# Patient Record
Sex: FEMALE | Race: WHITE | NOT HISPANIC OR LATINO | Employment: OTHER | URBAN - NONMETROPOLITAN AREA
[De-identification: names, ages, dates, MRNs, and addresses within clinical notes are randomized per-mention and may not be internally consistent; named-entity substitution may affect disease eponyms.]

---

## 2017-01-19 ENCOUNTER — OFFICE VISIT (OUTPATIENT)
Dept: GASTROENTEROLOGY | Facility: CLINIC | Age: 57
End: 2017-01-19

## 2017-01-19 VITALS
SYSTOLIC BLOOD PRESSURE: 108 MMHG | HEIGHT: 64 IN | HEART RATE: 90 BPM | BODY MASS INDEX: 44.71 KG/M2 | WEIGHT: 261.9 LBS | DIASTOLIC BLOOD PRESSURE: 70 MMHG

## 2017-01-19 DIAGNOSIS — R11.2 NON-INTRACTABLE VOMITING WITH NAUSEA, UNSPECIFIED VOMITING TYPE: ICD-10-CM

## 2017-01-19 DIAGNOSIS — R10.9 CHRONIC ABDOMINAL PAIN: ICD-10-CM

## 2017-01-19 DIAGNOSIS — K51.818 OTHER ULCERATIVE COLITIS WITH OTHER COMPLICATION (HCC): Primary | ICD-10-CM

## 2017-01-19 DIAGNOSIS — G89.29 CHRONIC ABDOMINAL PAIN: ICD-10-CM

## 2017-01-19 DIAGNOSIS — R19.7 DIARRHEA, UNSPECIFIED TYPE: ICD-10-CM

## 2017-01-19 DIAGNOSIS — D50.9 IRON DEFICIENCY ANEMIA, UNSPECIFIED IRON DEFICIENCY ANEMIA TYPE: ICD-10-CM

## 2017-01-19 LAB
ALBUMIN SERPL-MCNC: 3.3 GM/DL (ref 3.4–4.8)
ALP SERPL-CCNC: 63 U/L (ref 38–126)
ALT SERPL-CCNC: 37 U/L (ref 9–52)
ANION GAP SERPL CALCULATED.3IONS-SCNC: 12 MMOL/L (ref 5–15)
AST SERPL-CCNC: 20 U/L (ref 14–36)
BILIRUB SERPL-MCNC: 0.5 MG/DL (ref 0.2–1.3)
BUN SERPL-MCNC: 17 MG/DL (ref 7–21)
CALCIUM SERPL-MCNC: 8.8 MG/DL (ref 8.4–10.2)
CHLORIDE SERPL-SCNC: 98 MMOL/L (ref 95–110)
CO2 SERPL-SCNC: 26 MMOL/L (ref 22–31)
CREAT SERPL-MCNC: 0.8 MG/DL (ref 0.5–1)
CRP SERPL-MCNC: 2.6 MG/DL (ref 0–1)
FERRITIN SERPL IA-MCNC: 63.6 NG/ML (ref 11.1–264)
GLUCOSE SERPL-MCNC: 120 MG/DL (ref 60–100)
IRON SATN MFR SERPL: 12.4 % (ref 15–50)
IRON SERPL-MCNC: 33 UG/DL (ref 37–170)
POTASSIUM SERPL-SCNC: 4.5 MMOL/L (ref 3.5–5.1)
PROT SERPL-MCNC: 6.8 GM/DL (ref 6.3–8.6)
SODIUM SERPL-SCNC: 136 MMOL/L (ref 137–145)
TIBC SERPL-MCNC: 266 UG/DL (ref 265–497)

## 2017-01-19 PROCEDURE — 99214 OFFICE O/P EST MOD 30 MIN: CPT | Performed by: PHYSICIAN ASSISTANT

## 2017-01-19 RX ORDER — METRONIDAZOLE 500 MG/1
500 TABLET ORAL 4 TIMES DAILY
COMMUNITY
End: 2017-01-26

## 2017-01-19 RX ORDER — PREDNISONE 20 MG/1
20 TABLET ORAL
COMMUNITY
End: 2017-03-06

## 2017-01-19 NOTE — MR AVS SNAPSHOT
Tiffani Serra   1/19/2017 2:00 PM   Office Visit    Dept Phone:  [phone]   Encounter #:  [id number]    Provider:  Joao Arnett PA-C   Department:  [facility] MEDICAL [facility] GASTROENTEROLOGY                Your Full Care Plan              Your Updated Medication List          This list is accurate as of: 1/19/17  2:26 PM.  Always use your most recent med list.                CALTRATE GUMMY BITES PO       ferrous sulfate 325 (65 FE) MG tablet       hyoscyamine 0.125 MG SL tablet   Commonly known as:  LEVSIN       mesalamine 0.375 G 24 hr capsule   Commonly known as:  APRISO       metroNIDAZOLE 500 MG tablet   Commonly known as:  FLAGYL       predniSONE 20 MG tablet   Commonly known as:  DELTASONE               We Performed the Following     C-reactive Protein     C.Difficile Cytotoxin Antibody,Neut.     Calprotectin, Fecal     CBC Auto Differential     Comprehensive Metabolic Panel     CT Abdomen Pelvis With Contrast     Ferritin     Iron and TIBC       You Were Diagnosed With        Codes Comments    Other ulcerative colitis with other complication    -  Primary ICD-10-CM: K51.818  ICD-9-CM: 556.8     Chronic abdominal pain     ICD-10-CM: R10.9, G89.29  ICD-9-CM: 789.00, 338.29     Diarrhea, unspecified type     ICD-10-CM: R19.7  ICD-9-CM: 787.91     Iron deficiency anemia, unspecified iron deficiency anemia type     ICD-10-CM: D50.9  ICD-9-CM: 280.9     Non-intractable vomiting with nausea, unspecified vomiting type     ICD-10-CM: R11.2  ICD-9-CM: 787.01       Instructions     None    Patient Instructions History      Upcoming Appointments     Visit Type Date Time Department    OFFICE VISIT 1/19/2017  2:00 PM [facility] [place] [facility]    OFFICE VISIT 2/8/2017  9:15 AM [facility] [place] [facility]      MyChart Signup     [facility] allows you to send messages to your doctor, view your test results, renew your prescriptions, schedule appointments, and more. To sign up, go to  "[email or web address] and click on the Sign Up Now link in the New User? box. Enter your [employer] Activation Code exactly as it appears below along with the last four digits of your Social Security Number and your Date of Birth () to complete the sign-up process. If you do not sign up before the expiration date, you must request a new code.    [employer] Activation Code: [email or web address]  Expires: 2017  2:25 PM    If you have questions, you can email [email or web address] or call [phone] to talk to our [employer] staff. Remember, [employer] is NOT to be used for urgent needs. For medical emergencies, dial 911.               Other Info from Your Visit           Your Appointments     2017  9:15 AM CST   Office Visit with Joao Arnett PA-C   [facility] MEDICAL [facility] GASTROENTEROLOGY (--)    [address] [place] [address] Dr  [facility] [address] [place] [id number] [phone]           Arrive 15 minutes prior to appointment.              Allergies     Tape      Silk tape    Keflex [Cephalexin]  Rash    Penicillins  Rash    nylon      Reason for Visit     Ulcerative Colitis     Diarrhea     Abdominal Pain           Vital Signs     Blood Pressure Pulse Height Weight Body Mass Index Smoking Status    108/70 (BP Location: Left arm) 90 64\" (162.6 cm) 261 lb 14.4 oz (119 kg) 44.96 kg/m2 Never Smoker      Problems and Diagnoses Noted     Other ulcerative colitis with other complication    -  Primary    Chronic abdominal pain        Diarrhea        Iron deficiency anemia        Non-intractable vomiting with nausea, unspecified vomiting type            "

## 2017-01-19 NOTE — PROGRESS NOTES
Chief Complaint   Patient presents with   • Ulcerative Colitis   • Diarrhea   • Abdominal Pain       Subjective    Tiffani Serra is a 56 y.o. female. she is here today for follow-up.    History of Present Illness    Patient seen on a recheck of her ulcerative colitis, diarrhea, abdominal pain.  Last seen 8/15/16.  She has not kept her follow-up until today.  She had called stating she was ill and we try to get her in to the office but she was hospitalized from 1/3-6/17.  Initial labs showed normal amylase lipase, CMP.  CBC showed white count of 14.7.  CRP was 21.3.  She had GI pathogen panel was negative, she had a negative C. difficile.  A CT scan abdomen pelvis with contrast was unremarkable with a long segment in the mid transverse colon to sigmoid colon inflamed.  Laboratories from the day of discharge CMP showed glucose 126, protein 5.4, albumin 2.7, otherwise normal.  CBC showed a hemoglobin of 10.8, hematocrit 32.2.  She was discharged on Levaquin, Flagyl, prednisone, Pentasa.  She will finish the Flagyl today.  She is on 40 mg of prednisone.  She resumed her Apriso.  She completed the Levaquin.    Patient states she started having itching in her groin for several days in August, [place] got more red and inflamed, she had a boil, it was painful, seem to be getting worse was draining blood and purulent material, before [place] she had irritation around her rectum.  She's had fever.  She's had a lot of belching.  Nausea and vomiting.  She complains of 8 out of 10 left lower quadrant pain.  She has variable bowel movements.  Some day she is passing a lot of blood, Sunday she may have a normal formed stool.  She has urgent bowel movements after she eats.  Her weight is down 11 pounds since last visit.  Last colonoscopy on 6/15/16 showed colitis of the rectum, colon polyps.    A/P: Nausea, vomiting, abdominal pain, blood in stool, weight loss, presumed flare of her ulcerative colitis, suspect recurrent C.  difficile.  I recommended stool studies.  We'll also repeat laboratories, I recommended CT scan abdomen pelvis.  May need to consider repeat endoscopy.  We'll see her back in one week, further pending clinical course and the results of the above.     The following portions of the patient's history were reviewed and updated as appropriate:   Past Medical History   Diagnosis Date   • Abdominal pain    • Abscess of labia    • Acute posthemorrhagic anemia 01/08/2015   • Anemia      history of, mild   • Anemia due to blood loss 11/20/2014   • Contact dermatitis 01/30/2013     on labia and surrounding regions   • Cystitis      recurrent   • Diarrhea 04/11/2016   • Foot pain      porokeratoma causin metatarsalgia, right foot   • Generalized abdominal pain 02/23/2015   • Hypercholesterolemia    • Hypertensive disorder    • Infection of skin and subcutaneous tissue 01/30/2013   • Iron deficiency anemia 04/11/2016     improving   • Irritable bowel syndrome    • Left sided ulcerative colitis 10/08/2015   • Malaise and fatigue 11/02/2011   • Obesity    • Pseudomembranous enterocolitis 11/02/2012   • Rectal hemorrhage 07/01/2015   • Scapulalgia 10/24/2014   • Sialoadenitis 07/26/2013   • Ulcerative colitis 04/11/2016     chronic, for 29 years.  flare   • Urinary tract infectious disease 07/14/2012   • Vitamin D deficiency 05/14/2012     Past Surgical History   Procedure Laterality Date   • Lymph node biopsy  05/12/2009     [place] lymph node biopsy, superficial and deep, within the right groin involving superficial femoral nodes and also the external iliac nodes. Melanoma of the right leg   • Bladder surgery  2001     bladder tacking x 2   • Colonoscopy  07/25/2011     Colitis found in rectum & sigmoid colon. Biopsy taken   • Colonoscopy  06/15/2016     Erythematous mucosa in the rectum.Biopsied.One polyp in the transverse colon. Biopsied   • Colonoscopy  08/15/2008     Colitis of the rectum, the sigmoid and the transverse  colon. Multiple biopsies were obtained from the rectum, the sigmoid and the transverse colon. Multiple biopsies were obtained from the cecum, the transverse colon, sigmoid & rectum   • Knee arthroscopy  02/21/2005     Tears of the meidal and lateral meniscus and osteoarthritis of the knee. Arthroscopic medial and lateral meniscectomies of the right knee with chondroplasty of the medial, lateral femoral condyles and patella   • Excision lesion  10/17/1969     removal of sebaceous cyst of neck   • Wrist ganglion excision  10/17/1969     left wrist   • Total abdominal hysterectomy with salpingo oophorectomy  2001   • Total knee arthroplasty  08/08/2007     severe osteoarthritis of the left knee.     • Joint replacement Left      Family History   Problem Relation Age of Onset   • Coronary artery disease Other    • Hypertension Other      OB History     No data available        Allergies   Allergen Reactions   • Tape      Silk tape   • Keflex [Cephalexin] Rash   • Penicillins Rash     nylon     Social History     Social History   • Marital status:      Spouse name: N/A   • Number of children: N/A   • Years of education: N/A     Social History Main Topics   • Smoking status: Never Smoker   • Smokeless tobacco: Never Used   • Alcohol use No   • Drug use: No   • Sexual activity: Defer     Other Topics Concern   • None     Social History Narrative       Current Outpatient Prescriptions:   •  Ca Phosphate-Cholecalciferol (CALTRATE GUMMY BITES PO), Take  by mouth Daily., Disp: , Rfl:   •  ferrous sulfate 325 (65 FE) MG tablet, Take 325 mg by mouth daily with breakfast., Disp: , Rfl:   •  hyoscyamine (LEVSIN) 0.125 MG SL tablet, Take 0.125 mg by mouth every 4 (four) hours as needed for cramping., Disp: , Rfl:   •  mesalamine (APRISO) 0.375 G 24 hr capsule, Take 375 mg by mouth Daily. 4 CAPS DAILY, Disp: , Rfl:   •  metroNIDAZOLE (FLAGYL) 500 MG tablet, Take 500 mg by mouth 4 (Four) Times a Day., Disp: , Rfl:   •   "predniSONE (DELTASONE) 20 MG tablet, Take 20 mg by mouth. 2 daily, Disp: , Rfl:   Review of Systems  Review of Systems       Objective      Visit Vitals   • /70 (BP Location: Left arm)   • Pulse 90   • Ht 64\" (162.6 cm)   • Wt 261 lb 14.4 oz (119 kg)   • BMI 44.96 kg/m2     Physical Exam   Constitutional: She is oriented to person, place, and time. She appears well-developed and well-nourished. She appears distressed.   Ill appearing   HENT:   Head: Normocephalic and atraumatic.   Eyes: EOM are normal. Pupils are equal, round, and reactive to light.   Neck: Normal range of motion.   Cardiovascular: Normal rate, regular rhythm and normal heart sounds.    Pulmonary/Chest: Effort normal and breath sounds normal.   Abdominal: Soft. She exhibits distension. She exhibits no shifting dullness, no abdominal bruit, no ascites and no mass. Bowel sounds are decreased. There is no hepatosplenomegaly. There is tenderness in the periumbilical area. There is no rigidity, no rebound, no guarding and no CVA tenderness. No hernia. Hernia confirmed negative in the ventral area.   Obese, moderate diffuse   Musculoskeletal: Normal range of motion.   Neurological: She is alert and oriented to person, place, and time.   Skin: Skin is warm. She is diaphoretic.   Psychiatric: She has a normal mood and affect. Her behavior is normal. Judgment and thought content normal.   Nursing note and vitals reviewed.    Admission on 01/03/2017, Discharged on 01/06/2017   Component Date Value Ref Range Status   • Color, UA 01/03/2017 ORANGE* YELLOW,Yellow Final   • Appearance 01/03/2017 TURBID* CLEAR,Clear Final   • Specific Gravity, UA 01/03/2017 1.025  1.003 - 1.030 Final   • pH, UA 01/03/2017 6.0* 5.0 - 9.0 pH Units Final    pH Normal: 5.0 - 9.0    • Leukocytes, UA 01/03/2017 NEGATIVE  Negative,NEGATIVE Final   • Nitrite, UA 01/03/2017 NEGATIVE  Negative,NEGATIVE Final   • Protein, UA 01/03/2017 1+* Negative,NEGATIVE Final   • Glucose, Urine " 01/03/2017 NEGATIVE  Negative,NEGATIVE mg/dl Final   • Ketones, UA 01/03/2017 2+* Negative,NEGATIVE Final   • Urobilinogen, UA 01/03/2017 0.2  0.2 EU/dl Final   • Blood, UA 01/03/2017 NEGATIVE  NEGATIVE Final   • WBC 01/03/2017 14.7* 3.2 - 9.8 x1000/uL Final   • RBC 01/03/2017 4.04  3.77 - 5.16 veena/mm3 Final   • Hemoglobin 01/03/2017 12.4  12.0 - 15.5 gm/dl Final   • Hematocrit 01/03/2017 36.6  35.0 - 45.0 % Final   • MCV 01/03/2017 90.6  80.0 - 98.0 fl Final   • MCH 01/03/2017 30.7  26.0 - 34.0 pg Final   • MCHC 01/03/2017 33.9  31.4 - 36.0 gm/dl Final   • RDW 01/03/2017 12.0  11.5 - 14.5 % Final   • Platelets 01/03/2017 275  150 - 450 x1000/mm3 Final   • MPV 01/03/2017 8.5  8.0 - 12.0 fl Final   • Neutrophil Rel % 01/03/2017 76.0  37.0 - 80.0 % Final   • Lymphocyte Rel % 01/03/2017 11.3  10.0 - 50.0 % Final   • Monocyte Rel % 01/03/2017 7.6  0.0 - 12.0 % Final   • Eosinophil Rel % 01/03/2017 3.7  0.0 - 7.0 % Final   • Basophil Rel % 01/03/2017 0.4  0.0 - 2.0 % Final   • Immature Granulocyte Rel % 01/03/2017 1.00* 0.00 - 0.50 % Final   • Neutrophils Absolute 01/03/2017 11.14* 2.00 - 8.60 x1000/uL Final   • Lymphocytes Absolute 01/03/2017 1.65  0.60 - 4.20 x1000/uL Final   • Monocytes Absolute 01/03/2017 1.11* 0.00 - 0.90 x1000/uL Final   • Eosinophils Absolute 01/03/2017 0.54  0.00 - 0.70 x1000/uL Final   • Basophils Absolute 01/03/2017 0.06  0.00 - 0.20 x1000/uL Final   • Immature Granulocytes Absolute 01/03/2017 0.150* 0.005 - 0.022 x1000/uL Final   • Lipase 01/03/2017 46  23 - 300 U/L Final   • Amylase 01/03/2017 43* 50 - 130 U/L Final   • Sodium 01/03/2017 140  137 - 145 mmol/L Final   • Potassium 01/03/2017 3.6  3.5 - 5.1 mmol/L Final   • Chloride 01/03/2017 99  95 - 110 mmol/L Final   • CO2 01/03/2017 29  22 - 31 mmol/L Final   • Anion Gap 01/03/2017 12.0  5.0 - 15.0 mmol/L Final   • Glucose 01/03/2017 94  60 - 100 mg/dl Final   • BUN 01/03/2017 12  7 - 21 mg/dl Final   • Creatinine 01/03/2017 0.7  0.5 - 1.0  mg/dl Final   • GFR MDRD Non  01/03/2017 86  51 - 120 mL/min/1.73 sq.M Final    Comment: Invalid if creatinine is changing or the patient is on dialysis. Use AA  result if patient is -American, non AA result otherwise.     • GFR MDRD  01/03/2017 104  51 - 120 mL/min/1.73 sq.M Final   • Calcium 01/03/2017 8.8  8.4 - 10.2 mg/dl Final   • Total Protein 01/03/2017 7.3  6.3 - 8.6 gm/dl Final   • Albumin 01/03/2017 3.7  3.4 - 4.8 gm/dl Final   • Total Bilirubin 01/03/2017 0.7  0.2 - 1.3 mg/dl Final   • Alkaline Phosphatase 01/03/2017 109  38 - 126 U/L Final   • AST (SGOT) 01/03/2017 24  14 - 36 U/L Final   • ALT (SGPT) 01/03/2017 32  9 - 52 U/L Final   • C-Reactive Protein 01/03/2017 21.3* 0.0 - 1.0 mg/dl Final   • Prealbumin 01/03/2017 11.1* 17.6 - 36.0 mg/dl Final   • Sodium 01/04/2017 138  137 - 145 mmol/L Final   • Potassium 01/04/2017 4.4  3.5 - 5.1 mmol/L Final   • Chloride 01/04/2017 104  95 - 110 mmol/L Final   • CO2 01/04/2017 23  22 - 31 mmol/L Final   • Anion Gap 01/04/2017 11.0  5.0 - 15.0 mmol/L Final   • Glucose 01/04/2017 126* 60 - 100 mg/dl Final   • BUN 01/04/2017 12  7 - 21 mg/dl Final   • Creatinine 01/04/2017 0.6  0.5 - 1.0 mg/dl Final   • GFR MDRD Non  01/04/2017 103  51 - 120 mL/min/1.73 sq.M Final    Comment: Invalid if creatinine is changing or the patient is on dialysis. Use AA  result if patient is -American, non AA result otherwise.     • GFR MDRD  01/04/2017 125* 51 - 120 mL/min/1.73 sq.M Final   • Calcium 01/04/2017 8.3* 8.4 - 10.2 mg/dl Final   • Total Protein 01/04/2017 5.6* 6.3 - 8.6 gm/dl Final   • Albumin 01/04/2017 2.8* 3.4 - 4.8 gm/dl Final   • Total Bilirubin 01/04/2017 0.6  0.2 - 1.3 mg/dl Final   • Alkaline Phosphatase 01/04/2017 95  38 - 126 U/L Final   • AST (SGOT) 01/04/2017 11* 14 - 36 U/L Final   • ALT (SGPT) 01/04/2017 26  9 - 52 U/L Final   • WBC 01/04/2017 13.2* 3.2 - 9.8 x1000/uL Final   • RBC  01/04/2017 3.64* 3.77 - 5.16 veena/mm3 Final   • Hemoglobin 01/04/2017 11.1* 12.0 - 15.5 gm/dl Final   • Hematocrit 01/04/2017 32.7* 35.0 - 45.0 % Final   • MCV 01/04/2017 89.8  80.0 - 98.0 fl Final   • MCH 01/04/2017 30.5  26.0 - 34.0 pg Final   • MCHC 01/04/2017 33.9  31.4 - 36.0 gm/dl Final   • RDW 01/04/2017 11.9  11.5 - 14.5 % Final   • Platelets 01/04/2017 231  150 - 450 x1000/mm3 Final   • MPV 01/04/2017 8.8  8.0 - 12.0 fl Final   • Neutrophil Rel % 01/04/2017 91.8* 37.0 - 80.0 % Final   • Lymphocyte Rel % 01/04/2017 6.0* 10.0 - 50.0 % Final   • Monocyte Rel % 01/04/2017 1.1  0.0 - 12.0 % Final   • Eosinophil Rel % 01/04/2017 0.2  0.0 - 7.0 % Final   • Basophil Rel % 01/04/2017 0.2  0.0 - 2.0 % Final   • Immature Granulocyte Rel % 01/04/2017 0.70* 0.00 - 0.50 % Final   • Neutrophils Absolute 01/04/2017 12.15* 2.00 - 8.60 x1000/uL Final   • Lymphocytes Absolute 01/04/2017 0.80  0.60 - 4.20 x1000/uL Final   • Monocytes Absolute 01/04/2017 0.15  0.00 - 0.90 x1000/uL Final   • Eosinophils Absolute 01/04/2017 0.02  0.00 - 0.70 x1000/uL Final   • Basophils Absolute 01/04/2017 0.02  0.00 - 0.20 x1000/uL Final   • Immature Granulocytes Absolute 01/04/2017 0.090* 0.005 - 0.022 x1000/uL Final   • GI Panel 01/05/2017 Negative  Negative Final    Testing performed by multiplex PCR.   • C difficile Toxins A+B, EIA 01/05/2017 Negative  Negative,N/A - Formed stool Final   • WBC 01/05/2017 9.9* 3.2 - 9.8 x1000/uL Final   • RBC 01/05/2017 3.67* 3.77 - 5.16 veena/mm3 Final   • Hemoglobin 01/05/2017 11.1* 12.0 - 15.5 gm/dl Final   • Hematocrit 01/05/2017 33.1* 35.0 - 45.0 % Final   • MCV 01/05/2017 90.2  80.0 - 98.0 fl Final   • MCH 01/05/2017 30.2  26.0 - 34.0 pg Final   • MCHC 01/05/2017 33.5  31.4 - 36.0 gm/dl Final   • RDW 01/05/2017 12.0  11.5 - 14.5 % Final   • Platelets 01/05/2017 277  150 - 450 x1000/mm3 Final   • MPV 01/05/2017 8.8  8.0 - 12.0 fl Final   • Neutrophil Rel % 01/05/2017 79.8  37.0 - 80.0 % Final   •  Lymphocyte Rel % 01/05/2017 8.6* 10.0 - 50.0 % Final   • Monocyte Rel % 01/05/2017 9.9  0.0 - 12.0 % Final   • Eosinophil Rel % 01/05/2017 0.1  0.0 - 7.0 % Final   • Basophil Rel % 01/05/2017 0.1  0.0 - 2.0 % Final   • Immature Granulocyte Rel % 01/05/2017 1.50* 0.00 - 0.50 % Final   • Neutrophils Absolute 01/05/2017 7.93  2.00 - 8.60 x1000/uL Final   • Lymphocytes Absolute 01/05/2017 0.85  0.60 - 4.20 x1000/uL Final   • Monocytes Absolute 01/05/2017 0.98* 0.00 - 0.90 x1000/uL Final   • Eosinophils Absolute 01/05/2017 0.01  0.00 - 0.70 x1000/uL Final   • Basophils Absolute 01/05/2017 0.01  0.00 - 0.20 x1000/uL Final   • Immature Granulocytes Absolute 01/05/2017 0.150* 0.005 - 0.022 x1000/uL Final   • Sodium 01/05/2017 141  137 - 145 mmol/L Final   • Potassium 01/05/2017 3.9  3.5 - 5.1 mmol/L Final   • Chloride 01/05/2017 106  95 - 110 mmol/L Final   • CO2 01/05/2017 26  22 - 31 mmol/L Final   • Anion Gap 01/05/2017 9.0  5.0 - 15.0 mmol/L Final   • Glucose 01/05/2017 116* 60 - 100 mg/dl Final   • BUN 01/05/2017 14  7 - 21 mg/dl Final   • Creatinine 01/05/2017 0.6  0.5 - 1.0 mg/dl Final   • GFR MDRD Non  01/05/2017 103  51 - 120 mL/min/1.73 sq.M Final    Comment: Invalid if creatinine is changing or the patient is on dialysis. Use AA  result if patient is -American, non AA result otherwise.     • GFR MDRD  01/05/2017 125* 51 - 120 mL/min/1.73 sq.M Final   • Calcium 01/05/2017 8.6  8.4 - 10.2 mg/dl Final   • Total Protein 01/05/2017 5.8* 6.3 - 8.6 gm/dl Final   • Albumin 01/05/2017 2.9* 3.4 - 4.8 gm/dl Final   • Total Bilirubin 01/05/2017 0.3  0.2 - 1.3 mg/dl Final   • Alkaline Phosphatase 01/05/2017 87  38 - 126 U/L Final   • AST (SGOT) 01/05/2017 11* 14 - 36 U/L Final   • ALT (SGPT) 01/05/2017 28  9 - 52 U/L Final   • WBC 01/06/2017 8.6  3.2 - 9.8 x1000/uL Final   • RBC 01/06/2017 3.60* 3.77 - 5.16 veena/mm3 Final   • Hemoglobin 01/06/2017 10.8* 12.0 - 15.5 gm/dl Final   •  Hematocrit 01/06/2017 32.2* 35.0 - 45.0 % Final   • MCV 01/06/2017 89.4  80.0 - 98.0 fl Final   • MCH 01/06/2017 30.0  26.0 - 34.0 pg Final   • MCHC 01/06/2017 33.5  31.4 - 36.0 gm/dl Final   • RDW 01/06/2017 12.6  11.5 - 14.5 % Final   • Platelets 01/06/2017 247  150 - 450 x1000/mm3 Final   • MPV 01/06/2017 8.7  8.0 - 12.0 fl Final   • Neutrophil Rel % 01/06/2017 75.1  37.0 - 80.0 % Final   • Lymphocyte Rel % 01/06/2017 12.4  10.0 - 50.0 % Final   • Monocyte Rel % 01/06/2017 5.8  0.0 - 12.0 % Final   • Eosinophil Rel % 01/06/2017 0.1  0.0 - 7.0 % Final   • Basophil Rel % 01/06/2017 0.6  0.0 - 2.0 % Final   • Immature Granulocyte Rel % 01/06/2017 6.00* 0.00 - 0.50 % Final   • Neutrophils Absolute 01/06/2017 6.44  2.00 - 8.60 x1000/uL Final   • Lymphocytes Absolute 01/06/2017 1.06  0.60 - 4.20 x1000/uL Final   • Monocytes Absolute 01/06/2017 0.50  0.00 - 0.90 x1000/uL Final   • Eosinophils Absolute 01/06/2017 0.01  0.00 - 0.70 x1000/uL Final   • Basophils Absolute 01/06/2017 0.05  0.00 - 0.20 x1000/uL Final   • Immature Granulocytes Absolute 01/06/2017 0.510* 0.005 - 0.022 x1000/uL Final   • nRBC 01/06/2017 0.0  0.0 - 0.2 % Final   • nRBC 01/06/2017 0.000  x1000/uL Final   • Neutrophil Rel % 01/06/2017 61  37 - 80 % Final   • Bands Rel %  01/06/2017 1* 3 - 5 % Final   • Lymphocyte Rel % 01/06/2017 24  10 - 50 % Final   • Monocyte Rel % 01/06/2017 10  0 - 12 % Final   • Eosinophil Rel % 01/06/2017 1  0 - 7 % Final   • Metamyelocyte % 01/06/2017 2* 0 - 0 % Final   • Myelocyte Rel % 01/06/2017 1* 0 - 0 % Final   • RBC Morphology 01/06/2017 Normocytic Normochromic   Final   • Platelet Estimate 01/06/2017 Normal   Final   • Total Counted 01/06/2017 100   Final   • Sodium 01/06/2017 139  137 - 145 mmol/L Final   • Potassium 01/06/2017 4.0  3.5 - 5.1 mmol/L Final   • Chloride 01/06/2017 107  95 - 110 mmol/L Final   • CO2 01/06/2017 23  22 - 31 mmol/L Final   • Anion Gap 01/06/2017 9.0  5.0 - 15.0 mmol/L Final   • Glucose  01/06/2017 126* 60 - 100 mg/dl Final   • BUN 01/06/2017 15  7 - 21 mg/dl Final   • Creatinine 01/06/2017 0.6  0.5 - 1.0 mg/dl Final   • GFR MDRD Non  01/06/2017 103  51 - 120 mL/min/1.73 sq.M Final    Comment: Invalid if creatinine is changing or the patient is on dialysis. Use AA  result if patient is -American, non AA result otherwise.     • GFR MDRD  01/06/2017 125* 51 - 120 mL/min/1.73 sq.M Final   • Calcium 01/06/2017 8.5  8.4 - 10.2 mg/dl Final   • Total Protein 01/06/2017 5.4* 6.3 - 8.6 gm/dl Final   • Albumin 01/06/2017 2.7* 3.4 - 4.8 gm/dl Final   • Total Bilirubin 01/06/2017 0.3  0.2 - 1.3 mg/dl Final   • Alkaline Phosphatase 01/06/2017 70  38 - 126 U/L Final   • AST (SGOT) 01/06/2017 10* 14 - 36 U/L Final   • ALT (SGPT) 01/06/2017 28  9 - 52 U/L Final     Assessment/Plan      1. Other ulcerative colitis with other complication    2. Chronic abdominal pain    3. Diarrhea, unspecified type    4. Iron deficiency anemia, unspecified iron deficiency anemia type    5. Non-intractable vomiting with nausea, unspecified vomiting type    .   Tiffani was seen today for ulcerative colitis, diarrhea and abdominal pain.    Diagnoses and all orders for this visit:    Other ulcerative colitis with other complication  -     Calprotectin, Fecal  -     C.Difficile Cytotoxin Antibody,Neut.  -     Comprehensive Metabolic Panel  -     CBC Auto Differential  -     C-reactive Protein  -     CT Abdomen Pelvis With Contrast    Chronic abdominal pain  -     Comprehensive Metabolic Panel  -     CBC Auto Differential  -     C-reactive Protein  -     CT Abdomen Pelvis With Contrast    Diarrhea, unspecified type  -     Calprotectin, Fecal  -     C.Difficile Cytotoxin Antibody,Neut.  -     Comprehensive Metabolic Panel  -     CBC Auto Differential  -     C-reactive Protein  -     CT Abdomen Pelvis With Contrast    Iron deficiency anemia, unspecified iron deficiency anemia type  -     Comprehensive  Metabolic Panel  -     CBC Auto Differential  -     C-reactive Protein  -     Iron and TIBC  -     Ferritin  -     CT Abdomen Pelvis With Contrast    Non-intractable vomiting with nausea, unspecified vomiting type        Orders placed during this encounter include:  Orders Placed This Encounter   Procedures   • CT Abdomen Pelvis With Contrast     Order Specific Question:   Reason for Exam:     Answer:   N/V/D, BABAR, blood stool     Order Specific Question:   Is the patient pregnant?     Answer:   No   • Calprotectin, Fecal   • C.Difficile Cytotoxin Antibody,Neut.   • Comprehensive Metabolic Panel   • CBC Auto Differential   • C-reactive Protein   • Iron and TIBC   • Ferritin       Medications prescribed:  No orders of the defined types were placed in this encounter.      Requested Prescriptions      No prescriptions requested or ordered in this encounter       Review and/or summary of lab tests, radiology, procedures, medications. Review and summary of old records and obtaining of history. The risks and benefits of my recommendations, as well as other treatment options were discussed with the patient today. Questions were answered.    Follow-up: Return in about 1 week (around 1/26/2017), or if symptoms worsen or fail to improve.           This document has been electronically signed by Joao Arnett PA-C on January 25, 2017 5:43 PM      Results for orders placed or performed during the hospital encounter of 01/19/17   Clostridium Difficile Toxin, PCR   Result Value Ref Range    Toxogenic C. difficile Negative Negative    O27 Negative Negative   CBC & Differential   Result Value Ref Range    WBC 8.8 3.2 - 9.8 x1000/uL    RBC 3.97 3.77 - 5.16 veena/mm3    Hemoglobin 11.9 (L) 12.0 - 15.5 gm/dl    Hematocrit 36.4 35.0 - 45.0 %    MCV 91.7 80.0 - 98.0 fl    MCH 30.0 26.0 - 34.0 pg    MCHC 32.7 31.4 - 36.0 gm/dl    RDW 13.4 11.5 - 14.5 %    Platelets 324 150 - 450 x1000/mm3    MPV 8.0 8.0 - 12.0 fl    Neutrophil Rel %  79.8 37.0 - 80.0 %    Lymphocyte Rel % 10.3 10.0 - 50.0 %    Monocyte Rel % 8.9 0.0 - 12.0 %    Eosinophil Rel % 0.1 0.0 - 7.0 %    Basophil Rel % 0.1 0.0 - 2.0 %    Immature Granulocyte Rel % 0.80 (H) 0.00 - 0.50 %    Neutrophils Absolute 7.04 2.00 - 8.60 x1000/uL    Lymphocytes Absolute 0.91 0.60 - 4.20 x1000/uL    Monocytes Absolute 0.79 0.00 - 0.90 x1000/uL    Eosinophils Absolute 0.01 0.00 - 0.70 x1000/uL    Basophils Absolute 0.01 0.00 - 0.20 x1000/uL    Immature Granulocytes Absolute 0.070 (H) 0.005 - 0.022 x1000/uL   Results for orders placed or performed in visit on 01/19/17   Calprotectin, Fecal   Result Value Ref Range    Calprotectin, Fecal 298 (H) 0 - 120 ug/g   Iron and TIBC   Result Value Ref Range    Iron 33 (L) 37 - 170 ug/dl    TIBC 266 265 - 497 ug/dl    Iron Saturation 12.4 (L) 15.0 - 50.0 %   C-reactive Protein   Result Value Ref Range    C-Reactive Protein 2.6 (H) 0.0 - 1.0 mg/dl   Ferritin   Result Value Ref Range    Ferritin 63.6 11.1 - 264.0 ng/ml   Comprehensive Metabolic Panel   Result Value Ref Range    Sodium 136 (L) 137 - 145 mmol/L    Potassium 4.5 3.5 - 5.1 mmol/L    Chloride 98 95 - 110 mmol/L    CO2 26 22 - 31 mmol/L    Anion Gap 12.0 5.0 - 15.0 mmol/L    Glucose 120 (H) 60 - 100 mg/dl    BUN 17 7 - 21 mg/dl    Creatinine 0.8 0.5 - 1.0 mg/dl    GFR MDRD Non  74 51 - 120 mL/min/1.73 sq.M    GFR MDRD  89 51 - 120 mL/min/1.73 sq.M    Calcium 8.8 8.4 - 10.2 mg/dl    Total Protein 6.8 6.3 - 8.6 gm/dl    Albumin 3.3 (L) 3.4 - 4.8 gm/dl    Total Bilirubin 0.5 0.2 - 1.3 mg/dl    Alkaline Phosphatase 63 38 - 126 U/L    AST (SGOT) 20 14 - 36 U/L    ALT (SGPT) 37 9 - 52 U/L   Results for orders placed or performed during the hospital encounter of 01/03/17   Gastrointestinal Panel PCR   Result Value Ref Range    GI Panel Negative Negative    C difficile Toxins A+B, EIA Negative Negative,N/A - Formed stool   Manual Differential   Result Value Ref Range     Neutrophil Rel % 61 37 - 80 %    Bands Rel %  1 (L) 3 - 5 %    Lymphocyte Rel % 24 10 - 50 %    Monocyte Rel % 10 0 - 12 %    Eosinophil Rel % 1 0 - 7 %    Metamyelocyte % 2 (H) 0 - 0 %    Myelocyte Rel % 1 (H) 0 - 0 %    RBC Morphology Normocytic Normochromic     Platelet Estimate Normal     Total Counted 100    Urinalysis   Result Value Ref Range    Color, UA ORANGE (H) YELLOW,Yellow    Appearance TURBID (H) CLEAR,Clear    Specific [place], UA 1.025 1.003 - 1.030    pH, UA 6.0 (H) 5.0 - 9.0 pH Units    Leukocytes, UA NEGATIVE Negative,NEGATIVE    Nitrite, UA NEGATIVE Negative,NEGATIVE    Protein, UA 1+ (H) Negative,NEGATIVE    Glucose, Urine NEGATIVE Negative,NEGATIVE mg/dl    Ketones, UA 2+ (H) Negative,NEGATIVE    Urobilinogen, UA 0.2 0.2 EU/dl    Blood, UA NEGATIVE NEGATIVE   CBC & Differential   Result Value Ref Range    WBC 8.6 3.2 - 9.8 x1000/uL    RBC 3.60 (L) 3.77 - 5.16 veena/mm3    Hemoglobin 10.8 (L) 12.0 - 15.5 gm/dl    Hematocrit 32.2 (L) 35.0 - 45.0 %    MCV 89.4 80.0 - 98.0 fl    MCH 30.0 26.0 - 34.0 pg    MCHC 33.5 31.4 - 36.0 gm/dl    RDW 12.6 11.5 - 14.5 %    Platelets 247 150 - 450 x1000/mm3    MPV 8.7 8.0 - 12.0 fl    Neutrophil Rel % 75.1 37.0 - 80.0 %    Lymphocyte Rel % 12.4 10.0 - 50.0 %    Monocyte Rel % 5.8 0.0 - 12.0 %    Eosinophil Rel % 0.1 0.0 - 7.0 %    Basophil Rel % 0.6 0.0 - 2.0 %    Immature Granulocyte Rel % 6.00 (H) 0.00 - 0.50 %    Neutrophils Absolute 6.44 2.00 - 8.60 x1000/uL    Lymphocytes Absolute 1.06 0.60 - 4.20 x1000/uL    Monocytes Absolute 0.50 0.00 - 0.90 x1000/uL    Eosinophils Absolute 0.01 0.00 - 0.70 x1000/uL    Basophils Absolute 0.05 0.00 - 0.20 x1000/uL    Immature Granulocytes Absolute 0.510 (H) 0.005 - 0.022 x1000/uL    nRBC 0.0 0.0 - 0.2 %    nRBC 0.000 x1000/uL   CBC & Differential   Result Value Ref Range    WBC 9.9 (H) 3.2 - 9.8 x1000/uL    RBC 3.67 (L) 3.77 - 5.16 veena/mm3    Hemoglobin 11.1 (L) 12.0 - 15.5 gm/dl    Hematocrit 33.1 (L) 35.0 - 45.0 %     MCV 90.2 80.0 - 98.0 fl    MCH 30.2 26.0 - 34.0 pg    MCHC 33.5 31.4 - 36.0 gm/dl    RDW 12.0 11.5 - 14.5 %    Platelets 277 150 - 450 x1000/mm3    MPV 8.8 8.0 - 12.0 fl    Neutrophil Rel % 79.8 37.0 - 80.0 %    Lymphocyte Rel % 8.6 (L) 10.0 - 50.0 %    Monocyte Rel % 9.9 0.0 - 12.0 %    Eosinophil Rel % 0.1 0.0 - 7.0 %    Basophil Rel % 0.1 0.0 - 2.0 %    Immature Granulocyte Rel % 1.50 (H) 0.00 - 0.50 %    Neutrophils Absolute 7.93 2.00 - 8.60 x1000/uL    Lymphocytes Absolute 0.85 0.60 - 4.20 x1000/uL    Monocytes Absolute 0.98 (H) 0.00 - 0.90 x1000/uL    Eosinophils Absolute 0.01 0.00 - 0.70 x1000/uL    Basophils Absolute 0.01 0.00 - 0.20 x1000/uL    Immature Granulocytes Absolute 0.150 (H) 0.005 - 0.022 x1000/uL     *Note: Due to a large number of results and/or encounters for the requested time period, some results have not been displayed. A complete set of results can be found in Results Review.

## 2017-01-24 ENCOUNTER — HOSPITAL ENCOUNTER (OUTPATIENT)
Dept: CT IMAGING | Facility: HOSPITAL | Age: 57
Discharge: HOME | End: 2017-01-24
Admitting: PHYSICIAN ASSISTANT

## 2017-01-24 LAB — CALPROTECTIN STL-MCNT: 298 UG/G (ref 0–120)

## 2017-01-24 PROCEDURE — 0 IOPAMIDOL 61 % SOLUTION: Performed by: NURSE PRACTITIONER

## 2017-01-24 PROCEDURE — 74177 CT ABD & PELVIS W/CONTRAST: CPT

## 2017-01-24 RX ADMIN — IOPAMIDOL 95 ML: 612 INJECTION, SOLUTION INTRAVENOUS at 16:30

## 2017-01-26 ENCOUNTER — OFFICE VISIT (OUTPATIENT)
Dept: GASTROENTEROLOGY | Facility: CLINIC | Age: 57
End: 2017-01-26

## 2017-01-26 VITALS
DIASTOLIC BLOOD PRESSURE: 70 MMHG | SYSTOLIC BLOOD PRESSURE: 132 MMHG | HEART RATE: 85 BPM | HEIGHT: 64 IN | WEIGHT: 260.9 LBS | BODY MASS INDEX: 44.54 KG/M2

## 2017-01-26 DIAGNOSIS — R10.9 CHRONIC ABDOMINAL PAIN: Primary | ICD-10-CM

## 2017-01-26 DIAGNOSIS — R19.7 DIARRHEA, UNSPECIFIED TYPE: ICD-10-CM

## 2017-01-26 DIAGNOSIS — G89.29 CHRONIC ABDOMINAL PAIN: Primary | ICD-10-CM

## 2017-01-26 DIAGNOSIS — K51.818 OTHER ULCERATIVE COLITIS WITH OTHER COMPLICATION (HCC): ICD-10-CM

## 2017-01-26 DIAGNOSIS — D50.9 IRON DEFICIENCY ANEMIA, UNSPECIFIED IRON DEFICIENCY ANEMIA TYPE: ICD-10-CM

## 2017-01-26 DIAGNOSIS — R11.0 NAUSEA: ICD-10-CM

## 2017-01-26 PROCEDURE — 99214 OFFICE O/P EST MOD 30 MIN: CPT | Performed by: PHYSICIAN ASSISTANT

## 2017-01-26 RX ORDER — SUCRALFATE 1 G/1
1 TABLET ORAL 3 TIMES DAILY
Qty: 90 TABLET | Refills: 1 | Status: SHIPPED | OUTPATIENT
Start: 2017-01-26 | End: 2017-06-07

## 2017-02-02 ENCOUNTER — OFFICE VISIT (OUTPATIENT)
Dept: GASTROENTEROLOGY | Facility: CLINIC | Age: 57
End: 2017-02-02

## 2017-02-02 VITALS
BODY MASS INDEX: 46.33 KG/M2 | WEIGHT: 271.4 LBS | SYSTOLIC BLOOD PRESSURE: 131 MMHG | DIASTOLIC BLOOD PRESSURE: 79 MMHG | HEIGHT: 64 IN | HEART RATE: 87 BPM

## 2017-02-02 DIAGNOSIS — K51.818 OTHER ULCERATIVE COLITIS WITH OTHER COMPLICATION (HCC): Primary | ICD-10-CM

## 2017-02-02 DIAGNOSIS — G89.29 CHRONIC ABDOMINAL PAIN: ICD-10-CM

## 2017-02-02 DIAGNOSIS — R10.9 CHRONIC ABDOMINAL PAIN: ICD-10-CM

## 2017-02-02 DIAGNOSIS — R19.7 DIARRHEA, UNSPECIFIED TYPE: ICD-10-CM

## 2017-02-02 DIAGNOSIS — R11.0 NAUSEA: ICD-10-CM

## 2017-02-02 PROCEDURE — 99213 OFFICE O/P EST LOW 20 MIN: CPT | Performed by: PHYSICIAN ASSISTANT

## 2017-02-02 RX ORDER — DEXLANSOPRAZOLE 60 MG/1
60 CAPSULE, DELAYED RELEASE ORAL DAILY
COMMUNITY
End: 2017-06-07

## 2017-02-02 RX ORDER — PREDNISONE 10 MG/1
TABLET ORAL
Qty: 100 TABLET | Refills: 0 | Status: SHIPPED | OUTPATIENT
Start: 2017-02-02 | End: 2017-06-07

## 2017-02-02 NOTE — PROGRESS NOTES
Chief Complaint   Patient presents with   • Ulcerative Colitis       ENDO PROCEDURE ORDERED:    Subjective    Tiffani Serra is a 56 y.o. female. she is here today for follow-up.    History of Present Illness    Patient seen on a recheck of her abdominal pain, ulcerative colitis, chronic GERD, diarrhea.  Last seen 1/26/17.  Patient was started on prednisone 40 mg 1/6/17.  She's been having injections in her knees by Dr. Marion.  She's been doing better since starting the prednisone.  She's had less gas.  She's had less belching.  She still seeing a trace of blood on the tissue paper.  She's had much less discomfort, diarrhea.  No nausea, vomiting, fevers.  Weight is up 10 pounds since last visit.  She is on Dexilant for her chronic GERD.  She is taking iron.  She is on Apriso, Carafate.    A/P: Patient appears improved on current regimen.  We'll attempt to decrease her prednisone to 30 mg for now.  She is asked to hold that for one week at 30 mg, as long as she is improved she may begin to taper down by 5 mg per week.  I've asked her to follow-up in 4 weeks, further pending clinical course and the results of the above.     The following portions of the patient's history were reviewed and updated as appropriate:   Past Medical History   Diagnosis Date   • Abdominal pain    • Abscess of labia    • Acute posthemorrhagic anemia 01/08/2015   • Anemia      history of, mild   • Anemia due to blood loss 11/20/2014   • Contact dermatitis 01/30/2013     on labia and surrounding regions   • Cystitis      recurrent   • Diarrhea 04/11/2016   • Foot pain      porokeratoma causin metatarsalgia, right foot   • Generalized abdominal pain 02/23/2015   • Hypercholesterolemia    • Hypertensive disorder    • Infection of skin and subcutaneous tissue 01/30/2013   • Iron deficiency anemia 04/11/2016     improving   • Irritable bowel syndrome    • Left sided ulcerative colitis 10/08/2015   • Malaise and fatigue 11/02/2011   • Obesity     • Pseudomembranous enterocolitis 11/02/2012   • Rectal hemorrhage 07/01/2015   • Scapulalgia 10/24/2014   • Sialoadenitis 07/26/2013   • Ulcerative colitis 04/11/2016     chronic, for 29 years.  flare   • Urinary tract infectious disease 07/14/2012   • Vitamin D deficiency 05/14/2012     Past Surgical History   Procedure Laterality Date   • Lymph node biopsy  05/12/2009     [place] lymph node biopsy, superficial and deep, within the right groin involving superficial femoral nodes and also the external iliac nodes. Melanoma of the right leg   • Bladder surgery  2001     bladder tacking x 2   • Colonoscopy  07/25/2011     Colitis found in rectum & sigmoid colon. Biopsy taken   • Colonoscopy  06/15/2016     Erythematous mucosa in the rectum.Biopsied.One polyp in the transverse colon. Biopsied   • Colonoscopy  08/15/2008     Colitis of the rectum, the sigmoid and the transverse colon. Multiple biopsies were obtained from the rectum, the sigmoid and the transverse colon. Multiple biopsies were obtained from the cecum, the transverse colon, sigmoid & rectum   • Knee arthroscopy  02/21/2005     Tears of the meidal and lateral meniscus and osteoarthritis of the knee. Arthroscopic medial and lateral meniscectomies of the right knee with chondroplasty of the medial, lateral femoral condyles and patella   • Excision lesion  10/17/1969     removal of sebaceous cyst of neck   • Wrist ganglion excision  10/17/1969     left wrist   • Total abdominal hysterectomy with salpingo oophorectomy  2001   • Total knee arthroplasty  08/08/2007     severe osteoarthritis of the left knee.     • Joint replacement Left      Family History   Problem Relation Age of Onset   • Coronary artery disease Other    • Hypertension Other      OB History     No data available        Allergies   Allergen Reactions   • Tape      Silk tape   • Keflex [Cephalexin] Rash   • Penicillins Rash     nylon     Social History     Social History   • Marital status:  "     Spouse name: N/A   • Number of children: N/A   • Years of education: N/A     Social History Main Topics   • Smoking status: Never Smoker   • Smokeless tobacco: Never Used   • Alcohol use No   • Drug use: No   • Sexual activity: Defer     Other Topics Concern   • None     Social History Narrative       Current Outpatient Prescriptions:   •  Ca Phosphate-Cholecalciferol (CALTRATE GUMMY BITES PO), Take  by mouth Daily., Disp: , Rfl:   •  dexlansoprazole (DEXILANT) 60 MG capsule, Take 60 mg by mouth Daily., Disp: , Rfl:   •  ferrous sulfate 325 (65 FE) MG tablet, Take 325 mg by mouth daily with breakfast., Disp: , Rfl:   •  hyoscyamine (LEVSIN) 0.125 MG SL tablet, Take 0.125 mg by mouth every 4 (four) hours as needed for cramping., Disp: , Rfl:   •  mesalamine (APRISO) 0.375 G 24 hr capsule, Take 375 mg by mouth Daily. 4 CAPS DAILY, Disp: , Rfl:   •  predniSONE (DELTASONE) 20 MG tablet, Take 20 mg by mouth. 2 daily, Disp: , Rfl:   •  sucralfate (CARAFATE) 1 G tablet, Take 1 tablet by mouth 3 (Three) Times a Day., Disp: 90 tablet, Rfl: 1  •  predniSONE (DELTASONE) 10 MG tablet, As directed, Disp: 100 tablet, Rfl: 0    Current Facility-Administered Medications:   •  sodium hyaluronate (SUPARTZ) injection 25 mg, 25 mg, Intra-articular, Weekly, Yury Marion MD, 25 mg at 02/09/17 1645  •  sodium hyaluronate (SUPARTZ) injection 25 mg, 25 mg, Intra-articular, Weekly, Yury Marion MD, 25 mg at 02/16/17 1702  Review of Systems  Review of Systems       Objective      Visit Vitals   • /79 (BP Location: Left arm)   • Pulse 87   • Ht 64\" (162.6 cm)   • Wt 271 lb 6.4 oz (123 kg)   • BMI 46.59 kg/m2     Physical Exam   Constitutional: She is oriented to person, place, and time. She appears well-developed and well-nourished. No distress.   Ill appearing   HENT:   Head: Normocephalic and atraumatic.   Eyes: EOM are normal. Pupils are equal, round, and reactive to light.   Neck: Normal range of motion. "   Cardiovascular: Normal rate, regular rhythm and normal heart sounds.    Pulmonary/Chest: Effort normal and breath sounds normal.   Abdominal: Soft. She exhibits distension. She exhibits no shifting dullness, no abdominal bruit, no ascites and no mass. Bowel sounds are decreased. There is no hepatosplenomegaly. There is tenderness in the periumbilical area. There is no rigidity, no rebound, no guarding and no CVA tenderness. No hernia. Hernia confirmed negative in the ventral area.   Obese, moderate diffuse   Musculoskeletal: Normal range of motion.   Neurological: She is alert and oriented to person, place, and time.   Skin: Skin is warm. She is not diaphoretic.   Psychiatric: She has a normal mood and affect. Her behavior is normal. Judgment and thought content normal.   Nursing note and vitals reviewed.    Hospital Outpatient Visit on 01/19/2017   Component Date Value Ref Range Status   • WBC 01/19/2017 8.8  3.2 - 9.8 x1000/uL Final   • RBC 01/19/2017 3.97  3.77 - 5.16 veena/mm3 Final   • Hemoglobin 01/19/2017 11.9* 12.0 - 15.5 gm/dl Final   • Hematocrit 01/19/2017 36.4  35.0 - 45.0 % Final   • MCV 01/19/2017 91.7  80.0 - 98.0 fl Final   • MCH 01/19/2017 30.0  26.0 - 34.0 pg Final   • MCHC 01/19/2017 32.7  31.4 - 36.0 gm/dl Final   • RDW 01/19/2017 13.4  11.5 - 14.5 % Final   • Platelets 01/19/2017 324  150 - 450 x1000/mm3 Final   • MPV 01/19/2017 8.0  8.0 - 12.0 fl Final   • Neutrophil Rel % 01/19/2017 79.8  37.0 - 80.0 % Final   • Lymphocyte Rel % 01/19/2017 10.3  10.0 - 50.0 % Final   • Monocyte Rel % 01/19/2017 8.9  0.0 - 12.0 % Final   • Eosinophil Rel % 01/19/2017 0.1  0.0 - 7.0 % Final   • Basophil Rel % 01/19/2017 0.1  0.0 - 2.0 % Final   • Immature Granulocyte Rel % 01/19/2017 0.80* 0.00 - 0.50 % Final   • Neutrophils Absolute 01/19/2017 7.04  2.00 - 8.60 x1000/uL Final   • Lymphocytes Absolute 01/19/2017 0.91  0.60 - 4.20 x1000/uL Final   • Monocytes Absolute 01/19/2017 0.79  0.00 - 0.90 x1000/uL Final    • Eosinophils Absolute 01/19/2017 0.01  0.00 - 0.70 x1000/uL Final   • Basophils Absolute 01/19/2017 0.01  0.00 - 0.20 x1000/uL Final   • Immature Granulocytes Absolute 01/19/2017 0.070* 0.005 - 0.022 x1000/uL Final   • Toxogenic C. difficile 01/19/2017 Negative  Negative Final   • O27 01/19/2017 Negative  Negative Final     Assessment/Plan      1. Other ulcerative colitis with other complication    2. Chronic abdominal pain    3. Diarrhea, unspecified type    4. Nausea    .   Tiffani was seen today for ulcerative colitis.    Diagnoses and all orders for this visit:    Other ulcerative colitis with other complication  -     predniSONE (DELTASONE) 10 MG tablet; As directed    Chronic abdominal pain  -     predniSONE (DELTASONE) 10 MG tablet; As directed    Diarrhea, unspecified type  -     predniSONE (DELTASONE) 10 MG tablet; As directed    Nausea        Orders placed during this encounter include:  No orders of the defined types were placed in this encounter.      Medications prescribed:  New Medications Ordered This Visit   Medications   • predniSONE (DELTASONE) 10 MG tablet     Sig: As directed     Dispense:  100 tablet     Refill:  0       Requested Prescriptions     Signed Prescriptions Disp Refills   • predniSONE (DELTASONE) 10 MG tablet 100 tablet 0     Sig: As directed       Review and/or summary of lab tests, radiology, procedures, medications. Review and summary of old records and obtaining of history. The risks and benefits of my recommendations, as well as other treatment options were discussed with the patient today. Questions were answered.    Follow-up: Return in about 4 weeks (around 3/2/2017), or if symptoms worsen or fail to improve.           This document has been electronically signed by Joao Arnett PA-C on February 23, 2017 5:36 PM      Results for orders placed or performed during the hospital encounter of 01/19/17   Clostridium Difficile Toxin, PCR   Result Value Ref Range    Toxogenic  C. difficile Negative Negative    O27 Negative Negative   CBC & Differential   Result Value Ref Range    WBC 8.8 3.2 - 9.8 x1000/uL    RBC 3.97 3.77 - 5.16 veena/mm3    Hemoglobin 11.9 (L) 12.0 - 15.5 gm/dl    Hematocrit 36.4 35.0 - 45.0 %    MCV 91.7 80.0 - 98.0 fl    MCH 30.0 26.0 - 34.0 pg    MCHC 32.7 31.4 - 36.0 gm/dl    RDW 13.4 11.5 - 14.5 %    Platelets 324 150 - 450 x1000/mm3    MPV 8.0 8.0 - 12.0 fl    Neutrophil Rel % 79.8 37.0 - 80.0 %    Lymphocyte Rel % 10.3 10.0 - 50.0 %    Monocyte Rel % 8.9 0.0 - 12.0 %    Eosinophil Rel % 0.1 0.0 - 7.0 %    Basophil Rel % 0.1 0.0 - 2.0 %    Immature Granulocyte Rel % 0.80 (H) 0.00 - 0.50 %    Neutrophils Absolute 7.04 2.00 - 8.60 x1000/uL    Lymphocytes Absolute 0.91 0.60 - 4.20 x1000/uL    Monocytes Absolute 0.79 0.00 - 0.90 x1000/uL    Eosinophils Absolute 0.01 0.00 - 0.70 x1000/uL    Basophils Absolute 0.01 0.00 - 0.20 x1000/uL    Immature Granulocytes Absolute 0.070 (H) 0.005 - 0.022 x1000/uL   Results for orders placed or performed in visit on 01/19/17   Calprotectin, Fecal   Result Value Ref Range    Calprotectin, Fecal 298 (H) 0 - 120 ug/g   Iron and TIBC   Result Value Ref Range    Iron 33 (L) 37 - 170 ug/dl    TIBC 266 265 - 497 ug/dl    Iron Saturation 12.4 (L) 15.0 - 50.0 %   C-reactive Protein   Result Value Ref Range    C-Reactive Protein 2.6 (H) 0.0 - 1.0 mg/dl   Ferritin   Result Value Ref Range    Ferritin 63.6 11.1 - 264.0 ng/ml   Comprehensive Metabolic Panel   Result Value Ref Range    Sodium 136 (L) 137 - 145 mmol/L    Potassium 4.5 3.5 - 5.1 mmol/L    Chloride 98 95 - 110 mmol/L    CO2 26 22 - 31 mmol/L    Anion Gap 12.0 5.0 - 15.0 mmol/L    Glucose 120 (H) 60 - 100 mg/dl    BUN 17 7 - 21 mg/dl    Creatinine 0.8 0.5 - 1.0 mg/dl    GFR MDRD Non  74 51 - 120 mL/min/1.73 sq.M    GFR MDRD  89 51 - 120 mL/min/1.73 sq.M    Calcium 8.8 8.4 - 10.2 mg/dl    Total Protein 6.8 6.3 - 8.6 gm/dl    Albumin 3.3 (L) 3.4 - 4.8  gm/dl    Total Bilirubin 0.5 0.2 - 1.3 mg/dl    Alkaline Phosphatase 63 38 - 126 U/L    AST (SGOT) 20 14 - 36 U/L    ALT (SGPT) 37 9 - 52 U/L   Results for orders placed or performed during the hospital encounter of 01/03/17   Gastrointestinal Panel PCR   Result Value Ref Range    GI Panel Negative Negative    C difficile Toxins A+B, EIA Negative Negative,N/A - Formed stool   Manual Differential   Result Value Ref Range    Neutrophil Rel % 61 37 - 80 %    Bands Rel %  1 (L) 3 - 5 %    Lymphocyte Rel % 24 10 - 50 %    Monocyte Rel % 10 0 - 12 %    Eosinophil Rel % 1 0 - 7 %    Metamyelocyte % 2 (H) 0 - 0 %    Myelocyte Rel % 1 (H) 0 - 0 %    RBC Morphology Normocytic Normochromic     Platelet Estimate Normal     Total Counted 100    Urinalysis   Result Value Ref Range    Color, UA ORANGE (H) YELLOW,Yellow    Appearance TURBID (H) CLEAR,Clear    Specific [place], UA 1.025 1.003 - 1.030    pH, UA 6.0 (H) 5.0 - 9.0 pH Units    Leukocytes, UA NEGATIVE Negative,NEGATIVE    Nitrite, UA NEGATIVE Negative,NEGATIVE    Protein, UA 1+ (H) Negative,NEGATIVE    Glucose, Urine NEGATIVE Negative,NEGATIVE mg/dl    Ketones, UA 2+ (H) Negative,NEGATIVE    Urobilinogen, UA 0.2 0.2 EU/dl    Blood, UA NEGATIVE NEGATIVE   CBC & Differential   Result Value Ref Range    WBC 8.6 3.2 - 9.8 x1000/uL    RBC 3.60 (L) 3.77 - 5.16 veena/mm3    Hemoglobin 10.8 (L) 12.0 - 15.5 gm/dl    Hematocrit 32.2 (L) 35.0 - 45.0 %    MCV 89.4 80.0 - 98.0 fl    MCH 30.0 26.0 - 34.0 pg    MCHC 33.5 31.4 - 36.0 gm/dl    RDW 12.6 11.5 - 14.5 %    Platelets 247 150 - 450 x1000/mm3    MPV 8.7 8.0 - 12.0 fl    Neutrophil Rel % 75.1 37.0 - 80.0 %    Lymphocyte Rel % 12.4 10.0 - 50.0 %    Monocyte Rel % 5.8 0.0 - 12.0 %    Eosinophil Rel % 0.1 0.0 - 7.0 %    Basophil Rel % 0.6 0.0 - 2.0 %    Immature Granulocyte Rel % 6.00 (H) 0.00 - 0.50 %    Neutrophils Absolute 6.44 2.00 - 8.60 x1000/uL    Lymphocytes Absolute 1.06 0.60 - 4.20 x1000/uL    Monocytes Absolute 0.50 0.00  - 0.90 x1000/uL    Eosinophils Absolute 0.01 0.00 - 0.70 x1000/uL    Basophils Absolute 0.05 0.00 - 0.20 x1000/uL    Immature Granulocytes Absolute 0.510 (H) 0.005 - 0.022 x1000/uL    nRBC 0.0 0.0 - 0.2 %    nRBC 0.000 x1000/uL   CBC & Differential   Result Value Ref Range    WBC 9.9 (H) 3.2 - 9.8 x1000/uL    RBC 3.67 (L) 3.77 - 5.16 veena/mm3    Hemoglobin 11.1 (L) 12.0 - 15.5 gm/dl    Hematocrit 33.1 (L) 35.0 - 45.0 %    MCV 90.2 80.0 - 98.0 fl    MCH 30.2 26.0 - 34.0 pg    MCHC 33.5 31.4 - 36.0 gm/dl    RDW 12.0 11.5 - 14.5 %    Platelets 277 150 - 450 x1000/mm3    MPV 8.8 8.0 - 12.0 fl    Neutrophil Rel % 79.8 37.0 - 80.0 %    Lymphocyte Rel % 8.6 (L) 10.0 - 50.0 %    Monocyte Rel % 9.9 0.0 - 12.0 %    Eosinophil Rel % 0.1 0.0 - 7.0 %    Basophil Rel % 0.1 0.0 - 2.0 %    Immature Granulocyte Rel % 1.50 (H) 0.00 - 0.50 %    Neutrophils Absolute 7.93 2.00 - 8.60 x1000/uL    Lymphocytes Absolute 0.85 0.60 - 4.20 x1000/uL    Monocytes Absolute 0.98 (H) 0.00 - 0.90 x1000/uL    Eosinophils Absolute 0.01 0.00 - 0.70 x1000/uL    Basophils Absolute 0.01 0.00 - 0.20 x1000/uL    Immature Granulocytes Absolute 0.150 (H) 0.005 - 0.022 x1000/uL     *Note: Due to a large number of results and/or encounters for the requested time period, some results have not been displayed. A complete set of results can be found in Results Review.       Some portions of this note have been dictated using voice recognition software and may contain errors or omissions.

## 2017-02-03 ENCOUNTER — CLINICAL SUPPORT (OUTPATIENT)
Dept: ORTHOPEDIC SURGERY | Facility: CLINIC | Age: 57
End: 2017-02-03

## 2017-02-03 VITALS — WEIGHT: 269 LBS | HEIGHT: 64 IN | BODY MASS INDEX: 45.93 KG/M2

## 2017-02-03 DIAGNOSIS — M17.11 PRIMARY OSTEOARTHRITIS OF RIGHT KNEE: Primary | ICD-10-CM

## 2017-02-03 PROCEDURE — 20610 DRAIN/INJ JOINT/BURSA W/O US: CPT | Performed by: ORTHOPAEDIC SURGERY

## 2017-02-03 NOTE — PROGRESS NOTES
Subjective   Tiffani Serra is a 56 y.o. female. 1960    Patient is here today for a recheck of Right knee osteoarthritis and a Supartz injections (1 of 5)    History of Present Illness Patient presents with right knee osteoarthritis. She states she is not currently in pain and is doing well. She is here for Sodium hyaluronate injections series 1 of 5.    The following portions of the patient's history were reviewed and updated as appropriate:   She  has a past medical history of Abdominal pain; Abscess of labia; Acute posthemorrhagic anemia (01/08/2015); Anemia; Anemia due to blood loss (11/20/2014); Contact dermatitis (01/30/2013); Cystitis; Diarrhea (04/11/2016); Foot pain; Generalized abdominal pain (02/23/2015); Hypercholesterolemia; Hypertensive disorder; Infection of skin and subcutaneous tissue (01/30/2013); Iron deficiency anemia (04/11/2016); Irritable bowel syndrome; Left sided ulcerative colitis (10/08/2015); Malaise and fatigue (11/02/2011); Obesity; Pseudomembranous enterocolitis (11/02/2012); Rectal hemorrhage (07/01/2015); Scapulalgia (10/24/2014); Sialoadenitis (07/26/2013); Ulcerative colitis (04/11/2016); Urinary tract infectious disease (07/14/2012); and Vitamin D deficiency (05/14/2012).  She  does not have any pertinent problems on file.  She  has a past surgical history that includes Lymph node biopsy (05/12/2009); Bladder surgery (2001); Colonoscopy (07/25/2011); Colonoscopy (06/15/2016); Colonoscopy (08/15/2008); Knee arthroscopy (02/21/2005); Excision Lesion (10/17/1969); Wrist ganglion excision (10/17/1969); Total abdominal hysterectomy w/ bilateral salpingoophorectomy (2001); Total knee arthroplasty (08/08/2007); and Joint replacement (Left).  Her family history includes Coronary artery disease in her other; Hypertension in her other.  She  reports that she has never smoked. She has never used smokeless tobacco. She reports that she does not drink alcohol or use illicit  "drugs.  Current Outpatient Prescriptions   Medication Sig Dispense Refill   • Ca Phosphate-Cholecalciferol (CALTRATE GUMMY BITES PO) Take  by mouth Daily.     • dexlansoprazole (DEXILANT) 60 MG capsule Take 60 mg by mouth Daily.     • ferrous sulfate 325 (65 FE) MG tablet Take 325 mg by mouth daily with breakfast.     • hyoscyamine (LEVSIN) 0.125 MG SL tablet Take 0.125 mg by mouth every 4 (four) hours as needed for cramping.     • mesalamine (APRISO) 0.375 G 24 hr capsule Take 375 mg by mouth Daily. 4 CAPS DAILY     • predniSONE (DELTASONE) 10 MG tablet As directed 100 tablet 0   • predniSONE (DELTASONE) 20 MG tablet Take 20 mg by mouth. 2 daily     • sucralfate (CARAFATE) 1 G tablet Take 1 tablet by mouth 3 (Three) Times a Day. 90 tablet 1     No current facility-administered medications for this visit.      Current Outpatient Prescriptions on File Prior to Visit   Medication Sig   • Ca Phosphate-Cholecalciferol (CALTRATE GUMMY BITES PO) Take  by mouth Daily.   • dexlansoprazole (DEXILANT) 60 MG capsule Take 60 mg by mouth Daily.   • ferrous sulfate 325 (65 FE) MG tablet Take 325 mg by mouth daily with breakfast.   • hyoscyamine (LEVSIN) 0.125 MG SL tablet Take 0.125 mg by mouth every 4 (four) hours as needed for cramping.   • mesalamine (APRISO) 0.375 G 24 hr capsule Take 375 mg by mouth Daily. 4 CAPS DAILY   • predniSONE (DELTASONE) 10 MG tablet As directed   • predniSONE (DELTASONE) 20 MG tablet Take 20 mg by mouth. 2 daily   • sucralfate (CARAFATE) 1 G tablet Take 1 tablet by mouth 3 (Three) Times a Day.     No current facility-administered medications on file prior to visit.      She is allergic to tape; keflex [cephalexin]; and penicillins..    Review of Systems  Visit Vitals   • Ht 64\" (162.6 cm)   • Wt 269 lb (122 kg)   • BMI 46.17 kg/m2       Social History     Social History   • Marital status:      Spouse name: N/A   • Number of children: N/A   • Years of education: N/A     Occupational History "   • Not on file.     Social History Main Topics   • Smoking status: Never Smoker   • Smokeless tobacco: Never Used   • Alcohol use No   • Drug use: No   • Sexual activity: Defer     Other Topics Concern   • Not on file     Social History Narrative       HEENT: Normocephalic.  PERRLA.  TM's clear bilaterally.  Oropharynx: Clear.  Neck: Supple, with no adenopathy.  Chest: Equal bilateral expansion.  Clear to auscultation and percussion.  Heart: Regular sinus rhythm, S1 and S2 normal.  No murmurs or extra heart sounds heard.  Abdomen: Soft, nontender, and no organomegaly.  Neurological: cranial nerves II-XII normal Vascular: pulses are present  Dermatological: no rashes  or blemishes, or any abnormality of the skin.      REVIEW OF SYSTEMS:  Negative, other than presenting complaint.  HEENT: No headaches, diplopia, blurred vision, tinnitus, vertigo, epistaxis, hoarseness or sore throat.  Pulmonary: No cough, sputum, hemoptysis, dyspnea, wheezing, or chest pain.  Cardiac: No chest pain, palpitations, orthopnea, paroxysmal nocturnal dyspnea, shortness of breath, or pedal edema.  Gastrointestinal: No diarrhea, melena, or constipation.  Genitourinary: No dysuria, hematuria, nocturia, frequency, bladder or bowel incontinence.  Hematology: No history of any anemia, fatigue, fever, or chills or night sweats.  Dermatology: No rashes, pruritus, or increased pigmentation changes of the skin.   Objective   Physical Examthe knee is prepped  And one unit of Suppartz is injected into  The knee joint. Ice for next 48 hrs, and see next week for next injection.      Assessment/Plan  osteoarthritis of the  Rt knee Plan: as outlined above in the objective section.    Tiffani was seen today for follow-up, pain, edema and injections.    Diagnoses and all orders for this visit:    Primary osteoarthritis of right knee  -     sodium hyaluronate (SUPARTZ) injection 25 mg; Inject 2.5 mL into the joint 1 (One) Time.

## 2017-02-09 ENCOUNTER — CLINICAL SUPPORT (OUTPATIENT)
Dept: ORTHOPEDIC SURGERY | Facility: CLINIC | Age: 57
End: 2017-02-09

## 2017-02-09 VITALS
BODY MASS INDEX: 45.07 KG/M2 | HEIGHT: 64 IN | WEIGHT: 264 LBS | SYSTOLIC BLOOD PRESSURE: 138 MMHG | DIASTOLIC BLOOD PRESSURE: 80 MMHG

## 2017-02-09 DIAGNOSIS — M17.11 PRIMARY OSTEOARTHRITIS OF RIGHT KNEE: Primary | ICD-10-CM

## 2017-02-09 PROCEDURE — 20610 DRAIN/INJ JOINT/BURSA W/O US: CPT | Performed by: ORTHOPAEDIC SURGERY

## 2017-02-09 NOTE — PROGRESS NOTES
Subjective   Tiffani Serra is a 56 y.o. female. Here today for 2nd supartz injection to the right knee.     History of Present Illness Patient is here today for her 2nd supartz injection to her right knee. Patient states that her pain is 5 out of 10 today and she is limping on the right leg.     The following portions of the patient's history were reviewed and updated as appropriate:   She  has a past medical history of Abdominal pain; Abscess of labia; Acute posthemorrhagic anemia (01/08/2015); Anemia; Anemia due to blood loss (11/20/2014); Contact dermatitis (01/30/2013); Cystitis; Diarrhea (04/11/2016); Foot pain; Generalized abdominal pain (02/23/2015); Hypercholesterolemia; Hypertensive disorder; Infection of skin and subcutaneous tissue (01/30/2013); Iron deficiency anemia (04/11/2016); Irritable bowel syndrome; Left sided ulcerative colitis (10/08/2015); Malaise and fatigue (11/02/2011); Obesity; Pseudomembranous enterocolitis (11/02/2012); Rectal hemorrhage (07/01/2015); Scapulalgia (10/24/2014); Sialoadenitis (07/26/2013); Ulcerative colitis (04/11/2016); Urinary tract infectious disease (07/14/2012); and Vitamin D deficiency (05/14/2012).  She  does not have any pertinent problems on file.  She  has a past surgical history that includes Lymph node biopsy (05/12/2009); Bladder surgery (2001); Colonoscopy (07/25/2011); Colonoscopy (06/15/2016); Colonoscopy (08/15/2008); Knee arthroscopy (02/21/2005); Excision Lesion (10/17/1969); Wrist ganglion excision (10/17/1969); Total abdominal hysterectomy w/ bilateral salpingoophorectomy (2001); Total knee arthroplasty (08/08/2007); and Joint replacement (Left).  Her family history includes Coronary artery disease in her other; Hypertension in her other.  She  reports that she has never smoked. She has never used smokeless tobacco. She reports that she does not drink alcohol or use illicit drugs.  Current Outpatient Prescriptions   Medication Sig Dispense Refill  "  • Ca Phosphate-Cholecalciferol (CALTRATE GUMMY BITES PO) Take  by mouth Daily.     • dexlansoprazole (DEXILANT) 60 MG capsule Take 60 mg by mouth Daily.     • ferrous sulfate 325 (65 FE) MG tablet Take 325 mg by mouth daily with breakfast.     • hyoscyamine (LEVSIN) 0.125 MG SL tablet Take 0.125 mg by mouth every 4 (four) hours as needed for cramping.     • mesalamine (APRISO) 0.375 G 24 hr capsule Take 375 mg by mouth Daily. 4 CAPS DAILY     • predniSONE (DELTASONE) 10 MG tablet As directed 100 tablet 0   • predniSONE (DELTASONE) 20 MG tablet Take 20 mg by mouth. 2 daily     • sucralfate (CARAFATE) 1 G tablet Take 1 tablet by mouth 3 (Three) Times a Day. 90 tablet 1     Current Facility-Administered Medications   Medication Dose Route Frequency Provider Last Rate Last Dose   • sodium hyaluronate (SUPARTZ) injection 25 mg  25 mg Intra-articular Weekly Yury Marion MD   25 mg at 02/09/17 1645     Current Outpatient Prescriptions on File Prior to Visit   Medication Sig   • Ca Phosphate-Cholecalciferol (CALTRATE GUMMY BITES PO) Take  by mouth Daily.   • dexlansoprazole (DEXILANT) 60 MG capsule Take 60 mg by mouth Daily.   • ferrous sulfate 325 (65 FE) MG tablet Take 325 mg by mouth daily with breakfast.   • hyoscyamine (LEVSIN) 0.125 MG SL tablet Take 0.125 mg by mouth every 4 (four) hours as needed for cramping.   • mesalamine (APRISO) 0.375 G 24 hr capsule Take 375 mg by mouth Daily. 4 CAPS DAILY   • predniSONE (DELTASONE) 10 MG tablet As directed   • predniSONE (DELTASONE) 20 MG tablet Take 20 mg by mouth. 2 daily   • sucralfate (CARAFATE) 1 G tablet Take 1 tablet by mouth 3 (Three) Times a Day.     No current facility-administered medications on file prior to visit.      She is allergic to tape; keflex [cephalexin]; and penicillins..    Review of Systems  Visit Vitals   • /80   • Ht 64\" (162.6 cm)   • Wt 264 lb (120 kg)   • BMI 45.32 kg/m2       Social History     Social History   • Marital status: "      Spouse name: N/A   • Number of children: N/A   • Years of education: N/A     Occupational History   • Not on file.     Social History Main Topics   • Smoking status: Never Smoker   • Smokeless tobacco: Never Used   • Alcohol use No   • Drug use: No   • Sexual activity: Defer     Other Topics Concern   • Not on file     Social History Narrative       HEENT: Normocephalic.  PERRLA.  TM's clear bilaterally.  Oropharynx: Clear.  Neck: Supple, with no adenopathy.  Chest: Equal bilateral expansion.  Clear to auscultation and percussion.  Heart: Regular sinus rhythm, S1 and S2 normal.  No murmurs or extra heart sounds heard.  Abdomen: Soft, nontender, and no organomegaly.  Neurological: cranial nerves II-XII normal Vascular: pulses are present  Dermatological: no rashes  or blemishes, or any abnormality of the skin.    REVIEW OF SYSTEMS:  Negative, other than presenting complaint.  HEENT: No headaches, diplopia, blurred vision, tinnitus, vertigo, epistaxis, hoarseness or sore throat.  Pulmonary: No cough, sputum, hemoptysis, dyspnea, wheezing, or chest pain.  Cardiac: No chest pain, palpitations, orthopnea, paroxysmal nocturnal dyspnea, shortness of breath, or pedal edema.  Gastrointestinal: No diarrhea, melena, or constipation.  Genitourinary: No dysuria, hematuria, nocturia, frequency, bladder or bowel incontinence.  Hematology: No history of any anemia, fatigue, fever, or chills or night sweats.  Dermatology: No rashes, pruritus, or increased pigmentation changes of the skin.   Objective   Physical Exam the skin of the left knee was prepped and one unit of Suppartz is given in the knee  Recommend ice for next 48 hrs.    Assessment/Plan   Tiffani was seen today for follow-up.    Diagnoses and all orders for this visit:    Primary osteoarthritis of right knee  -     sodium hyaluronate (SUPARTZ) injection 25 mg; Inject 2.5 mL into the joint 1 (One) Time Per Week.

## 2017-02-16 ENCOUNTER — OFFICE VISIT (OUTPATIENT)
Dept: ORTHOPEDIC SURGERY | Facility: CLINIC | Age: 57
End: 2017-02-16

## 2017-02-16 VITALS
DIASTOLIC BLOOD PRESSURE: 98 MMHG | BODY MASS INDEX: 45.07 KG/M2 | SYSTOLIC BLOOD PRESSURE: 150 MMHG | WEIGHT: 264 LBS | HEIGHT: 64 IN

## 2017-02-16 DIAGNOSIS — M17.11 PRIMARY OSTEOARTHRITIS OF RIGHT KNEE: Primary | ICD-10-CM

## 2017-02-16 PROCEDURE — 99024 POSTOP FOLLOW-UP VISIT: CPT | Performed by: ORTHOPAEDIC SURGERY

## 2017-02-16 PROCEDURE — 20610 DRAIN/INJ JOINT/BURSA W/O US: CPT | Performed by: ORTHOPAEDIC SURGERY

## 2017-02-16 NOTE — PROGRESS NOTES
Subjective   Tiffani Serra is a 56 y.o. female. Here today for 3rd Supartz injection to Right knee.    History of Present Illness Patient is here today for her 3rd Supartz injection. Patient states that she feels like the injections are helping her knee.     The following portions of the patient's history were reviewed and updated as appropriate:   She  has a past medical history of Abdominal pain; Abscess of labia; Acute posthemorrhagic anemia (01/08/2015); Anemia; Anemia due to blood loss (11/20/2014); Contact dermatitis (01/30/2013); Cystitis; Diarrhea (04/11/2016); Foot pain; Generalized abdominal pain (02/23/2015); Hypercholesterolemia; Hypertensive disorder; Infection of skin and subcutaneous tissue (01/30/2013); Iron deficiency anemia (04/11/2016); Irritable bowel syndrome; Left sided ulcerative colitis (10/08/2015); Malaise and fatigue (11/02/2011); Obesity; Pseudomembranous enterocolitis (11/02/2012); Rectal hemorrhage (07/01/2015); Scapulalgia (10/24/2014); Sialoadenitis (07/26/2013); Ulcerative colitis (04/11/2016); Urinary tract infectious disease (07/14/2012); and Vitamin D deficiency (05/14/2012).  She  does not have any pertinent problems on file.  She  has a past surgical history that includes Lymph node biopsy (05/12/2009); Bladder surgery (2001); Colonoscopy (07/25/2011); Colonoscopy (06/15/2016); Colonoscopy (08/15/2008); Knee arthroscopy (02/21/2005); Excision Lesion (10/17/1969); Wrist ganglion excision (10/17/1969); Total abdominal hysterectomy w/ bilateral salpingoophorectomy (2001); Total knee arthroplasty (08/08/2007); and Joint replacement (Left).  Her family history includes Coronary artery disease in her other; Hypertension in her other.  She  reports that she has never smoked. She has never used smokeless tobacco. She reports that she does not drink alcohol or use illicit drugs.  Current Outpatient Prescriptions   Medication Sig Dispense Refill   • Ca Phosphate-Cholecalciferol  (CALTRATE GUMMY BITES PO) Take  by mouth Daily.     • dexlansoprazole (DEXILANT) 60 MG capsule Take 60 mg by mouth Daily.     • ferrous sulfate 325 (65 FE) MG tablet Take 325 mg by mouth daily with breakfast.     • hyoscyamine (LEVSIN) 0.125 MG SL tablet Take 0.125 mg by mouth every 4 (four) hours as needed for cramping.     • mesalamine (APRISO) 0.375 G 24 hr capsule Take 375 mg by mouth Daily. 4 CAPS DAILY     • predniSONE (DELTASONE) 10 MG tablet As directed 100 tablet 0   • predniSONE (DELTASONE) 20 MG tablet Take 20 mg by mouth. 2 daily     • sucralfate (CARAFATE) 1 G tablet Take 1 tablet by mouth 3 (Three) Times a Day. 90 tablet 1     Current Facility-Administered Medications   Medication Dose Route Frequency Provider Last Rate Last Dose   • sodium hyaluronate (SUPARTZ) injection 25 mg  25 mg Intra-articular Weekly Yury Marion MD   25 mg at 02/09/17 1645   • sodium hyaluronate (SUPARTZ) injection 25 mg  25 mg Intra-articular Weekly Yury Marion MD   25 mg at 02/16/17 1702     Current Outpatient Prescriptions on File Prior to Visit   Medication Sig   • Ca Phosphate-Cholecalciferol (CALTRATE GUMMY BITES PO) Take  by mouth Daily.   • dexlansoprazole (DEXILANT) 60 MG capsule Take 60 mg by mouth Daily.   • ferrous sulfate 325 (65 FE) MG tablet Take 325 mg by mouth daily with breakfast.   • hyoscyamine (LEVSIN) 0.125 MG SL tablet Take 0.125 mg by mouth every 4 (four) hours as needed for cramping.   • mesalamine (APRISO) 0.375 G 24 hr capsule Take 375 mg by mouth Daily. 4 CAPS DAILY   • predniSONE (DELTASONE) 10 MG tablet As directed   • predniSONE (DELTASONE) 20 MG tablet Take 20 mg by mouth. 2 daily   • sucralfate (CARAFATE) 1 G tablet Take 1 tablet by mouth 3 (Three) Times a Day.     Current Facility-Administered Medications on File Prior to Visit   Medication   • sodium hyaluronate (SUPARTZ) injection 25 mg     She is allergic to tape; keflex [cephalexin]; and penicillins..    Review of Systems  Visit Vitals  "  • /98   • Ht 64\" (162.6 cm)   • Wt 264 lb (120 kg)   • BMI 45.32 kg/m2       Social History     Social History   • Marital status:      Spouse name: N/A   • Number of children: N/A   • Years of education: N/A     Occupational History   • Not on file.     Social History Main Topics   • Smoking status: Never Smoker   • Smokeless tobacco: Never Used   • Alcohol use No   • Drug use: No   • Sexual activity: Defer     Other Topics Concern   • Not on file     Social History Narrative       HEENT: Normocephalic.  PERRLA.  TM's clear bilaterally.  Oropharynx: Clear.  Neck: Supple, with no adenopathy.  Chest: Equal bilateral expansion.  Clear to auscultation and percussion.  Heart: Regular sinus rhythm, S1 and S2 normal.  No murmurs or extra heart sounds heard.  Abdomen: Soft, nontender, and no organomegaly.  Neurological: cranial nerves II-XII normal Vascular: pulses are present  Dermatological: no rashes  or blemishes, or any abnormality of the skin.    REVIEW OF SYSTEMS:  Negative, other than presenting complaint.  HEENT: No headaches, diplopia, blurred vision, tinnitus, vertigo, epistaxis, hoarseness or sore throat.  Pulmonary: No cough, sputum, hemoptysis, dyspnea, wheezing, or chest pain.  Cardiac: No chest pain, palpitations, orthopnea, paroxysmal nocturnal dyspnea, shortness of breath, or pedal edema.  Gastrointestinal: No diarrhea, melena, or constipation.  Genitourinary: No dysuria, hematuria, nocturia, frequency, bladder or bowel incontinence.  Hematology: No history of any anemia, fatigue, fever, or chills or night sweats.  Dermatology: No rashes, pruritus, or increased pigmentation changes of the skin.   Objective   Physical Exam after prepping of the rt knee, one unit of Suppartz is given in the knee. Ice for next 48 hrs.  Return next week  For last injection.        Assessment/Plan   Tiffani was seen today for injections.    Diagnoses and all orders for this visit:    Primary osteoarthritis of " right knee  -     sodium hyaluronate (SUPARTZ) injection 25 mg; Inject 2.5 mL into the joint 1 (One) Time Per Week.

## 2017-03-06 ENCOUNTER — OFFICE VISIT (OUTPATIENT)
Dept: GASTROENTEROLOGY | Facility: CLINIC | Age: 57
End: 2017-03-06

## 2017-03-06 VITALS
SYSTOLIC BLOOD PRESSURE: 162 MMHG | WEIGHT: 271.7 LBS | BODY MASS INDEX: 46.38 KG/M2 | DIASTOLIC BLOOD PRESSURE: 97 MMHG | HEIGHT: 64 IN | HEART RATE: 114 BPM

## 2017-03-06 DIAGNOSIS — K51.818 OTHER ULCERATIVE COLITIS WITH OTHER COMPLICATION (HCC): Primary | ICD-10-CM

## 2017-03-06 DIAGNOSIS — G89.29 CHRONIC ABDOMINAL PAIN: ICD-10-CM

## 2017-03-06 DIAGNOSIS — D50.9 IRON DEFICIENCY ANEMIA, UNSPECIFIED IRON DEFICIENCY ANEMIA TYPE: ICD-10-CM

## 2017-03-06 DIAGNOSIS — R10.9 CHRONIC ABDOMINAL PAIN: ICD-10-CM

## 2017-03-06 DIAGNOSIS — R19.7 DIARRHEA, UNSPECIFIED TYPE: ICD-10-CM

## 2017-03-06 PROCEDURE — 99213 OFFICE O/P EST LOW 20 MIN: CPT | Performed by: PHYSICIAN ASSISTANT

## 2017-03-06 RX ORDER — MESALAMINE 0.38 G/1
375 CAPSULE, EXTENDED RELEASE ORAL DAILY
Qty: 160 CAPSULE | Refills: 5 | Status: ON HOLD | OUTPATIENT
Start: 2017-03-06 | End: 2017-10-16

## 2017-03-06 NOTE — PROGRESS NOTES
Chief Complaint   Patient presents with   • Abdominal Pain   • Heartburn   • Ulcerative Colitis       ENDO PROCEDURE ORDERED:    Subjective    Tiffani Serra is a 56 y.o. female. she is here today for follow-up.    History of Present Illness    Patient seen on a recheck of her GERD, abdominal pain, ulcerative colitis.  Last seen 2/2/17.  At that time patient was told to begin tapering down on her prednisone.  She is at 15 mg daily now.  She's having hot flashes as well as swelling.  She feels she is doing much better.  She's had no severe abdominal pain.  She's had no diarrhea or gas.  She thinks Lialda does better for her ulcerative colitis but insurance was not covering well and she is on Apriso.  She is still on iron.  She seen no blood in her stool.  Her weight is stable.  Last colonoscopy 6/15/16.  Her GERD is doing well on Dexilant and Carafate.    A/P: GERD, abdominal pain, diarrhea, ulcerative colitis.  She appears improved on current regimen.  We'll continue to taper off of the prednisone by 5 mg per week.  We'll plan colonoscopy this year.  She has to defer at present.  Refilled her Apriso, she did do better on Lialda but states she cannot afford the co-pay.  We'll plan follow-up in 3 months with laboratory prior.  Sooner if needed.     The following portions of the patient's history were reviewed and updated as appropriate:   Past Medical History   Diagnosis Date   • Abdominal pain    • Abscess of labia    • Acute posthemorrhagic anemia 01/08/2015   • Anemia      history of, mild   • Anemia due to blood loss 11/20/2014   • Contact dermatitis 01/30/2013     on labia and surrounding regions   • Cystitis      recurrent   • Diarrhea 04/11/2016   • Foot pain      porokeratoma causin metatarsalgia, right foot   • Generalized abdominal pain 02/23/2015   • Hypercholesterolemia    • Hypertensive disorder    • Infection of skin and subcutaneous tissue 01/30/2013   • Iron deficiency anemia 04/11/2016      improving   • Irritable bowel syndrome    • Left sided ulcerative colitis 10/08/2015   • Malaise and fatigue 11/02/2011   • Obesity    • Pseudomembranous enterocolitis 11/02/2012   • Rectal hemorrhage 07/01/2015   • Scapulalgia 10/24/2014   • Sialoadenitis 07/26/2013   • Ulcerative colitis 04/11/2016     chronic, for 29 years.  flare   • Urinary tract infectious disease 07/14/2012   • Vitamin D deficiency 05/14/2012     Past Surgical History   Procedure Laterality Date   • Lymph node biopsy  05/12/2009     [place] lymph node biopsy, superficial and deep, within the right groin involving superficial femoral nodes and also the external iliac nodes. Melanoma of the right leg   • Bladder surgery  2001     bladder tacking x 2   • Colonoscopy  07/25/2011     Colitis found in rectum & sigmoid colon. Biopsy taken   • Colonoscopy  06/15/2016     Erythematous mucosa in the rectum.Biopsied.One polyp in the transverse colon. Biopsied   • Colonoscopy  08/15/2008     Colitis of the rectum, the sigmoid and the transverse colon. Multiple biopsies were obtained from the rectum, the sigmoid and the transverse colon. Multiple biopsies were obtained from the cecum, the transverse colon, sigmoid & rectum   • Knee arthroscopy  02/21/2005     Tears of the meidal and lateral meniscus and osteoarthritis of the knee. Arthroscopic medial and lateral meniscectomies of the right knee with chondroplasty of the medial, lateral femoral condyles and patella   • Excision lesion  10/17/1969     removal of sebaceous cyst of neck   • Wrist ganglion excision  10/17/1969     left wrist   • Total abdominal hysterectomy with salpingo oophorectomy  2001   • Total knee arthroplasty  08/08/2007     severe osteoarthritis of the left knee.     • Joint replacement Left      Family History   Problem Relation Age of Onset   • Coronary artery disease Other    • Hypertension Other      OB History     No data available        Allergies   Allergen Reactions   • Tape   "    Silk tape   • Keflex [Cephalexin] Rash   • Penicillins Rash     nylon     Social History     Social History   • Marital status:      Spouse name: N/A   • Number of children: N/A   • Years of education: N/A     Social History Main Topics   • Smoking status: Never Smoker   • Smokeless tobacco: Never Used   • Alcohol use No   • Drug use: No   • Sexual activity: Defer     Other Topics Concern   • None     Social History Narrative       Current Outpatient Prescriptions:   •  Ca Phosphate-Cholecalciferol (CALTRATE GUMMY BITES PO), Take  by mouth Daily., Disp: , Rfl:   •  dexlansoprazole (DEXILANT) 60 MG capsule, Take 60 mg by mouth Daily., Disp: , Rfl:   •  ferrous sulfate 325 (65 FE) MG tablet, Take 325 mg by mouth daily with breakfast., Disp: , Rfl:   •  hyoscyamine (LEVSIN) 0.125 MG SL tablet, Take 0.125 mg by mouth every 4 (four) hours as needed for cramping., Disp: , Rfl:   •  mesalamine (APRISO) 0.375 G 24 hr capsule, Take 1 capsule by mouth Daily. 4 CAPS DAILY, Disp: 160 capsule, Rfl: 5  •  predniSONE (DELTASONE) 10 MG tablet, As directed, Disp: 100 tablet, Rfl: 0  •  sucralfate (CARAFATE) 1 G tablet, Take 1 tablet by mouth 3 (Three) Times a Day. (Patient taking differently: Take 1 g by mouth As Needed.), Disp: 90 tablet, Rfl: 1    Current Facility-Administered Medications:   •  sodium hyaluronate (SUPARTZ) injection 25 mg, 25 mg, Intra-articular, Weekly, Yury Marion MD, 25 mg at 02/09/17 1645  •  sodium hyaluronate (SUPARTZ) injection 25 mg, 25 mg, Intra-articular, Weekly, Yury Marion MD, 25 mg at 02/16/17 1702  Review of Systems  Review of Systems       Objective      Visit Vitals   • /97 (BP Location: Left arm)   • Pulse 114   • Ht 64\" (162.6 cm)   • Wt 271 lb 11.2 oz (123 kg)   • BMI 46.64 kg/m2     Physical Exam   Constitutional: She is oriented to person, place, and time. She appears well-developed and well-nourished. No distress.   Ill appearing   HENT:   Head: Normocephalic and " atraumatic.   Eyes: EOM are normal. Pupils are equal, round, and reactive to light.   Neck: Normal range of motion.   Cardiovascular: Normal rate, regular rhythm and normal heart sounds.    Pulmonary/Chest: Effort normal and breath sounds normal.   Abdominal: Soft. She exhibits distension. She exhibits no shifting dullness, no abdominal bruit, no ascites and no mass. Bowel sounds are decreased. There is no hepatosplenomegaly. There is tenderness in the periumbilical area. There is no rigidity, no rebound, no guarding and no CVA tenderness. No hernia. Hernia confirmed negative in the ventral area.   Obese, mild diffuse   Musculoskeletal: Normal range of motion.   Neurological: She is alert and oriented to person, place, and time.   Skin: Skin is warm. She is not diaphoretic.   Psychiatric: She has a normal mood and affect. Her behavior is normal. Judgment and thought content normal.   Nursing note and vitals reviewed.    Hospital Outpatient Visit on 01/19/2017   Component Date Value Ref Range Status   • WBC 01/19/2017 8.8  3.2 - 9.8 x1000/uL Final   • RBC 01/19/2017 3.97  3.77 - 5.16 veena/mm3 Final   • Hemoglobin 01/19/2017 11.9* 12.0 - 15.5 gm/dl Final   • Hematocrit 01/19/2017 36.4  35.0 - 45.0 % Final   • MCV 01/19/2017 91.7  80.0 - 98.0 fl Final   • MCH 01/19/2017 30.0  26.0 - 34.0 pg Final   • MCHC 01/19/2017 32.7  31.4 - 36.0 gm/dl Final   • RDW 01/19/2017 13.4  11.5 - 14.5 % Final   • Platelets 01/19/2017 324  150 - 450 x1000/mm3 Final   • MPV 01/19/2017 8.0  8.0 - 12.0 fl Final   • Neutrophil Rel % 01/19/2017 79.8  37.0 - 80.0 % Final   • Lymphocyte Rel % 01/19/2017 10.3  10.0 - 50.0 % Final   • Monocyte Rel % 01/19/2017 8.9  0.0 - 12.0 % Final   • Eosinophil Rel % 01/19/2017 0.1  0.0 - 7.0 % Final   • Basophil Rel % 01/19/2017 0.1  0.0 - 2.0 % Final   • Immature Granulocyte Rel % 01/19/2017 0.80* 0.00 - 0.50 % Final   • Neutrophils Absolute 01/19/2017 7.04  2.00 - 8.60 x1000/uL Final   • Lymphocytes Absolute  01/19/2017 0.91  0.60 - 4.20 x1000/uL Final   • Monocytes Absolute 01/19/2017 0.79  0.00 - 0.90 x1000/uL Final   • Eosinophils Absolute 01/19/2017 0.01  0.00 - 0.70 x1000/uL Final   • Basophils Absolute 01/19/2017 0.01  0.00 - 0.20 x1000/uL Final   • Immature Granulocytes Absolute 01/19/2017 0.070* 0.005 - 0.022 x1000/uL Final   • Toxogenic C. difficile 01/19/2017 Negative  Negative Final   • O27 01/19/2017 Negative  Negative Final     Assessment/Plan      1. Other ulcerative colitis with other complication    2. Chronic abdominal pain    3. Diarrhea, unspecified type    4. Iron deficiency anemia, unspecified iron deficiency anemia type    .   Tiffani was seen today for abdominal pain, heartburn and ulcerative colitis.    Diagnoses and all orders for this visit:    Other ulcerative colitis with other complication  -     Comprehensive Metabolic Panel; Future  -     CBC Auto Differential; Future  -     mesalamine (APRISO) 0.375 G 24 hr capsule; Take 1 capsule by mouth Daily. 4 CAPS DAILY    Chronic abdominal pain  -     Comprehensive Metabolic Panel; Future  -     CBC Auto Differential; Future  -     mesalamine (APRISO) 0.375 G 24 hr capsule; Take 1 capsule by mouth Daily. 4 CAPS DAILY    Diarrhea, unspecified type  -     Comprehensive Metabolic Panel; Future  -     CBC Auto Differential; Future  -     mesalamine (APRISO) 0.375 G 24 hr capsule; Take 1 capsule by mouth Daily. 4 CAPS DAILY    Iron deficiency anemia, unspecified iron deficiency anemia type  -     Comprehensive Metabolic Panel; Future  -     CBC Auto Differential; Future        Orders placed during this encounter include:  Orders Placed This Encounter   Procedures   • Comprehensive Metabolic Panel     Due before follow up     Standing Status:   Future     Standing Expiration Date:   6/16/2017   • CBC Auto Differential     Due before follow up     Standing Status:   Future     Standing Expiration Date:   6/16/2017       Medications prescribed:  New  Medications Ordered This Visit   Medications   • mesalamine (APRISO) 0.375 G 24 hr capsule     Sig: Take 1 capsule by mouth Daily. 4 CAPS DAILY     Dispense:  160 capsule     Refill:  5     Discontinued Medications       Reason for Discontinue    predniSONE (DELTASONE) 20 MG tablet Therapy completed        Requested Prescriptions     Signed Prescriptions Disp Refills   • mesalamine (APRISO) 0.375 G 24 hr capsule 160 capsule 5     Sig: Take 1 capsule by mouth Daily. 4 CAPS DAILY       Review and/or summary of lab tests, radiology, procedures, medications. Review and summary of old records and obtaining of history. The risks and benefits of my recommendations, as well as other treatment options were discussed with the patient today. Questions were answered.    Follow-up: Return in about 3 months (around 6/6/2017), or if symptoms worsen or fail to improve, for lab prior.     * Surgery not found *      This document has been electronically signed by Joao Arnett PA-C on March 13, 2017 7:11 PM      Results for orders placed or performed during the hospital encounter of 01/19/17   Clostridium Difficile Toxin, PCR   Result Value Ref Range    Toxogenic C. difficile Negative Negative    O27 Negative Negative   CBC & Differential   Result Value Ref Range    WBC 8.8 3.2 - 9.8 x1000/uL    RBC 3.97 3.77 - 5.16 veena/mm3    Hemoglobin 11.9 (L) 12.0 - 15.5 gm/dl    Hematocrit 36.4 35.0 - 45.0 %    MCV 91.7 80.0 - 98.0 fl    MCH 30.0 26.0 - 34.0 pg    MCHC 32.7 31.4 - 36.0 gm/dl    RDW 13.4 11.5 - 14.5 %    Platelets 324 150 - 450 x1000/mm3    MPV 8.0 8.0 - 12.0 fl    Neutrophil Rel % 79.8 37.0 - 80.0 %    Lymphocyte Rel % 10.3 10.0 - 50.0 %    Monocyte Rel % 8.9 0.0 - 12.0 %    Eosinophil Rel % 0.1 0.0 - 7.0 %    Basophil Rel % 0.1 0.0 - 2.0 %    Immature Granulocyte Rel % 0.80 (H) 0.00 - 0.50 %    Neutrophils Absolute 7.04 2.00 - 8.60 x1000/uL    Lymphocytes Absolute 0.91 0.60 - 4.20 x1000/uL    Monocytes Absolute 0.79 0.00 -  0.90 x1000/uL    Eosinophils Absolute 0.01 0.00 - 0.70 x1000/uL    Basophils Absolute 0.01 0.00 - 0.20 x1000/uL    Immature Granulocytes Absolute 0.070 (H) 0.005 - 0.022 x1000/uL   Results for orders placed or performed in visit on 01/19/17   Calprotectin, Fecal   Result Value Ref Range    Calprotectin, Fecal 298 (H) 0 - 120 ug/g   Iron and TIBC   Result Value Ref Range    Iron 33 (L) 37 - 170 ug/dl    TIBC 266 265 - 497 ug/dl    Iron Saturation 12.4 (L) 15.0 - 50.0 %   C-reactive Protein   Result Value Ref Range    C-Reactive Protein 2.6 (H) 0.0 - 1.0 mg/dl   Ferritin   Result Value Ref Range    Ferritin 63.6 11.1 - 264.0 ng/ml   Comprehensive Metabolic Panel   Result Value Ref Range    Sodium 136 (L) 137 - 145 mmol/L    Potassium 4.5 3.5 - 5.1 mmol/L    Chloride 98 95 - 110 mmol/L    CO2 26 22 - 31 mmol/L    Anion Gap 12.0 5.0 - 15.0 mmol/L    Glucose 120 (H) 60 - 100 mg/dl    BUN 17 7 - 21 mg/dl    Creatinine 0.8 0.5 - 1.0 mg/dl    GFR MDRD Non  74 51 - 120 mL/min/1.73 sq.M    GFR MDRD  89 51 - 120 mL/min/1.73 sq.M    Calcium 8.8 8.4 - 10.2 mg/dl    Total Protein 6.8 6.3 - 8.6 gm/dl    Albumin 3.3 (L) 3.4 - 4.8 gm/dl    Total Bilirubin 0.5 0.2 - 1.3 mg/dl    Alkaline Phosphatase 63 38 - 126 U/L    AST (SGOT) 20 14 - 36 U/L    ALT (SGPT) 37 9 - 52 U/L   Results for orders placed or performed during the hospital encounter of 01/03/17   Gastrointestinal Panel PCR   Result Value Ref Range    GI Panel Negative Negative    C difficile Toxins A+B, EIA Negative Negative,N/A - Formed stool   Manual Differential   Result Value Ref Range    Neutrophil Rel % 61 37 - 80 %    Bands Rel %  1 (L) 3 - 5 %    Lymphocyte Rel % 24 10 - 50 %    Monocyte Rel % 10 0 - 12 %    Eosinophil Rel % 1 0 - 7 %    Metamyelocyte % 2 (H) 0 - 0 %    Myelocyte Rel % 1 (H) 0 - 0 %    RBC Morphology Normocytic Normochromic     Platelet Estimate Normal     Total Counted 100    Urinalysis   Result Value Ref Range     Color, UA ORANGE (H) YELLOW,Yellow    Appearance TURBID (H) CLEAR,Clear    Specific [place], UA 1.025 1.003 - 1.030    pH, UA 6.0 (H) 5.0 - 9.0 pH Units    Leukocytes, UA NEGATIVE Negative,NEGATIVE    Nitrite, UA NEGATIVE Negative,NEGATIVE    Protein, UA 1+ (H) Negative,NEGATIVE    Glucose, Urine NEGATIVE Negative,NEGATIVE mg/dl    Ketones, UA 2+ (H) Negative,NEGATIVE    Urobilinogen, UA 0.2 0.2 EU/dl    Blood, UA NEGATIVE NEGATIVE   CBC & Differential   Result Value Ref Range    WBC 8.6 3.2 - 9.8 x1000/uL    RBC 3.60 (L) 3.77 - 5.16 veena/mm3    Hemoglobin 10.8 (L) 12.0 - 15.5 gm/dl    Hematocrit 32.2 (L) 35.0 - 45.0 %    MCV 89.4 80.0 - 98.0 fl    MCH 30.0 26.0 - 34.0 pg    MCHC 33.5 31.4 - 36.0 gm/dl    RDW 12.6 11.5 - 14.5 %    Platelets 247 150 - 450 x1000/mm3    MPV 8.7 8.0 - 12.0 fl    Neutrophil Rel % 75.1 37.0 - 80.0 %    Lymphocyte Rel % 12.4 10.0 - 50.0 %    Monocyte Rel % 5.8 0.0 - 12.0 %    Eosinophil Rel % 0.1 0.0 - 7.0 %    Basophil Rel % 0.6 0.0 - 2.0 %    Immature Granulocyte Rel % 6.00 (H) 0.00 - 0.50 %    Neutrophils Absolute 6.44 2.00 - 8.60 x1000/uL    Lymphocytes Absolute 1.06 0.60 - 4.20 x1000/uL    Monocytes Absolute 0.50 0.00 - 0.90 x1000/uL    Eosinophils Absolute 0.01 0.00 - 0.70 x1000/uL    Basophils Absolute 0.05 0.00 - 0.20 x1000/uL    Immature Granulocytes Absolute 0.510 (H) 0.005 - 0.022 x1000/uL    nRBC 0.0 0.0 - 0.2 %    nRBC 0.000 x1000/uL   CBC & Differential   Result Value Ref Range    WBC 9.9 (H) 3.2 - 9.8 x1000/uL    RBC 3.67 (L) 3.77 - 5.16 veena/mm3    Hemoglobin 11.1 (L) 12.0 - 15.5 gm/dl    Hematocrit 33.1 (L) 35.0 - 45.0 %    MCV 90.2 80.0 - 98.0 fl    MCH 30.2 26.0 - 34.0 pg    MCHC 33.5 31.4 - 36.0 gm/dl    RDW 12.0 11.5 - 14.5 %    Platelets 277 150 - 450 x1000/mm3    MPV 8.8 8.0 - 12.0 fl    Neutrophil Rel % 79.8 37.0 - 80.0 %    Lymphocyte Rel % 8.6 (L) 10.0 - 50.0 %    Monocyte Rel % 9.9 0.0 - 12.0 %    Eosinophil Rel % 0.1 0.0 - 7.0 %    Basophil Rel % 0.1 0.0 - 2.0 %     Immature Granulocyte Rel % 1.50 (H) 0.00 - 0.50 %    Neutrophils Absolute 7.93 2.00 - 8.60 x1000/uL    Lymphocytes Absolute 0.85 0.60 - 4.20 x1000/uL    Monocytes Absolute 0.98 (H) 0.00 - 0.90 x1000/uL    Eosinophils Absolute 0.01 0.00 - 0.70 x1000/uL    Basophils Absolute 0.01 0.00 - 0.20 x1000/uL    Immature Granulocytes Absolute 0.150 (H) 0.005 - 0.022 x1000/uL     *Note: Due to a large number of results and/or encounters for the requested time period, some results have not been displayed. A complete set of results can be found in Results Review.       Some portions of this note have been dictated using voice recognition software and may contain errors and/or omissions.

## 2017-05-24 ENCOUNTER — OFFICE VISIT (OUTPATIENT)
Dept: PODIATRY | Facility: CLINIC | Age: 57
End: 2017-05-24

## 2017-05-24 VITALS — BODY MASS INDEX: 46.1 KG/M2 | WEIGHT: 270 LBS | HEIGHT: 64 IN

## 2017-05-24 DIAGNOSIS — M21.42 PES PLANUS OF LEFT FOOT: ICD-10-CM

## 2017-05-24 DIAGNOSIS — G57.52 TARSAL TUNNEL SYNDROME OF LEFT SIDE: ICD-10-CM

## 2017-05-24 DIAGNOSIS — M79.672 LEFT FOOT PAIN: Primary | ICD-10-CM

## 2017-05-24 PROCEDURE — 99203 OFFICE O/P NEW LOW 30 MIN: CPT | Performed by: PODIATRIST

## 2017-05-24 RX ORDER — GABAPENTIN 300 MG/1
300 CAPSULE ORAL NIGHTLY
Qty: 30 CAPSULE | Refills: 0 | Status: SHIPPED | OUTPATIENT
Start: 2017-05-24 | End: 2017-10-04

## 2017-06-07 ENCOUNTER — OFFICE VISIT (OUTPATIENT)
Dept: GASTROENTEROLOGY | Facility: CLINIC | Age: 57
End: 2017-06-07

## 2017-06-07 VITALS
BODY MASS INDEX: 47.27 KG/M2 | SYSTOLIC BLOOD PRESSURE: 167 MMHG | HEIGHT: 64 IN | WEIGHT: 276.9 LBS | HEART RATE: 73 BPM | DIASTOLIC BLOOD PRESSURE: 85 MMHG

## 2017-06-07 DIAGNOSIS — R19.7 DIARRHEA, UNSPECIFIED TYPE: ICD-10-CM

## 2017-06-07 DIAGNOSIS — G89.29 CHRONIC ABDOMINAL PAIN: Primary | ICD-10-CM

## 2017-06-07 DIAGNOSIS — K51.818 OTHER ULCERATIVE COLITIS WITH OTHER COMPLICATION (HCC): ICD-10-CM

## 2017-06-07 DIAGNOSIS — R10.9 CHRONIC ABDOMINAL PAIN: Primary | ICD-10-CM

## 2017-06-07 DIAGNOSIS — D50.9 IRON DEFICIENCY ANEMIA, UNSPECIFIED IRON DEFICIENCY ANEMIA TYPE: ICD-10-CM

## 2017-06-07 PROCEDURE — 99213 OFFICE O/P EST LOW 20 MIN: CPT | Performed by: PHYSICIAN ASSISTANT

## 2017-06-07 NOTE — PROGRESS NOTES
Chief Complaint   Patient presents with   • Ulcerative Colitis   • Abdominal Pain   • Diarrhea   • Iron Deficiency Anemia       ENDO PROCEDURE ORDERED:    Subjective    Tiffani Serra is a 57 y.o. female. she is here today for follow-up.    History of Present Illness    Patient seen on a recheck of her ulcerative colitis, abdominal pain, diarrhea, iron deficiency anemia.  Last seen 3/6/17.  Patient did not get her laboratories drawn as requested.  She currently denies significant abdominal pain, nausea, vomiting, diarrhea.  Weight is up 5 pounds since last visit.  Last colonoscopy showed colitis in the rectum with a colon polyp on 6/15/16.  She has reduced herself to 2 Apriso daily.  She still claims to be taking her iron.    A/P: Patient appears to be stable on current regimen.  I recommended she resume dose back up to 4 Apriso daily.  We'll check CBC, CMP, serum iron.  As long as she is doing well we'll see her back in 4 months.  We'll need to consider repeat colonoscopy within the next year.  She declined at present.     The following portions of the patient's history were reviewed and updated as appropriate:   Past Medical History:   Diagnosis Date   • Abdominal pain    • Abscess of labia    • Acute posthemorrhagic anemia 01/08/2015   • Anemia     history of, mild   • Anemia due to blood loss 11/20/2014   • Contact dermatitis 01/30/2013    on labia and surrounding regions   • Cystitis     recurrent   • Diarrhea 04/11/2016   • Foot pain     porokeratoma causin metatarsalgia, right foot   • Generalized abdominal pain 02/23/2015   • Hypercholesterolemia    • Hypertensive disorder    • Infection of skin and subcutaneous tissue 01/30/2013   • Iron deficiency anemia 04/11/2016    improving   • Irritable bowel syndrome    • Left sided ulcerative colitis 10/08/2015   • Malaise and fatigue 11/02/2011   • Obesity    • Pseudomembranous enterocolitis 11/02/2012   • Rectal hemorrhage 07/01/2015   • Scapulalgia  10/24/2014   • Sialoadenitis 2013   • Ulcerative colitis 2016    chronic, for 29 years.  flare   • Urinary tract infectious disease 2012   • Vitamin D deficiency 2012     Past Surgical History:   Procedure Laterality Date   • BLADDER SURGERY      bladder tacking x 2   • COLONOSCOPY  2011    Colitis found in rectum & sigmoid colon. Biopsy taken   • COLONOSCOPY  06/15/2016    Erythematous mucosa in the rectum.Biopsied.One polyp in the transverse colon. Biopsied   • COLONOSCOPY  08/15/2008    Colitis of the rectum, the sigmoid and the transverse colon. Multiple biopsies were obtained from the rectum, the sigmoid and the transverse colon. Multiple biopsies were obtained from the cecum, the transverse colon, sigmoid & rectum   • EXCISION LESION  10/17/1969    removal of sebaceous cyst of neck   • JOINT REPLACEMENT Left    • KNEE ARTHROSCOPY  2005    Tears of the meidal and lateral meniscus and osteoarthritis of the knee. Arthroscopic medial and lateral meniscectomies of the right knee with chondroplasty of the medial, lateral femoral condyles and patella   • LYMPH NODE BIOPSY  2009    [place] lymph node biopsy, superficial and deep, within the right groin involving superficial femoral nodes and also the external iliac nodes. Melanoma of the right leg   • TOTAL ABDOMINAL HYSTERECTOMY WITH SALPINGO OOPHORECTOMY     • TOTAL KNEE ARTHROPLASTY  2007    severe osteoarthritis of the left knee.     • WRIST GANGLION EXCISION  10/17/1969    left wrist     Family History   Problem Relation Age of Onset   • Coronary artery disease Other    • Hypertension Other      OB History      Para Term  AB TAB SAB Ectopic Multiple Living    1 1 1               Allergies   Allergen Reactions   • Tape      Silk tape   • Keflex [Cephalexin] Rash   • Penicillins Rash     nylon     Social History     Social History   • Marital status:      Spouse name: N/A   • Number of  "children: N/A   • Years of education: N/A     Social History Main Topics   • Smoking status: Never Smoker   • Smokeless tobacco: Never Used   • Alcohol use No   • Drug use: No   • Sexual activity: Defer     Other Topics Concern   • None     Social History Narrative       Current Outpatient Prescriptions:   •  Ca Phosphate-Cholecalciferol (CALTRATE GUMMY BITES PO), Take  by mouth Daily., Disp: , Rfl:   •  ferrous sulfate 325 (65 FE) MG tablet, Take 325 mg by mouth daily with breakfast., Disp: , Rfl:   •  gabapentin (NEURONTIN) 300 MG capsule, Take 1 capsule by mouth Every Night. Take at bedtime., Disp: 30 capsule, Rfl: 0  •  mesalamine (APRISO) 0.375 G 24 hr capsule, Take 1 capsule by mouth Daily. 4 CAPS DAILY (Patient taking differently: Take 375 mg by mouth Daily. 2 CAPS DAILY), Disp: 160 capsule, Rfl: 5    Current Facility-Administered Medications:   •  sodium hyaluronate (SUPARTZ) injection 25 mg, 25 mg, Intra-articular, Weekly, Yury Marion MD, 25 mg at 02/09/17 1645  •  sodium hyaluronate (SUPARTZ) injection 25 mg, 25 mg, Intra-articular, Weekly, Yury Marion MD, 25 mg at 02/16/17 1702  Review of Systems  Review of Systems       Objective    /85 (BP Location: Left arm)  Pulse 73  Ht 64\" (162.6 cm)  Wt 276 lb 14.4 oz (126 kg)  LMP  (LMP Unknown)  BMI 47.53 kg/m2  Physical Exam   Constitutional: She is oriented to person, place, and time. She appears well-developed and well-nourished. No distress.   Ill appearing   HENT:   Head: Normocephalic and atraumatic.   Eyes: EOM are normal. Pupils are equal, round, and reactive to light.   Neck: Normal range of motion.   Cardiovascular: Normal rate, regular rhythm and normal heart sounds.    Pulmonary/Chest: Effort normal and breath sounds normal.   Abdominal: Soft. She exhibits no shifting dullness, no distension, no abdominal bruit, no ascites and no mass. Bowel sounds are decreased. There is no hepatosplenomegaly. There is tenderness in the periumbilical " area. There is no rigidity, no rebound, no guarding and no CVA tenderness. No hernia. Hernia confirmed negative in the ventral area.   Obese, mild diffuse   Musculoskeletal: Normal range of motion.   Neurological: She is alert and oriented to person, place, and time.   Skin: Skin is warm. She is not diaphoretic.   Psychiatric: She has a normal mood and affect. Her behavior is normal. Judgment and thought content normal.   Nursing note and vitals reviewed.    Assessment/Plan      1. Chronic abdominal pain    2. Other ulcerative colitis with other complication    3. Iron deficiency anemia, unspecified iron deficiency anemia type    4. Diarrhea, unspecified type    .   Tiffani was seen today for ulcerative colitis, abdominal pain, diarrhea and iron deficiency anemia.    Diagnoses and all orders for this visit:    Chronic abdominal pain  -     CBC Auto Differential  -     Comprehensive Metabolic Panel  -     Iron    Other ulcerative colitis with other complication  -     CBC Auto Differential  -     Comprehensive Metabolic Panel  -     Iron    Iron deficiency anemia, unspecified iron deficiency anemia type  -     CBC Auto Differential  -     Comprehensive Metabolic Panel  -     Iron    Diarrhea, unspecified type  -     CBC Auto Differential  -     Comprehensive Metabolic Panel  -     Iron        Orders placed during this encounter include:  Orders Placed This Encounter   Procedures   • CBC Auto Differential   • Comprehensive Metabolic Panel   • Iron       Medications prescribed:  No orders of the defined types were placed in this encounter.    Discontinued Medications       Reason for Discontinue    dexlansoprazole (DEXILANT) 60 MG capsule Therapy completed    predniSONE (DELTASONE) 10 MG tablet Therapy completed    hyoscyamine (LEVSIN) 0.125 MG SL tablet Non-compliance    sucralfate (CARAFATE) 1 G tablet Therapy completed        Requested Prescriptions      No prescriptions requested or ordered in this encounter        Review and/or summary of lab tests, radiology, procedures, medications. Review and summary of old records and obtaining of history. The risks and benefits of my recommendations, as well as other treatment options were discussed with the patient today. Questions were answered.    Follow-up: Return in about 4 months (around 10/7/2017).     * Surgery not found *      This document has been electronically signed by Joao Arnett PA-C on June 30, 2017 2:04 PM      Results for orders placed or performed during the hospital encounter of 01/19/17   Clostridium Difficile Toxin, PCR   Result Value Ref Range    Toxogenic C. difficile Negative Negative    O27 Negative Negative   CBC & Differential   Result Value Ref Range    WBC 8.8 3.2 - 9.8 x1000/uL    RBC 3.97 3.77 - 5.16 veena/mm3    Hemoglobin 11.9 (L) 12.0 - 15.5 gm/dl    Hematocrit 36.4 35.0 - 45.0 %    MCV 91.7 80.0 - 98.0 fl    MCH 30.0 26.0 - 34.0 pg    MCHC 32.7 31.4 - 36.0 gm/dl    RDW 13.4 11.5 - 14.5 %    Platelets 324 150 - 450 x1000/mm3    MPV 8.0 8.0 - 12.0 fl    Neutrophil Rel % 79.8 37.0 - 80.0 %    Lymphocyte Rel % 10.3 10.0 - 50.0 %    Monocyte Rel % 8.9 0.0 - 12.0 %    Eosinophil Rel % 0.1 0.0 - 7.0 %    Basophil Rel % 0.1 0.0 - 2.0 %    Immature Granulocyte Rel % 0.80 (H) 0.00 - 0.50 %    Neutrophils Absolute 7.04 2.00 - 8.60 x1000/uL    Lymphocytes Absolute 0.91 0.60 - 4.20 x1000/uL    Monocytes Absolute 0.79 0.00 - 0.90 x1000/uL    Eosinophils Absolute 0.01 0.00 - 0.70 x1000/uL    Basophils Absolute 0.01 0.00 - 0.20 x1000/uL    Immature Granulocytes Absolute 0.070 (H) 0.005 - 0.022 x1000/uL   Results for orders placed or performed in visit on 01/19/17   Calprotectin, Fecal   Result Value Ref Range    Calprotectin, Fecal 298 (H) 0 - 120 ug/g   Iron and TIBC   Result Value Ref Range    Iron 33 (L) 37 - 170 ug/dl    TIBC 266 265 - 497 ug/dl    Iron Saturation 12.4 (L) 15.0 - 50.0 %   C-reactive Protein   Result Value Ref Range    C-Reactive  Protein 2.6 (H) 0.0 - 1.0 mg/dl   Ferritin   Result Value Ref Range    Ferritin 63.6 11.1 - 264.0 ng/ml   Comprehensive Metabolic Panel   Result Value Ref Range    Sodium 136 (L) 137 - 145 mmol/L    Potassium 4.5 3.5 - 5.1 mmol/L    Chloride 98 95 - 110 mmol/L    CO2 26 22 - 31 mmol/L    Anion Gap 12.0 5.0 - 15.0 mmol/L    Glucose 120 (H) 60 - 100 mg/dl    BUN 17 7 - 21 mg/dl    Creatinine 0.8 0.5 - 1.0 mg/dl    GFR MDRD Non  74 51 - 120 mL/min/1.73 sq.M    GFR MDRD  89 51 - 120 mL/min/1.73 sq.M    Calcium 8.8 8.4 - 10.2 mg/dl    Total Protein 6.8 6.3 - 8.6 gm/dl    Albumin 3.3 (L) 3.4 - 4.8 gm/dl    Total Bilirubin 0.5 0.2 - 1.3 mg/dl    Alkaline Phosphatase 63 38 - 126 U/L    AST (SGOT) 20 14 - 36 U/L    ALT (SGPT) 37 9 - 52 U/L   Results for orders placed or performed during the hospital encounter of 01/03/17   Gastrointestinal Panel PCR   Result Value Ref Range    GI Panel Negative Negative    C difficile Toxins A+B, EIA Negative Negative,N/A - Formed stool   Manual Differential   Result Value Ref Range    Neutrophil Rel % 61 37 - 80 %    Bands Rel %  1 (L) 3 - 5 %    Lymphocyte Rel % 24 10 - 50 %    Monocyte Rel % 10 0 - 12 %    Eosinophil Rel % 1 0 - 7 %    Metamyelocyte % 2 (H) 0 - 0 %    Myelocyte Rel % 1 (H) 0 - 0 %    RBC Morphology Normocytic Normochromic     Platelet Estimate Normal     Total Counted 100    Urinalysis   Result Value Ref Range    Color, UA ORANGE (H) YELLOW,Yellow    Appearance TURBID (H) CLEAR,Clear    Specific [place], UA 1.025 1.003 - 1.030    pH, UA 6.0 (H) 5.0 - 9.0 pH Units    Leukocytes, UA NEGATIVE Negative,NEGATIVE    Nitrite, UA NEGATIVE Negative,NEGATIVE    Protein, UA 1+ (H) Negative,NEGATIVE    Glucose, Urine NEGATIVE Negative,NEGATIVE mg/dl    Ketones, UA 2+ (H) Negative,NEGATIVE    Urobilinogen, UA 0.2 0.2 EU/dl    Blood, UA NEGATIVE NEGATIVE   CBC & Differential   Result Value Ref Range    WBC 8.6 3.2 - 9.8 x1000/uL    RBC 3.60 (L) 3.77 -  5.16 veena/mm3    Hemoglobin 10.8 (L) 12.0 - 15.5 gm/dl    Hematocrit 32.2 (L) 35.0 - 45.0 %    MCV 89.4 80.0 - 98.0 fl    MCH 30.0 26.0 - 34.0 pg    MCHC 33.5 31.4 - 36.0 gm/dl    RDW 12.6 11.5 - 14.5 %    Platelets 247 150 - 450 x1000/mm3    MPV 8.7 8.0 - 12.0 fl    Neutrophil Rel % 75.1 37.0 - 80.0 %    Lymphocyte Rel % 12.4 10.0 - 50.0 %    Monocyte Rel % 5.8 0.0 - 12.0 %    Eosinophil Rel % 0.1 0.0 - 7.0 %    Basophil Rel % 0.6 0.0 - 2.0 %    Immature Granulocyte Rel % 6.00 (H) 0.00 - 0.50 %    Neutrophils Absolute 6.44 2.00 - 8.60 x1000/uL    Lymphocytes Absolute 1.06 0.60 - 4.20 x1000/uL    Monocytes Absolute 0.50 0.00 - 0.90 x1000/uL    Eosinophils Absolute 0.01 0.00 - 0.70 x1000/uL    Basophils Absolute 0.05 0.00 - 0.20 x1000/uL    Immature Granulocytes Absolute 0.510 (H) 0.005 - 0.022 x1000/uL    nRBC 0.0 0.0 - 0.2 %    nRBC 0.000 x1000/uL   CBC & Differential   Result Value Ref Range    WBC 9.9 (H) 3.2 - 9.8 x1000/uL    RBC 3.67 (L) 3.77 - 5.16 veena/mm3    Hemoglobin 11.1 (L) 12.0 - 15.5 gm/dl    Hematocrit 33.1 (L) 35.0 - 45.0 %    MCV 90.2 80.0 - 98.0 fl    MCH 30.2 26.0 - 34.0 pg    MCHC 33.5 31.4 - 36.0 gm/dl    RDW 12.0 11.5 - 14.5 %    Platelets 277 150 - 450 x1000/mm3    MPV 8.8 8.0 - 12.0 fl    Neutrophil Rel % 79.8 37.0 - 80.0 %    Lymphocyte Rel % 8.6 (L) 10.0 - 50.0 %    Monocyte Rel % 9.9 0.0 - 12.0 %    Eosinophil Rel % 0.1 0.0 - 7.0 %    Basophil Rel % 0.1 0.0 - 2.0 %    Immature Granulocyte Rel % 1.50 (H) 0.00 - 0.50 %    Neutrophils Absolute 7.93 2.00 - 8.60 x1000/uL    Lymphocytes Absolute 0.85 0.60 - 4.20 x1000/uL    Monocytes Absolute 0.98 (H) 0.00 - 0.90 x1000/uL    Eosinophils Absolute 0.01 0.00 - 0.70 x1000/uL    Basophils Absolute 0.01 0.00 - 0.20 x1000/uL    Immature Granulocytes Absolute 0.150 (H) 0.005 - 0.022 x1000/uL     *Note: Due to a large number of results and/or encounters for the requested time period, some results have not been displayed. A complete set of results can be  found in Results Review.       Some portions of this note have been dictated using voice recognition software and may contain errors and/or omissions.

## 2017-09-19 ENCOUNTER — OFFICE VISIT (OUTPATIENT)
Dept: ORTHOPEDIC SURGERY | Facility: CLINIC | Age: 57
End: 2017-09-19

## 2017-09-19 VITALS
HEIGHT: 64 IN | DIASTOLIC BLOOD PRESSURE: 80 MMHG | BODY MASS INDEX: 45.07 KG/M2 | WEIGHT: 264 LBS | SYSTOLIC BLOOD PRESSURE: 148 MMHG

## 2017-09-19 DIAGNOSIS — M25.561 ACUTE PAIN OF RIGHT KNEE: ICD-10-CM

## 2017-09-19 DIAGNOSIS — M17.11 PRIMARY OSTEOARTHRITIS OF RIGHT KNEE: Primary | ICD-10-CM

## 2017-09-19 PROCEDURE — 99214 OFFICE O/P EST MOD 30 MIN: CPT | Performed by: ORTHOPAEDIC SURGERY

## 2017-09-19 RX ORDER — PREGABALIN 75 MG/1
75 CAPSULE ORAL ONCE
Status: CANCELLED | OUTPATIENT
Start: 2017-10-16 | End: 2017-09-19

## 2017-09-19 RX ORDER — ACETAMINOPHEN 325 MG/1
975 TABLET ORAL ONCE
Status: CANCELLED | OUTPATIENT
Start: 2017-10-16 | End: 2017-09-19

## 2017-09-19 RX ORDER — CYCLOBENZAPRINE HCL 10 MG
10 TABLET ORAL 3 TIMES DAILY PRN
Status: ON HOLD | COMMUNITY
Start: 2017-08-30 | End: 2017-10-16

## 2017-09-19 NOTE — PROGRESS NOTES
"Subjective   Tiffani Serra is a 57 y.o. female. 1960- Recheck of right knee- Patient wants to schedule right total knee arthroplasty      History of Present Illness   Patient is here for recheck of right knee. Patient would like to decline the remaining Supartz injection series and go ahead and schedule right total knee arthroplasty. Patient last xray's were September 2016. Patient states that she has severe pain daily, in her right knee. This pain keeps her from daily living activities. Patient states that she is \"so very sick\" of feeling poorly and in constant pain. Patient states that her primary care physician prescribed Flexeril which seems to help the patient get some rest.  Xray of the right knee will be obtained before she leaves today's office visit.     The following portions of the patient's history were reviewed and updated as appropriate:   She  has a past medical history of Abdominal pain; Abscess of labia; Acute posthemorrhagic anemia (01/08/2015); Anemia; Anemia due to blood loss (11/20/2014); Contact dermatitis (01/30/2013); Cystitis; Diarrhea (04/11/2016); Foot pain; Generalized abdominal pain (02/23/2015); Hypercholesterolemia; Hypertensive disorder; Infection of skin and subcutaneous tissue (01/30/2013); Iron deficiency anemia (04/11/2016); Irritable bowel syndrome; Left sided ulcerative colitis (10/08/2015); Malaise and fatigue (11/02/2011); Obesity; Pseudomembranous enterocolitis (11/02/2012); Rectal hemorrhage (07/01/2015); Scapulalgia (10/24/2014); Sialoadenitis (07/26/2013); Ulcerative colitis (04/11/2016); Urinary tract infectious disease (07/14/2012); and Vitamin D deficiency (05/14/2012).  She  does not have any pertinent problems on file.  She  has a past surgical history that includes Lymph node biopsy (05/12/2009); Bladder surgery (2001); Colonoscopy (07/25/2011); Colonoscopy (06/15/2016); Colonoscopy (08/15/2008); Knee arthroscopy (02/21/2005); Excision Lesion (10/17/1969); " Wrist ganglion excision (10/17/1969); Total abdominal hysterectomy w/ bilateral salpingoophorectomy (2001); Total knee arthroplasty (08/08/2007); and Joint replacement (Left).  Her family history includes Coronary artery disease in her other; Hypertension in her other.  She  reports that she has never smoked. She has never used smokeless tobacco. She reports that she does not drink alcohol or use illicit drugs.  Current Outpatient Prescriptions   Medication Sig Dispense Refill   • Ca Phosphate-Cholecalciferol (CALTRATE GUMMY BITES PO) Take  by mouth Daily.     • cyclobenzaprine (FLEXERIL) 10 MG tablet      • ferrous sulfate 325 (65 FE) MG tablet Take 325 mg by mouth daily with breakfast.     • gabapentin (NEURONTIN) 300 MG capsule Take 1 capsule by mouth Every Night. Take at bedtime. 30 capsule 0   • mesalamine (APRISO) 0.375 G 24 hr capsule Take 1 capsule by mouth Daily. 4 CAPS DAILY (Patient taking differently: Take 375 mg by mouth Daily. 2 CAPS DAILY) 160 capsule 5     Current Facility-Administered Medications   Medication Dose Route Frequency Provider Last Rate Last Dose   • sodium hyaluronate (SUPARTZ) injection 25 mg  25 mg Intra-articular Weekly Yury Marion MD   25 mg at 02/09/17 1645   • sodium hyaluronate (SUPARTZ) injection 25 mg  25 mg Intra-articular Weekly Yury Marion MD   25 mg at 02/16/17 1702     Current Outpatient Prescriptions on File Prior to Visit   Medication Sig   • Ca Phosphate-Cholecalciferol (CALTRATE GUMMY BITES PO) Take  by mouth Daily.   • ferrous sulfate 325 (65 FE) MG tablet Take 325 mg by mouth daily with breakfast.   • gabapentin (NEURONTIN) 300 MG capsule Take 1 capsule by mouth Every Night. Take at bedtime.   • mesalamine (APRISO) 0.375 G 24 hr capsule Take 1 capsule by mouth Daily. 4 CAPS DAILY (Patient taking differently: Take 375 mg by mouth Daily. 2 CAPS DAILY)     Current Facility-Administered Medications on File Prior to Visit   Medication   • sodium hyaluronate  "(SUPARTZ) injection 25 mg   • sodium hyaluronate (SUPARTZ) injection 25 mg     She is allergic to tape; keflex [cephalexin]; and penicillins..    Review of Systems  REVIEW OF SYSTEMS:  Negative, other than presenting complaint.  HEENT: No headaches, diplopia, blurred vision, tinnitus, vertigo, epistaxis, hoarseness or sore throat.  Pulmonary: No cough, sputum, hemoptysis, dyspnea, wheezing, or chest pain.  Cardiac: No chest pain, palpitations, orthopnea, paroxysmal nocturnal dyspnea, shortness of breath, or pedal edema.  Gastrointestinal: No diarrhea, melena, or constipation.  Genitourinary: No dysuria, hematuria, nocturia, frequency, bladder or bowel incontinence.  Hematology: No history of any anemia, fatigue, fever, or chills or night sweats.  Dermatology: No rashes, pruritus, or increased pigmentation changes of the skin.     Objective   Physical Exam  /80 (BP Location: Right arm, Patient Position: Sitting)  Ht 64\" (162.6 cm)  Wt 264 lb (120 kg)  LMP  (LMP Unknown)  BMI 45.32 kg/m2    Social History     Social History   • Marital status:      Spouse name: N/A   • Number of children: N/A   • Years of education: N/A     Occupational History   • Not on file.     Social History Main Topics   • Smoking status: Never Smoker   • Smokeless tobacco: Never Used   • Alcohol use No   • Drug use: No   • Sexual activity: Defer     Other Topics Concern   • Not on file     Social History Narrative       HEENT: Normocephalic.  PERRLA.  TM's clear bilaterally.  Oropharynx: Clear.  Neck: Supple, with no adenopathy.  Chest: Equal bilateral expansion.  Clear to auscultation and percussion.  Heart: Regular sinus rhythm, S1 and S2 normal.  No murmurs or extra heart sounds heard.  Abdomen: Soft, nontender, and no organomegaly.  Neurological: cranial nerves II-XII normal Vascular: pulses are present  Dermatological: no rashes  or blemishes, or any abnormality of the skin.    Examination of right knee shows severe " crepitus. There is tenderness present upon palpitation to the medial joint line. Severe varus deformity present.  Range is from -10 degrees of extension to 95 degrees of flexion, tender to palpation of the medial joint line.      Xray of the right knee reveals severe osteoarthritis of the right knee. End stage.    SURGERY RISKS    Procedure has been explained to the patient.  Risks and benefits have been explained, including the risk of bleeding, stiffness, recurrent problems, blood vessel or nerve injury, blood clots, infection, and lack of healing.  Patient understands and agrees to the procedure, and we will proceed ahead with this, as an outpatient under general anesthesia, on  10/16/17          Assessment/Plan   Problems Addressed this Visit        Musculoskeletal and Integument    Osteoarthritis of right knee - Primary    Relevant Orders    XR Knee 4+ View Right    Knee pain, acute    Relevant Orders    XR Knee 4+ View Right

## 2017-10-04 ENCOUNTER — APPOINTMENT (OUTPATIENT)
Dept: PREADMISSION TESTING | Facility: HOSPITAL | Age: 57
End: 2017-10-04

## 2017-10-04 VITALS
RESPIRATION RATE: 12 BRPM | WEIGHT: 265 LBS | OXYGEN SATURATION: 100 % | HEART RATE: 95 BPM | HEIGHT: 64 IN | BODY MASS INDEX: 45.24 KG/M2 | SYSTOLIC BLOOD PRESSURE: 148 MMHG | DIASTOLIC BLOOD PRESSURE: 92 MMHG

## 2017-10-04 LAB
ABO GROUP BLD: NORMAL
BLD GP AB SCN SERPL QL: NEGATIVE
DEPRECATED RDW RBC AUTO: 40.9 FL (ref 36.4–46.3)
ERYTHROCYTE [DISTWIDTH] IN BLOOD BY AUTOMATED COUNT: 12.6 % (ref 11.5–14.5)
HCT VFR BLD AUTO: 39.9 % (ref 35–45)
HGB BLD-MCNC: 13.5 G/DL (ref 12–15.5)
Lab: NORMAL
MCH RBC QN AUTO: 30.3 PG (ref 26.5–34)
MCHC RBC AUTO-ENTMCNC: 33.8 G/DL (ref 31.4–36)
MCV RBC AUTO: 89.7 FL (ref 80–98)
MRSA DNA SPEC QL NAA+PROBE: NEGATIVE
PLATELET # BLD AUTO: 225 10*3/MM3 (ref 150–450)
PMV BLD AUTO: 9.2 FL (ref 8–12)
RBC # BLD AUTO: 4.45 10*6/MM3 (ref 3.77–5.16)
RH BLD: NEGATIVE
WBC NRBC COR # BLD: 8.1 10*3/MM3 (ref 3.2–9.8)

## 2017-10-04 PROCEDURE — 93005 ELECTROCARDIOGRAM TRACING: CPT

## 2017-10-04 PROCEDURE — 86901 BLOOD TYPING SEROLOGIC RH(D): CPT | Performed by: ANESTHESIOLOGY

## 2017-10-04 PROCEDURE — 86900 BLOOD TYPING SEROLOGIC ABO: CPT | Performed by: ANESTHESIOLOGY

## 2017-10-04 PROCEDURE — 86850 RBC ANTIBODY SCREEN: CPT | Performed by: ANESTHESIOLOGY

## 2017-10-04 PROCEDURE — 85027 COMPLETE CBC AUTOMATED: CPT | Performed by: ANESTHESIOLOGY

## 2017-10-04 PROCEDURE — 36415 COLL VENOUS BLD VENIPUNCTURE: CPT

## 2017-10-04 PROCEDURE — 87641 MR-STAPH DNA AMP PROBE: CPT | Performed by: ORTHOPAEDIC SURGERY

## 2017-10-04 PROCEDURE — 93010 ELECTROCARDIOGRAM REPORT: CPT | Performed by: INTERNAL MEDICINE

## 2017-10-04 RX ORDER — SODIUM CHLORIDE, SODIUM GLUCONATE, SODIUM ACETATE, POTASSIUM CHLORIDE, AND MAGNESIUM CHLORIDE 526; 502; 368; 37; 30 MG/100ML; MG/100ML; MG/100ML; MG/100ML; MG/100ML
1000 INJECTION, SOLUTION INTRAVENOUS CONTINUOUS PRN
Status: CANCELLED | OUTPATIENT
Start: 2017-10-16

## 2017-10-04 ASSESSMENT — KOOS JR: KOOS JR SCORE: 22

## 2017-10-04 NOTE — DISCHARGE INSTRUCTIONS
[facility]  Pre-op Information and Guidelines    You will be called after 2 p.m. the day before your surgery (Friday for Monday surgery) and notified of your time for arrival and approximate surgery time.  If you have not received a call by 4P.M., please contact Same Day Surgery at [phone] of if outside [facility] call [phone].    Please Follow these Important Safety Guidelines:    • The morning of your procedure, take only the medications listed below with   A sip of water:_____________________________________________       ______________________________________________    • DO NOT eat or drink anything after 12:00 midnight the night before surgery  Specific instructions concerning drinking clear liquids will be discussed during  the pre-surgery instruction call the day before your surgery.    • If you take a blood thinner (ex. Plavix, Coumadin, aspirin), ask your doctor when to stop it before surgery  STOP DATE: _________________    • Only 2 visitors are allowed in patient rooms at a time  Your visitors will be asked to wait in the lobby until the admission process is complete with the exception of a parent with a child and patients in need of special assistance.    • YOU CANNOT DRIVE YOURSELF HOME  You must be accompanied by someone who will be responsible for driving you home after surgery and for your care at home.    • DO NOT chew gum, use breath mints, hard candy, or smoke the day of surgery  • DO NOT drink alcohol for at least 24 hours before your surgery  • DO NOT wear any jewelry and remove all body piercing before coming to the hospital  • DO NOT wear make-up to the hospital  • If you are having surgery on an extremity (arm/leg/foot) remove nail polish/artificial nails on the surgical side  • Clothing, glasses, contacts, dentures, and hairpieces must be removed before surgery  • Bathe the night before or the morning of your surgery and do not use powders/lotions on  skin.

## 2017-10-04 NOTE — PAT
Spoke with Dr. Rodriguez over the phone, EKG results read at this time, /92 today had seen cardiology 10 yrs ago in relation to bp elevation prior to surgery at that time, placed on bp meds which she relates she was only on for a short period of time, and was told ok to stop them, currently denies any cardiac issues, she does reports that she has white coat syndrome.  Dr. Rodriguez related may proceed with surgery no further orders or instructions at this time.

## 2017-10-16 ENCOUNTER — HOSPITAL ENCOUNTER (INPATIENT)
Facility: HOSPITAL | Age: 57
LOS: 3 days | Discharge: HOME HEALTH | End: 2017-10-19
Attending: ORTHOPAEDIC SURGERY | Admitting: ORTHOPAEDIC SURGERY

## 2017-10-16 ENCOUNTER — ANESTHESIA (OUTPATIENT)
Dept: PERIOP | Facility: HOSPITAL | Age: 57
End: 2017-10-16

## 2017-10-16 ENCOUNTER — ANESTHESIA EVENT (OUTPATIENT)
Dept: PERIOP | Facility: HOSPITAL | Age: 57
End: 2017-10-16

## 2017-10-16 ENCOUNTER — APPOINTMENT (OUTPATIENT)
Dept: GENERAL RADIOLOGY | Facility: HOSPITAL | Age: 57
End: 2017-10-16

## 2017-10-16 DIAGNOSIS — M17.11 PRIMARY OSTEOARTHRITIS OF RIGHT KNEE: ICD-10-CM

## 2017-10-16 DIAGNOSIS — Z74.09 IMPAIRED MOBILITY AND ACTIVITIES OF DAILY LIVING: ICD-10-CM

## 2017-10-16 DIAGNOSIS — Z74.09 IMPAIRED PHYSICAL MOBILITY: ICD-10-CM

## 2017-10-16 DIAGNOSIS — Z78.9 IMPAIRED MOBILITY AND ACTIVITIES OF DAILY LIVING: ICD-10-CM

## 2017-10-16 LAB
ABO GROUP BLD: NORMAL
BLD GP AB SCN SERPL QL: NEGATIVE
INR PPP: 1.1 (ref 0.8–1.2)
Lab: NORMAL
PROTHROMBIN TIME: 14.1 SECONDS (ref 11.1–15.3)
RH BLD: NEGATIVE

## 2017-10-16 PROCEDURE — 97530 THERAPEUTIC ACTIVITIES: CPT

## 2017-10-16 PROCEDURE — 85610 PROTHROMBIN TIME: CPT | Performed by: ORTHOPAEDIC SURGERY

## 2017-10-16 PROCEDURE — 25010000002 MIDAZOLAM PER 1 MG: Performed by: NURSE ANESTHETIST, CERTIFIED REGISTERED

## 2017-10-16 PROCEDURE — C1776 JOINT DEVICE (IMPLANTABLE): HCPCS | Performed by: ORTHOPAEDIC SURGERY

## 2017-10-16 PROCEDURE — 0SRC0J9 REPLACEMENT OF RIGHT KNEE JOINT WITH SYNTHETIC SUBSTITUTE, CEMENTED, OPEN APPROACH: ICD-10-PCS | Performed by: ORTHOPAEDIC SURGERY

## 2017-10-16 PROCEDURE — 27447 TOTAL KNEE ARTHROPLASTY: CPT | Performed by: ORTHOPAEDIC SURGERY

## 2017-10-16 PROCEDURE — G8979 MOBILITY GOAL STATUS: HCPCS

## 2017-10-16 PROCEDURE — 25010000002 DEXAMETHASONE PER 1 MG: Performed by: NURSE ANESTHETIST, CERTIFIED REGISTERED

## 2017-10-16 PROCEDURE — 88305 TISSUE EXAM BY PATHOLOGIST: CPT | Performed by: ORTHOPAEDIC SURGERY

## 2017-10-16 PROCEDURE — 86923 COMPATIBILITY TEST ELECTRIC: CPT

## 2017-10-16 PROCEDURE — 94799 UNLISTED PULMONARY SVC/PX: CPT

## 2017-10-16 PROCEDURE — 25010000002 PROPOFOL 1000 MG/ML EMULSION: Performed by: NURSE ANESTHETIST, CERTIFIED REGISTERED

## 2017-10-16 PROCEDURE — 25010000002 MORPHINE PER 10 MG: Performed by: ORTHOPAEDIC SURGERY

## 2017-10-16 PROCEDURE — 86850 RBC ANTIBODY SCREEN: CPT | Performed by: ORTHOPAEDIC SURGERY

## 2017-10-16 PROCEDURE — 94760 N-INVAS EAR/PLS OXIMETRY 1: CPT

## 2017-10-16 PROCEDURE — 73560 X-RAY EXAM OF KNEE 1 OR 2: CPT

## 2017-10-16 PROCEDURE — 87070 CULTURE OTHR SPECIMN AEROBIC: CPT | Performed by: ORTHOPAEDIC SURGERY

## 2017-10-16 PROCEDURE — 86901 BLOOD TYPING SEROLOGIC RH(D): CPT | Performed by: ORTHOPAEDIC SURGERY

## 2017-10-16 PROCEDURE — G8978 MOBILITY CURRENT STATUS: HCPCS

## 2017-10-16 PROCEDURE — C1713 ANCHOR/SCREW BN/BN,TIS/BN: HCPCS | Performed by: ORTHOPAEDIC SURGERY

## 2017-10-16 PROCEDURE — 25010000002 HYDROMORPHONE PER 4 MG: Performed by: NURSE ANESTHETIST, CERTIFIED REGISTERED

## 2017-10-16 PROCEDURE — 97162 PT EVAL MOD COMPLEX 30 MIN: CPT

## 2017-10-16 PROCEDURE — 87205 SMEAR GRAM STAIN: CPT | Performed by: ORTHOPAEDIC SURGERY

## 2017-10-16 PROCEDURE — 86900 BLOOD TYPING SEROLOGIC ABO: CPT | Performed by: ORTHOPAEDIC SURGERY

## 2017-10-16 PROCEDURE — 25010000002 ROPIVACAINE PER 1 MG: Performed by: NURSE ANESTHETIST, CERTIFIED REGISTERED

## 2017-10-16 PROCEDURE — 25010000002 ONDANSETRON PER 1 MG: Performed by: ORTHOPAEDIC SURGERY

## 2017-10-16 PROCEDURE — 88305 TISSUE EXAM BY PATHOLOGIST: CPT | Performed by: PATHOLOGY

## 2017-10-16 PROCEDURE — 25010000002 ONDANSETRON PER 1 MG: Performed by: NURSE ANESTHETIST, CERTIFIED REGISTERED

## 2017-10-16 PROCEDURE — 25010000002 VANCOMYCIN PER 500 MG: Performed by: ORTHOPAEDIC SURGERY

## 2017-10-16 DEVICE — ATTUNE KNEE SYSTEM TIBIAL BASE ROTATING PLATFORM SIZE 4 CEMENTED
Type: IMPLANTABLE DEVICE | Site: KNEE | Status: FUNCTIONAL
Brand: ATTUNE

## 2017-10-16 DEVICE — ATTUNE KNEE SYSTEM TIBIAL INSERT ROTATING PLATFORM POSTERIOR STABILIZED 5 8MM AOX
Type: IMPLANTABLE DEVICE | Site: KNEE | Status: FUNCTIONAL
Brand: ATTUNE

## 2017-10-16 DEVICE — TOTL KN ATTUNE DEPUY 9527038: Type: IMPLANTABLE DEVICE | Site: KNEE | Status: FUNCTIONAL

## 2017-10-16 DEVICE — SMARTSET GHV GENTAMICIN HIGH VISCOSITY BONE CEMENT 40G
Type: IMPLANTABLE DEVICE | Site: KNEE | Status: FUNCTIONAL
Brand: SMARTSET

## 2017-10-16 DEVICE — ATTUNE PATELLA MEDIALIZED DOME 32MM CEMENTED AOX
Type: IMPLANTABLE DEVICE | Site: KNEE | Status: FUNCTIONAL
Brand: ATTUNE

## 2017-10-16 DEVICE — ATTUNE KNEE SYSTEM FEMORAL POSTERIOR STABILIZED NARROW SIZE 5N RIGHT CEMENTED
Type: IMPLANTABLE DEVICE | Site: KNEE | Status: FUNCTIONAL
Brand: ATTUNE

## 2017-10-16 RX ORDER — FERROUS SULFATE 325(65) MG
325 TABLET ORAL
COMMUNITY

## 2017-10-16 RX ORDER — BISACODYL 10 MG
10 SUPPOSITORY, RECTAL RECTAL DAILY PRN
Status: DISCONTINUED | OUTPATIENT
Start: 2017-10-16 | End: 2017-10-19 | Stop reason: HOSPADM

## 2017-10-16 RX ORDER — BACTERIOSTATIC SODIUM CHLORIDE 0.9 %
VIAL (ML) INJECTION AS NEEDED
Status: DISCONTINUED | OUTPATIENT
Start: 2017-10-16 | End: 2017-10-19 | Stop reason: HOSPADM

## 2017-10-16 RX ORDER — ONDANSETRON 2 MG/ML
INJECTION INTRAMUSCULAR; INTRAVENOUS AS NEEDED
Status: DISCONTINUED | OUTPATIENT
Start: 2017-10-16 | End: 2017-10-16 | Stop reason: SURG

## 2017-10-16 RX ORDER — NALOXONE HCL 0.4 MG/ML
0.4 VIAL (ML) INJECTION
Status: DISCONTINUED | OUTPATIENT
Start: 2017-10-16 | End: 2017-10-19 | Stop reason: HOSPADM

## 2017-10-16 RX ORDER — BUPIVACAINE HYDROCHLORIDE AND EPINEPHRINE 2.5; 5 MG/ML; UG/ML
INJECTION, SOLUTION EPIDURAL; INFILTRATION; INTRACAUDAL; PERINEURAL AS NEEDED
Status: DISCONTINUED | OUTPATIENT
Start: 2017-10-16 | End: 2017-10-19 | Stop reason: HOSPADM

## 2017-10-16 RX ORDER — ACETAMINOPHEN 325 MG/1
975 TABLET ORAL ONCE
Status: COMPLETED | OUTPATIENT
Start: 2017-10-16 | End: 2017-10-16

## 2017-10-16 RX ORDER — DEXAMETHASONE SODIUM PHOSPHATE 4 MG/ML
INJECTION, SOLUTION INTRA-ARTICULAR; INTRALESIONAL; INTRAMUSCULAR; INTRAVENOUS; SOFT TISSUE AS NEEDED
Status: DISCONTINUED | OUTPATIENT
Start: 2017-10-16 | End: 2017-10-16 | Stop reason: SURG

## 2017-10-16 RX ORDER — BUPIVACAINE HYDROCHLORIDE 7.5 MG/ML
INJECTION, SOLUTION EPIDURAL; RETROBULBAR AS NEEDED
Status: DISCONTINUED | OUTPATIENT
Start: 2017-10-16 | End: 2017-10-16 | Stop reason: SURG

## 2017-10-16 RX ORDER — MORPHINE SULFATE 10 MG/ML
INJECTION, SOLUTION INTRAMUSCULAR; INTRAVENOUS AS NEEDED
Status: DISCONTINUED | OUTPATIENT
Start: 2017-10-16 | End: 2017-10-19 | Stop reason: HOSPADM

## 2017-10-16 RX ORDER — ONDANSETRON 2 MG/ML
4 INJECTION INTRAMUSCULAR; INTRAVENOUS EVERY 6 HOURS PRN
Status: DISCONTINUED | OUTPATIENT
Start: 2017-10-16 | End: 2017-10-19 | Stop reason: HOSPADM

## 2017-10-16 RX ORDER — ONDANSETRON 2 MG/ML
4 INJECTION INTRAMUSCULAR; INTRAVENOUS ONCE AS NEEDED
Status: DISCONTINUED | OUTPATIENT
Start: 2017-10-16 | End: 2017-10-16 | Stop reason: HOSPADM

## 2017-10-16 RX ORDER — HYDROMORPHONE HCL 110MG/55ML
PATIENT CONTROLLED ANALGESIA SYRINGE INTRAVENOUS AS NEEDED
Status: DISCONTINUED | OUTPATIENT
Start: 2017-10-16 | End: 2017-10-16 | Stop reason: SURG

## 2017-10-16 RX ORDER — MORPHINE SULFATE 10 MG/ML
4 INJECTION INTRAMUSCULAR; INTRAVENOUS; SUBCUTANEOUS
Status: DISCONTINUED | OUTPATIENT
Start: 2017-10-16 | End: 2017-10-19 | Stop reason: HOSPADM

## 2017-10-16 RX ORDER — PREGABALIN 75 MG/1
75 CAPSULE ORAL ONCE
Status: COMPLETED | OUTPATIENT
Start: 2017-10-16 | End: 2017-10-16

## 2017-10-16 RX ORDER — OXYCODONE AND ACETAMINOPHEN 7.5; 325 MG/1; MG/1
2 TABLET ORAL EVERY 4 HOURS PRN
Status: DISCONTINUED | OUTPATIENT
Start: 2017-10-16 | End: 2017-10-19 | Stop reason: HOSPADM

## 2017-10-16 RX ORDER — ALBUTEROL SULFATE 2.5 MG/3ML
2.5 SOLUTION RESPIRATORY (INHALATION) ONCE AS NEEDED
Status: DISCONTINUED | OUTPATIENT
Start: 2017-10-16 | End: 2017-10-16 | Stop reason: HOSPADM

## 2017-10-16 RX ORDER — WARFARIN SODIUM 5 MG/1
5 TABLET ORAL
Status: DISCONTINUED | OUTPATIENT
Start: 2017-10-16 | End: 2017-10-19

## 2017-10-16 RX ORDER — ONDANSETRON 4 MG/1
4 TABLET, FILM COATED ORAL EVERY 6 HOURS PRN
Status: DISCONTINUED | OUTPATIENT
Start: 2017-10-16 | End: 2017-10-19 | Stop reason: HOSPADM

## 2017-10-16 RX ORDER — MESALAMINE 0.38 G/1
375 CAPSULE, EXTENDED RELEASE ORAL DAILY
COMMUNITY
End: 2018-07-25 | Stop reason: SDUPTHER

## 2017-10-16 RX ORDER — SODIUM CHLORIDE, SODIUM GLUCONATE, SODIUM ACETATE, POTASSIUM CHLORIDE, AND MAGNESIUM CHLORIDE 526; 502; 368; 37; 30 MG/100ML; MG/100ML; MG/100ML; MG/100ML; MG/100ML
1000 INJECTION, SOLUTION INTRAVENOUS CONTINUOUS PRN
Status: DISCONTINUED | OUTPATIENT
Start: 2017-10-16 | End: 2017-10-16 | Stop reason: HOSPADM

## 2017-10-16 RX ORDER — BACITRACIN 50000 [IU]/1
INJECTION, POWDER, FOR SOLUTION INTRAMUSCULAR AS NEEDED
Status: DISCONTINUED | OUTPATIENT
Start: 2017-10-16 | End: 2017-10-19 | Stop reason: HOSPADM

## 2017-10-16 RX ORDER — ONDANSETRON 4 MG/1
4 TABLET, ORALLY DISINTEGRATING ORAL EVERY 6 HOURS PRN
Status: DISCONTINUED | OUTPATIENT
Start: 2017-10-16 | End: 2017-10-19 | Stop reason: HOSPADM

## 2017-10-16 RX ORDER — ROPIVACAINE HYDROCHLORIDE 5 MG/ML
INJECTION, SOLUTION EPIDURAL; INFILTRATION; PERINEURAL AS NEEDED
Status: DISCONTINUED | OUTPATIENT
Start: 2017-10-16 | End: 2017-10-16 | Stop reason: SURG

## 2017-10-16 RX ORDER — MIDAZOLAM HYDROCHLORIDE 1 MG/ML
INJECTION INTRAMUSCULAR; INTRAVENOUS AS NEEDED
Status: DISCONTINUED | OUTPATIENT
Start: 2017-10-16 | End: 2017-10-16 | Stop reason: SURG

## 2017-10-16 RX ORDER — PROPOFOL 10 MG/ML
1000 INJECTION, EMULSION INTRAVENOUS ONCE
Status: DISCONTINUED | OUTPATIENT
Start: 2017-10-16 | End: 2017-10-19 | Stop reason: HOSPADM

## 2017-10-16 RX ADMIN — BUPIVACAINE HYDROCHLORIDE 1.5 ML: 7.5 INJECTION, SOLUTION EPIDURAL; RETROBULBAR at 07:30

## 2017-10-16 RX ADMIN — HYDROMORPHONE HYDROCHLORIDE 1 MG: 2 INJECTION INTRAMUSCULAR; INTRAVENOUS; SUBCUTANEOUS at 10:30

## 2017-10-16 RX ADMIN — VANCOMYCIN HYDROCHLORIDE 1 G: 1 INJECTION, POWDER, LYOPHILIZED, FOR SOLUTION INTRAVENOUS at 07:39

## 2017-10-16 RX ADMIN — VANCOMYCIN HYDROCHLORIDE 1750 MG: 5 INJECTION, POWDER, LYOPHILIZED, FOR SOLUTION INTRAVENOUS at 13:58

## 2017-10-16 RX ADMIN — ROPIVACAINE HYDROCHLORIDE 30 ML: 5 INJECTION, SOLUTION EPIDURAL; INFILTRATION; PERINEURAL at 07:38

## 2017-10-16 RX ADMIN — SODIUM CHLORIDE, SODIUM GLUCONATE, SODIUM ACETATE, POTASSIUM CHLORIDE, AND MAGNESIUM CHLORIDE: 526; 502; 368; 37; 30 INJECTION, SOLUTION INTRAVENOUS at 07:10

## 2017-10-16 RX ADMIN — SODIUM CHLORIDE, SODIUM GLUCONATE, SODIUM ACETATE, POTASSIUM CHLORIDE, AND MAGNESIUM CHLORIDE: 526; 502; 368; 37; 30 INJECTION, SOLUTION INTRAVENOUS at 08:40

## 2017-10-16 RX ADMIN — TRANEXAMIC ACID 1000 MG: 100 INJECTION, SOLUTION INTRAVENOUS at 07:10

## 2017-10-16 RX ADMIN — MIDAZOLAM 2 MG: 1 INJECTION INTRAMUSCULAR; INTRAVENOUS at 07:50

## 2017-10-16 RX ADMIN — SODIUM CHLORIDE, SODIUM GLUCONATE, SODIUM ACETATE, POTASSIUM CHLORIDE, AND MAGNESIUM CHLORIDE 1000 ML: 526; 502; 368; 37; 30 INJECTION, SOLUTION INTRAVENOUS at 06:26

## 2017-10-16 RX ADMIN — ONDANSETRON 4 MG: 2 INJECTION INTRAMUSCULAR; INTRAVENOUS at 13:58

## 2017-10-16 RX ADMIN — HYDROMORPHONE HYDROCHLORIDE 1 MG: 2 INJECTION INTRAMUSCULAR; INTRAVENOUS; SUBCUTANEOUS at 10:37

## 2017-10-16 RX ADMIN — ONDANSETRON 4 MG: 2 INJECTION INTRAMUSCULAR; INTRAVENOUS at 09:39

## 2017-10-16 RX ADMIN — MIDAZOLAM 2 MG: 1 INJECTION INTRAMUSCULAR; INTRAVENOUS at 07:30

## 2017-10-16 RX ADMIN — TRANEXAMIC ACID 1000 MG: 100 INJECTION, SOLUTION INTRAVENOUS at 10:08

## 2017-10-16 RX ADMIN — PROPOFOL 50 MCG/KG/MIN: 10 INJECTION, EMULSION INTRAVENOUS at 07:45

## 2017-10-16 RX ADMIN — MIDAZOLAM 2 MG: 1 INJECTION INTRAMUSCULAR; INTRAVENOUS at 07:07

## 2017-10-16 RX ADMIN — ACETAMINOPHEN 975 MG: 325 TABLET ORAL at 06:26

## 2017-10-16 RX ADMIN — OXYCODONE HYDROCHLORIDE AND ACETAMINOPHEN 1 TABLET: 7.5; 325 TABLET ORAL at 18:04

## 2017-10-16 RX ADMIN — WARFARIN SODIUM 5 MG: 5 TABLET ORAL at 17:59

## 2017-10-16 RX ADMIN — PREGABALIN 75 MG: 75 CAPSULE ORAL at 06:26

## 2017-10-16 RX ADMIN — DEXAMETHASONE SODIUM PHOSPHATE 4 MG: 4 INJECTION, SOLUTION INTRAMUSCULAR; INTRAVENOUS at 09:39

## 2017-10-16 RX ADMIN — OXYCODONE HYDROCHLORIDE AND ACETAMINOPHEN 1 TABLET: 7.5; 325 TABLET ORAL at 22:20

## 2017-10-16 RX ADMIN — TRANEXAMIC ACID 1000 MG: 100 INJECTION, SOLUTION INTRAVENOUS at 06:26

## 2017-10-16 NOTE — OP NOTE
TOTAL KNEE ARTHROPLASTY  Procedure Note    Name:    Tiffani Serra  YOB: 1960  Date of surgery:   10/16/2017    Pre-op Diagnosis:   Primary osteoarthritis of right knee [M17.11]    Post-op Diagnosis:    Post-Op Diagnosis Codes:     * Primary osteoarthritis of right knee [M17.11]    Procedure:  Procedure(s):  TOTAL KNEE ARTHROPLASTY  RIGHT    Surgeon(s):  Yury Marion MD    SURGEON: Yury Marion MD    [facility]  [facility]    Anesthesia: Spinal    Staff:   Circulator: Alisha Asif RN  Scrub Person: Marlene Garner; Waleska Metzger  Assistant: William Brito    Estimated Blood Loss: 100 mL    Specimens:                  ID Type Source Tests Collected by Time Destination   1 : C & S GRAM STAIN RIGHT KNEE JOINT Wound Knee, Right WOUND CULTURE Yury Marion MD 10/16/2017 0816    A : bone and soft tissue right knee  Bone Knee, Right TISSUE EXAM Yury Marion MD 10/16/2017 0833          Drains: one hemodrain    Y Collapsible Closed Device 1 and 2 10/16/17 1004 Right leg (Active)       Urethral Catheter 10/16/17 1006 latex 16 (Active)           Findings:  End-stage osteoarthritis Right knee    Complications: None    IMPLANTS:     Implant Name Type Inv. Item Serial No.  Lot No. LRB No. Used   CMT BONE SMARTSET GHV GENTAMYCIN 40GM - - - [id number] Implant CMT BONE SMARTSET GHV GENTAMYCIN 40GM [id number] DEPUY [id number] Right 2   BASE TIB ATTUNE CMT RP SZ4 - [id number] - [id number] Implant BASE TIB ATTUNE CMT RP SZ4 [id number] DEPUY [id number] Right 1   COMP FEM ATTUNE CMT PS NRW SZ5 RT - [id number] - [id number] Implant COMP FEM ATTUNE CMT PS NRW SZ5 RT [id number] DEPUY [id number] Right 1   COMP PAT ATTUNE CMT MEDL/DOME 32MM - [id number] - [id number] Implant COMP PAT ATTUNE CMT MEDL/DOME 32MM [id number] DEPUY [id number] Right 1   INSRT TIB ATTUNE PS RP SZ5 8MM - [id number] - [id number] Implant INSRT TIB ATTUNE PS RP SZ5 8MM [id number] DEPUY [id number] Right 1         PROCEDURE:    The  patient was taken to the operating room and placed in the supine position. Preoperative antibiotics were administered. Surgical time out was performed. After adequate induction of anesthesia, the leg was prepped and draped in the usual sterile fashion, exsanguinated with an Ace bandage and the tourniquet inflated to 300 mmHg. A midline incision was performed followed by a medial parapatellar arthrotomy.    Attention was then placed to the patella. The patella was noted to track centrally through range of motion. The patella was then sized with the trials. The thickness of the patella was then measured and the cut was made in a free-hand technique. The patella protector was then placed and the surrounding osteophytes were removed.   The patella was subluxed laterally in a non-everted position.  A portion of the fat pad, ACL, and anterior horns of the meniscus were excised.     The drill hole was placed in the distal femur and the canal was the irrigated and suctioned. The IM modesto was placed and a 5 degree distal valgus cut was performed on the femur. The femur was then sized with a sizing guide. The femoral cutting block was placed and all femoral cuts were performed. The proximal tibia was exposed. We used the extramedullary tibial cutting guide set for removal of an appropriate amount of proximal tibia. The tibial cut was performed. The posterior horns of the menisci were excised. The posterior osteophytes were removed. Flexion extension blocks were then used to balance the knee. The tibial cut surface was then sized with the sizing templates and the tibial and femoral trial were then placed. The knee was placed in full extension and then the patella was re-addressed. The patella was resurfaced and the preoperative thickness was reproduced. The patella tracked centrally through range of motion.     At this point all trial components were removed, the knee was copiously irrigated with pulsed lavage.. The cut surfaces  were then dried with clean lap sponges, and the components were cemented tibia, followed by femur, then patella. The knee was held in full extension and all excess cement was removed. The knee was held still until the cement had completely hardened. We then placed the trial polyethylene spacer which resulted in full extension and excellent flexion-extension balance. We placed the final polyethylene spacer.    The knee was then copiously irrigated. The tourniquet was then released. There was excellent hemostasis. We placed a 10 [place] Hemovac drain. We closed the knee in multiple layers in standard fashion. Sterile dressing were applied. At the end of the case, the sponge and needle counts were reported as being correct. There were no known complications. The patient was then transported to the recovery room in satisfactory condition..      Yury Marion MD     Date: 10/16/2017  Time: 10:53 AM

## 2017-10-16 NOTE — ANESTHESIA PROCEDURE NOTES
Peripheral Block    Patient location during procedure: OR  Start time: 10/16/2017 7:38 AM  Reason for block: at surgeon's request and post-op pain management  Performed by  CRNA: ANDRE STEVENSON  Preanesthetic Checklist  Completed: patient identified, site marked, surgical consent, pre-op evaluation, timeout performed, IV checked, risks and benefits discussed and monitors and equipment checked  Prep:  Pt Position: supine  Sterile barriers:cap, gloves and mask  Prep: ChloraPrep  Patient monitoring: blood pressure monitoring, continuous pulse oximetry and EKG  Procedure  Sedation:yes  Performed under: MAC  Guidance:nerve stimulator and landmark technique    Laterality:right  Block Type:femoral  Injection Technique:single-shotNeedle Gauge:22 G    Medications  Comment:10 mg psf decadron    Local Injected:ropivacaine 0.5% Local Amount Injected:30mL  Post Assessment  Injection Assessment: negative aspiration for heme, no paresthesia on injection and incremental injection  Patient Tolerance:comfortable throughout block  Complications:no

## 2017-10-16 NOTE — PROGRESS NOTES
"Anticoagulation by Pharmacy - Warfarin Day 1 of 28    Tiffani Serra is a 57 y.o.female  [Ht: 64\" (162.6 cm); Wt: 278 lb (126 kg)] on Warfarin 5 mg PO  for indication of VTE prophylaxis s/p total right knee arthroplasty.    Goal INR: 1.7-2.5  Today's INR:   Lab Results   Component Value Date    INR 1.10 10/16/2017         Lab Results   Component Value Date    INR 1.10 10/16/2017    PROTIME 14.1 10/16/2017     Lab Results   Component Value Date    HGB 13.5 10/04/2017    HGB 11.9 (L) 01/19/2017    HGB 10.8 (L) 01/06/2017     Lab Results   Component Value Date    HCT 39.9 10/04/2017    HCT 36.4 01/19/2017    HCT 32.2 (L) 01/06/2017     Assessment/Plan:  Initial consult. Reviewed above labs. INR is 1.1.  INR is subtherapeutic. Concurrent medications include enoxaparin. Will initiate dose of  5 mg. Rx will continue to follow and adjust dose accordingly.  Today is day 1 of therapy.      Ling Perez RPH  10/16/17 4:00 PM       "

## 2017-10-16 NOTE — THERAPY EVALUATION
Acute Care - Physical Therapy Initial Evaluation  [facility]     Patient Name: Tiffani Serra  : 1960  MRN: [id number]  Today's Date: 10/16/2017   Onset of Illness/Injury or Date of Surgery Date: 10/16/17  Date of Referral to PT: 10/16/17  Referring Physician: JOEY Marion MD.      Admit Date: 10/16/2017     Visit Dx:    ICD-10-CM ICD-9-CM   1. Primary osteoarthritis of right knee M17.11 715.16   2. Impaired physical mobility Z74.09 781.99     Patient Active Problem List   Diagnosis   • Osteoarthritis of right knee   • Acute foot pain   • Knee pain, acute   • Eversion deformity of left foot   • Plantar fasciitis of left foot   • Pes planus of left foot   • Primary osteoarthritis of right knee     Past Medical History:   Diagnosis Date   • Abdominal pain    • Abscess of labia    • Acute posthemorrhagic anemia 2015   • Anemia     history of, mild   • Anemia due to blood loss 2014   • Cancer     cosmo/right leg mole   • Contact dermatitis 2013    on labia and surrounding regions   • Cystitis     recurrent   • Diarrhea 2016   • Foot pain     porokeratoma causin metatarsalgia, right foot   • Generalized abdominal pain 2015   • Hypercholesterolemia    • Hypertensive disorder    • Infection of skin and subcutaneous tissue 2013   • Iron deficiency anemia 2016    improving   • Irritable bowel syndrome    • Knee pain, right    • Left sided ulcerative colitis 10/08/2015   • Malaise and fatigue 2011   • Obesity    • Pseudomembranous enterocolitis 2012   • Rectal hemorrhage 2015   • Scapulalgia 10/24/2014   • Sialoadenitis 2013   • Ulcerative colitis 2016    chronic, for 29 years.  flare   • Urinary tract infectious disease 2012   • Vitamin D deficiency 2012     Past Surgical History:   Procedure Laterality Date   • BLADDER SURGERY      bladder tacking x 3   • COLONOSCOPY  2011    Colitis found in rectum & sigmoid colon.  Biopsy taken   • COLONOSCOPY  06/15/2016    Erythematous mucosa in the rectum.Biopsied.One polyp in the transverse colon. Biopsied   • COLONOSCOPY  08/15/2008    Colitis of the rectum, the sigmoid and the transverse colon. Multiple biopsies were obtained from the rectum, the sigmoid and the transverse colon. Multiple biopsies were obtained from the cecum, the transverse colon, sigmoid & rectum   • EXCISION LESION  10/17/1969    removal of sebaceous cyst of neck   • JOINT REPLACEMENT Left    • KNEE ARTHROSCOPY  02/21/2005    Tears of the meidal and lateral meniscus and osteoarthritis of the knee. Arthroscopic medial and lateral meniscectomies of the right knee with chondroplasty of the medial, lateral femoral condyles and patella   • LYMPH NODE BIOPSY  05/12/2009    [place] lymph node biopsy, superficial and deep, within the right groin involving superficial femoral nodes and also the external iliac nodes. Melanoma of the right leg   • TOTAL ABDOMINAL HYSTERECTOMY WITH SALPINGO OOPHORECTOMY  2001   • TOTAL KNEE ARTHROPLASTY  08/08/2007    severe osteoarthritis of the left knee.     • WRIST GANGLION EXCISION  10/17/1969    left wrist          PT ASSESSMENT (last 72 hours)      PT Evaluation       10/16/17 1303 10/16/17 1100    Rehab Evaluation    Document Type evaluation  -CZ     Subjective Information agree to therapy;complains of;pain  -CZ     Patient Effort, Rehab Treatment good  -CZ     Symptoms Noted During/After Treatment fatigue;other (see comments)   nausea: nurse notified.   -CZ     General Information    Patient Profile Review yes  -CZ     Onset of Illness/Injury or Date of Surgery Date 10/16/17  -CZ     Referring Physician JOEY Marion MD.  -CZ     General Observations Supine in bed, on supplemental O2 via nasal canula, SCDs, lethargic, nauseous, but cooperative; spouse present  -CZ     Pertinent History Of Current Problem Admitted for R TKA surgery.   -CZ     Precautions/Limitations fall precautions  -CZ   "   Prior Level of Function independent:;all household mobility;community mobility  -CZ     Equipment Currently Used at Home none   Has FWW from previous TKA (L, 10 years ago).  -CZ none  -LW    Plans/Goals Discussed With patient;spouse/S.O.  -CZ     Benefits Reviewed patient:;spouse/S.O.:;improve function;increase independence;increase strength;increase balance  -CZ     Living Environment    Lives With child(lila), adult;spouse  -CZ spouse  -LW    Living Arrangements house  -CZ house  -LW    Home Accessibility stairs to enter home  -CZ no concerns  -LW    Number of Stairs to Enter Home 4  -CZ     Stair Railings at Home outside, present on right side  -CZ outside, present on left side;outside, present on right side  -LW    Type of Financial/Environmental Concern  none  -LW    Transportation Available  car  -LW    Living Environment Comment has 4 \"landings\", 1 step up to each.   -CZ     Clinical Impression    Date of Referral to PT 10/16/17  -CZ     PT Diagnosis impaired physical mobility  -CZ     Prognosis good  -CZ     Functional Level At Time Of Evaluation min Ax1 bed mobility, max Ax1 for transfers and standing.   -CZ     Criteria for Skilled Therapeutic Interventions Met yes;treatment indicated  -CZ     Pathology/Pathophysiology Noted (Describe Specifically for Each System) musculoskeletal  -CZ     Impairments Found (describe specific impairments) aerobic capacity/endurance;gait, locomotion, and balance;muscle performance;ROM  -CZ     Rehab Potential good, to achieve stated therapy goals  -CZ     Predicted Duration of Therapy Intervention (days/wks) 2-4 days  -CZ     Vital Signs    Pre Systolic BP Rehab 161  -CZ     Pre Treatment Diastolic BP 70  -CZ     Post Systolic BP Rehab 128  -CZ     Post Treatment Diastolic BP 82  -CZ     Pretreatment Heart Rate (beats/min) 71  -CZ     Posttreatment Heart Rate (beats/min) 76  -CZ     Pre SpO2 (%) 97  -CZ     O2 Delivery Pre Treatment supplemental O2   2 LPM  -CZ     Post " SpO2 (%) 96  -CZ     O2 Delivery Post Treatment supplemental O2  -CZ     Pain Assessment    Pain Assessment 0-10  -CZ     Pain Score 8  -CZ     Post Pain Score 7  -CZ     Pain Type Chronic pain;Surgical pain  -CZ     Pain Location Knee  -CZ     Pain Orientation Right  -CZ     Pain Intervention(s) Medication (See MAR);Ambulation/increased activity;Distraction;Repositioned  -CZ     Vision Assessment/Intervention    Visual Impairment WFL with corrective lenses  -CZ     Cognitive Assessment/Intervention    Current Cognitive/Communication Assessment functional  -CZ     Orientation Status oriented x 4  -CZ     Follows Commands/Answers Questions 75% of the time   Very lethargic.  -CZ     Personal Safety mild impairment  -CZ     Personal Safety Interventions gait belt;fall prevention program maintained;nonskid shoes/slippers when out of bed  -CZ     ROM (Range of Motion)    General ROM lower extremity range of motion deficits identified  -CZ     General ROM Detail LLE: WFL, RLE: 12-85 decrease PROM.  -CZ     MMT (Manual Muscle Testing)    General MMT Assessment lower extremity strength deficits identified  -CZ     General MMT Assessment Detail RLE: 2+/5, LLE: 4/5, grossly  -CZ     Bed Mobility, Assessment/Treatment    Bed Mobility, Assistive Device bed rails;head of bed elevated  -CZ     Bed Mobility, Scoot/Bridge, [place] minimum assist (75% patient effort);2 person assist required  -CZ     Bed Mob, Supine to Sit, [place] minimum assist (75% patient effort)  -CZ     Bed Mob, Sit to Supine, [place] minimum assist (75% patient effort)  -CZ     Transfer Assessment/Treatment    Transfers, Sit-Stand [place] maximum assist (25% patient effort)  -CZ     Transfers, Stand-Sit [place] maximum assist (25% patient effort)  -CZ     Transfer, Comment Cues for upright posture, increased ventilation.   -CZ     Gait Assessment/Treatment    Gait, [place] Level maximum assist (25% patient effort)  -CZ     Gait,  Distance (Feet) 0  -CZ     Gait, Comment Able to stand, unable to advance either LE.   -CZ     Sensory Assessment/Intervention    Light Touch LLE;RLE  -CZ     LLE Light Touch WNL  -CZ     RLE Light Touch mild impairment  -CZ     Positioning and Restraints    Pre-Treatment Position in bed  -CZ     Post Treatment Position bed  -CZ     In Bed supine;call light within reach;encouraged to call for assist;exit alarm on;with family/caregiver;SCD pump applied  -CZ       User Key  (r) = Recorded By, (t) = Taken By, (c) = Cosigned By    Initials Name Provider Type    LW Chelsy Varela, RN Registered Nurse     Lukas Blake, PT Physical Therapist          Physical Therapy Education     Title: PT OT SLP Therapies (Active)     Topic: Physical Therapy (Active)     Point: Mobility training (Active)    Learning Progress Summary    Learner Readiness Method Response Comment Documented by Status   Patient Acceptance E NR Educated patient in proper technique for bed mobility,transfers and standing.  10/16/17 1516 Active                      User Key     Initials Effective Dates Name Provider Type Discipline     02/17/17 -  Lukas Blake, PT Physical Therapist PT                PT Recommendation and Plan  Anticipated Discharge Disposition: home with outpatient services  Planned Therapy Interventions: balance training, bed mobility training, gait training, home exercise program, patient/family education, ROM (Range of Motion), stair training, strengthening, stretching, transfer training  PT Frequency: other (see comments) (5-14x/week)  Plan of Care Review  Plan Of Care Reviewed With: patient, spouse  Outcome Summary/Follow up Plan: Initial PT evaluation complete.  Patient is cooperative, but very lethargic, difficulty remaining awake and alert.  Patient requires min Ax1 with bed mobility, max Ax1 for sit<-->stand, max Ax1 to stand at EOB.  R knee PROM: 12 - 85 degrees. Patient's spouse present to encourage patient.  Continue  skilled PT.           IP PT Goals       10/16/17 1303          Bed Mobility PT STG    Bed Mobility PT STG, Date Established 10/16/17  -CZ      Bed Mobility PT STG, Time to Achieve 2 days  -CZ      Bed Mobility PT STG, Activity Type all bed mobility  -CZ      Bed Mobility PT STG, [place] Level conditional independence  -CZ      Bed Mobility PT STG, Additional Goal HOB flat, no bed rails.   -CZ      Bed Mobility PT STG, Date Goal Reviewed 10/16/17  -CZ      Bed Mobility PT STG, Outcome goal ongoing  -CZ      Transfer Training PT STG    Transfer Training PT STG, Date Established 10/16/17  -CZ      Transfer Training PT STG, Time to Achieve 2 days  -CZ      Transfer Training PT STG, Activity Type bed to chair /chair to bed;sit to stand/stand to sit  -CZ      Transfer Training PT STG, Assist Device walker, rolling  -CZ      Transfer Training PT STG, Date Goal Reviewed 10/16/17  -CZ      Transfer Training PT STG, Outcome goal ongoing  -CZ      Gait Training PT LTG    Gait Training Goal PT LTG, Date Established 10/16/17  -CZ      Gait Training Goal PT LTG, Time to Achieve by discharge  -CZ      Gait Training Goal PT LTG, [place] Level conditional independence  -CZ      Gait Training Goal PT LTG, Assist Device walker, rolling  -CZ      Gait Training Goal PT LTG, Distance to Achieve 150'x1.  -CZ      Gait Training Goal PT LTG, Date Goal Reviewed 10/16/17  -CZ      Gait Training Goal PT LTG, Outcome goal ongoing  -CZ      Stair Training PT LTG    Stair Training Goal PT LTG, Date Established 10/16/17  -CZ      Stair Training Goal PT LTG, Time to Achieve by discharge  -CZ      Stair Training Goal PT LTG, Number of Steps 4  -CZ      Stair Training Goal PT LTG, [place] Level conditional independence  -CZ      Stair Training Goal PT LTG, Assist Device 1 handrail  -CZ      Stair Training Goal PT LTG, Date Goal Reviewed 10/16/17  -CZ      Stair Training Goal PT LTG, Outcome goal ongoing  -CZ        User Key  (r) =  Recorded By, (t) = Taken By, (c) = Cosigned By    Initials Name Provider Type    CZ Lukas Blake, PT Physical Therapist                Outcome Measures       10/16/17 1303          How much help from another person do you currently need...    Turning from your back to your side while in flat bed without using bedrails? 3  -CZ      Moving from lying on back to sitting on the side of a flat bed without bedrails? 3  -CZ      Moving to and from a bed to a chair (including a wheelchair)? 2  -CZ      Standing up from a chair using your arms (e.g., wheelchair, bedside chair)? 2  -CZ      Climbing 3-5 steps with a railing? 2  -CZ      To walk in hospital room? 2  -CZ      AM-PAC 6 Clicks Score 14  -CZ      Functional Assessment    Outcome Measure Options AM-PAC 6 Clicks Basic Mobility (PT)  -CZ        User Key  (r) = Recorded By, (t) = Taken By, (c) = Cosigned By    Initials Name Provider Type    CZ Lukas Blake PT Physical Therapist           Time Calculation:         PT Charges       10/16/17 1526          Time Calculation    Start Time 1303  -CZ      Stop Time 1357  -CZ      Time Calculation (min) 54 min  -CZ      PT Received On 10/16/17  -CZ      PT Goal Re-Cert Due Date 10/29/17  -CZ      Time Calculation- PT    Total Timed Code Minutes- PT 39 minute(s)  -CZ        User Key  (r) = Recorded By, (t) = Taken By, (c) = Cosigned By    Initials Name Provider Type    CZ Lukas Blake, PT Physical Therapist          Therapy Charges for Today     Code Description Service Date Service Provider Modifiers Qty    30566720104 HC PT MOBILITY CURRENT 10/16/2017 Lukas Blake, PT GP, CK 1    23539397155 HC PT MOBILITY PROJECTED 10/16/2017 Lukas Blake, PT GP, CJ 1    34785312893 HC PT THERAPEUTIC ACT EA 15 MIN 10/16/2017 Lukas Blake, PT GP 3    77962877236 HC PT EVAL MOD COMPLEXITY 1 10/16/2017 Lukas Blake, PT GP 1          PT G-Codes  PT Professional Judgement Used?: Yes  Outcome Measure Options: AM-PAC 6 Clicks  Basic Mobility (PT)  Score: 14  Functional Limitation: Mobility: Walking and moving around  Mobility: Walking and Moving Around Current Status (): At least 40 percent but less than 60 percent impaired, limited or restricted  Mobility: Walking and Moving Around Goal Status (): At least 20 percent but less than 40 percent impaired, limited or restricted      Lukas Blake, PT  10/16/2017

## 2017-10-16 NOTE — PLAN OF CARE
Problem: Patient Care Overview (Adult)  Goal: Plan of Care Review  Outcome: Ongoing (interventions implemented as appropriate)    10/16/17 1303   Coping/Psychosocial Response Interventions   Plan Of Care Reviewed With patient;spouse   Outcome Evaluation   Outcome Summary/Follow up Plan Initial PT evaluation complete. Patient is cooperative, but very lethargic, difficulty remaining awake and alert. Patient requires min Ax1 with bed mobility, max Ax1 for sit<-->stand, max Ax1 to stand at EOB. R knee PROM: 12 - 85 degrees. Patient's spouse present to encourage patient. Continue skilled PT.          Problem: Inpatient Physical Therapy  Goal: Bed Mobility Goal STG- PT  Outcome: Ongoing (interventions implemented as appropriate)    10/16/17 1303   Bed Mobility PT STG   Bed Mobility PT STG, Date Established 10/16/17   Bed Mobility PT STG, Time to Achieve 2 days   Bed Mobility PT STG, Activity Type all bed mobility   Bed Mobility PT STG, [place] Level conditional independence   Bed Mobility PT STG, Additional Goal HOB flat, no bed rails.    Bed Mobility PT STG, Date Goal Reviewed 10/16/17   Bed Mobility PT STG, Outcome goal ongoing       Goal: Transfer Training Goal 1 STG- PT  Outcome: Ongoing (interventions implemented as appropriate)    10/16/17 1303   Transfer Training PT STG   Transfer Training PT STG, Date Established 10/16/17   Transfer Training PT STG, Time to Achieve 2 days   Transfer Training PT STG, Activity Type bed to chair /chair to bed;sit to stand/stand to sit   Transfer Training PT STG, Assist Device walker, rolling   Transfer Training PT STG, Date Goal Reviewed 10/16/17   Transfer Training PT STG, Outcome goal ongoing       Goal: Gait Training Goal LTG- PT  Outcome: Ongoing (interventions implemented as appropriate)    10/16/17 1303   Gait Training PT LTG   Gait Training Goal PT LTG, Date Established 10/16/17   Gait Training Goal PT LTG, Time to Achieve by discharge   Gait Training Goal PT LTG,  [place] Level conditional independence   Gait Training Goal PT LTG, Assist Device walker, rolling   Gait Training Goal PT LTG, Distance to Achieve 150'x1.   Gait Training Goal PT LTG, Date Goal Reviewed 10/16/17   Gait Training Goal PT LTG, Outcome goal ongoing       Goal: Stair Training Goal LTG- PT  Outcome: Ongoing (interventions implemented as appropriate)    10/16/17 1303   Stair Training PT LTG   Stair Training Goal PT LTG, Date Established 10/16/17   Stair Training Goal PT LTG, Time to Achieve by discharge   Stair Training Goal PT LTG, Number of Steps 4   Stair Training Goal PT LTG, [place] Level conditional independence   Stair Training Goal PT LTG, Assist Device 1 handrail   Stair Training Goal PT LTG, Date Goal Reviewed 10/16/17   Stair Training Goal PT LTG, Outcome goal ongoing

## 2017-10-16 NOTE — PLAN OF CARE
Problem: Patient Care Overview (Adult)  Goal: Plan of Care Review  Outcome: Ongoing (interventions implemented as appropriate)    10/16/17 1506   Coping/Psychosocial Response Interventions   Plan Of Care Reviewed With patient   Patient Care Overview   Progress no change   Outcome Evaluation   Outcome Summary/Follow up Plan pt still drowsy from surgery; doing well, able to stand on side of bed. tolerating pain well       Goal: Adult Individualization and Mutuality  Outcome: Ongoing (interventions implemented as appropriate)  Goal: Discharge Needs Assessment  Outcome: Ongoing (interventions implemented as appropriate)    Problem: Perioperative Period (Adult)  Goal: Signs and Symptoms of Listed Potential Problems Will be Absent or Manageable (Perioperative Period)  Outcome: Ongoing (interventions implemented as appropriate)

## 2017-10-16 NOTE — ANESTHESIA PROCEDURE NOTES
Spinal Block    Patient location during procedure: OR  Start Time: 10/16/2017 7:30 AM  Indication:at surgeon's request  Preanesthetic Checklist  Completed: patient identified, site marked, surgical consent, pre-op evaluation, timeout performed, IV checked, risks and benefits discussed and monitors and equipment checked  Spinal Block Prep:  Patient Position:sitting  Sterile Tech:cap, gloves, gown, mask and sterile barriers  Prep:Betadine  Patient Monitoring:blood pressure monitoring, continuous pulse oximetry and EKG  Spinal Block Procedure  Approach:midline  Guidance:landmark technique  Location:L3-L4  Needle Type:Quincke  Needle Gauge:22 G  Placement of Spinal needle event:cerebrospinal fluid aspirated and paresthesia  Paresthesia: right  Fluid Appearance:clear  Post Assessment  Patient Tolerance:patient tolerated the procedure well with no apparent complications  Complications no  Additional Notes  1.5 cc spinal marcaine

## 2017-10-16 NOTE — ANESTHESIA PREPROCEDURE EVALUATION
Anesthesia Evaluation     Patient summary reviewed and Nursing notes reviewed   NPO Solid Status: > 8 hours       Airway   Mallampati: II  TM distance: >3 FB  Neck ROM: full  possible difficult intubation  Dental    (+) poor dentation    Comment: Right upper incisor crown.    Pulmonary - negative pulmonary ROS and normal exam    breath sounds clear to auscultation  Cardiovascular - normal exam    ECG reviewed  Rhythm: regular  Rate: normal    (+) hypertension, hyperlipidemia    ROS comment: EKG:NSR possible old IMI.    Neuro/Psych- negative ROS    ROS Comment: By history right Meralgia Paraesthetica.  GI/Hepatic/Renal/Endo    (+) obesity,      Musculoskeletal     Abdominal   (+) obese,    Substance History - negative use     OB/GYN negative ob/gyn ROS         Other   (+) arthritis                                     Anesthesia Plan    ASA 3     spinal   (Discussed peripheral nerve block for post op pain relief and patient understands possible complications,risks and agrees.)  intravenous induction   Anesthetic plan and risks discussed with patient and spouse/significant other.

## 2017-10-16 NOTE — PLAN OF CARE
Problem: Patient Care Overview (Adult)  Goal: Plan of Care Review  Outcome: Ongoing (interventions implemented as appropriate)    10/16/17 1124   Coping/Psychosocial Response Interventions   Plan Of Care Reviewed With patient   Patient Care Overview   Progress improving   Outcome Evaluation   Outcome Summary/Follow up Plan Verbalizing minimal pain. Resting comfortably. VSS. Meets pacu d/c criteria.          Problem: Perioperative Period (Adult)  Goal: Signs and Symptoms of Listed Potential Problems Will be Absent or Manageable (Perioperative Period)  Outcome: Ongoing (interventions implemented as appropriate)    10/16/17 1124   Perioperative Period   Problems Assessed (Perioperative Period) all   Problems Present (Perioperative Period) none

## 2017-10-16 NOTE — ANESTHESIA POSTPROCEDURE EVALUATION
Patient: Tiffani Serra    Procedure Summary     Date Anesthesia Start Anesthesia Stop Room / Location    10/16/17 0710   [facility] / [facility]       Procedure Diagnosis Surgeon Provider    TOTAL KNEE ARTHROPLASTY (Right Knee) Primary osteoarthritis of right knee  (Primary osteoarthritis of right knee [M17.11]) MD Russ Aguero MD          Anesthesia Type: spinal  Last vitals  BP       Temp        Pulse       Resp        SpO2          Post Anesthesia Care and Evaluation    Patient location during evaluation: PACU  Patient participation: complete - patient participated  Level of consciousness: awake and alert  Pain score: 3  Pain management: adequate  Airway patency: patent  Anesthetic complications: No anesthetic complications  PONV Status: none  Cardiovascular status: acceptable  Respiratory status: acceptable  Hydration status: acceptable

## 2017-10-17 LAB
HCT VFR BLD AUTO: 30 % (ref 35–45)
HGB BLD-MCNC: 10.6 G/DL (ref 12–15.5)
HOLD SPECIMEN: NORMAL
INR PPP: 1.1 (ref 0.8–1.2)
LAB AP CASE REPORT: NORMAL
Lab: NORMAL
PATH REPORT.FINAL DX SPEC: NORMAL
PATH REPORT.GROSS SPEC: NORMAL
PROTHROMBIN TIME: 14.1 SECONDS (ref 11.1–15.3)

## 2017-10-17 PROCEDURE — G8987 SELF CARE CURRENT STATUS: HCPCS

## 2017-10-17 PROCEDURE — 97110 THERAPEUTIC EXERCISES: CPT

## 2017-10-17 PROCEDURE — 85014 HEMATOCRIT: CPT | Performed by: ORTHOPAEDIC SURGERY

## 2017-10-17 PROCEDURE — 85610 PROTHROMBIN TIME: CPT | Performed by: ORTHOPAEDIC SURGERY

## 2017-10-17 PROCEDURE — 85018 HEMOGLOBIN: CPT | Performed by: ORTHOPAEDIC SURGERY

## 2017-10-17 PROCEDURE — 25010000002 ENOXAPARIN PER 10 MG: Performed by: ORTHOPAEDIC SURGERY

## 2017-10-17 PROCEDURE — 97535 SELF CARE MNGMENT TRAINING: CPT

## 2017-10-17 PROCEDURE — 97166 OT EVAL MOD COMPLEX 45 MIN: CPT

## 2017-10-17 PROCEDURE — G8988 SELF CARE GOAL STATUS: HCPCS

## 2017-10-17 RX ADMIN — OXYCODONE HYDROCHLORIDE AND ACETAMINOPHEN 1 TABLET: 7.5; 325 TABLET ORAL at 04:17

## 2017-10-17 RX ADMIN — OXYCODONE HYDROCHLORIDE AND ACETAMINOPHEN 2 TABLET: 7.5; 325 TABLET ORAL at 20:49

## 2017-10-17 RX ADMIN — OXYCODONE HYDROCHLORIDE AND ACETAMINOPHEN 1 TABLET: 7.5; 325 TABLET ORAL at 17:14

## 2017-10-17 RX ADMIN — ENOXAPARIN SODIUM 40 MG: 40 INJECTION SUBCUTANEOUS at 00:22

## 2017-10-17 RX ADMIN — WARFARIN SODIUM 5 MG: 5 TABLET ORAL at 17:14

## 2017-10-17 RX ADMIN — OXYCODONE HYDROCHLORIDE AND ACETAMINOPHEN 1 TABLET: 7.5; 325 TABLET ORAL at 08:47

## 2017-10-17 NOTE — PLAN OF CARE
Problem: Patient Care Overview (Adult)  Goal: Plan of Care Review  Outcome: Ongoing (interventions implemented as appropriate)    10/17/17 1522   Coping/Psychosocial Response Interventions   Plan Of Care Reviewed With patient   Patient Care Overview   Progress progress towards functional goals is fair   Outcome Evaluation   Outcome Summary/Follow up Plan pt evin A-AAROM of B LE well, pt w/ c/o's pain posterior knee and calf of R LE         Problem: Inpatient Physical Therapy  Goal: Bed Mobility Goal STG- PT  Outcome: Ongoing (interventions implemented as appropriate)    10/16/17 1303 10/17/17 1522   Bed Mobility PT STG   Bed Mobility PT STG, Date Established 10/16/17 --    Bed Mobility PT STG, Time to Achieve 2 days --    Bed Mobility PT STG, Activity Type all bed mobility --    Bed Mobility PT STG, [place] Level conditional independence --    Bed Mobility PT STG, Additional Goal HOB flat, no bed rails.  --    Bed Mobility PT STG, Date Goal Reviewed --  10/17/17   Bed Mobility PT STG, Outcome --  goal ongoing       Goal: Transfer Training Goal 1 STG- PT  Outcome: Ongoing (interventions implemented as appropriate)    10/16/17 1303 10/17/17 1522   Transfer Training PT STG   Transfer Training PT STG, Date Established 10/16/17 --    Transfer Training PT STG, Time to Achieve 2 days --    Transfer Training PT STG, Activity Type bed to chair /chair to bed;sit to stand/stand to sit --    Transfer Training PT STG, Assist Device walker, rolling --    Transfer Training PT STG, Date Goal Reviewed --  10/17/17   Transfer Training PT STG, Outcome --  goal ongoing       Goal: Gait Training Goal LTG- PT  Outcome: Ongoing (interventions implemented as appropriate)    10/16/17 1303 10/17/17 1522   Gait Training PT LTG   Gait Training Goal PT LTG, Date Established 10/16/17 --    Gait Training Goal PT LTG, Time to Achieve by discharge --    Gait Training Goal PT LTG, [place] Level conditional independence --    Gait Training  Goal PT LTG, Assist Device walker, rolling --    Gait Training Goal PT LTG, Distance to Achieve 150'x1. --    Gait Training Goal PT LTG, Date Goal Reviewed --  10/17/17   Gait Training Goal PT LTG, Outcome --  goal ongoing       Goal: Stair Training Goal LTG- PT  Outcome: Ongoing (interventions implemented as appropriate)    10/16/17 1303 10/17/17 1522   Stair Training PT LTG   Stair Training Goal PT LTG, Date Established 10/16/17 --    Stair Training Goal PT LTG, Time to Achieve by discharge --    Stair Training Goal PT LTG, Number of Steps 4 --    Stair Training Goal PT LTG, [place] Level conditional independence --    Stair Training Goal PT LTG, Assist Device 1 handrail --    Stair Training Goal PT LTG, Date Goal Reviewed --  10/17/17   Stair Training Goal PT LTG, Outcome --  goal ongoing

## 2017-10-17 NOTE — THERAPY EVALUATION
Acute Care - Occupational Therapy Initial Evaluation  [facility]     Patient Name: Tiffani Serra  : 1960  MRN: [id number]  Today's Date: 10/17/2017  Onset of Illness/Injury or Date of Surgery Date: 10/16/17  Date of Referral to OT: 10/16/17  Referring Physician: Sanam    Admit Date: 10/16/2017       ICD-10-CM ICD-9-CM   1. Primary osteoarthritis of right knee M17.11 715.16   2. Impaired physical mobility Z74.09 781.99   3. Impaired mobility and activities of daily living Z74.09 799.89     Patient Active Problem List   Diagnosis   • Osteoarthritis of right knee   • Acute foot pain   • Knee pain, acute   • Eversion deformity of left foot   • Plantar fasciitis of left foot   • Pes planus of left foot   • Primary osteoarthritis of right knee     Past Medical History:   Diagnosis Date   • Abdominal pain    • Abscess of labia    • Acute posthemorrhagic anemia 2015   • Anemia     history of, mild   • Anemia due to blood loss 2014   • Cancer     cosmo/right leg mole   • Contact dermatitis 2013    on labia and surrounding regions   • Cystitis     recurrent   • Diarrhea 2016   • Foot pain     porokeratoma causin metatarsalgia, right foot   • Generalized abdominal pain 2015   • Hypercholesterolemia    • Hypertensive disorder    • Infection of skin and subcutaneous tissue 2013   • Iron deficiency anemia 2016    improving   • Irritable bowel syndrome    • Knee pain, right    • Left sided ulcerative colitis 10/08/2015   • Malaise and fatigue 2011   • Obesity    • Pseudomembranous enterocolitis 2012   • Rectal hemorrhage 2015   • Scapulalgia 10/24/2014   • Sialoadenitis 2013   • Ulcerative colitis 2016    chronic, for 29 years.  flare   • Urinary tract infectious disease 2012   • Vitamin D deficiency 2012     Past Surgical History:   Procedure Laterality Date   • BLADDER SURGERY      bladder tacking x 3   • COLONOSCOPY   07/25/2011    Colitis found in rectum & sigmoid colon. Biopsy taken   • COLONOSCOPY  06/15/2016    Erythematous mucosa in the rectum.Biopsied.One polyp in the transverse colon. Biopsied   • COLONOSCOPY  08/15/2008    Colitis of the rectum, the sigmoid and the transverse colon. Multiple biopsies were obtained from the rectum, the sigmoid and the transverse colon. Multiple biopsies were obtained from the cecum, the transverse colon, sigmoid & rectum   • EXCISION LESION  10/17/1969    removal of sebaceous cyst of neck   • JOINT REPLACEMENT Left    • KNEE ARTHROSCOPY  02/21/2005    Tears of the meidal and lateral meniscus and osteoarthritis of the knee. Arthroscopic medial and lateral meniscectomies of the right knee with chondroplasty of the medial, lateral femoral condyles and patella   • LYMPH NODE BIOPSY  05/12/2009    [place] lymph node biopsy, superficial and deep, within the right groin involving superficial femoral nodes and also the external iliac nodes. Melanoma of the right leg   • TOTAL ABDOMINAL HYSTERECTOMY WITH SALPINGO OOPHORECTOMY  2001   • TOTAL KNEE ARTHROPLASTY  08/08/2007    severe osteoarthritis of the left knee.     • WRIST GANGLION EXCISION  10/17/1969    left wrist          OT ASSESSMENT FLOWSHEET (last 72 hours)      OT Evaluation       10/17/17 1050 10/17/17 0930 10/17/17 0925 10/16/17 1303 10/16/17 1100    Rehab Evaluation    Document Type therapy note (daily note)  -AM  evaluation  -RB evaluation  -CZ     Subjective Information agree to therapy;complains of;pain  -AM  agree to therapy;complains of;pain;numbness  -RB agree to therapy;complains of;pain  -CZ     Patient Effort, Rehab Treatment good  -AM  good  -RB good  -CZ     Symptoms Noted During/After Treatment none  -AM  other (see comments)   decreased feeling in R LE.  -RB fatigue;other (see comments)   nausea: nurse notified.   -CZ     General Information    Patient Profile Review   yes  -RB yes  -CZ     Onset of Illness/Injury or Date  "of Surgery Date   10/16/17  -RB 10/16/17  -CZ     Referring Physician   Sanam  -RB JOEY Marion MD.  -CZ     General Observations   Supine in bed with IV  -RB Supine in bed, on supplemental O2 via nasal canula, SCDs, lethargic, nauseous, but cooperative; spouse present  -CZ     Pertinent History Of Current Problem   S/P R TKA.  -RB Admitted for R TKA surgery.   -CZ     Precautions/Limitations fall precautions;other (see comments)   WBAT R LE  -AM  fall precautions  -RB fall precautions  -CZ     Prior Level of Function   independent:;gait;transfer;ADL's  -RB independent:;all household mobility;community mobility  -CZ     Equipment Currently Used at Home  walker, rolling;raised toilet;shower chair  -EW none   Has R/W.  -RB none   Has FWW from previous TKA (L, 10 years ago).  -CZ none  -LW    Plans/Goals Discussed With   patient  -RB patient;spouse/S.O.  -CZ     Risks Reviewed   patient:  -RB      Benefits Reviewed   patient:  -RB patient:;spouse/S.O.:;improve function;increase independence;increase strength;increase balance  -CZ     Barriers to Rehab   none identified  -RB      Living Environment    Lives With  child(lila), adult;spouse  -EW child(lila), adult;spouse  -RB child(lila), adult;spouse  -CZ spouse  -LW    Living Arrangements   house  -RB house  -CZ house  -LW    Home Accessibility   stairs to enter home  -RB stairs to enter home  -CZ no concerns  -LW    Number of Stairs to Enter Home   4  -RB 4  -CZ     Stair Railings at Home   outside, present on right side  -RB outside, present on right side  -CZ outside, present on left side;outside, present on right side  -LW    Type of Financial/Environmental Concern     none  -LW    Transportation Available     car  -LW    Living Environment Comment   4 landings with 1 step for each.  -RB has 4 \"landings\", 1 step up to each.   -CZ     Clinical Impression    Date of Referral to OT   10/16/17  -RB      OT Diagnosis   impaired mobility and ADLs.  -RB      Functional Level At " Time Of Evaluation   Impaired mobility and ADLs.  -RB      Impairments Found (describe specific impairments)   gait, locomotion, and balance  -RB      Criteria for Skilled Therapeutic Interventions Met   yes;treatment indicated  -RB      Rehab Potential   good, to achieve stated therapy goals  -RB      Therapy Frequency   --   3-14x/wk  -RB      Predicted Duration of Therapy Intervention (days/wks)   Until D/C or goals met.  -RB      Anticipated Discharge Disposition   home with home health;home with outpatient services  -RB      Functional Level Prior    Ambulation  0-->independent  -EW 0-->independent  -RB  0-->independent  -LW    Transferring  0-->independent  -EW 0-->independent  -RB  0-->independent  -LW    Toileting  0-->independent  -EW 0-->independent  -RB  0-->independent  -LW    Bathing  0-->independent  -EW 0-->independent  -RB  0-->independent  -LW    Dressing  0-->independent  -EW 0-->independent  -RB  0-->independent  -LW    Eating  0-->independent  -EW 0-->independent  -RB  0-->independent  -LW    Communication  0-->understands/communicates without difficulty  -EW 0-->understands/communicates without difficulty  -RB  0-->understands/communicates without difficulty  -LW    Swallowing   0-->swallows foods/liquids without difficulty  -RB  0-->swallows foods/liquids without difficulty  -LW    Prior Functional Level Comment     independent  -LW    Vital Signs    Pre Systolic BP Rehab 119  -AM  141  -  -CZ     Pre Treatment Diastolic BP 61  -AM  67  -RB 70  -CZ     Post Systolic BP Rehab   135  -  -CZ     Post Treatment Diastolic BP   70  -RB 82  -CZ     Pretreatment Heart Rate (beats/min) 79  -AM  88  -RB 71  -CZ     Posttreatment Heart Rate (beats/min)   90  -RB 76  -CZ     Pre SpO2 (%) 94  -AM  95  -RB 97  -CZ     O2 Delivery Pre Treatment room air  -AM  room air  -RB supplemental O2   2 LPM  -CZ     Post SpO2 (%)   97  -RB 96  -CZ     O2 Delivery Post Treatment   room air  -RB supplemental  O2  -CZ     Pre Patient Position Supine  -AM  Supine  -RB      Intra Patient Position Standing  -AM  Standing  -RB      Post Patient Position Sitting  -AM  Supine  -RB      Pain Assessment    Pain Assessment 0-10  -AM  0-10  -RB 0-10  -CZ     Pain Score 5  -AM  3  -RB 8  -CZ     Post Pain Score   7  -RB 7  -CZ     Pain Type Acute pain;Surgical pain  -AM  Surgical pain  -RB Chronic pain;Surgical pain  -CZ     Pain Location Knee  -AM  Knee  -RB Knee  -CZ     Pain Orientation Right  -AM  Right  -RB Right  -CZ     Pain Descriptors Sore  -AM        Pain Frequency Constant/continuous  -AM        Date Pain First Started 10/16/17  -AM        Clinical Progression Gradually improving  -AM        Patient's Stated Pain Goal No pain  -AM        Pain Intervention(s) Medication (See MAR);Cold applied;Ambulation/increased activity  -AM  Medication (See MAR)  -RB Medication (See MAR);Ambulation/increased activity;Distraction;Repositioned  -CZ     Result of Injury No  -AM        Work-Related Injury No  -AM        Multiple Pain Sites No  -AM        Vision Assessment/Intervention    Visual Impairment --  -AM  WFL with corrective lenses  -RB WFL with corrective lenses  -CZ     Cognitive Assessment/Intervention    Current Cognitive/Communication Assessment functional  -AM  functional  -RB functional  -CZ     Orientation Status oriented x 4  -AM  oriented x 4  -RB oriented x 4  -CZ     Follows Commands/Answers Questions 100% of the time  -AM  100% of the time  -RB 75% of the time   Very lethargic.  -CZ     Personal Safety   WNL/WFL  -RB mild impairment  -CZ     Personal Safety Interventions gait belt;nonskid shoes/slippers when out of bed;supervised activity  -AM  gait belt;nonskid shoes/slippers when out of bed;supervised activity  -RB gait belt;fall prevention program maintained;nonskid shoes/slippers when out of bed  -CZ     ROM (Range of Motion)    General ROM lower extremity range of motion deficits identified  -AM  no range of  motion deficits identified  -RB lower extremity range of motion deficits identified  -     General ROM Detail   B UEs  -RB LLE: WFL, RLE: 12-85 decrease PROM.  -     MMT (Manual Muscle Testing)    General MMT Assessment   no strength deficits identified  -RB lower extremity strength deficits identified  -     General MMT Assessment Detail   4+ to 5/5 for B UE strength.  -RB RLE: 2+/5, LLE: 4/5, grossly  -CZ     Mobility Assessment/Training    Extremity Weight-Bearing Status right lower extremity  -AM  right lower extremity  -RB      Right Lower Extremity Weight-Bearing weight-bearing as tolerated  -AM  weight-bearing as tolerated  -RB      Bed Mobility, Assessment/Treatment    Bed Mobility, Assistive Device --  -AM  bed rails;head of bed elevated  - bed rails;head of bed elevated  -     Bed Mobility, Scoot/Bridge, [place] --  -AM   minimum assist (75% patient effort);2 person assist required  -     Bed Mob, Supine to Sit, [place] --  -AM  contact guard assist  -RB minimum assist (75% patient effort)  -     Bed Mob, Sit to Supine, [place] --  -AM  contact guard assist  -RB minimum assist (75% patient effort)  -     Bed Mobility, Comment   Pt used rolled towel to move R LE on and off bed.  -RB      Transfer Assessment/Treatment    Transfers, Sit-Stand [place] --  -AM  minimum assist (75% patient effort)  -RB maximum assist (25% patient effort)  -CZ     Transfers, Stand-Sit [place] --  -AM  minimum assist (75% patient effort)  -RB maximum assist (25% patient effort)  -CZ     Transfers, Sit-Stand-Sit, Assist Device   rolling walker  -RB      Transfer, Comment    Cues for upright posture, increased ventilation.   -CZ     Functional Mobility    Functional Mobility- Ind. Level   minimum assist (75% patient effort)  -RB      Functional Mobility- Device   rolling walker  -RB      Functional Mobility-Distance (Feet)   5  -RB      Functional Mobility- Safety Issues   step length  decreased;sequencing ability decreased;steps too close front assistive device  -RB      Sensory Assessment/Intervention    Light Touch --  -AM  LUE;RUE  -RB LLE;RLE  -CZ     LUE Light Touch   WNL  -RB      RUE Light Touch   WNL  -RB      LLE Light Touch --  -AM   WNL  -CZ     RLE Light Touch --  -AM   mild impairment  -CZ     General Therapy Interventions    Planned Therapy Interventions   activity intolerance;ADL retraining;balance training;bed mobility training;strengthening;transfer training  -RB      Activity Intolerance   fair  -RB      Positioning and Restraints    Pre-Treatment Position in bed  -AM  in bed  -RB in bed  -CZ     Post Treatment Position chair  -AM  bed  -RB bed  -CZ     In Bed   supine;call light within reach  -RB supine;call light within reach;encouraged to call for assist;exit alarm on;with family/caregiver;SCD pump applied  -CZ     In Chair reclined;call light within reach;encouraged to call for assist;exit alarm on;legs elevated  -AM        General LE Assessment    ROM knee, right: LE ROM deficit  -AM        Right Knee    Extension/Flexion AROM within functional limits  -AM          User Key  (r) = Recorded By, (t) = Taken By, (c) = Cosigned By    Initials Name Effective Dates    RB Flynn Kaplan, OT 06/15/16 -     AM Wayne Olmos, KANDY 10/17/16 -     EW ESTEPHANIE Arias 10/17/16 -     LW Chelsy Varela, RN 10/17/16 -     CZ Lukas Blake, PT 02/17/17 -            Occupational Therapy Education     Title: PT OT SLP Therapies (Active)     Topic: Occupational Therapy (Active)     Point: Precautions (Done)    Description: Instruct learner(s) on prescribed precautions during self-care and functional transfers.    Learning Progress Summary    Learner Readiness Method Response Comment Documented by Status   Patient Acceptance E VU Pt edu on use of gait belt and non skid socks when OOB. RB 10/17/17 1128 Done                      User Key     Initials Effective Dates Name Provider Type  Discipline    RB 06/15/16 -  Flynn Kaplan OT Occupational Therapist OT                  OT Recommendation and Plan  Anticipated Discharge Disposition: home with home health, home with outpatient services  Planned Therapy Interventions: activity intolerance, ADL retraining, balance training, bed mobility training, strengthening, transfer training  Therapy Frequency:  (3-14x/wk)  Plan of Care Review  Plan Of Care Reviewed With: patient  Outcome Summary/Follow up Plan: OT pastor complete on this date.  Pt required SBA/CGA for sup to sit to sup,  min A for sit to stand to sit and min A with  5' of ambulation.  Pt could benefit form OT services to increase independence with ADLs and functional mobility/transfers.          OT Goals       10/17/17 1130          Transfer Training OT LTG    Transfer Training OT LTG, Date Established 10/17/17  -RB      Transfer Training OT LTG, Time to Achieve by discharge  -RB      Transfer Training OT LTG, Activity Type all transfers  -RB      Transfer Training OT LTG, [place] Level supervision required;conditional independence  -RB      Static Standing Balance OT LTG    Static Standing Balance OT LTG, Date Established 10/17/17  -RB      Static Standing Balance OT LTG, Time to Achieve by discharge  -RB      Static Standing Balance OT LTG, [place] Level conditional independence;supervision required   5 minutes with functional activity.  -RB      Static Standing Balance OT LTG, Assist Device assistive device   R/W.  -RB      Patient Education OT STG    Patient Education OT STG, Date Established 10/17/17  -RB      Patient Education OT STG, Time to Achieve 4 days  -RB      Patient Education OT STG, Education Type energy conservation;home safety;adaptive equipment mgmt;joint protection;positioning;posture/body mechanics  -RB      Patient Education OT STG, Education Understanding demonstrates adequately;verbalizes understanding  -RB      ADL OT LTG    ADL OT LTG, Date Established  10/17/17  -RB      ADL OT LTG, Time to Achieve by discharge  -RB      ADL OT LTG, Activity Type ADL skills   Sponge bath and dress or walk-in shower.  -RB      ADL OT LTG, [place] Level modified independent;standby assist;assistive device   R/W.  -RB        User Key  (r) = Recorded By, (t) = Taken By, (c) = Cosigned By    Initials Name Provider Type    KENZIE Kaplan OT Occupational Therapist                Outcome Measures       10/17/17 0925 10/16/17 1303       How much help from another person do you currently need...    Turning from your back to your side while in flat bed without using bedrails?  3  -CZ     Moving from lying on back to sitting on the side of a flat bed without bedrails?  3  -CZ     Moving to and from a bed to a chair (including a wheelchair)?  2  -CZ     Standing up from a chair using your arms (e.g., wheelchair, bedside chair)?  2  -CZ     Climbing 3-5 steps with a railing?  2  -CZ     To walk in hospital room?  2  -CZ     AM-PAC 6 Clicks Score  14  -CZ     How much help from another is currently needed...    Putting on and taking off regular lower body clothing? 2  -RB      Bathing (including washing, rinsing, and drying) 2  -RB      Toileting (which includes using toilet bed pan or urinal) 2  -RB      Putting on and taking off regular upper body clothing 3  -RB      Taking care of personal grooming (such as brushing teeth) 4  -RB      Eating meals 4  -RB      Score 17  -RB      Functional Assessment    Outcome Measure Options AM-PAC 6 Clicks Daily Activity (OT)  -RB AM-PAC 6 Clicks Basic Mobility (PT)  -CZ       User Key  (r) = Recorded By, (t) = Taken By, (c) = Cosigned By    Initials Name Provider Type    KENZIE Kaplan OT Occupational Therapist    HERBERT Blake, PT Physical Therapist          Time Calculation:   OT Start Time: 0925  OT Stop Time: 0959  OT Time Calculation (min): 34 min    Therapy Charges for Today     Code Description Service Date Service Provider Modifiers  Qty    15216561171 HC OT SELFCARE CURRENT 10/17/2017 WAYNE Mckinley, CK 1    61099446485 HC OT SELFCARE PROJECTED 10/17/2017 WAYNE Mckinley CJ 1    [id number]  OT EVAL MOD COMPLEXITY 2 10/17/2017 Flynn Kaplan OT GO 1          OT G-codes  OT Professional Judgement Used?: Yes  OT Functional Scales Options: AM-PAC 6 Clicks Daily Activity (OT)  Score: 17  Functional Limitation: Self care  Self Care Current Status (): At least 40 percent but less than 60 percent impaired, limited or restricted  Self Care Goal Status (): At least 20 percent but less than 40 percent impaired, limited or restricted    Flynn Kaplan OT  10/17/2017

## 2017-10-17 NOTE — PLAN OF CARE
Problem: Patient Care Overview (Adult)  Goal: Plan of Care Review  Outcome: Ongoing (interventions implemented as appropriate)    10/17/17 0312   Coping/Psychosocial Response Interventions   Plan Of Care Reviewed With patient   Patient Care Overview   Progress progress toward functional goals as expected   Outcome Evaluation   Outcome Summary/Follow up Plan VSS, pain controlled       Goal: Adult Individualization and Mutuality  Outcome: Ongoing (interventions implemented as appropriate)    Problem: Perioperative Period (Adult)  Goal: Signs and Symptoms of Listed Potential Problems Will be Absent or Manageable (Perioperative Period)  Outcome: Outcome(s) achieved Date Met:  10/17/17    Problem: Knee Replacement, Total (Adult)  Goal: Signs and Symptoms of Listed Potential Problems Will be Absent or Manageable (Knee Replacement, Total)  Outcome: Ongoing (interventions implemented as appropriate)

## 2017-10-17 NOTE — CONSULTS
Adult Nutrition  Assessment    Patient Name:  Tiffani Serra  YOB: 1960  MRN: [id number]  Admit Date:  10/16/2017    Assessment Date:  10/17/2017    Comments:  Pt presents with a BMI of 47.72 which is compatible with morbid obesity.  She is 232% of her IBW.  Provided education on Making Lifestyle changes for a Healthy Weight.  Written diet copy provided with contact number.  Rd will monitor prn.          Reason for Assessment       10/17/17 1307    Reason for Assessment    Reason For Assessment/Visit identified at risk by screening criteria    Identified At Risk By Screening Criteria BMI                Nutrition/Diet History       10/17/17 1307    Nutrition/Diet History    Typical Food/Fluid Intake Pt reports that meals are going well.                Labs/Tests/Procedures/Meds       10/17/17 1307    Labs/Tests/Procedures/Meds    Labs/Tests Review Reviewed    Medication Review Reviewed, pertinent            Physical Findings       10/17/17 1308    Physical Findings/Assessment    Additional Documentation Physical Appearance (Group)    Physical Appearance    Overall Physical Appearance overweight;on oxygen therapy                  Electronically signed by:  Yesenia Holley RD  10/17/17 1:12 PM

## 2017-10-17 NOTE — THERAPY TREATMENT NOTE
Acute Care - Physical Therapy Treatment Note  [facility]     Patient Name: Tiffani Serra  : 1960  MRN: [id number]  Today's Date: 10/17/2017  Onset of Illness/Injury or Date of Surgery Date: 10/16/17  Date of Referral to PT: 10/16/17  Referring Physician: Sanam    Admit Date: 10/16/2017    Visit Dx:    ICD-10-CM ICD-9-CM   1. Primary osteoarthritis of right knee M17.11 715.16   2. Impaired physical mobility Z74.09 781.99   3. Impaired mobility and activities of daily living Z74.09 799.89     Patient Active Problem List   Diagnosis   • Osteoarthritis of right knee   • Acute foot pain   • Knee pain, acute   • Eversion deformity of left foot   • Plantar fasciitis of left foot   • Pes planus of left foot   • Primary osteoarthritis of right knee               Adult Rehabilitation Note       10/17/17 1522 10/17/17 1050       Rehab Assessment/Intervention    Discipline physical therapy assistant  -JW physical therapy assistant  -AM     Document Type therapy note (daily note)  -JW therapy note (daily note)  -AM     Subjective Information agree to therapy  -JW agree to therapy;complains of;pain  -AM     Patient Effort, Rehab Treatment good  -JW good  -AM     Symptoms Noted During/After Treatment  none  -AM     Precautions/Limitations fall precautions  -JW fall precautions;other (see comments)   WBAT R LE  -AM     Equipment Issued to Patient  gait belt  -AM     Recorded by [JW] Kadi Paredes, PTA [AM] Wayne Olmos, KANDY     Vital Signs    Pre Systolic BP Rehab  119  -AM     Pre Treatment Diastolic BP  61  -AM     Post Systolic BP Rehab  121  -AM     Post Treatment Diastolic BP  73  -AM     Pretreatment Heart Rate (beats/min)  79  -AM     Posttreatment Heart Rate (beats/min)  74  -AM     Pre SpO2 (%)  94  -AM     O2 Delivery Pre Treatment  room air  -AM     Post SpO2 (%)  96  -AM     O2 Delivery Post Treatment  room air  -AM     Pre Patient Position Sitting  -JW Supine  -AM     Intra Patient Position   Standing  -AM     Post Patient Position Sitting  -JW Sitting  -AM     Recorded by [JW] Kadi Paredes PTA [AM] Wayne Olmos PTA     Pain Assessment    Pain Assessment 0-10  -JW 0-10  -AM     Pain Score 5  -JW 5  -AM     Post Pain Score 5  -JW 5  -AM     Pain Type Surgical pain;Acute pain  -JW Acute pain;Surgical pain  -AM     Pain Location Knee  -JW Knee  -AM     Pain Orientation Right  -JW Right  -AM     Pain Descriptors  Sore  -AM     Pain Frequency  Constant/continuous  -AM     Date Pain First Started  10/16/17  -AM     Clinical Progression  Gradually improving  -AM     Patient's Stated Pain Goal  No pain  -AM     Pain Intervention(s) Medication (See MAR)  - Medication (See MAR);Cold applied;Ambulation/increased activity  -AM     Result of Injury  No  -AM     Work-Related Injury  No  -AM     Multiple Pain Sites  No  -AM     Recorded by [JW] Kadi Paredes PTA [AM] Wayne Olmos PTA     Vision Assessment/Intervention    Visual Impairment  --  -AM     Recorded by  [AM] Wayne Omlos PTA     Cognitive Assessment/Intervention    Current Cognitive/Communication Assessment functional  -JW functional  -AM     Orientation Status oriented x 4  -JW oriented x 4  -AM     Follows Commands/Answers Questions 100% of the time  -% of the time  -AM     Personal Safety Interventions gait belt;nonskid shoes/slippers when out of bed  - gait belt;nonskid shoes/slippers when out of bed;supervised activity  -AM     Recorded by [JW] Kadi Paredes PTA [AM] Wayne Olmos PTA     ROM (Range of Motion)    General ROM  lower extremity range of motion deficits identified  -AM     Recorded by  [AM] Wayne Olmos PTA     General LE Assessment    ROM  knee, right: LE ROM deficit  -AM     Recorded by  [AM] Wayne Olmos PTA     Right Knee    Extension/Flexion AROM  within functional limits   12-96  -AM     Recorded by  [AM] Wayne Olmos PTA     Mobility Assessment/Training    Extremity  Weight-Bearing Status  right lower extremity  -AM     Right Lower Extremity Weight-Bearing  weight-bearing as tolerated  -AM     Recorded by  [AM] Wayne Olmos PTA     Bed Mobility, Assessment/Treatment    Bed Mobility, Assistive Device  bed rails;head of bed elevated  -AM     Bed Mobility, Roll Left, [place]  not tested  -AM     Bed Mobility, Roll Right, [place]  not tested  -AM     Bed Mobility, Scoot/Bridge, [place]  not tested  -AM     Bed Mob, Supine to Sit, [place]  conditional independence  -AM     Bed Mob, Sit to Supine, [place]  not tested  -AM     Bed Mob, Sidelying to Sit, [place]  not tested  -AM     Bed Mob, Sit to Sidelying, [place]  not tested  -AM     Bed Mobility, Safety Issues  decreased use of arms for pushing/pulling;decreased use of legs for bridging/pushing  -AM     Bed Mobility, Impairments  ROM decreased;strength decreased;pain  -AM     Recorded by  [AM] Wayne Olmos PTA     Transfer Assessment/Treatment    Transfers, Bed-Chair [place]  maximum assist (25% patient effort);2 person assist required  -AM     Transfers, Chair-Bed [place]  maximum assist (25% patient effort);2 person assist required  -AM     Transfers, Bed-Chair-Bed, Assist Device  rolling walker  -AM     Transfers, Sit-Stand [place]  maximum assist (25% patient effort);2 person assist required  -AM     Transfers, Stand-Sit [place]  maximum assist (25% patient effort);2 person assist required  -AM     Transfers, Sit-Stand-Sit, Assist Device  rolling walker  -AM     Toilet Transfer, [place]  not tested  -AM     Walk-In Shower Transfer, [place]  not tested  -AM     Bathtub Transfer, [place]  not tested  -AM     Transfer, Maintain Weight Bearing Status  able to maintain weight bearing status  -AM     Transfer, Safety Issues  step length decreased  -AM     Transfer, Impairments  ROM decreased;strength decreased;pain  -AM     Transfer, Comment pt defers as  she wanted to stay up in recliner longer  -JW      Recorded by [JW] Kadi Paredes PTA [AM] Wayne Olmos PTA     Gait Assessment/Treatment    Gait, [place] Level  not tested  -AM     Recorded by  [AM] Wayne Olmos PTA     Stairs Assessment/Treatment    Stairs, [place] Level  not tested  -AM     Recorded by  [AM] Wayne Olmos PTA     Therapy Exercises    Right Lower Extremity AAROM:;10 reps;heel slides;hip abduction/adduction   2 sets  -JW      Left Lower Extremity AROM:;10 reps;hip abduction/adduction;heel slides   2 sets  -JW      Bilateral Lower Extremities AROM:;20 reps;ankle pumps/circles;glut sets;quad sets  -JW AROM:;20 reps;supine;sitting;ankle pumps/circles;glut sets;heel slides;quad sets;SLR  -AM     Recorded by [JW] Kadi Paredes PTA [AM] Wayne Olmos PTA     Sensory Assessment/Intervention    Light Touch  --  -AM     LLE Light Touch  --  -AM     RLE Light Touch  --  -AM     Recorded by  [AM] Wayne Olmos PTA     Modality Treatment    Modality Treatment Location 1 right knee  -JW      Modality Performed cryotherapy  -JW      Recorded by [JW] Kadi Paredes PTA      Positioning and Restraints    Pre-Treatment Position sitting in chair/recliner  -JW in bed  -AM     Post Treatment Position chair  -JW chair  -AM     In Chair reclined;call light within reach;encouraged to call for assist  -JW reclined;call light within reach;encouraged to call for assist;exit alarm on;legs elevated  -AM     Recorded by [JW] Kadi Paredes PTA [AM] Wayne Olmos PTA       User Key  (r) = Recorded By, (t) = Taken By, (c) = Cosigned By    Initials Name Effective Dates     Kadi Paredes PTA 08/11/15 -     AM Wayne Olmos PTA 10/17/16 -                 IP PT Goals       10/17/17 1522 10/16/17 1303       Bed Mobility PT STG    Bed Mobility PT STG, Date Established  10/16/17  -CZ     Bed Mobility PT STG, Time to Achieve  2 days  -CZ     Bed Mobility PT STG,  Activity Type  all bed mobility  -CZ     Bed Mobility PT STG, [place] Level  conditional independence  -CZ     Bed Mobility PT STG, Additional Goal  HOB flat, no bed rails.   -CZ     Bed Mobility PT STG, Date Goal Reviewed 10/17/17  -JW 10/16/17  -CZ     Bed Mobility PT STG, Outcome goal ongoing  - goal ongoing  -CZ     Transfer Training PT STG    Transfer Training PT STG, Date Established  10/16/17  -CZ     Transfer Training PT STG, Time to Achieve  2 days  -CZ     Transfer Training PT STG, Activity Type  bed to chair /chair to bed;sit to stand/stand to sit  -CZ     Transfer Training PT STG, Assist Device  walker, rolling  -CZ     Transfer Training PT STG, Date Goal Reviewed 10/17/17  -JW 10/16/17  -CZ     Transfer Training PT STG, Outcome goal ongoing - goal ongoing  -CZ     Gait Training PT LTG    Gait Training Goal PT LTG, Date Established  10/16/17  -CZ     Gait Training Goal PT LTG, Time to Achieve  by discharge  -CZ     Gait Training Goal PT LTG, [place] Level  conditional independence  -CZ     Gait Training Goal PT LTG, Assist Device  walker, rolling  -CZ     Gait Training Goal PT LTG, Distance to Achieve  150'x1.  -CZ     Gait Training Goal PT LTG, Date Goal Reviewed 10/17/17  -JW 10/16/17  -CZ     Gait Training Goal PT LTG, Outcome goal ongoing - goal ongoing  -CZ     Stair Training PT LTG    Stair Training Goal PT LTG, Date Established  10/16/17  -CZ     Stair Training Goal PT LTG, Time to Achieve  by discharge  -CZ     Stair Training Goal PT LTG, Number of Steps  4  -CZ     Stair Training Goal PT LTG, [place] Level  conditional independence  -CZ     Stair Training Goal PT LTG, Assist Device  1 handrail  -CZ     Stair Training Goal PT LTG, Date Goal Reviewed 10/17/17  -JW 10/16/17  -CZ     Stair Training Goal PT LTG, Outcome goal ongoing  - goal ongoing  -CZ       User Key  (r) = Recorded By, (t) = Taken By, (c) = Cosigned By    Initials Name Provider Type    SWEETIE PETERSON  KANDY Paredes Physical Therapy Assistant     Lukas Blake, PT Physical Therapist          Physical Therapy Education     Title: PT OT SLP Therapies (Active)     Topic: Physical Therapy (Active)     Point: Mobility training (Active)    Learning Progress Summary    Learner Readiness Method Response Comment Documented by Status   Patient Acceptance E NR Educated patient in proper technique for bed mobility,transfers and standing.  10/16/17 1516 Active                      User Key     Initials Effective Dates Name Provider Type Discipline     02/17/17 -  Lukas Blake, PT Physical Therapist PT                    PT Recommendation and Plan  Anticipated Discharge Disposition: home with outpatient services  Planned Therapy Interventions: balance training, bed mobility training, gait training, home exercise program, patient/family education, ROM (Range of Motion), stair training, strengthening, stretching, transfer training  PT Frequency: other (see comments) (5-14x/week)  Plan of Care Review  Plan Of Care Reviewed With: patient  Progress: progress towards functional goals is fair  Outcome Summary/Follow up Plan: pt evin A-AAROM of B LE well, pt w/ c/o's pain posterior knee and calf of R LE          Outcome Measures       10/17/17 1050 10/17/17 0925 10/16/17 1303    How much help from another person do you currently need...    Turning from your back to your side while in flat bed without using bedrails? 4  -AM  3  -CZ    Moving from lying on back to sitting on the side of a flat bed without bedrails? 4  -AM  3  -CZ    Moving to and from a bed to a chair (including a wheelchair)? 2  -AM  2  -CZ    Standing up from a chair using your arms (e.g., wheelchair, bedside chair)? 2  -AM  2  -CZ    Climbing 3-5 steps with a railing? 1  -AM  2  -CZ    To walk in hospital room? 1  -AM  2  -CZ    AM-PAC 6 Clicks Score 14  -AM  14  -CZ    How much help from another is currently needed...    Putting on and taking off regular  lower body clothing?  2  -RB     Bathing (including washing, rinsing, and drying)  2  -RB     Toileting (which includes using toilet bed pan or urinal)  2  -RB     Putting on and taking off regular upper body clothing  3  -RB     Taking care of personal grooming (such as brushing teeth)  4  -RB     Eating meals  4  -RB     Score  17  -RB     Functional Assessment    Outcome Measure Options AM-PAC 6 Clicks Basic Mobility (PT)  -AM AM-PAC 6 Clicks Daily Activity (OT)  -RB AM-PAC 6 Clicks Basic Mobility (PT)  -CZ      User Key  (r) = Recorded By, (t) = Taken By, (c) = Cosigned By    Initials Name Provider Type    RB Flynn Kaplan, OT Occupational Therapist    AM Wayne Olmos, PTA Physical Therapy Assistant    HERBERT Blake, PT Physical Therapist           Time Calculation:         PT Charges       10/17/17 1522 10/17/17 1050       Time Calculation    Start Time 1522  -JW 1050  -AM     Stop Time 1548  -JW 1145  -AM     Time Calculation (min) 26 min  -JW 55 min  -AM     PT Received On 10/17/17  -JW 10/17/17  -AM     PT - Next Appointment  10/17/17  -AM     Time Calculation- PT    Total Timed Code Minutes- PT 26 minute(s)  -JW 55 minute(s)  -AM       User Key  (r) = Recorded By, (t) = Taken By, (c) = Cosigned By    Initials Name Provider Type     Kadi Paredes PTA Physical Therapy Assistant    GUILLERMO Olmos PTA Physical Therapy Assistant          Therapy Charges for Today     Code Description Service Date Service Provider Modifiers Qty    [id number] HC PT THER PROC EA 15 MIN 10/17/2017 Kadi Paredes PTA GP 1          PT G-Codes  PT Professional Judgement Used?: Yes  Outcome Measure Options: AM-PAC 6 Clicks Basic Mobility (PT)  Score: 14  Functional Limitation: Mobility: Walking and moving around  Mobility: Walking and Moving Around Current Status (): At least 40 percent but less than 60 percent impaired, limited or restricted  Mobility: Walking and Moving Around Goal Status (): At  least 20 percent but less than 40 percent impaired, limited or restricted    Kadi Paredes, PTA  10/17/2017

## 2017-10-17 NOTE — PROGRESS NOTES
LOS: 1 day   Patient Care Team:  Maria Antonia Hood DO as PCP - General    Chief Complaint:  S/p rt tka.      Subjective  complains of weakness rt leg.    Objective wound is dry and clean, patient states that when she walks her rt leg will not support her , this is due to a good nerve block from anesthesia. Will us knee immobilizer when she is up.      Vital Signs  Temp:  [96.3 °F (35.7 °C)-97 °F (36.1 °C)] 97 °F (36.1 °C)  Heart Rate:  [77-96] 77  Resp:  [16-18] 18  BP: (122-145)/(61-73) 127/61      Assessment/Plan s/p rt tka, motor block still present from  preop block.    Principal Problem:    Primary osteoarthritis of right knee        Yury Marion MD  10/17/17  1:00 PM

## 2017-10-17 NOTE — PLAN OF CARE
Problem: Patient Care Overview (Adult)  Goal: Plan of Care Review  Outcome: Ongoing (interventions implemented as appropriate)    10/17/17 1130   Coping/Psychosocial Response Interventions   Plan Of Care Reviewed With patient   Outcome Evaluation   Outcome Summary/Follow up Plan OT pastor complete on this date. Pt required SBA/CGA for sup to sit to sup, min A for sit to stand to sit and min A with 5' of ambulation. Pt could benefit form OT services to increase independence with ADLs and functional mobility/transfers.       Goal: Discharge Needs Assessment  Outcome: Ongoing (interventions implemented as appropriate)    10/17/17 1130   Discharge Needs Assessment   Equipment Needed After Discharge shower chair         Problem: Inpatient Occupational Therapy  Goal: Transfer Training Goal 1 LTG- OT  Outcome: Ongoing (interventions implemented as appropriate)    10/17/17 1130   Transfer Training OT LTG   Transfer Training OT LTG, Date Established 10/17/17   Transfer Training OT LTG, Time to Achieve by discharge   Transfer Training OT LTG, Activity Type all transfers   Transfer Training OT LTG, [place] Level supervision required;conditional independence       Goal: Static Standing Balance Goal LTG- OT  Outcome: Ongoing (interventions implemented as appropriate)    10/17/17 1130   Static Standing Balance OT LTG   Static Standing Balance OT LTG, Date Established 10/17/17   Static Standing Balance OT LTG, Time to Achieve by discharge   Static Standing Balance OT LTG, [place] Level conditional independence;supervision required  (5 minutes with functional activity.)   Static Standing Balance OT LTG, Assist Device assistive device  (R/W.)       Goal: Patient Education Goal STG- OT  Outcome: Ongoing (interventions implemented as appropriate)    10/17/17 1130   Patient Education OT STG   Patient Education OT STG, Date Established 10/17/17   Patient Education OT STG, Time to Achieve 4 days   Patient Education OT STG,  Education Type energy conservation;home safety;adaptive equipment mgmt;joint protection;positioning;posture/body mechanics   Patient Education OT STG, Education Understanding demonstrates adequately;verbalizes understanding       Goal: ADL Goal LTG- OT  Outcome: Ongoing (interventions implemented as appropriate)    10/17/17 1130   ADL OT LTG   ADL OT LTG, Date Established 10/17/17   ADL OT LTG, Time to Achieve by discharge   ADL OT LTG, Activity Type ADL skills  (Sponge bath and dress or walk-in shower.)   ADL OT LTG, [place] Level modified independent;standby assist;assistive device  (R/W.)

## 2017-10-17 NOTE — PROGRESS NOTES
"Anticoagulation by Pharmacy - Warfarin Day 2 of 28    Tiffani Serra is a 57 y.o.female  [Ht: 64\" (162.6 cm); Wt: 278 lb (126 kg)] on Warfarin 5 mg PO  for indication of VTE prophylaxis s/p ortho procedure.    Goal INR: 1.7-2.5  Today's INR:   Lab Results   Component Value Date    INR 1.10 10/17/2017         Lab Results   Component Value Date    INR 1.10 10/17/2017    INR 1.10 10/16/2017    PROTIME 14.1 10/17/2017    PROTIME 14.1 10/16/2017     Lab Results   Component Value Date    HGB 10.6 (L) 10/17/2017    HGB 13.5 10/04/2017    HGB 11.9 (L) 01/19/2017     Lab Results   Component Value Date    HCT 30.0 (L) 10/17/2017    HCT 39.9 10/04/2017    HCT 36.4 01/19/2017     Assessment/Plan:  INR 1.10, will continue 5mg daily as INR trend develops. Today is Day 2 of 28. Last day of therapy will be 11/13.    Darci Person [facility]  10/17/17 1:16 PM     "

## 2017-10-18 LAB
HOLD SPECIMEN: NORMAL
INR PPP: 1.19 (ref 0.8–1.2)
PROTHROMBIN TIME: 15.1 SECONDS (ref 11.1–15.3)
WHOLE BLOOD HOLD SPECIMEN: NORMAL

## 2017-10-18 PROCEDURE — 25010000002 ENOXAPARIN PER 10 MG: Performed by: ORTHOPAEDIC SURGERY

## 2017-10-18 PROCEDURE — 85610 PROTHROMBIN TIME: CPT | Performed by: ORTHOPAEDIC SURGERY

## 2017-10-18 PROCEDURE — 97116 GAIT TRAINING THERAPY: CPT

## 2017-10-18 PROCEDURE — 97755 ASSISTIVE TECHNOLOGY ASSESS: CPT

## 2017-10-18 PROCEDURE — 97110 THERAPEUTIC EXERCISES: CPT

## 2017-10-18 PROCEDURE — 97535 SELF CARE MNGMENT TRAINING: CPT

## 2017-10-18 RX ADMIN — OXYCODONE HYDROCHLORIDE AND ACETAMINOPHEN 1 TABLET: 7.5; 325 TABLET ORAL at 23:22

## 2017-10-18 RX ADMIN — OXYCODONE HYDROCHLORIDE AND ACETAMINOPHEN 1 TABLET: 7.5; 325 TABLET ORAL at 12:09

## 2017-10-18 RX ADMIN — OXYCODONE HYDROCHLORIDE AND ACETAMINOPHEN 2 TABLET: 7.5; 325 TABLET ORAL at 01:50

## 2017-10-18 RX ADMIN — ENOXAPARIN SODIUM 40 MG: 40 INJECTION SUBCUTANEOUS at 00:32

## 2017-10-18 RX ADMIN — OXYCODONE HYDROCHLORIDE AND ACETAMINOPHEN 1 TABLET: 7.5; 325 TABLET ORAL at 17:04

## 2017-10-18 RX ADMIN — WARFARIN SODIUM 5 MG: 5 TABLET ORAL at 18:19

## 2017-10-18 RX ADMIN — ENOXAPARIN SODIUM 40 MG: 40 INJECTION SUBCUTANEOUS at 23:22

## 2017-10-18 RX ADMIN — OXYCODONE HYDROCHLORIDE AND ACETAMINOPHEN 1 TABLET: 7.5; 325 TABLET ORAL at 08:29

## 2017-10-18 NOTE — PROGRESS NOTES
"Anticoagulation by Pharmacy - Warfarin Day 3 of 28    Tiffani Serra is a 57 y.o.female  [Ht: 64\" (162.6 cm); Wt: 278 lb (126 kg)] on Warfarin 5 mg PO  for indication of VTE prophylaxis s/p ortho procedure.    Goal INR: 1.7-2.5  Today's INR:   Lab Results   Component Value Date    INR 1.19 10/18/2017         Lab Results   Component Value Date    INR 1.19 10/18/2017    INR 1.10 10/17/2017    INR 1.10 10/16/2017    PROTIME 15.1 10/18/2017    PROTIME 14.1 10/17/2017    PROTIME 14.1 10/16/2017     Lab Results   Component Value Date    HGB 10.6 (L) 10/17/2017    HGB 13.5 10/04/2017    HGB 11.9 (L) 01/19/2017     Lab Results   Component Value Date    HCT 30.0 (L) 10/17/2017    HCT 39.9 10/04/2017    HCT 36.4 01/19/2017     Lab Results   Component Value Date     10/04/2017     01/19/2017     01/06/2017     Assessment/Plan:  Reviewed above labs. INR is 1.19.  INR is subtherapeutic, but trending to goal. No changes in medication list. Concurrent medications include enoxaparin. Pt did receive dose of warfarin last night.  Will continue current dose of  5 mg to see trend. Rx will continue to follow and adjust dose accordingly.  Today is day 3 of therapy.      Ling Perez RPH  10/18/17 11:18 AM     "

## 2017-10-18 NOTE — PLAN OF CARE
Problem: Inpatient Occupational Therapy  Goal: Transfer Training Goal 1 LTG- OT  Outcome: Ongoing (interventions implemented as appropriate)    10/17/17 1130 10/18/17 0945   Transfer Training OT LTG   Transfer Training OT LTG, Date Established 10/17/17 --    Transfer Training OT LTG, Time to Achieve by discharge --    Transfer Training OT LTG, Activity Type all transfers --    Transfer Training OT LTG, [place] Level supervision required;conditional independence --    Transfer Training OT LTG, Date Goal Reviewed --  10/18/17   Transfer Training OT LTG, Outcome --  goal ongoing       Goal: Static Standing Balance Goal LTG- OT  Outcome: Ongoing (interventions implemented as appropriate)    10/17/17 1130 10/18/17 0945   Static Standing Balance OT LTG   Static Standing Balance OT LTG, Date Established 10/17/17 --    Static Standing Balance OT LTG, Time to Achieve by discharge --    Static Standing Balance OT LTG, [place] Level conditional independence;supervision required  (5 minutes with functional activity.) --    Static Standing Balance OT LTG, Assist Device assistive device  (R/W.) --    Static Standing Balance OT LTG, Date Goal Reviewed --  10/18/17   Static Standing Balance OT LTG, Outcome --  goal ongoing       Goal: Patient Education Goal STG- OT  Outcome: Ongoing (interventions implemented as appropriate)    10/17/17 1130 10/18/17 0945   Patient Education OT STG   Patient Education OT STG, Date Established 10/17/17 --    Patient Education OT STG, Time to Achieve 4 days --    Patient Education OT STG, Education Type energy conservation;home safety;adaptive equipment mgmt;joint protection;positioning;posture/body mechanics --    Patient Education OT STG, Education Understanding demonstrates adequately;verbalizes understanding --    Patient Education OT STG, Date Goal Reviewed --  10/18/17   Patient Education OT STG Outcome --  goal ongoing       Goal: ADL Goal LTG- OT  Outcome: Ongoing (interventions  implemented as appropriate)    10/17/17 1130 10/18/17 0945   ADL OT LTG   ADL OT LTG, Date Established 10/17/17 --    ADL OT LTG, Time to Achieve by discharge --    ADL OT LTG, Activity Type ADL skills  (Sponge bath and dress or walk-in shower.) --    ADL OT LTG, [place] Level modified independent;standby assist;assistive device  (R/W.) --    ADL OT LTG, Date Goal Reviewed --  10/18/17   ADL OT LTG, Outcome --  goal ongoing

## 2017-10-18 NOTE — PROGRESS NOTES
LOS: 2 days   Patient Care Team:  Maria Antonia Hood DO as PCP - General    Chief Complaint:  S/p rt tka.      Subjective  no complaints.      Objective wound clean , dry h/h stable. ambulating well.      Vital Signs  Temp:  [97.6 °F (36.4 °C)-98.3 °F (36.8 °C)] 98.1 °F (36.7 °C)  Heart Rate:  [72-93] 93  Resp:  [18] 18  BP: (130-144)/(64-75) 135/75      Assessment/Plan s/p rt tka. Continue with pathway.      Principal Problem:    Primary osteoarthritis of right knee        Yury Marion MD  10/18/17  12:17 PM

## 2017-10-18 NOTE — THERAPY TREATMENT NOTE
Acute Care - Occupational Therapy Treatment Note  [facility]     Patient Name: Tiffani Serra  : 1960  MRN: [id number]  Today's Date: 10/18/2017  Onset of Illness/Injury or Date of Surgery Date: 10/16/17  Date of Referral to OT: 10/16/17  Referring Physician: Sanam      Admit Date: 10/16/2017    Visit Dx:     ICD-10-CM ICD-9-CM   1. Primary osteoarthritis of right knee M17.11 715.16   2. Impaired physical mobility Z74.09 781.99   3. Impaired mobility and activities of daily living Z74.09 799.89     Patient Active Problem List   Diagnosis   • Osteoarthritis of right knee   • Acute foot pain   • Knee pain, acute   • Eversion deformity of left foot   • Plantar fasciitis of left foot   • Pes planus of left foot   • Primary osteoarthritis of right knee             Adult Rehabilitation Note       10/18/17 1045 10/18/17 0945 10/17/17 1522    Rehab Assessment/Intervention    Discipline physical therapy assistant  -AM occupational therapy assistant  - physical therapy assistant  -    Document Type therapy note (daily note)  -AM therapy note (daily note)  - therapy note (daily note)  -    Subjective Information agree to therapy;complains of;pain  -AM agree to therapy  - agree to therapy  -    Patient Effort, Rehab Treatment good  -AM good  - good  -    Symptoms Noted During/After Treatment increased pain  -AM      Precautions/Limitations fall precautions;other (see comments)   WBAT R LE  -AM fall precautions  - fall precautions  -    Equipment Issued to Patient gait belt  -AM      Recorded by [AM] Wayne Olmos PTA [] TATIANA Ayers [] Kadi Paredes PTA    Vital Signs    Pre Systolic BP Rehab 135  -AM      Pre Treatment Diastolic BP 75  -AM      Pretreatment Heart Rate (beats/min) 93  -AM      Pre SpO2 (%) 96  -AM      O2 Delivery Pre Treatment room air  -AM      Pre Patient Position Sitting  -AM  Sitting  -JW    Intra Patient Position Standing  -AM      Post Patient  Position Sitting  -AM  Sitting  -JW    Recorded by [AM] Wayne Olmos PTA  [JW] Kadi Paredes PTA    Pain Assessment    Pain Assessment 0-10  -AM No/denies pain  - 0-10  -JW    Pain Score 5  -AM  5  -JW    Post Pain Score 9  -AM  5  -JW    Pain Type Acute pain;Surgical pain  -AM  Surgical pain;Acute pain  -JW    Pain Location Knee  -AM  Knee  -JW    Pain Orientation Right  -AM  Right  -JW    Pain Descriptors Sore  -AM      Pain Frequency Constant/continuous  -AM      Date Pain First Started 10/16/17  -AM      Clinical Progression Gradually improving  -AM      Patient's Stated Pain Goal No pain  -AM      Pain Intervention(s) Medication (See MAR);Cold applied;Ambulation/increased activity  -AM  Medication (See MAR)  -JW    Result of Injury No  -AM      Work-Related Injury No  -AM      Multiple Pain Sites No  -AM      Recorded by [AM] Wayne Olmos PTA [] DENEEN Ayers/ZOEY [JW] Kadi Paredes PTA    Cognitive Assessment/Intervention    Current Cognitive/Communication Assessment functional  -AM functional  -JH functional  -JW    Orientation Status oriented x 4  -AM oriented x 4  -JH oriented x 4  -JW    Follows Commands/Answers Questions 100% of the time  -% of the time  -% of the time  -JW    Personal Safety Interventions gait belt;nonskid shoes/slippers when out of bed;supervised activity  -AM  gait belt;nonskid shoes/slippers when out of bed  -JW    Recorded by [AM] Wayne Olmos PTA [JH] DENEEN Ayers/L [] Kadi Paredes PTA    ROM (Range of Motion)    General ROM lower extremity range of motion deficits identified  -AM      Recorded by [AM] Wayne Olmos PTA      General LE Assessment    ROM knee, right: LE ROM deficit  -AM      Recorded by [AM] Wayne Olmos PTA      Right Knee    Extension/Flexion AROM within functional limits   6-80  -AM      Recorded by [AM] Wayne Olmos PTA      Mobility Assessment/Training    Extremity Weight-Bearing  Status right lower extremity  -AM      Right Lower Extremity Weight-Bearing weight-bearing as tolerated  -AM      Recorded by [AM] Wayne Olmos PTA      Bed Mobility, Assessment/Treatment    Bed Mobility, Roll Left, [place] not tested  -AM      Bed Mobility, Roll Right, [place] not tested  -AM      Bed Mobility, Scoot/Bridge, [place] not tested  -AM      Bed Mob, Supine to Sit, [place] not tested  -AM      Bed Mob, Sit to Supine, [place] not tested  -AM      Bed Mob, Sidelying to Sit, [place] not tested  -AM      Bed Mob, Sit to Sidelying, [place] not tested  -AM      Recorded by [AM] Wayne Olmos PTA      Transfer Assessment/Treatment    Transfers, Bed-Chair [place] contact guard assist  -AM      Transfers, Chair-Bed [place] contact guard assist  -AM      Transfers, Bed-Chair-Bed, Assist Device rolling walker  -AM      Transfers, Sit-Stand [place] contact guard assist  -AM      Transfers, Stand-Sit [place] contact guard assist  -AM      Transfers, Sit-Stand-Sit, Assist Device rolling walker  -AM      Toilet Transfer, [place] contact guard assist  -AM      Toilet Transfer, Assistive Device bedside commode without drop arms;rolling walker  -AM      Walk-In Shower Transfer, [place] not tested  -AM      Bathtub Transfer, [place] not tested  -AM      Transfer, Maintain Weight Bearing Status able to maintain weight bearing status  -AM      Transfer, Safety Issues step length decreased  -AM      Transfer, Impairments ROM decreased;strength decreased;pain  -AM      Transfer, Comment   pt defers as she wanted to stay up in recliner longer  -JW    Recorded by [AM] Wayne Olmos PTA  [JW] Kadi Paredes PTA    Gait Assessment/Treatment    Gait, [place] Level contact guard assist  -AM      Gait, Assistive Device rolling walker  -AM      Gait, Distance (Feet) 30   15+15  -AM      Gait, Gait Pattern Analysis 3-point gait  -AM      Gait,  Gait Deviations bilateral:;nabeel decreased  -AM      Gait, Maintain Weight Bearing Status able to maintain weight bearing status  -AM      Gait, Safety Issues step length decreased  -AM      Gait, Impairments ROM decreased;strength decreased;pain  -AM      Recorded by [AM] Wayne Olmos PTA      Stairs Assessment/Treatment    Stairs, [place] Level not tested  -AM      Recorded by [AM] Wayne Olmos PTA      Lower Body Dressing Assessment/Training    LB Dressing Assess/Train, Clothing Type  socks  -     LB Dressing Assess/Train, Assist Device  reacher;sock-aid  -     LB Dressing Assess/Train, Position  sitting  -     Recorded by  [] DENEEN Ayers/L     Grooming Assessment/Training    Grooming Assess/Train, Position  sitting  -     Grooming Assess/Train, Indepen Level  set up required;supervision required  -     Recorded by  [] TATIANA Ayers     Therapy Exercises    Right Lower Extremity   AAROM:;10 reps;heel slides;hip abduction/adduction   2 sets  -    Left Lower Extremity   AROM:;10 reps;hip abduction/adduction;heel slides   2 sets  -    Bilateral Lower Extremities AROM:;20 reps;supine;sitting;ankle pumps/circles;glut sets;heel slides;quad sets  -AM  AROM:;20 reps;ankle pumps/circles;glut sets;quad sets  -    Bilateral Upper Extremity  AROM:;15 reps;supine;sitting;elbow flexion/extension;hand pumps;pronation/supination  -     BUE Resistance  manual resistance- minimal  -     Recorded by [AM] Wayne Olmos PTA [] DENEEN Ayers/ZOEY [] Kadi Paredes PTA    Modality Treatment    Modality Treatment Location 1   right knee  -    Modality Performed   cryotherapy  -    Recorded by   [JW] Kadi Paredes PTA    Positioning and Restraints    Pre-Treatment Position sitting in chair/recliner  -AM in bed  - sitting in chair/recliner  -JW    Post Treatment Position chair  -AM bed  - chair  -JW    In Bed  sitting;call light within  reach;encouraged to call for assist  -     In Chair reclined;call light within reach;encouraged to call for assist;legs elevated  -AM  reclined;call light within reach;encouraged to call for assist  -SWEETIE    Recorded by [AM] Wayne Olmos PTA [JH] DENEEN Ayers/ZOEY [JW] Kadi Paredes PTA      10/17/17 1050          Rehab Assessment/Intervention    Discipline physical therapy assistant  -AM      Document Type therapy note (daily note)  -AM      Subjective Information agree to therapy;complains of;pain  -AM      Patient Effort, Rehab Treatment good  -AM      Symptoms Noted During/After Treatment none  -AM      Precautions/Limitations fall precautions;other (see comments)   WBAT R LE  -AM      Equipment Issued to Patient gait belt  -AM      Recorded by [AM] Wayne Olmos PTA      Vital Signs    Pre Systolic BP Rehab 119  -AM      Pre Treatment Diastolic BP 61  -AM      Post Systolic BP Rehab 121  -AM      Post Treatment Diastolic BP 73  -AM      Pretreatment Heart Rate (beats/min) 79  -AM      Posttreatment Heart Rate (beats/min) 74  -AM      Pre SpO2 (%) 94  -AM      O2 Delivery Pre Treatment room air  -AM      Post SpO2 (%) 96  -AM      O2 Delivery Post Treatment room air  -AM      Pre Patient Position Supine  -AM      Intra Patient Position Standing  -AM      Post Patient Position Sitting  -AM      Recorded by [AM] Wayne Olmos PTA      Pain Assessment    Pain Assessment 0-10  -AM      Pain Score 5  -AM      Post Pain Score 5  -AM      Pain Type Acute pain;Surgical pain  -AM      Pain Location Knee  -AM      Pain Orientation Right  -AM      Pain Descriptors Sore  -AM      Pain Frequency Constant/continuous  -AM      Date Pain First Started 10/16/17  -AM      Clinical Progression Gradually improving  -AM      Patient's Stated Pain Goal No pain  -AM      Pain Intervention(s) Medication (See MAR);Cold applied;Ambulation/increased activity  -AM      Result of Injury No  -AM      Work-Related  Injury No  -AM      Multiple Pain Sites No  -AM      Recorded by [AM] Wayne Olmos PTA      Vision Assessment/Intervention    Visual Impairment --  -AM      Recorded by [AM] Wayne Olmos PTA      Cognitive Assessment/Intervention    Current Cognitive/Communication Assessment functional  -AM      Orientation Status oriented x 4  -AM      Follows Commands/Answers Questions 100% of the time  -AM      Personal Safety Interventions gait belt;nonskid shoes/slippers when out of bed;supervised activity  -AM      Recorded by [AM] Wayne Olmos PTA      ROM (Range of Motion)    General ROM lower extremity range of motion deficits identified  -AM      Recorded by [AM] Wayne Olmos PTA      General LE Assessment    ROM knee, right: LE ROM deficit  -AM      Recorded by [AM] Wayne Olmos PTA      Right Knee    Extension/Flexion AROM within functional limits   12-96  -AM      Recorded by [AM] Wayne Olmos PTA      Mobility Assessment/Training    Extremity Weight-Bearing Status right lower extremity  -AM      Right Lower Extremity Weight-Bearing weight-bearing as tolerated  -AM      Recorded by [AM] Wayne Olmos PTA      Bed Mobility, Assessment/Treatment    Bed Mobility, Assistive Device bed rails;head of bed elevated  -AM      Bed Mobility, Roll Left, [place] not tested  -AM      Bed Mobility, Roll Right, [place] not tested  -AM      Bed Mobility, Scoot/Bridge, [place] not tested  -AM      Bed Mob, Supine to Sit, [place] conditional independence  -AM      Bed Mob, Sit to Supine, [place] not tested  -AM      Bed Mob, Sidelying to Sit, [place] not tested  -AM      Bed Mob, Sit to Sidelying, [place] not tested  -AM      Bed Mobility, Safety Issues decreased use of arms for pushing/pulling;decreased use of legs for bridging/pushing  -AM      Bed Mobility, Impairments ROM decreased;strength decreased;pain  -AM      Recorded by [AM] Wayne Olmos PTA      Transfer  Assessment/Treatment    Transfers, Bed-Chair [place] maximum assist (25% patient effort);2 person assist required  -AM      Transfers, Chair-Bed [place] maximum assist (25% patient effort);2 person assist required  -AM      Transfers, Bed-Chair-Bed, Assist Device rolling walker  -AM      Transfers, Sit-Stand [place] maximum assist (25% patient effort);2 person assist required  -AM      Transfers, Stand-Sit [place] maximum assist (25% patient effort);2 person assist required  -AM      Transfers, Sit-Stand-Sit, Assist Device rolling walker  -AM      Toilet Transfer, [place] not tested  -AM      Walk-In Shower Transfer, [place] not tested  -AM      Bathtub Transfer, [place] not tested  -AM      Transfer, Maintain Weight Bearing Status able to maintain weight bearing status  -AM      Transfer, Safety Issues step length decreased  -AM      Transfer, Impairments ROM decreased;strength decreased;pain  -AM      Recorded by [AM] Wayne Olmos PTA      Gait Assessment/Treatment    Gait, [place] Level not tested  -AM      Recorded by [AM] Wayne Olmos PTA      Stairs Assessment/Treatment    Stairs, [place] Level not tested  -AM      Recorded by [AM] Wayne Olmos PTA      Therapy Exercises    Bilateral Lower Extremities AROM:;20 reps;supine;sitting;ankle pumps/circles;glut sets;heel slides;quad sets;SLR  -AM      Recorded by [AM] Wayne Olmos PTA      Sensory Assessment/Intervention    Light Touch --  -AM      LLE Light Touch --  -AM      RLE Light Touch --  -AM      Recorded by [AM] Wayne Olmos PTA      Positioning and Restraints    Pre-Treatment Position in bed  -AM      Post Treatment Position chair  -AM      In Chair reclined;call light within reach;encouraged to call for assist;exit alarm on;legs elevated  -AM      Recorded by [AM] Wayne Olmos PTA        User Key  (r) = Recorded By, (t) = Taken By, (c) = Cosigned By    Initials Name Effective Dates    JW  Kadi Paredes, PTA 08/11/15 -     AM Wayne Olmos, PTA 10/17/16 -      Marlys Delcid, BROTHERS/L 10/17/16 -                 OT Goals       10/18/17 0945 10/17/17 1130       Transfer Training OT LTG    Transfer Training OT LTG, Date Established  10/17/17  -     Transfer Training OT LTG, Time to Achieve  by discharge  -RB     Transfer Training OT LTG, Activity Type  all transfers  -RB     Transfer Training OT LTG, [place] Level  supervision required;conditional independence  -RB     Transfer Training OT LTG, Date Goal Reviewed 10/18/17  -      Transfer Training OT LTG, Outcome goal ongoing  -      Static Standing Balance OT LTG    Static Standing Balance OT LTG, Date Established  10/17/17  -     Static Standing Balance OT LTG, Time to Achieve  by discharge  -RB     Static Standing Balance OT LTG, [place] Level  conditional independence;supervision required   5 minutes with functional activity.  -RB     Static Standing Balance OT LTG, Assist Device  assistive device   R/W.  -RB     Static Standing Balance OT LTG, Date Goal Reviewed 10/18/17  -      Static Standing Balance OT LTG, Outcome goal ongoing  -      Patient Education OT STG    Patient Education OT STG, Date Established  10/17/17  -     Patient Education OT STG, Time to Achieve  4 days  -     Patient Education OT STG, Education Type  energy conservation;home safety;adaptive equipment mgmt;joint protection;positioning;posture/body mechanics  -     Patient Education OT STG, Education Understanding  demonstrates adequately;verbalizes understanding  -     Patient Education OT STG, Date Goal Reviewed 10/18/17  -      Patient Education OT STG Outcome goal ongoing  -      ADL OT LTG    ADL OT LTG, Date Established  10/17/17  -     ADL OT LTG, Time to Achieve  by discharge  -RB     ADL OT LTG, Activity Type  ADL skills   Sponge bath and dress or walk-in shower.  -RB     ADL OT LTG, [place] Level  modified  independent;standby assist;assistive device   R/W.  -RB     ADL OT LTG, Date Goal Reviewed 10/18/17  -      ADL OT LTG, Outcome goal ongoing  -        User Key  (r) = Recorded By, (t) = Taken By, (c) = Cosigned By    Initials Name Provider Type    RB Flynn Kaplan OT Occupational Therapist     DENEEN Ayers/L Occupational Therapy Assistant          Occupational Therapy Education     Title: PT OT SLP Therapies (Active)     Topic: Occupational Therapy (Active)     Point: Precautions (Done)    Description: Instruct learner(s) on prescribed precautions during self-care and functional transfers.    Learning Progress Summary    Learner Readiness Method Response Comment Documented by Status   Patient Acceptance E VU Pt edu on use of gait belt and non skid socks when OOB.  10/17/17 1128 Done                      User Key     Initials Effective Dates Name Provider Type Discipline     06/15/16 -  Flynn Kaplan OT Occupational Therapist OT                  OT Recommendation and Plan  Anticipated Discharge Disposition: home with home health, home with outpatient services  Planned Therapy Interventions: activity intolerance, ADL retraining, balance training, bed mobility training, strengthening, transfer training  Therapy Frequency:  (3-14x/wk)           Outcome Measures       10/18/17 1045 10/17/17 1050 10/17/17 0925    How much help from another person do you currently need...    Turning from your back to your side while in flat bed without using bedrails? 4  -AM 4  -AM     Moving from lying on back to sitting on the side of a flat bed without bedrails? 4  -AM 4  -AM     Moving to and from a bed to a chair (including a wheelchair)? 3  -AM 2  -AM     Standing up from a chair using your arms (e.g., wheelchair, bedside chair)? 3  -AM 2  -AM     Climbing 3-5 steps with a railing? 1  -AM 1  -AM     To walk in hospital room? 3  -AM 1  -AM     AM-PAC 6 Clicks Score 18  -AM 14  -AM     How much help from another is  currently needed...    Putting on and taking off regular lower body clothing?   2  -RB    Bathing (including washing, rinsing, and drying)   2  -RB    Toileting (which includes using toilet bed pan or urinal)   2  -RB    Putting on and taking off regular upper body clothing   3  -RB    Taking care of personal grooming (such as brushing teeth)   4  -RB    Eating meals   4  -RB    Score   17  -RB    Functional Assessment    Outcome Measure Options AM-PAC 6 Clicks Basic Mobility (PT)  -AM AM-PAC 6 Clicks Basic Mobility (PT)  -AM AM-PAC 6 Clicks Daily Activity (OT)  -RB      10/16/17 1303          How much help from another person do you currently need...    Turning from your back to your side while in flat bed without using bedrails? 3  -CZ      Moving from lying on back to sitting on the side of a flat bed without bedrails? 3  -CZ      Moving to and from a bed to a chair (including a wheelchair)? 2  -CZ      Standing up from a chair using your arms (e.g., wheelchair, bedside chair)? 2  -CZ      Climbing 3-5 steps with a railing? 2  -CZ      To walk in hospital room? 2  -CZ      AM-PAC 6 Clicks Score 14  -CZ      Functional Assessment    Outcome Measure Options AM-PAC 6 Clicks Basic Mobility (PT)  -CZ        User Key  (r) = Recorded By, (t) = Taken By, (c) = Cosigned By    Initials Name Provider Type     Flynn Kaplan, OT Occupational Therapist    AM Wayne Olmos, PTA Physical Therapy Assistant    HERBERT Blake, PT Physical Therapist           Time Calculation:         Time Calculation- OT       10/18/17 1217          Time Calculation- OT    OT Start Time 0945  -      OT Stop Time 1030  -      OT Time Calculation (min) 45 min  -      OT Received On 10/18/17  -        User Key  (r) = Recorded By, (t) = Taken By, (c) = Cosigned By    Initials Name Provider Type     DENEEN Ayers/L Occupational Therapy Assistant           Therapy Charges for Today     Code Description Service Date Service  Provider Modifiers Qty    18150960554 HC OT SELF CARE/MGMT/TRAIN EA 15 MIN 10/18/2017 DENEEN Ayers/L GO 1    83676931281 HC OT ASSTV TECHNICAL ASSMT-15 MIN 10/18/2017 DENEEN Ayers/L  1    16092225070 HC OT THER PROC EA 15 MIN 10/18/2017 DENEEN Ayers/L GO 1    73914369115 HC OT THER SUPP EA 15 MIN 10/18/2017 DENEEN Ayers/L GO 1          OT G-codes  OT Professional Judgement Used?: Yes  OT Functional Scales Options: AM-PAC 6 Clicks Daily Activity (OT)  Score: 17  Functional Limitation: Self care  Self Care Current Status (): At least 40 percent but less than 60 percent impaired, limited or restricted  Self Care Goal Status (): At least 20 percent but less than 40 percent impaired, limited or restricted    DENEEN Ayers/ZOEY  10/18/2017

## 2017-10-18 NOTE — THERAPY TREATMENT NOTE
Acute Care - Physical Therapy Treatment Note  [facility]     Patient Name: Tiffani Serra  : 1960  MRN: [id number]  Today's Date: 10/18/2017  Onset of Illness/Injury or Date of Surgery Date: 10/16/17  Date of Referral to PT: 10/16/17  Referring Physician: Sanam    Admit Date: 10/16/2017    Visit Dx:    ICD-10-CM ICD-9-CM   1. Primary osteoarthritis of right knee M17.11 715.16   2. Impaired physical mobility Z74.09 781.99   3. Impaired mobility and activities of daily living Z74.09 799.89     Patient Active Problem List   Diagnosis   • Osteoarthritis of right knee   • Acute foot pain   • Knee pain, acute   • Eversion deformity of left foot   • Plantar fasciitis of left foot   • Pes planus of left foot   • Primary osteoarthritis of right knee               Adult Rehabilitation Note       10/18/17 1400 10/18/17 1045 10/18/17 0945    Rehab Assessment/Intervention    Discipline physical therapy assistant  -AM physical therapy assistant  -AM occupational therapy assistant  -    Document Type therapy note (daily note)  -AM therapy note (daily note)  -AM therapy note (daily note)  -    Subjective Information agree to therapy;complains of;pain  -AM agree to therapy;complains of;pain  -AM agree to therapy  -JH    Patient Effort, Rehab Treatment good  -AM good  -AM good  -JH    Symptoms Noted During/After Treatment increased pain  -AM increased pain  -AM     Precautions/Limitations fall precautions;other (see comments)   WBAT RLE  -AM fall precautions;other (see comments)   WBAT R LE  -AM fall precautions  -    Equipment Issued to Patient gait belt  -AM gait belt  -AM     Recorded by [AM] Wayne Olmos PTA [AM] Wayne Olmos PTA [JH] DENEEN Ayers/L    Vital Signs    Pre Systolic BP Rehab 127  -  -AM     Pre Treatment Diastolic BP 62  -AM 75  -AM     Post Systolic BP Rehab 145  -  -AM     Post Treatment Diastolic BP 67  -AM 75  -AM     Pretreatment Heart Rate (beats/min) 96  -AM  93  -AM     Posttreatment Heart Rate (beats/min) 91  -  -AM     Pre SpO2 (%) 97  -AM 96  -AM     O2 Delivery Pre Treatment room air  -AM room air  -AM     Post SpO2 (%) 96  -AM 96  -AM     O2 Delivery Post Treatment room air  -AM room air  -AM     Pre Patient Position Sitting  -AM Sitting  -AM     Intra Patient Position Standing  -AM Standing  -AM     Post Patient Position Supine  -AM Sitting  -AM     Recorded by [AM] Wayne Olmos PTA [AM] Wayne Olmos PTA     Pain Assessment    Pain Assessment 0-10  -AM 0-10  -AM No/denies pain  -JH    Pain Score 4  -AM 5  -AM     Post Pain Score 7  -AM 9  -AM     Pain Type Acute pain;Surgical pain  -AM Acute pain;Surgical pain  -AM     Pain Location Knee  -AM Knee  -AM     Pain Orientation Right  -AM Right  -AM     Pain Descriptors Sore  -AM Sore  -AM     Pain Frequency Constant/continuous  -AM Constant/continuous  -AM     Date Pain First Started 10/16/17  -AM 10/16/17  -AM     Clinical Progression Gradually improving  -AM Gradually improving  -AM     Patient's Stated Pain Goal No pain  -AM No pain  -AM     Pain Intervention(s) Medication (See MAR);Cold applied;Ambulation/increased activity  -AM Medication (See MAR);Cold applied;Ambulation/increased activity  -AM     Result of Injury No  -AM No  -AM     Work-Related Injury No  -AM No  -AM     Multiple Pain Sites No  -AM No  -AM     Recorded by [AM] Wayne Olmos PTA [AM] Wayne Olmos PTA [JH] DENEEN Ayers/L    Cognitive Assessment/Intervention    Current Cognitive/Communication Assessment functional  -AM functional  -AM functional  -JH    Orientation Status oriented x 4  -AM oriented x 4  -AM oriented x 4  -JH    Follows Commands/Answers Questions 100% of the time  -% of the time  -% of the time  -JH    Personal Safety Interventions gait belt;nonskid shoes/slippers when out of bed;supervised activity  -AM gait belt;nonskid shoes/slippers when out of bed;supervised activity  -AM     Recorded  by [AM] Wayne Olmos PTA [AM] Wayne Olmos PTA [JH] Marlys Delcid, BROTHERS/L    ROM (Range of Motion)    General ROM lower extremity range of motion deficits identified  -AM lower extremity range of motion deficits identified  -AM     Recorded by [AM] Wayne Olmos PTA [AM] Wayne Olmos PTA     General LE Assessment    ROM knee, right: LE ROM deficit  -AM knee, right: LE ROM deficit  -AM     Recorded by [AM] Wayne Olmos PTA [AM] Wayne Olmos PTA     Right Knee    Extension/Flexion AROM within functional limits   6-80  -AM within functional limits   6-80  -AM     Recorded by [AM] Wayne Olmos PTA [AM] Wayne Olmos PTA     Mobility Assessment/Training    Extremity Weight-Bearing Status right lower extremity  -AM right lower extremity  -AM     Right Lower Extremity Weight-Bearing weight-bearing as tolerated  -AM weight-bearing as tolerated  -AM     Recorded by [AM] Wayne Olmos PTA [AM] Wayne Olmos PTA     Bed Mobility, Assessment/Treatment    Bed Mobility, Roll Left, [place] not tested  -AM not tested  -AM     Bed Mobility, Roll Right, [place] not tested  -AM not tested  -AM     Bed Mobility, Scoot/Bridge, [place] not tested  -AM not tested  -AM     Bed Mob, Supine to Sit, [place] not tested  -AM not tested  -AM     Bed Mob, Sit to Supine, [place] contact guard assist  -AM not tested  -AM     Bed Mob, Sidelying to Sit, [place] not tested  -AM not tested  -AM     Bed Mob, Sit to Sidelying, [place] not tested  -AM not tested  -AM     Bed Mobility, Safety Issues decreased use of arms for pushing/pulling;decreased use of legs for bridging/pushing  -AM      Bed Mobility, Impairments ROM decreased;strength decreased;pain  -AM      Recorded by [AM] Wayne Olmos PTA [AM] Wayne Olmos PTA     Transfer Assessment/Treatment    Transfers, Bed-Chair [place] stand by assist  -AM contact guard assist  -AM     Transfers, Chair-Bed [place]  stand by assist  -AM contact guard assist  -AM     Transfers, Bed-Chair-Bed, Assist Device rolling walker  -AM rolling walker  -AM     Transfers, Sit-Stand [place] stand by assist  -AM contact guard assist  -AM     Transfers, Stand-Sit [place] stand by assist  -AM contact guard assist  -AM     Transfers, Sit-Stand-Sit, Assist Device rolling walker  -AM rolling walker  -AM     Toilet Transfer, [place] supervision required  -AM contact guard assist  -AM     Toilet Transfer, Assistive Device bedside commode without drop arms;rolling walker  -AM bedside commode without drop arms;rolling walker  -AM     Walk-In Shower Transfer, [place] not tested  -AM not tested  -AM     Bathtub Transfer, [place] not tested  -AM not tested  -AM     Transfer, Maintain Weight Bearing Status able to maintain weight bearing status  -AM able to maintain weight bearing status  -AM     Transfer, Safety Issues step length decreased  -AM step length decreased  -AM     Transfer, Impairments ROM decreased;strength decreased;pain  -AM ROM decreased;strength decreased;pain  -AM     Recorded by [AM] Wayne Olmos PTA [AM] Wayne Olmos PTA     Gait Assessment/Treatment    Gait, [place] Level stand by assist  -AM contact guard assist  -AM     Gait, Assistive Device rolling walker  -AM rolling walker  -AM     Gait, Distance (Feet) 150   75+75  -AM 30   15+15  -AM     Gait, Gait Pattern Analysis 3-point gait  -AM 3-point gait  -AM     Gait, Gait Deviations bilateral:;n[place] decreased  -AM bilateral:;nabeel decreased  -AM     Gait, Maintain Weight Bearing Status able to maintain weight bearing status  -AM able to maintain weight bearing status  -AM     Gait, Safety Issues step length decreased  -AM step length decreased  -AM     Gait, Impairments ROM decreased;strength decreased;pain  -AM ROM decreased;strength decreased;pain  -AM     Recorded by [AM] Wayne Olmos PTA [AM] Wayne Olmos PTA     Stairs  Assessment/Treatment    Number of Stairs 1 step  -AM      Stairs, Handrail Location none  -AM      Stairs, [place] Level contact guard assist  -AM not tested  -AM     Stairs, Assistive Device walker  -AM      Stairs, Technique Used step to step (ascending);step to step (descending)  -AM      Stairs, Maintain Weight Bearing Status able to maintain weight bearing status  -AM      Stairs, Impairments ROM decreased;strength decreased;pain  -AM      Recorded by [AM] Wayne Olmos PTA [AM] Wayne Olmos PTA     Lower Body Dressing Assessment/Training    LB Dressing Assess/Train, Clothing Type   socks  -    LB Dressing Assess/Train, Assist Device   reacher;sock-aid  -    LB Dressing Assess/Train, Position   sitting  -    Recorded by   [] TATIANA Ayers    Grooming Assessment/Training    Grooming Assess/Train, Position   sitting  -    Grooming Assess/Train, Indepen Level   set up required;supervision required  -    Recorded by   [] TATIANA Ayers    Therapy Exercises    Bilateral Lower Extremities AROM:;20 reps;supine;sitting;ankle pumps/circles;glut sets;heel slides;quad sets  -AM AROM:;20 reps;supine;sitting;ankle pumps/circles;glut sets;heel slides;quad sets  -AM     Bilateral Upper Extremity   AROM:;15 reps;supine;sitting;elbow flexion/extension;hand pumps;pronation/supination  -    BUE Resistance   manual resistance- minimal  -    Recorded by [AM] Wayne Olmos PTA [AM] Wayne Olmos PTA [] DENEEN Ayers/L    Positioning and Restraints    Pre-Treatment Position sitting in chair/recliner  -AM sitting in chair/recliner  -AM in bed  -    Post Treatment Position bed  -AM chair  -AM bed  -    In Bed supine;call light within reach;encouraged to call for assist;exit alarm on  -AM  sitting;call light within reach;encouraged to call for assist  -    In Chair  reclined;call light within reach;encouraged to call for assist;legs elevated  -AM     Recorded by [AM]  Wayne Olmos PTA [AM] Wayne Olmos PTA [JH] Marlys Delcid, BROTHERS/ZOEY      10/17/17 1522 10/17/17 1050       Rehab Assessment/Intervention    Discipline physical therapy assistant  - physical therapy assistant  -AM     Document Type therapy note (daily note)  - therapy note (daily note)  -AM     Subjective Information agree to therapy  -JW agree to therapy;complains of;pain  -AM     Patient Effort, Rehab Treatment good  -JW good  -AM     Symptoms Noted During/After Treatment  none  -AM     Precautions/Limitations fall precautions  -JW fall precautions;other (see comments)   WBAT R LE  -AM     Equipment Issued to Patient  gait belt  -AM     Recorded by [JW] Kadi Paredes PTA [AM] Wayne Olmos PTA     Vital Signs    Pre Systolic BP Rehab  119  -AM     Pre Treatment Diastolic BP  61  -AM     Post Systolic BP Rehab  121  -AM     Post Treatment Diastolic BP  73  -AM     Pretreatment Heart Rate (beats/min)  79  -AM     Posttreatment Heart Rate (beats/min)  74  -AM     Pre SpO2 (%)  94  -AM     O2 Delivery Pre Treatment  room air  -AM     Post SpO2 (%)  96  -AM     O2 Delivery Post Treatment  room air  -AM     Pre Patient Position Sitting  -JW Supine  -AM     Intra Patient Position  Standing  -AM     Post Patient Position Sitting  -JW Sitting  -AM     Recorded by [JW] Kadi Paredes PTA [AM] Wayne Olmos PTA     Pain Assessment    Pain Assessment 0-10  -JW 0-10  -AM     Pain Score 5  -JW 5  -AM     Post Pain Score 5  -JW 5  -AM     Pain Type Surgical pain;Acute pain  -JW Acute pain;Surgical pain  -AM     Pain Location Knee  -JW Knee  -AM     Pain Orientation Right  -JW Right  -AM     Pain Descriptors  Sore  -AM     Pain Frequency  Constant/continuous  -AM     Date Pain First Started  10/16/17  -AM     Clinical Progression  Gradually improving  -AM     Patient's Stated Pain Goal  No pain  -AM     Pain Intervention(s) Medication (See MAR)  - Medication (See MAR);Cold  applied;Ambulation/increased activity  -AM     Result of Injury  No  -AM     Work-Related Injury  No  -AM     Multiple Pain Sites  No  -AM     Recorded by [JW] Kadi Paredes PTA [AM] Wayne Olmos PTA     Vision Assessment/Intervention    Visual Impairment  --  -AM     Recorded by  [AM] Wayne Olmos PTA     Cognitive Assessment/Intervention    Current Cognitive/Communication Assessment functional  -JW functional  -AM     Orientation Status oriented x 4  -JW oriented x 4  -AM     Follows Commands/Answers Questions 100% of the time  -% of the time  -AM     Personal Safety Interventions gait belt;nonskid shoes/slippers when out of bed  -JW gait belt;nonskid shoes/slippers when out of bed;supervised activity  -AM     Recorded by [JW] Kadi Paredes PTA [AM] Wayne Olmos PTA     ROM (Range of Motion)    General ROM  lower extremity range of motion deficits identified  -AM     Recorded by  [AM] Wayne Olmos PTA     General LE Assessment    ROM  knee, right: LE ROM deficit  -AM     Recorded by  [AM] Wayne Olmos PTA     Right Knee    Extension/Flexion AROM  within functional limits   12-96  -AM     Recorded by  [AM] Wayne Olmos PTA     Mobility Assessment/Training    Extremity Weight-Bearing Status  right lower extremity  -AM     Right Lower Extremity Weight-Bearing  weight-bearing as tolerated  -AM     Recorded by  [AM] Wayne Olmos PTA     Bed Mobility, Assessment/Treatment    Bed Mobility, Assistive Device  bed rails;head of bed elevated  -AM     Bed Mobility, Roll Left, [place]  not tested  -AM     Bed Mobility, Roll Right, [place]  not tested  -AM     Bed Mobility, Scoot/Bridge, [place]  not tested  -AM     Bed Mob, Supine to Sit, [place]  conditional independence  -AM     Bed Mob, Sit to Supine, [place]  not tested  -AM     Bed Mob, Sidelying to Sit, [place]  not tested  -AM     Bed Mob, Sit to Sidelying, [place]  not tested  -AM      Bed Mobility, Safety Issues  decreased use of arms for pushing/pulling;decreased use of legs for bridging/pushing  -AM     Bed Mobility, Impairments  ROM decreased;strength decreased;pain  -AM     Recorded by  [AM] Wayne Olmos PTA     Transfer Assessment/Treatment    Transfers, Bed-Chair [place]  maximum assist (25% patient effort);2 person assist required  -AM     Transfers, Chair-Bed [place]  maximum assist (25% patient effort);2 person assist required  -AM     Transfers, Bed-Chair-Bed, Assist Device  rolling walker  -AM     Transfers, Sit-Stand [place]  maximum assist (25% patient effort);2 person assist required  -AM     Transfers, Stand-Sit [place]  maximum assist (25% patient effort);2 person assist required  -AM     Transfers, Sit-Stand-Sit, Assist Device  rolling walker  -AM     Toilet Transfer, [place]  not tested  -AM     Walk-In Shower Transfer, [place]  not tested  -AM     Bathtub Transfer, [place]  not tested  -AM     Transfer, Maintain Weight Bearing Status  able to maintain weight bearing status  -AM     Transfer, Safety Issues  step length decreased  -AM     Transfer, Impairments  ROM decreased;strength decreased;pain  -AM     Transfer, Comment pt defers as she wanted to stay up in recliner longer  -JW      Recorded by [JW] Kadi Paredes PTA [AM] Wayne Olmos PTA     Gait Assessment/Treatment    Gait, [place] Level  not tested  -AM     Recorded by  [AM] Wayne Olmos PTA     Stairs Assessment/Treatment    Stairs, [place] Level  not tested  -AM     Recorded by  [AM] Wayne Olmos PTA     Therapy Exercises    Right Lower Extremity AAROM:;10 reps;heel slides;hip abduction/adduction   2 sets  -JW      Left Lower Extremity AROM:;10 reps;hip abduction/adduction;heel slides   2 sets  -JW      Bilateral Lower Extremities AROM:;20 reps;ankle pumps/circles;glut sets;quad sets  -JW AROM:;20 reps;supine;sitting;ankle pumps/circles;glut sets;heel  slides;quad sets;SLR  -AM     Recorded by [JW] Kadi Paredes, KANDY [AM] Wayne Olmos PTA     Sensory Assessment/Intervention    Light Touch  --  -AM     LLE Light Touch  --  -AM     RLE Light Touch  --  -AM     Recorded by  [AM] Wayne Olmos PTA     Modality Treatment    Modality Treatment Location 1 right knee  -JW      Modality Performed cryotherapy  -JW      Recorded by [JW] Kadi Paredes PTA      Positioning and Restraints    Pre-Treatment Position sitting in chair/recliner  -JW in bed  -AM     Post Treatment Position chair  -JW chair  -AM     In Chair reclined;call light within reach;encouraged to call for assist  -JW reclined;call light within reach;encouraged to call for assist;exit alarm on;legs elevated  -AM     Recorded by [JW] Kadi Paredes PTA [AM] Wayne Olmos PTA       User Key  (r) = Recorded By, (t) = Taken By, (c) = Cosigned By    Initials Name Effective Dates     Kadi Paredes, KANDY 08/11/15 -     AM Wayne Olmos PTA 10/17/16 -     JH DENEEN Ayers/ZOEY 10/17/16 -                 IP PT Goals       10/17/17 1522 10/16/17 1303       Bed Mobility PT STG    Bed Mobility PT STG, Date Established  10/16/17  -CZ     Bed Mobility PT STG, Time to Achieve  2 days  -CZ     Bed Mobility PT STG, Activity Type  all bed mobility  -CZ     Bed Mobility PT STG, [place] Level  conditional independence  -CZ     Bed Mobility PT STG, Additional Goal  HOB flat, no bed rails.   -CZ     Bed Mobility PT STG, Date Goal Reviewed 10/17/17  -JW 10/16/17  -CZ     Bed Mobility PT STG, Outcome goal ongoing  -JW goal ongoing  -CZ     Transfer Training PT STG    Transfer Training PT STG, Date Established  10/16/17  -CZ     Transfer Training PT STG, Time to Achieve  2 days  -CZ     Transfer Training PT STG, Activity Type  bed to chair /chair to bed;sit to stand/stand to sit  -CZ     Transfer Training PT STG, Assist Device  walker, rolling  -CZ     Transfer Training PT STG, Date  Goal Reviewed 10/17/17  -JW 10/16/17  -CZ     Transfer Training PT STG, Outcome goal ongoing  - goal ongoing  -CZ     Gait Training PT LTG    Gait Training Goal PT LTG, Date Established  10/16/17  -CZ     Gait Training Goal PT LTG, Time to Achieve  by discharge  -CZ     Gait Training Goal PT LTG, [place] Level  conditional independence  -CZ     Gait Training Goal PT LTG, Assist Device  walker, rolling  -CZ     Gait Training Goal PT LTG, Distance to Achieve  150'x1.  -CZ     Gait Training Goal PT LTG, Date Goal Reviewed 10/17/17  -JW 10/16/17  -CZ     Gait Training Goal PT LTG, Outcome goal ongoing  - goal ongoing  -CZ     Stair Training PT LTG    Stair Training Goal PT LTG, Date Established  10/16/17  -CZ     Stair Training Goal PT LTG, Time to Achieve  by discharge  -CZ     Stair Training Goal PT LTG, Number of Steps  4  -CZ     Stair Training Goal PT LTG, [place] Level  conditional independence  -CZ     Stair Training Goal PT LTG, Assist Device  1 handrail  -CZ     Stair Training Goal PT LTG, Date Goal Reviewed 10/17/17  -JW 10/16/17  -CZ     Stair Training Goal PT LTG, Outcome goal ongoing  - goal ongoing  -       User Key  (r) = Recorded By, (t) = Taken By, (c) = Cosigned By    Initials Name Provider Type     Kadi Paredes, PTA Physical Therapy Assistant     Lukas Blake, PT Physical Therapist          Physical Therapy Education     Title: PT OT SLP Therapies (Active)     Topic: Physical Therapy (Active)     Point: Mobility training (Active)    Learning Progress Summary    Learner Readiness Method Response Comment Documented by Status   Patient Acceptance E NR Educated patient in proper technique for bed mobility,transfers and standing.  10/16/17 1516 Active                      User Key     Initials Effective Dates Name Provider Type Discipline     02/17/17 -  Lukas Blake, JOIN Physical Therapist PT                    PT Recommendation and Plan  Anticipated Discharge  Disposition: home with outpatient services  Planned Therapy Interventions: balance training, bed mobility training, gait training, home exercise program, patient/family education, ROM (Range of Motion), stair training, strengthening, stretching, transfer training  PT Frequency: other (see comments) (5-14x/week)             Outcome Measures       10/18/17 1400 10/18/17 1045 10/17/17 1050    How much help from another person do you currently need...    Turning from your back to your side while in flat bed without using bedrails? 4  -AM 4  -AM 4  -AM    Moving from lying on back to sitting on the side of a flat bed without bedrails? 4  -AM 4  -AM 4  -AM    Moving to and from a bed to a chair (including a wheelchair)? 3  -AM 3  -AM 2  -AM    Standing up from a chair using your arms (e.g., wheelchair, bedside chair)? 3  -AM 3  -AM 2  -AM    Climbing 3-5 steps with a railing? 3  -AM 1  -AM 1  -AM    To walk in hospital room? 3  -AM 3  -AM 1  -AM    AM-PAC 6 Clicks Score 20  -AM 18  -AM 14  -AM    Functional Assessment    Outcome Measure Options AM-PAC 6 Clicks Basic Mobility (PT)  -AM AM-PAC 6 Clicks Basic Mobility (PT)  -AM AM-PAC 6 Clicks Basic Mobility (PT)  -AM      10/17/17 0925 10/16/17 1303       How much help from another person do you currently need...    Turning from your back to your side while in flat bed without using bedrails?  3  -CZ     Moving from lying on back to sitting on the side of a flat bed without bedrails?  3  -CZ     Moving to and from a bed to a chair (including a wheelchair)?  2  -CZ     Standing up from a chair using your arms (e.g., wheelchair, bedside chair)?  2  -CZ     Climbing 3-5 steps with a railing?  2  -CZ     To walk in hospital room?  2  -CZ     AM-PAC 6 Clicks Score  14  -CZ     How much help from another is currently needed...    Putting on and taking off regular lower body clothing? 2  -RB      Bathing (including washing, rinsing, and drying) 2  -RB      Toileting (which  includes using toilet bed pan or urinal) 2  -RB      Putting on and taking off regular upper body clothing 3  -RB      Taking care of personal grooming (such as brushing teeth) 4  -RB      Eating meals 4  -RB      Score 17  -RB      Functional Assessment    Outcome Measure Options AM-PAC 6 Clicks Daily Activity (OT)  -RB AM-PAC 6 Clicks Basic Mobility (PT)  -CZ       User Key  (r) = Recorded By, (t) = Taken By, (c) = Cosigned By    Initials Name Provider Type    RB Flynn Kaplan, OT Occupational Therapist    AM Wayne Olmos PTA Physical Therapy Assistant    HERBERT Blake, PT Physical Therapist           Time Calculation:         PT Charges       10/18/17 1400 10/18/17 1045       Time Calculation    Start Time 1400  -AM 1045  -AM     Stop Time 1440  -AM 1125  -AM     Time Calculation (min) 40 min  -AM 40 min  -AM     PT Received On 10/18/17  -AM 10/18/17  -AM     PT - Next Appointment 10/19/17  -AM 10/18/17  -AM     Time Calculation- PT    Total Timed Code Minutes- PT 40 minute(s)  -AM 40 minute(s)  -AM       User Key  (r) = Recorded By, (t) = Taken By, (c) = Cosigned By    Initials Name Provider Type    AM Wayne Olmos PTA Physical Therapy Assistant          Therapy Charges for Today     Code Description Service Date Service Provider Modifiers Qty    07399187654 HC PT THER PROC EA 15 MIN 10/17/2017 Wayne Olmos PTA GP 2     Duration:  25m (10:50 AM - 11:15 AM)      76018055874 HC PT SELF CARE/MGMT/TRAIN EA 15 MIN 10/17/2017 Wayne Olmos PTA GP 2     Duration:  30m (11:15 AM - 11:45 AM)      90766386628 HC GAIT TRAINING EA 15 MIN 10/18/2017 Wayne Olmos PTA GP 1     Duration:  15m (10:45 AM - 11:00 AM)      80374532883 HC PT THER PROC EA 15 MIN 10/18/2017 Wayne Olmos PTA GP 2     Duration:  25m (11:00 AM - 11:25 AM)      [id number] HC GAIT TRAINING EA 15 MIN 10/18/2017 Wayne Olmos PTA GP 1     Duration:  15m ( 2:25 PM -  2:40 PM)      [id number] HC PT THER PROC EA 15 MIN 10/18/2017  Wayne Olmos, PTA GP 2     Duration:  25m ( 2:00 PM -  2:25 PM)            PT G-Codes  PT Professional Judgement Used?: Yes  Outcome Measure Options: AM-PAC 6 Clicks Basic Mobility (PT)  Score: 14  Functional Limitation: Mobility: Walking and moving around  Mobility: Walking and Moving Around Current Status (): At least 40 percent but less than 60 percent impaired, limited or restricted  Mobility: Walking and Moving Around Goal Status (): At least 20 percent but less than 40 percent impaired, limited or restricted    Wayne Olmos, PTA  10/18/2017

## 2017-10-18 NOTE — PLAN OF CARE
Problem: Patient Care Overview (Adult)  Goal: Plan of Care Review  Outcome: Ongoing (interventions implemented as appropriate)    10/18/17 1400   Coping/Psychosocial Response Interventions   Plan Of Care Reviewed With patient   Patient Care Overview   Progress progress toward functional goals as expected   Outcome Evaluation   Outcome Summary/Follow up Plan Pt met 1 PT goal this date. Pt able to t/f in/out bed CGA. ROM 6-80. Pt able to amb 75+75+15+15 w/RW SBA. Pt able to go up/down steps CGA. Pt would benefit from HH vs OP PT once discharged from this faciltiy.          Problem: Inpatient Physical Therapy  Goal: Transfer Training Goal 1 STG- PT  Outcome: Outcome(s) achieved Date Met:  10/18/17    10/16/17 1303 10/18/17 1400   Transfer Training PT STG   Transfer Training PT STG, Date Established 10/16/17 --    Transfer Training PT STG, Time to Achieve 2 days --    Transfer Training PT STG, Activity Type bed to chair /chair to bed;sit to stand/stand to sit --    Transfer Training PT STG, Assist Device walker, rolling --    Transfer Training PT STG, Date Goal Reviewed --  10/18/17   Transfer Training PT STG, Outcome --  goal met

## 2017-10-18 NOTE — PAYOR COMM NOTE
"Tiffani Serra (57 y.o. Female)     Date of Birth Social Security Number Address Home Phone MRN    1960  [address] [place] [id number] [phone] [id number]    Protestant Marital Status          None        Admission Date Admission Type Admitting Provider Attending Provider Department, Room/Bed    10/16/17 Elective Yury Marion MD [place], Joseph E, MD [address], 379/1    Discharge Date Discharge Disposition Discharge Destination                      Attending Provider: Yury Marion MD     Allergies:  Other, Keflex [Cephalexin], Penicillins, Tape    Isolation:  None   Infection:  None   Code Status:  FULL    Ht:  64\" (162.6 cm)   Wt:  278 lb (126 kg)    Admission Cmt:  None   Principal Problem:  Primary osteoarthritis of right knee [M17.11] More...                 Active Insurance as of 10/16/2017     Primary Coverage     Payor Plan Insurance Group Employer/Plan Group    ANTHEM BLUE CROSS AN[employer]M BLUE CROSS BLUE SHIELD PPO [id number]     Payor Plan Address Payor Plan Phone Number Effective From Effective To    [address] [phone] 6/1/2015     [place] [address]       Subscriber Name Subscriber Birth Date Member ID       FELICE SERRA 7/20/1957 [id number]                 Emergency Contacts      (Rel.) Home Phone Work Phone Mobile Phone    Felice Serra (Spouse) [phone] -- --        REFERENCE # [id number]  CALL BACK [phone]    Insurance Information                [employer]HEM BLUE CROSS/[employer]HEM BLUE CROSS BLUE SHIELD PPO Phone: [phone]    Subscriber: Felice Serra Subscriber#: [id number]    Group#: [id number] Precert#:           Problem List           Codes Noted - Resolved       Hospital    * (Principal)Primary osteoarthritis of right knee ICD-10-CM: M17.11  ICD-9-CM: 715.16 9/19/2017 - Present       Non-Hospital    Osteoarthritis of right knee ICD-10-CM: M17.11  ICD-9-CM: 715.96 9/15/2016 - " "Present    Acute foot pain ICD-10-CM: M79.673  ICD-9-CM: 729.5 9/15/2016 - Present    Knee pain, acute ICD-10-CM: M25.569  ICD-9-CM: 719.46 9/15/2016 - Present    Eversion deformity of left foot ICD-10-CM: M21.072  ICD-9-CM: 736.79 9/15/2016 - Present    Plantar fasciitis of left foot ICD-10-CM: M72.2  ICD-9-CM: 728.71 9/15/2016 - Present    Pes planus of left foot ICD-10-CM: M21.42  ICD-9-CM: 734 9/15/2016 - Present             History & Physical      Yury Marion MD at 9/19/2017 10:25 AM          Subjective   Tiffani Serra is a 57 y.o. female. 1960- Recheck of right knee- Patient wants to schedule right total knee arthroplasty      History of Present Illness   Patient is here for recheck of right knee. Patient would like to decline the remaining Supartz injection series and go ahead and schedule right total knee arthroplasty. Patient last xray's were September 2016. Patient states that she has severe pain daily, in her right knee. This pain keeps her from daily living activities. Patient states that she is \"so very sick\" of feeling poorly and in constant pain. Patient states that her primary care physician prescribed Flexeril which seems to help the patient get some rest.  Xray of the right knee will be obtained before she leaves today's office visit.     The following portions of the patient's history were reviewed and updated as appropriate:   She  has a past medical history of Abdominal pain; Abscess of labia; Acute posthemorrhagic anemia (01/08/2015); Anemia; Anemia due to blood loss (11/20/2014); Contact dermatitis (01/30/2013); Cystitis; Diarrhea (04/11/2016); Foot pain; Generalized abdominal pain (02/23/2015); Hypercholesterolemia; Hypertensive disorder; Infection of skin and subcutaneous tissue (01/30/2013); Iron deficiency anemia (04/11/2016); Irritable bowel syndrome; Left sided ulcerative colitis (10/08/2015); Malaise and fatigue (11/02/2011); Obesity; Pseudomembranous enterocolitis " (11/02/2012); Rectal hemorrhage (07/01/2015); Scapulalgia (10/24/2014); Sialoadenitis (07/26/2013); Ulcerative colitis (04/11/2016); Urinary tract infectious disease (07/14/2012); and Vitamin D deficiency (05/14/2012).  She  does not have any pertinent problems on file.  She  has a past surgical history that includes Lymph node biopsy (05/12/2009); Bladder surgery (2001); Colonoscopy (07/25/2011); Colonoscopy (06/15/2016); Colonoscopy (08/15/2008); Knee arthroscopy (02/21/2005); Excision Lesion (10/17/1969); Wrist ganglion excision (10/17/1969); Total abdominal hysterectomy w/ bilateral salpingoophorectomy (2001); Total knee arthroplasty (08/08/2007); and Joint replacement (Left).  Her family history includes Coronary artery disease in her other; Hypertension in her other.  She  reports that she has never smoked. She has never used smokeless tobacco. She reports that she does not drink alcohol or use illicit drugs.  Current Outpatient Prescriptions   Medication Sig Dispense Refill   • Ca Phosphate-Cholecalciferol (CALTRATE GUMMY BITES PO) Take  by mouth Daily.     • cyclobenzaprine (FLEXERIL) 10 MG tablet      • ferrous sulfate 325 (65 FE) MG tablet Take 325 mg by mouth daily with breakfast.     • gabapentin (NEURONTIN) 300 MG capsule Take 1 capsule by mouth Every Night. Take at bedtime. 30 capsule 0   • mesalamine (APRISO) 0.375 G 24 hr capsule Take 1 capsule by mouth Daily. 4 CAPS DAILY (Patient taking differently: Take 375 mg by mouth Daily. 2 CAPS DAILY) 160 capsule 5     Current Facility-Administered Medications   Medication Dose Route Frequency Provider Last Rate Last Dose   • sodium hyaluronate (SUPARTZ) injection 25 mg  25 mg Intra-articular Weekly Yury Marion MD   25 mg at 02/09/17 1645   • sodium hyaluronate (SUPARTZ) injection 25 mg  25 mg Intra-articular Weekly Yury Marion MD   25 mg at 02/16/17 1702     Current Outpatient Prescriptions on File Prior to Visit   Medication Sig   • Ca  "Phosphate-Cholecalciferol (CALTRATE GUMMY BITES PO) Take  by mouth Daily.   • ferrous sulfate 325 (65 FE) MG tablet Take 325 mg by mouth daily with breakfast.   • gabapentin (NEURONTIN) 300 MG capsule Take 1 capsule by mouth Every Night. Take at bedtime.   • mesalamine (APRISO) 0.375 G 24 hr capsule Take 1 capsule by mouth Daily. 4 CAPS DAILY (Patient taking differently: Take 375 mg by mouth Daily. 2 CAPS DAILY)     Current Facility-Administered Medications on File Prior to Visit   Medication   • sodium hyaluronate (SUPARTZ) injection 25 mg   • sodium hyaluronate (SUPARTZ) injection 25 mg     She is allergic to tape; keflex [cephalexin]; and penicillins..    Review of Systems  REVIEW OF SYSTEMS:  Negative, other than presenting complaint.  HEENT: No headaches, diplopia, blurred vision, tinnitus, vertigo, epistaxis, hoarseness or sore throat.  Pulmonary: No cough, sputum, hemoptysis, dyspnea, wheezing, or chest pain.  Cardiac: No chest pain, palpitations, orthopnea, paroxysmal nocturnal dyspnea, shortness of breath, or pedal edema.  Gastrointestinal: No diarrhea, melena, or constipation.  Genitourinary: No dysuria, hematuria, nocturia, frequency, bladder or bowel incontinence.  Hematology: No history of any anemia, fatigue, fever, or chills or night sweats.  Dermatology: No rashes, pruritus, or increased pigmentation changes of the skin.     Objective   Physical Exam  /80 (BP Location: Right arm, Patient Position: Sitting)  Ht 64\" (162.6 cm)  Wt 264 lb (120 kg)  LMP  (LMP Unknown)  BMI 45.32 kg/m2    Social History     Social History   • Marital status:      Spouse name: N/A   • Number of children: N/A   • Years of education: N/A     Occupational History   • Not on file.     Social History Main Topics   • Smoking status: Never Smoker   • Smokeless tobacco: Never Used   • Alcohol use No   • Drug use: No   • Sexual activity: Defer     Other Topics Concern   • Not on file     Social History Narrative "       HEENT: Normocephalic.  PERRLA.  TM's clear bilaterally.  Oropharynx: Clear.  Neck: Supple, with no adenopathy.  Chest: Equal bilateral expansion.  Clear to auscultation and percussion.  Heart: Regular sinus rhythm, S1 and S2 normal.  No murmurs or extra heart sounds heard.  Abdomen: Soft, nontender, and no organomegaly.  Neurological: cranial nerves II-XII normal Vascular: pulses are present  Dermatological: no rashes  or blemishes, or any abnormality of the skin.    Examination of right knee shows severe crepitus. There is tenderness present upon palpitation to the medial joint line. Severe varus deformity present.  Range is from -10 degrees of extension to 95 degrees of flexion, tender to palpation of the medial joint line.      Xray of the right knee reveals severe osteoarthritis of the right knee. End stage.    SURGERY RISKS    Procedure has been explained to the patient.  Risks and benefits have been explained, including the risk of bleeding, stiffness, recurrent problems, blood vessel or nerve injury, blood clots, infection, and lack of healing.  Patient understands and agrees to the procedure, and we will proceed ahead with this, as an outpatient under general anesthesia, on  10/16/17          Assessment/Plan   Problems Addressed this Visit        Musculoskeletal and Integument    Osteoarthritis of right knee - Primary    Relevant Orders    XR Knee 4+ View Right    Knee pain, acute    Relevant Orders    XR Knee 4+ View Right                      Electronically signed by Yury Marion MD at 9/19/2017 10:36 AM      Yury Marion MD at 10/16/2017  7:11 AM          H&P reviewed. The patient was examined and there are no changes to the H&P.     Electronically signed by Yury Marion MD at 10/16/2017  7:11 AM    Source Note             Subjective   Tiffani Serra is a 57 y.o. female. 1960- Recheck of right knee- Patient wants to schedule right total knee arthroplasty      History of Present  "Illness   Patient is here for recheck of right knee. Patient would like to decline the remaining Supartz injection series and go ahead and schedule right total knee arthroplasty. Patient last xray's were September 2016. Patient states that she has severe pain daily, in her right knee. This pain keeps her from daily living activities. Patient states that she is \"so very sick\" of feeling poorly and in constant pain. Patient states that her primary care physician prescribed Flexeril which seems to help the patient get some rest.  Xray of the right knee will be obtained before she leaves today's office visit.     The following portions of the patient's history were reviewed and updated as appropriate:   She  has a past medical history of Abdominal pain; Abscess of labia; Acute posthemorrhagic anemia (01/08/2015); Anemia; Anemia due to blood loss (11/20/2014); Contact dermatitis (01/30/2013); Cystitis; Diarrhea (04/11/2016); Foot pain; Generalized abdominal pain (02/23/2015); Hypercholesterolemia; Hypertensive disorder; Infection of skin and subcutaneous tissue (01/30/2013); Iron deficiency anemia (04/11/2016); Irritable bowel syndrome; Left sided ulcerative colitis (10/08/2015); Malaise and fatigue (11/02/2011); Obesity; Pseudomembranous enterocolitis (11/02/2012); Rectal hemorrhage (07/01/2015); Scapulalgia (10/24/2014); Sialoadenitis (07/26/2013); Ulcerative colitis (04/11/2016); Urinary tract infectious disease (07/14/2012); and Vitamin D deficiency (05/14/2012).  She  does not have any pertinent problems on file.  She  has a past surgical history that includes Lymph node biopsy (05/12/2009); Bladder surgery (2001); Colonoscopy (07/25/2011); Colonoscopy (06/15/2016); Colonoscopy (08/15/2008); Knee arthroscopy (02/21/2005); Excision Lesion (10/17/1969); Wrist ganglion excision (10/17/1969); Total abdominal hysterectomy w/ bilateral salpingoophorectomy (2001); Total knee arthroplasty (08/08/2007); and Joint replacement " (Left).  Her family history includes Coronary artery disease in her other; Hypertension in her other.  She  reports that she has never smoked. She has never used smokeless tobacco. She reports that she does not drink alcohol or use illicit drugs.  Current Outpatient Prescriptions   Medication Sig Dispense Refill   • Ca Phosphate-Cholecalciferol (CALTRATE GUMMY BITES PO) Take  by mouth Daily.     • cyclobenzaprine (FLEXERIL) 10 MG tablet      • ferrous sulfate 325 (65 FE) MG tablet Take 325 mg by mouth daily with breakfast.     • gabapentin (NEURONTIN) 300 MG capsule Take 1 capsule by mouth Every Night. Take at bedtime. 30 capsule 0   • mesalamine (APRISO) 0.375 G 24 hr capsule Take 1 capsule by mouth Daily. 4 CAPS DAILY (Patient taking differently: Take 375 mg by mouth Daily. 2 CAPS DAILY) 160 capsule 5     Current Facility-Administered Medications   Medication Dose Route Frequency Provider Last Rate Last Dose   • sodium hyaluronate (SUPARTZ) injection 25 mg  25 mg Intra-articular Weekly Yury Marion MD   25 mg at 02/09/17 1645   • sodium hyaluronate (SUPARTZ) injection 25 mg  25 mg Intra-articular Weekly Yury Marion MD   25 mg at 02/16/17 1702     Current Outpatient Prescriptions on File Prior to Visit   Medication Sig   • Ca Phosphate-Cholecalciferol (CALTRATE GUMMY BITES PO) Take  by mouth Daily.   • ferrous sulfate 325 (65 FE) MG tablet Take 325 mg by mouth daily with breakfast.   • gabapentin (NEURONTIN) 300 MG capsule Take 1 capsule by mouth Every Night. Take at bedtime.   • mesalamine (APRISO) 0.375 G 24 hr capsule Take 1 capsule by mouth Daily. 4 CAPS DAILY (Patient taking differently: Take 375 mg by mouth Daily. 2 CAPS DAILY)     Current Facility-Administered Medications on File Prior to Visit   Medication   • sodium hyaluronate (SUPARTZ) injection 25 mg   • sodium hyaluronate (SUPARTZ) injection 25 mg     She is allergic to tape; keflex [cephalexin]; and penicillins..    Review of Systems  REVIEW OF  "SYSTEMS:  Negative, other than presenting complaint.  HEENT: No headaches, diplopia, blurred vision, tinnitus, vertigo, epistaxis, hoarseness or sore throat.  Pulmonary: No cough, sputum, hemoptysis, dyspnea, wheezing, or chest pain.  Cardiac: No chest pain, palpitations, orthopnea, paroxysmal nocturnal dyspnea, shortness of breath, or pedal edema.  Gastrointestinal: No diarrhea, melena, or constipation.  Genitourinary: No dysuria, hematuria, nocturia, frequency, bladder or bowel incontinence.  Hematology: No history of any anemia, fatigue, fever, or chills or night sweats.  Dermatology: No rashes, pruritus, or increased pigmentation changes of the skin.     Objective   Physical Exam  /80 (BP Location: Right arm, Patient Position: Sitting)  Ht 64\" (162.6 cm)  Wt 264 lb (120 kg)  LMP  (LMP Unknown)  BMI 45.32 kg/m2    Social History     Social History   • Marital status:      Spouse name: N/A   • Number of children: N/A   • Years of education: N/A     Occupational History   • Not on file.     Social History Main Topics   • Smoking status: Never Smoker   • Smokeless tobacco: Never Used   • Alcohol use No   • Drug use: No   • Sexual activity: Defer     Other Topics Concern   • Not on file     Social History Narrative       HEENT: Normocephalic.  PERRLA.  TM's clear bilaterally.  Oropharynx: Clear.  Neck: Supple, with no adenopathy.  Chest: Equal bilateral expansion.  Clear to auscultation and percussion.  Heart: Regular sinus rhythm, S1 and S2 normal.  No murmurs or extra heart sounds heard.  Abdomen: Soft, nontender, and no organomegaly.  Neurological: cranial nerves II-XII normal Vascular: pulses are present  Dermatological: no rashes  or blemishes, or any abnormality of the skin.    Examination of right knee shows severe crepitus. There is tenderness present upon palpitation to the medial joint line. Severe varus deformity present.  Range is from -10 degrees of extension to 95 degrees of flexion, " tender to palpation of the medial joint line.      Xray of the right knee reveals severe osteoarthritis of the right knee. End stage.    SURGERY RISKS    Procedure has been explained to the patient.  Risks and benefits have been explained, including the risk of bleeding, stiffness, recurrent problems, blood vessel or nerve injury, blood clots, infection, and lack of healing.  Patient understands and agrees to the procedure, and we will proceed ahead with this, as an outpatient under general anesthesia, on  10/16/17          Assessment/Plan   Problems Addressed this Visit        Musculoskeletal and Integument    Osteoarthritis of right knee - Primary    Relevant Orders    XR Knee 4+ View Right    Knee pain, acute    Relevant Orders    XR Knee 4+ View Right                       Electronically signed by Yury Marion MD at 9/19/2017 10:36 AM              Vital Signs (last 72 hrs)       10/15 0700  -  10/16 0659 10/16 0700  -  10/17 0659 10/17 0700  -  10/18 0659 10/18 0700  -  10/18 1441   Most Recent    Temp (°F)   97.3    96.2 -  97.2    97 -  98.3       98.1 (36.7)    Heart Rate   92    63 -  96    72 -  92      93     93    Resp   20    16 -  22      18       18    BP   (!) 183/79    (!)83/53 -  165/77    127/61 -  145/73      135/75     135/75    SpO2 (%)   95    93 -  100    92 -  96      95     95          Intake & Output (last 3 days)       10/15 0701 - 10/16 0700 10/16 0701 - 10/17 0700 10/17 0701 - 10/18 0700 10/18 0701 - 10/19 0700    P.O.  720 1080 120    I.V. (mL/kg)  250 (2)      IV Piggyback  1750      Total Intake(mL/kg)  2720 (21.6) 1080 (8.6) 120 (1)    Urine (mL/kg/hr)  2825 (0.9) 1900 (0.6) 0 (0)    Emesis/NG output  0 (0) 0 (0) 0 (0)    Other  185 (0.1) 95 (0)     Stool  0 (0) 0 (0) 0 (0)    Blood  100 (0)      Total Output   3110 1995 0    Net   -390 -915 +120            Unmeasured Urine Occurrence    2 x    Unmeasured Stool Occurrence  0 x          Lines, Drains & Airways    Active LDAs      "Name:   Placement date:   Placement time:   Site:   Days:    Peripheral IV Line - Single Lumen 10/16/17 0625 cephalic vein (lateral side of arm), right 20 gauge  10/16/17    0625      2    Y Collapsible Closed Device 1 and 2 10/16/17 1004 Right leg  10/16/17    1004      2                Hospital Medications (active)       Dose Frequency Start End    bacitracin injection  As Needed 10/16/2017     Sig: As Needed.    bisacodyl (DULCOLAX) suppository 10 mg 10 mg Daily PRN 10/16/2017     Sig - Route: Insert 1 suppository into the rectum Daily As Needed for Constipation. - Rectal    bupivacaine-EPINEPHrine PF (MARCAINE w/EPI) 0.25% -1:252251 injection  As Needed 10/16/2017     Sig: As Needed.    enoxaparin (LOVENOX) syringe 40 mg 40 mg Every 24 Hours 10/17/2017     Sig - Route: Inject 0.4 mL under the skin Daily. - Subcutaneous    influenza vac split quad (FLUZONE QUADRIVALENT) IM suspension 0.5 mL 0.5 mL During Hospitalization 10/16/2017     Sig - Route: Inject 0.5 mL into the shoulder, thigh, or buttocks During Hospitalization for Immunization. - Intramuscular    Morphine injection 4 mg 4 mg Every 2 Hours PRN 10/16/2017 10/26/2017    Sig - Route: Infuse 0.4 mL into a venous catheter Every 2 (Two) Hours As Needed for Severe Pain . - Intravenous    Linked Group 1:  \"And\" Linked Group Details        Morphine injection  As Needed 10/16/2017     Sig: As Needed.    naloxone (NARCAN) injection 0.4 mg 0.4 mg Every 5 Minutes PRN 10/16/2017     Sig - Route: Infuse 1 mL into a venous catheter Every 5 (Five) Minutes As Needed for Respiratory Depression. - Intravenous    Linked Group 1:  \"And\" Linked Group Details        ondansetron (ZOFRAN) injection 4 mg 4 mg Every 6 Hours PRN 10/16/2017     Sig - Route: Infuse 2 mL into a venous catheter Every 6 (Six) Hours As Needed for Nausea or Vomiting. - Intravenous    Linked Group 2:  \"Or\" Linked Group Details        ondansetron (ZOFRAN) tablet 4 mg 4 mg Every 6 Hours PRN 10/16/2017     " "Sig - Route: Take 1 tablet by mouth Every 6 (Six) Hours As Needed for Nausea or Vomiting. - Oral    Linked Group 2:  \"Or\" Linked Group Details        ondansetron ODT (ZOFRAN-ODT) disintegrating tablet 4 mg 4 mg Every 6 Hours PRN 10/16/2017     Sig - Route: Take 1 tablet by mouth Every 6 (Six) Hours As Needed for Nausea or Vomiting. - Oral    Linked Group 2:  \"Or\" Linked Group Details        oxyCODONE-acetaminophen (PERCOCET) 7.5-325 MG per tablet 2 tablet 2 tablet Every 4 Hours PRN 10/16/2017 10/26/2017    Sig - Route: Take 2 tablets by mouth Every 4 (Four) Hours As Needed for Severe Pain . - Oral    Pharmacy to dose warfarin  Continuous PRN 10/17/2017     Sig - Route: Continuous As Needed for Consult. - Does not apply    Propofol (DIPRIVAN) injection 1,000 mg 1,000 mg Once 10/16/2017     Sig - Route: Infuse 100 mL into a venous catheter 1 (One) Time. - Intravenous    sodium chloride (BACTERIOSTATIC) injection  As Needed 10/16/2017     Sig: As Needed.    warfarin (COUMADIN) tablet 5 mg 5 mg Daily Warfarin 10/16/2017     Sig - Route: Take 1 tablet by mouth Daily. - Oral          Lab Results (last 72 hours)     Procedure Component Value Units Date/Time    Protime-INR [[id number]]  (Normal) Collected:  10/16/17 1203    Specimen:  Blood Updated:  10/16/17 1231     Protime 14.1 Seconds      INR 1.10    Narrative:       Therapeutic range for most indications is 2.0-3.0 INR,  or 2.5-3.5 for mechanical heart valves.    Hemoglobin & Hematocrit, Blood [[id number]]  (Abnormal) Collected:  10/17/17 0613    Specimen:  Blood Updated:  10/17/17 0714     Hemoglobin 10.6 (L) g/dL       SPECIMEN REANALYZED TO CONFIRM  RESULTS        Hematocrit 30.0 (L) %     Protime-INR [[id number]]  (Normal) Collected:  10/17/17 0612    Specimen:  Blood Updated:  10/17/17 0716     Protime 14.1 Seconds      INR 1.10    Narrative:       Therapeutic range for most indications is 2.0-3.0 INR,  or 2.5-3.5 for mechanical heart valves.    Extra Tubes " [[id number]] Collected:  10/17/17 0612    Specimen:  Blood from Blood, Venous Line Updated:  10/17/17 0716    Narrative:       The following orders were created for panel order Extra Tubes.  Procedure                               Abnormality         Status                     ---------                               -----------         ------                     Gold Top - SST[[id number]]                                   Final result                 Please view results for these tests on the individual orders.    Gold Top - SST [[id number]] Collected:  10/17/17 0612    Specimen:  Blood Updated:  10/17/17 0716     Extra Tube Hold for add-ons.      Auto resulted.       Tissue Pathology Exam - Bone, Knee, Right [[id number]] Collected:  10/16/17 0833    Specimen:  Bone from Knee, Right Updated:  10/17/17 1334     Case Report --     Surgical Pathology Report                         Case: [id number]                                  Authorizing Provider:  Yury Marion MD          Collected:           10/16/2017 08:33 AM          Ordering Location:     [facility]             Received:            10/16/2017 11:26 AM                                 [place]                                                              Pathologist:           Larry Stafford MD                                                         Specimen:    Knee, Right, bone and soft tissue right knee                                                Final Diagnosis --     BONE AND SOFT TISSUE FRAGMENTS WITH OSTEOARTHRITIC CHANGES, RIGHT KNEE.       Gross Description --     The specimen consists of fragments of degenerated bone and soft tissues from the right knee measuring from 2.0-7.8 cm in greatest dimension.  Representative sections are embedded.       Embedded Images --    Wound Culture - Wound, Knee, Right [[id number]]  (Normal) Collected:  10/16/17 0816    Specimen:  Wound from Knee, Right Updated:  10/18/17 0650     Wound Culture No growth  at 2 days     Gram Stain Result No WBCs or organisms seen    Protime-INR [[id number]]  (Normal) Collected:  10/18/17 0618    Specimen:  Blood Updated:  10/18/17 0710     Protime 15.1 Seconds      INR 1.19    Narrative:       Therapeutic range for most indications is 2.0-3.0 INR,  or 2.5-3.5 for mechanical heart valves.    Extra Tubes [[id number]] Collected:  10/18/17 0618    Specimen:  Blood from Blood, Venous Line Updated:  10/18/17 0731    Narrative:       The following orders were created for panel order Extra Tubes.  Procedure                               Abnormality         Status                     ---------                               -----------         ------                     Lavender Top[[id number]]                                     Final result               Green Top (Gel)[[id number]]                                  Final result                 Please view results for these tests on the individual orders.    Lavender Top [[id number]] Collected:  10/18/17 0618    Specimen:  Blood Updated:  10/18/17 0731     Extra Tube hold for add-on      Auto resulted       Green Top (Gel) [[id number]] Collected:  10/18/17 0618    Specimen:  Blood Updated:  10/18/17 0731     Extra Tube Hold for add-ons.      Auto resulted.             Imaging Results (last 72 hours)     Procedure Component Value Units Date/Time    XR Knee 1 or 2 View Right [[id number]] Collected:  10/16/17 1118     Updated:  10/16/17 1136    Narrative:         EXAM:  Radiograph(s), Osseous         REGION:   Knee    SIDE:     Right     VIEWS:   2             INDICATION:    Postoperative follow-up      COMPARISON:    Preoperative examination 9/19/17            FINDINGS:       Status post total knee arthroplasty, with orthopedic appliance in  standard position. Percutaneous drain grossly in standard  position. Overlying skin staples.                            .        Impression:       CONCLUSION:           1. Stable postoperative examination.                          Electronically signed by:  HI Trinh MD  10/16/2017 11:35  AM CDT Workstation: [id number]             Operative/Procedure Notes (last 72 hours) (Notes from 10/15/2017  2:42 PM through 10/18/2017  2:42 PM)      Yury Marion MD at 10/16/2017 10:53 AM  Version 1 of 1         TOTAL KNEE ARTHROPLASTY  Procedure Note    Name:    Tiffani Serra  YOB: 1960  Date of surgery:   10/16/2017    Pre-op Diagnosis:   Primary osteoarthritis of right knee [M17.11]    Post-op Diagnosis:    Post-Op Diagnosis Codes:     * Primary osteoarthritis of right knee [M17.11]    Procedure:  Procedure(s):  TOTAL KNEE ARTHROPLASTY  RIGHT    Surgeon(s):  Yury Marion MD    SURGEON: Yury Marion MD    ASSISTANT:  FARHAD Owusu    Anesthesia: Spinal    Staff:   Circulator: Alisha Asif RN  Scrub Person: Marlene Garner; Waleska Metzger  Assistant: William Schulz    Estimated Blood Loss: 100 mL    Specimens:                  ID Type Source Tests Collected by Time Destination   1 : C & S GRAM STAIN RIGHT KNEE JOINT Wound Knee, Right WOUND CULTURE Yury Marion MD 10/16/2017 0816    A : bone and soft tissue right knee  Bone Knee, Right TISSUE EXAM Yury Marion MD 10/16/2017 0833          Drains: one hemodrain    Y Collapsible Closed Device 1 and 2 10/16/17 1004 Right leg (Active)       Urethral Catheter 10/16/17 1006 latex 16 (Active)           Findings:  End-stage osteoarthritis Right knee    Complications: None    IMPLANTS:     Implant Name Type Inv. Item Serial No.  Lot No. LRB No. Used   [facility] BONE SMARTSET GHV GENTAMYCIN 40GM - - - [id number] Implant CMT BONE SMARTSET GHV GENTAMYCIN 40GM [id number] [id number] Right 2   BASE TIB ATTUNE CMT RP SZ4 - [id number] - [id number] Implant BASE TIB ATTUNE CMT RP SZ4 [id number] [id number] Right 1   COMP FEM ATTUNE CMT PS NRW SZ5 RT - [id number] - [id number] Implant COMP FEM ATTUNE CMT PS NRW SZ5 RT [id number] [id number] Right  1   COMP PAT ATTUNE CMT MEDL/DOME 32MM - [id number] - [id number] Implant COMP PAT ATTUNE CMT MEDL/DOME 32MM [id number] DEPUY [id number] Right 1   INSRT TIB ATTUNE PS RP SZ5 8MM - [id number] - [id number] Implant INSRT TIB ATTUNE PS RP SZ5 8MM [id number] DEPUY [id number] Right 1         PROCEDURE:    The patient was taken to the operating room and placed in the supine position. Preoperative antibiotics were administered. Surgical time out was performed. After adequate induction of anesthesia, the leg was prepped and draped in the usual sterile fashion, exsanguinated with an Ace bandage and the tourniquet inflated to 300 mmHg. A midline incision was performed followed by a medial parapatellar arthrotomy.    Attention was then placed to the patella. The patella was noted to track centrally through range of motion. The patella was then sized with the trials. The thickness of the patella was then measured and the cut was made in a free-hand technique. The patella protector was then placed and the surrounding osteophytes were removed.   The patella was subluxed laterally in a non-everted position.  A portion of the fat pad, ACL, and anterior horns of the meniscus were excised.     The drill hole was placed in the distal femur and the canal was the irrigated and suctioned. The IM modesto was placed and a 5 degree distal valgus cut was performed on the femur. The femur was then sized with a sizing guide. The femoral cutting block was placed and all femoral cuts were performed. The proximal tibia was exposed. We used the extramedullary tibial cutting guide set for removal of an appropriate amount of proximal tibia. The tibial cut was performed. The posterior horns of the menisci were excised. The posterior osteophytes were removed. Flexion extension blocks were then used to balance the knee. The tibial cut surface was then sized with the sizing templates and the tibial and femoral trial were then placed. The knee was placed in full  extension and then the patella was re-addressed. The patella was resurfaced and the preoperative thickness was reproduced. The patella tracked centrally through range of motion.     At this point all trial components were removed, the knee was copiously irrigated with pulsed lavage.. The cut surfaces were then dried with clean lap sponges, and the components were cemented tibia, followed by femur, then patella. The knee was held in full extension and all excess cement was removed. The knee was held still until the cement had completely hardened. We then placed the trial polyethylene spacer which resulted in full extension and excellent flexion-extension balance. We placed the final polyethylene spacer.    The knee was then copiously irrigated. The tourniquet was then released. There was excellent hemostasis. We placed a 10 Polish Hemovac drain. We closed the knee in multiple layers in standard fashion. Sterile dressing were applied. At the end of the case, the sponge and needle counts were reported as being correct. There were no known complications. The patient was then transported to the recovery room in satisfactory condition..      Yury Marion MD     Date: 10/16/2017  Time: 10:53 AM     Electronically signed by Yury Marion MD at 10/16/2017 10:54 AM           Physician Progress Notes (last 24 hours) (Notes from 10/17/2017  2:42 PM through 10/18/2017  2:42 PM)      Yury Marion MD at 10/18/2017 12:17 PM  Version 1 of 1              LOS: 2 days   Patient Care Team:  Maria Antonia Hood DO as PCP - General    Chief Complaint:  S/p rt tka.      Subjective  no complaints.      Objective wound clean , dry h/h stable. ambulating well.      Vital Signs  Temp:  [97.6 °F (36.4 °C)-98.3 °F (36.8 °C)] 98.1 °F (36.7 °C)  Heart Rate:  [72-93] 93  Resp:  [18] 18  BP: (130-144)/(64-75) 135/75      Assessment/Plan s/p rt tka. Continue with pathway.      Principal Problem:    Primary osteoarthritis of right knee        Yury  CAMERON Marion MD  10/18/17  12:17 PM           Electronically signed by Yury Marion MD at 10/18/2017 12:19 PM        Consult Notes (last 24 hours) (Notes from 10/17/2017  2:42 PM through 10/18/2017  2:42 PM)     No notes of this type exist for this encounter.

## 2017-10-19 ENCOUNTER — ANTICOAGULATION VISIT (OUTPATIENT)
Dept: PHARMACY | Facility: HOSPITAL | Age: 57
End: 2017-10-19

## 2017-10-19 VITALS
HEART RATE: 90 BPM | TEMPERATURE: 96 F | OXYGEN SATURATION: 94 % | DIASTOLIC BLOOD PRESSURE: 70 MMHG | HEIGHT: 64 IN | RESPIRATION RATE: 18 BRPM | WEIGHT: 278 LBS | BODY MASS INDEX: 47.46 KG/M2 | SYSTOLIC BLOOD PRESSURE: 146 MMHG

## 2017-10-19 DIAGNOSIS — Z79.01 LONG-TERM (CURRENT) USE OF ANTICOAGULANTS: ICD-10-CM

## 2017-10-19 LAB
HOLD SPECIMEN: NORMAL
INR PPP: 1.23 (ref 0.8–1.2)
PROTHROMBIN TIME: 15.5 SECONDS (ref 11.1–15.3)
WHOLE BLOOD HOLD SPECIMEN: NORMAL

## 2017-10-19 PROCEDURE — 90682 RIV4 VACC RECOMBINANT DNA IM: CPT | Performed by: ORTHOPAEDIC SURGERY

## 2017-10-19 PROCEDURE — 97116 GAIT TRAINING THERAPY: CPT

## 2017-10-19 PROCEDURE — G0008 ADMIN INFLUENZA VIRUS VAC: HCPCS | Performed by: ORTHOPAEDIC SURGERY

## 2017-10-19 PROCEDURE — 97530 THERAPEUTIC ACTIVITIES: CPT

## 2017-10-19 PROCEDURE — 97535 SELF CARE MNGMENT TRAINING: CPT

## 2017-10-19 PROCEDURE — 97110 THERAPEUTIC EXERCISES: CPT

## 2017-10-19 PROCEDURE — 85610 PROTHROMBIN TIME: CPT | Performed by: ORTHOPAEDIC SURGERY

## 2017-10-19 PROCEDURE — 94799 UNLISTED PULMONARY SVC/PX: CPT

## 2017-10-19 PROCEDURE — 25010000002 INFLUENZA VAC SPLIT QUAD SUSPENSION: Performed by: ORTHOPAEDIC SURGERY

## 2017-10-19 PROCEDURE — 97755 ASSISTIVE TECHNOLOGY ASSESS: CPT

## 2017-10-19 RX ORDER — WARFARIN SODIUM 6 MG/1
6 TABLET ORAL
Qty: 25 TABLET | Refills: 0 | Status: SHIPPED | OUTPATIENT
Start: 2017-10-19 | End: 2017-11-13

## 2017-10-19 RX ORDER — OXYCODONE AND ACETAMINOPHEN 7.5; 325 MG/1; MG/1
1 TABLET ORAL EVERY 6 HOURS PRN
Qty: 45 TABLET | Refills: 0 | Status: SHIPPED | OUTPATIENT
Start: 2017-10-19 | End: 2017-10-26

## 2017-10-19 RX ORDER — WARFARIN SODIUM 3 MG/1
6 TABLET ORAL
Status: DISCONTINUED | OUTPATIENT
Start: 2017-10-19 | End: 2017-10-19 | Stop reason: HOSPADM

## 2017-10-19 RX ADMIN — OXYCODONE HYDROCHLORIDE AND ACETAMINOPHEN 2 TABLET: 7.5; 325 TABLET ORAL at 09:09

## 2017-10-19 RX ADMIN — INFLUENZA A VIRUS A/MICHIGAN/45/2015 X-275 (H1N1) ANTIGEN (FORMALDEHYDE INACTIVATED), INFLUENZA A VIRUS A/HONG KONG/4801/2014 X-263B (H3N2) ANTIGEN (FORMALDEHYDE INACTIVATED), INFLUENZA B VIRUS B/PHUKET/3073/2013 ANTIGEN (FORMALDEHYDE INACTIVATED), AND INFLUENZA B VIRUS B/BRISBANE/60/2008 ANTIGEN (FORMALDEHYDE INACTIVATED) 0.5 ML: 15; 15; 15; 15 INJECTION, SUSPENSION INTRAMUSCULAR at 14:35

## 2017-10-19 NOTE — PLAN OF CARE
Problem: Patient Care Overview (Adult)  Goal: Plan of Care Review  Outcome: Ongoing (interventions implemented as appropriate)    10/19/17 0430   Coping/Psychosocial Response Interventions   Plan Of Care Reviewed With patient       Goal: Adult Individualization and Mutuality  Outcome: Ongoing (interventions implemented as appropriate)  Goal: Discharge Needs Assessment  Outcome: Ongoing (interventions implemented as appropriate)    10/19/17 0430   Discharge Needs Assessment   Concerns To Be Addressed no discharge needs identified   Readmission Within The Last 30 Days no previous admission in last 30 days         Problem: Knee Replacement, Total (Adult)  Goal: Signs and Symptoms of Listed Potential Problems Will be Absent or Manageable (Knee Replacement, Total)  Outcome: Ongoing (interventions implemented as appropriate)    10/19/17 0430   Knee Replacement, Total   Problems Assessed (Total Knee Replacement) all   Problems Present (Total Knee Replacement) none

## 2017-10-19 NOTE — PLAN OF CARE
Problem: Inpatient Occupational Therapy  Goal: Transfer Training Goal 1 LTG- OT  Outcome: Outcome(s) achieved Date Met:  10/19/17    10/17/17 1130 10/19/17 0912   Transfer Training OT LTG   Transfer Training OT LTG, Date Established 10/17/17 --    Transfer Training OT LTG, Time to Achieve by discharge --    Transfer Training OT LTG, Activity Type all transfers --    Transfer Training OT LTG, [place] Level supervision required;conditional independence --    Transfer Training OT LTG, Date Goal Reviewed --  10/19/17   Transfer Training OT LTG, Outcome --  goal met       Goal: Static Standing Balance Goal LTG- OT  Outcome: Outcome(s) achieved Date Met:  10/19/17    10/17/17 1130 10/19/17 0912   Static Standing Balance OT LTG   Static Standing Balance OT LTG, Date Established 10/17/17 --    Static Standing Balance OT LTG, Time to Achieve by discharge --    Static Standing Balance OT LTG, [place] Level conditional independence;supervision required  (5 minutes with functional activity.) --    Static Standing Balance OT LTG, Assist Device assistive device  (R/W.) --    Static Standing Balance OT LTG, Date Goal Reviewed --  10/19/17   Static Standing Balance OT LTG, Outcome --  goal met       Goal: Patient Education Goal STG- OT  Outcome: Outcome(s) achieved Date Met:  10/19/17    10/17/17 1130 10/18/17 0945 10/19/17 0912   Patient Education OT STG   Patient Education OT STG, Date Established 10/17/17 --  --    Patient Education OT STG, Time to Achieve 4 days --  --    Patient Education OT STG, Education Type energy conservation;home safety;adaptive equipment mgmt;joint protection;positioning;posture/body mechanics --  --    Patient Education OT STG, Education Understanding demonstrates adequately;verbalizes understanding --  --    Patient Education OT STG, Date Goal Reviewed --  10/18/17 --    Patient Education OT STG Outcome --  --  goal met       Goal: ADL Goal LTG- OT  Outcome: Ongoing (interventions  implemented as appropriate)    10/17/17 1130 10/18/17 0945 10/19/17 0912   ADL OT LTG   ADL OT LTG, Date Established 10/17/17 --  --    ADL OT LTG, Time to Achieve by discharge --  --    ADL OT LTG, Activity Type ADL skills  (Sponge bath and dress or walk-in shower.) --  --    ADL OT LTG, [place] Level modified independent;standby assist;assistive device  (R/W.) --  --    ADL OT LTG, Date Goal Reviewed --  --  10/19/17   ADL OT LTG, Outcome --  goal ongoing --

## 2017-10-19 NOTE — THERAPY TREATMENT NOTE
Acute Care - Occupational Therapy Treatment Note  [facility]     Patient Name: Tiffani Serra  : 1960  MRN: [id number]  Today's Date: 10/19/2017  Onset of Illness/Injury or Date of Surgery Date: 10/16/17  Date of Referral to OT: 10/16/17  Referring Physician: Sanam      Admit Date: 10/16/2017    Visit Dx:     ICD-10-CM ICD-9-CM   1. Primary osteoarthritis of right knee M17.11 715.16   2. Impaired physical mobility Z74.09 781.99   3. Impaired mobility and activities of daily living Z74.09 799.89     Patient Active Problem List   Diagnosis   • Osteoarthritis of right knee   • Acute foot pain   • Knee pain, acute   • Eversion deformity of left foot   • Plantar fasciitis of left foot   • Pes planus of left foot   • Primary osteoarthritis of right knee             Adult Rehabilitation Note       10/19/17 1035 10/19/17 0912 10/18/17 1400    Rehab Assessment/Intervention    Discipline physical therapy assistant  -AM occupational therapy assistant  - physical therapy assistant  -AM    Document Type therapy note (daily note)  -AM therapy note (daily note)  - therapy note (daily note)  -AM    Subjective Information agree to therapy;complains of;pain  -AM agree to therapy  - agree to therapy;complains of;pain  -AM    Patient Effort, Rehab Treatment good  -AM good  -JH good  -AM    Symptoms Noted During/After Treatment none  -AM  increased pain  -AM    Precautions/Limitations fall precautions;other (see comments)   WBAT R LE  -AM  fall precautions;other (see comments)   WBAT RLE  -AM    Equipment Issued to Patient gait belt  -AM  gait belt  -AM    Recorded by [AM] Wayne Olmos PTA [] DENEEN Ayers/ZOEY [AM] Wayne Olmos PTA    Vital Signs    Pre Systolic BP Rehab 128  -AM  127  -AM    Pre Treatment Diastolic BP 68  -AM  62  -AM    Post Systolic BP Rehab 146  -AM  145  -AM    Post Treatment Diastolic BP 70  -AM  67  -AM    Pretreatment Heart Rate (beats/min) 95  -AM  96  -AM    Posttreatment  Heart Rate (beats/min) 90  -AM  91  -AM    Pre SpO2 (%) 97  -AM  97  -AM    O2 Delivery Pre Treatment room air  -AM  room air  -AM    Post SpO2 (%) 94  -AM  96  -AM    O2 Delivery Post Treatment room air  -AM  room air  -AM    Pre Patient Position Sitting  -AM  Sitting  -AM    Intra Patient Position Standing  -AM  Standing  -AM    Post Patient Position Supine  -AM  Supine  -AM    Recorded by [AM] Wayne Olmos PTA  [AM] Wayne Olmos PTA    Pain Assessment    Pain Assessment 0-10  -AM 0-10  -JH 0-10  -AM    Pain Score 2  -AM 6  -JH 4  -AM    Post Pain Score 2  -AM 8  -JH 7  -AM    Pain Type Acute pain;Surgical pain  -AM Acute pain;Surgical pain  -JH Acute pain;Surgical pain  -AM    Pain Location Knee  -AM Knee  -JH Knee  -AM    Pain Orientation Right  -AM Right  -JH Right  -AM    Pain Descriptors Sore  -AM  Sore  -AM    Pain Frequency Constant/continuous  -AM Constant/continuous  -JH Constant/continuous  -AM    Date Pain First Started 10/16/17  -AM  10/16/17  -AM    Clinical Progression Gradually improving  -AM  Gradually improving  -AM    Patient's Stated Pain Goal No pain  -AM  No pain  -AM    Pain Intervention(s) Medication (See MAR);Cold applied;Ambulation/increased activity  -AM Medication (See MAR);Repositioned  - Medication (See MAR);Cold applied;Ambulation/increased activity  -AM    Result of Injury No  -AM  No  -AM    Work-Related Injury No  -AM  No  -AM    Multiple Pain Sites No  -AM  No  -AM    Recorded by [AM] Wayne Olmos PTA [JH] TATIANA Ayres [AM] Wayne Olmos PTA    Cognitive Assessment/Intervention    Current Cognitive/Communication Assessment functional  -AM functional  -JH functional  -AM    Orientation Status oriented x 4  -AM oriented x 4  -JH oriented x 4  -AM    Follows Commands/Answers Questions 100% of the time  -% of the time  -% of the time  -AM    Personal Safety Interventions gait belt;nonskid shoes/slippers when out of bed;supervised activity  -AM   gait belt;nonskid shoes/slippers when out of bed;supervised activity  -AM    Recorded by [AM] Wayne Olmos PTA [JH] DENEEN Ayers/ZOEY [AM] Wayne Olmos PTA    ROM (Range of Motion)    General ROM lower extremity range of motion deficits identified  -AM  lower extremity range of motion deficits identified  -AM    Recorded by [AM] Wayne Olmos PTA  [AM] Wayne Olmos PTA    General LE Assessment    ROM knee, right: LE ROM deficit  -AM  knee, right: LE ROM deficit  -AM    Recorded by [AM] Wayne Olmos PTA  [AM] Wayne Olmos PTA    Right Knee    Extension/Flexion AROM within functional limits   4-80  -AM  within functional limits   6-80  -AM    Recorded by [AM] Wayne Olmos PTA  [AM] Wayne Olmos PTA    Mobility Assessment/Training    Extremity Weight-Bearing Status right lower extremity  -AM  right lower extremity  -AM    Right Lower Extremity Weight-Bearing weight-bearing as tolerated  -AM  weight-bearing as tolerated  -AM    Recorded by [AM] Wayne Olmos PTA  [AM] Wayne Olmos PTA    Bed Mobility, Assessment/Treatment    Bed Mobility, Roll Left, [place] not tested  -AM  not tested  -AM    Bed Mobility, Roll Right, [place] not tested  -AM  not tested  -AM    Bed Mobility, Scoot/Bridge, [place] not tested  -AM  not tested  -AM    Bed Mob, Supine to Sit, [place] independent  -AM  not tested  -AM    Bed Mob, Sit to Supine, [place] independent  -AM  contact guard assist  -AM    Bed Mob, Sidelying to Sit, [place] not tested  -AM  not tested  -AM    Bed Mob, Sit to Sidelying, [place] not tested  -AM  not tested  -AM    Bed Mobility, Safety Issues --  -AM  decreased use of arms for pushing/pulling;decreased use of legs for bridging/pushing  -AM    Bed Mobility, Impairments --  -AM  ROM decreased;strength decreased;pain  -AM    Recorded by [AM] Wayne Olmos PTA  [AM] Wayne Olmos PTA    Transfer Assessment/Treatment    Transfers, Bed-Chair  [place] conditional independence  -AM  stand by assist  -AM    Transfers, Chair-Bed [place] conditional independence  -AM  stand by assist  -AM    Transfers, Bed-Chair-Bed, Assist Device rolling walker  -AM  rolling walker  -AM    Transfers, Sit-Stand [place] conditional independence  -AM supervision required  -JH stand by assist  -AM    Transfers, Stand-Sit [place] conditional independence  -AM supervision required  -JH stand by assist  -AM    Transfers, Sit-Stand-Sit, Assist Device rolling walker  -AM  rolling walker  -AM    Toilet Transfer, [place] conditional independence  -AM supervision required  -JH supervision required  -AM    Toilet Transfer, Assistive Device bedside commode without drop arms;rolling walker  -AM rolling walker  -JH bedside commode without drop arms;rolling walker  -AM    Walk-In Shower Transfer, [place] not tested  -AM  not tested  -AM    Bathtub Transfer, [place] not tested  -AM  not tested  -AM    Transfer, Maintain Weight Bearing Status able to maintain weight bearing status  -AM  able to maintain weight bearing status  -AM    Transfer, Safety Issues step length decreased  -AM  step length decreased  -AM    Transfer, Impairments ROM decreased;strength decreased;pain  -AM  ROM decreased;strength decreased;pain  -AM    Recorded by [AM] Wayne Olmos PTA [JH] DENEEN Ayers/ZOEY [AM] Wayne Olmos PTA    Gait Assessment/Treatment    Gait, [place] Level conditional independence  -AM  stand by assist  -AM    Gait, Assistive Device rolling walker  -AM  rolling walker  -AM    Gait, Distance (Feet) 150  -AM  150   75+75  -AM    Gait, Gait Pattern Analysis 3-point gait  -AM  3-point gait  -AM    Gait, Gait Deviations bilateral:;nabeel decreased  -AM  bilateral:;nabeel decreased  -AM    Gait, Maintain Weight Bearing Status able to maintain weight bearing status  -AM  able to maintain weight bearing status  -AM    Gait, Safety Issues step length  decreased  -AM  step length decreased  -AM    Gait, Impairments ROM decreased;strength decreased;pain  -AM  ROM decreased;strength decreased;pain  -AM    Recorded by [AM] Wayne Olmos PTA  [AM] Wayne Olmos PTA    Stairs Assessment/Treatment    Number of Stairs   1 step  -AM    Stairs, Handrail Location --  -AM  none  -AM    Stairs, [place] Level not tested  -AM  contact guard assist  -AM    Stairs, Assistive Device --  -AM  walker  -AM    Stairs, Technique Used --  -AM  step to step (ascending);step to step (descending)  -AM    Stairs, Maintain Weight Bearing Status   able to maintain weight bearing status  -AM    Stairs, Impairments --  -AM  ROM decreased;strength decreased;pain  -AM    Recorded by [AM] Wayne Olmos PTA  [AM] Wayne Olmos PTA    Upper Body Bathing Assessment/Training    UB Bathing Assess/Train, Comment  Pt reports she completed adl already  -     Recorded by  [JH] STACIE AyersA/L     Toileting Assessment/Training    Toileting Assess/Train, Position  sitting  -     Toileting Assess/Train, Indepen Level  conditional independence  -JH     Recorded by  [JH] STACIE AyersA/L     Grooming Assessment/Training    Grooming Assess/Train, Indepen Level  conditional independence  -JH     Recorded by  [JH] DENEEN Ayers/L     Therapy Exercises    Bilateral Lower Extremities AROM:;20 reps;supine;sitting;ankle pumps/circles;glut sets;heel slides;quad sets  -AM  AROM:;20 reps;supine;sitting;ankle pumps/circles;glut sets;heel slides;quad sets  -AM    Recorded by [AM] Wayne Olmos PTA  [AM] Wayne Olmos PTA    Positioning and Restraints    Pre-Treatment Position sitting in chair/recliner  -AM sitting in chair/recliner  - sitting in chair/recliner  -AM    Post Treatment Position bed  -AM chair  - bed  -AM    In Bed supine;call light within reach;encouraged to call for assist;exit alarm on  -AM  supine;call light within reach;encouraged to call for assist;exit  alarm on  -AM    In Chair  reclined;call light within reach;encouraged to call for assist  -     Recorded by [AM] Wayne Olmos PTA [] TATIANA Ayers [AM] Wayne Olmos PTA      10/18/17 1045 10/18/17 0945 10/17/17 1522    Rehab Assessment/Intervention    Discipline physical therapy assistant  -AM occupational therapy assistant  - physical therapy assistant  -    Document Type therapy note (daily note)  -AM therapy note (daily note)  - therapy note (daily note)  -    Subjective Information agree to therapy;complains of;pain  -AM agree to therapy  - agree to therapy  -    Patient Effort, Rehab Treatment good  -AM good  - good  -    Symptoms Noted During/After Treatment increased pain  -AM      Precautions/Limitations fall precautions;other (see comments)   WBAT R LE  -AM fall precautions  - fall precautions  -    Equipment Issued to Patient gait belt  -AM      Recorded by [AM] Wayne Olmos PTA [] DENEEN Ayers/ZOEY [] Kadi Paredes PTA    Vital Signs    Pre Systolic BP Rehab 135  -AM      Pre Treatment Diastolic BP 75  -AM      Post Systolic BP Rehab 135  -AM      Post Treatment Diastolic BP 75  -AM      Pretreatment Heart Rate (beats/min) 93  -AM      Posttreatment Heart Rate (beats/min) 100  -AM      Pre SpO2 (%) 96  -AM      O2 Delivery Pre Treatment room air  -AM      Post SpO2 (%) 96  -AM      O2 Delivery Post Treatment room air  -AM      Pre Patient Position Sitting  -AM  Sitting  -JW    Intra Patient Position Standing  -AM      Post Patient Position Sitting  -AM  Sitting  -JW    Recorded by [AM] Wayne Olmos PTA  [] Kadi Paredes PTA    Pain Assessment    Pain Assessment 0-10  -AM No/denies pain  - 0-10  -JW    Pain Score 5  -AM  5  -JW    Post Pain Score 9  -AM  5  -JW    Pain Type Acute pain;Surgical pain  -AM  Surgical pain;Acute pain  -    Pain Location Knee  -AM  Knee  -JW    Pain Orientation Right  -AM  Right  -JW    Pain  Descriptors Sore  -AM      Pain Frequency Constant/continuous  -AM      Date Pain First Started 10/16/17  -AM      Clinical Progression Gradually improving  -AM      Patient's Stated Pain Goal No pain  -AM      Pain Intervention(s) Medication (See MAR);Cold applied;Ambulation/increased activity  -AM  Medication (See MAR)  -JW    Result of Injury No  -AM      Work-Related Injury No  -AM      Multiple Pain Sites No  -AM      Recorded by [AM] Wayne Olmos PTA [] DENEEN Ayers/ZOEY [] Kadi Paredes [facility]    Cognitive Assessment/Intervention    Current Cognitive/Communication Assessment functional  -AM functional  -JH functional  -JW    Orientation Status oriented x 4  -AM oriented x 4  -JH oriented x 4  -JW    Follows Commands/Answers Questions 100% of the time  -% of the time  -% of the time  -JW    Personal Safety Interventions gait belt;nonskid shoes/slippers when out of bed;supervised activity  -AM  gait belt;nonskid shoes/slippers when out of bed  -JW    Recorded by [AM] Wayne Olmos PTA [] DENEEN Ayers/ZOEY [] Kadi Paredes, PTA    ROM (Range of Motion)    General ROM lower extremity range of motion deficits identified  -AM      Recorded by [AM] Wayne Olmos PTA      General LE Assessment    ROM knee, right: LE ROM deficit  -AM      Recorded by [AM] Wayne Olmos PTA      Right Knee    Extension/Flexion AROM within functional limits   6-80  -AM      Recorded by [AM] Wayne Olmos PTA      Mobility Assessment/Training    Extremity Weight-Bearing Status right lower extremity  -AM      Right Lower Extremity Weight-Bearing weight-bearing as tolerated  -AM      Recorded by [AM] Wayne Olmos PTA      Bed Mobility, Assessment/Treatment    Bed Mobility, Roll Left, [place] not tested  -AM      Bed Mobility, Roll Right, [place] not tested  -AM      Bed Mobility, Scoot/Bridge, [place] not tested  -AM      Bed Mob, Supine to Sit, [place] not  tested  -AM      Bed Mob, Sit to Supine, [place] not tested  -AM      Bed Mob, Sidelying to Sit, [place] not tested  -AM      Bed Mob, Sit to Sidelying, [place] not tested  -AM      Recorded by [AM] Wayne Olmos PTA      Transfer Assessment/Treatment    Transfers, Bed-Chair [place] contact guard assist  -AM      Transfers, Chair-Bed [place] contact guard assist  -AM      Transfers, Bed-Chair-Bed, Assist Device rolling walker  -AM      Transfers, Sit-Stand [place] contact guard assist  -AM      Transfers, Stand-Sit [place] contact guard assist  -AM      Transfers, Sit-Stand-Sit, Assist Device rolling walker  -AM      Toilet Transfer, [place] contact guard assist  -AM      Toilet Transfer, Assistive Device bedside commode without drop arms;rolling walker  -AM      Walk-In Shower Transfer, [place] not tested  -AM      Bathtub Transfer, [place] not tested  -AM      Transfer, Maintain Weight Bearing Status able to maintain weight bearing status  -AM      Transfer, Safety Issues step length decreased  -AM      Transfer, Impairments ROM decreased;strength decreased;pain  -AM      Transfer, Comment   pt defers as she wanted to stay up in recliner longer  -JW    Recorded by [AM] Wayne Olmos PTA  [JW] Kadi Paredes PTA    Gait Assessment/Treatment    Gait, [place] Level contact guard assist  -AM      Gait, Assistive Device rolling walker  -AM      Gait, Distance (Feet) 30   15+15  -AM      Gait, Gait Pattern Analysis 3-point gait  -AM      Gait, Gait Deviations bilateral:;nabeel decreased  -AM      Gait, Maintain Weight Bearing Status able to maintain weight bearing status  -AM      Gait, Safety Issues step length decreased  -AM      Gait, Impairments ROM decreased;strength decreased;pain  -AM      Recorded by [AM] Wayne Olmos PTA      Stairs Assessment/Treatment    Stairs, [place] Level not tested  -AM      Recorded by [AM] Wayne Olmos,  PTA      Lower Body Dressing Assessment/Training    LB Dressing Assess/Train, Clothing Type  socks  -     LB Dressing Assess/Train, Assist Device  reacher;sock-aid  -     LB Dressing Assess/Train, Position  sitting  -     Recorded by  [] TATIANA Ayers     Grooming Assessment/Training    Grooming Assess/Train, Position  sitting  -     Grooming Assess/Train, [place]ep[place] Level  set up required;supervision required  -     Recorded by  [] TATIANA Ayers     Therapy Exercises    Right Lower Extremity   AAROM:;10 reps;heel slides;hip abduction/adduction   2 sets  -    Left Lower Extremity   AROM:;10 reps;hip abduction/adduction;heel slides   2 sets  -JW    Bilateral Lower Extremities AROM:;20 reps;supine;sitting;ankle pumps/circles;glut sets;heel slides;quad sets  -  AROM:;20 reps;ankle pumps/circles;glut sets;quad sets  -JW    Bilateral Upper Extremity  AROM:;15 reps;supine;sitting;elbow flexion/extension;hand pumps;pronation/supination  -     BUE Resistance  manual resistance- minimal  -     Recorded by [AM] Wayne Olmos PTA [] DENEEN Ayers/ZOEY [JW] Kadi Paredes, KANDY    Modality Treatment    Modality Treatment Location 1   right knee  -    Modality Performed   cryotherapy  -    Recorded by   [JW] Kadi Paredes PTA    Positioning and Restraints    Pre-Treatment Position sitting in chair/recliner  -AM in bed  - sitting in chair/recliner  -JW    Post Treatment Position chair  -AM bed  - chair  -JW    In Bed  sitting;call light within reach;encouraged to call for assist  -     In Chair reclined;call light within reach;encouraged to call for assist;legs elevated  -AM  reclined;call light within reach;encouraged to call for assist  -    Recorded by [AM] Wayne Olmos PTA [] DENEEN Ayers/ZOEY [JW] Kadi Paredes, PTA      10/17/17 1050          Rehab Assessment/Intervention    Discipline physical therapy assistant  -AM      Document Type  therapy note (daily note)  -AM      Subjective Information agree to therapy;complains of;pain  -AM      Patient Effort, Rehab Treatment good  -AM      Symptoms Noted During/After Treatment none  -AM      Precautions/Limitations fall precautions;other (see comments)   WBAT R LE  -AM      Equipment Issued to Patient gait belt  -AM      Recorded by [AM] Wayne Olmos PTA      Vital Signs    Pre Systolic BP Rehab 119  -AM      Pre Treatment Diastolic BP 61  -AM      Post Systolic BP Rehab 121  -AM      Post Treatment Diastolic BP 73  -AM      Pretreatment Heart Rate (beats/min) 79  -AM      Posttreatment Heart Rate (beats/min) 74  -AM      Pre SpO2 (%) 94  -AM      O2 Delivery Pre Treatment room air  -AM      Post SpO2 (%) 96  -AM      O2 Delivery Post Treatment room air  -AM      Pre Patient Position Supine  -AM      Intra Patient Position Standing  -AM      Post Patient Position Sitting  -AM      Recorded by [AM] Wayne Olmos PTA      Pain Assessment    Pain Assessment 0-10  -AM      Pain Score 5  -AM      Post Pain Score 5  -AM      Pain Type Acute pain;Surgical pain  -AM      Pain Location Knee  -AM      Pain Orientation Right  -AM      Pain Descriptors Sore  -AM      Pain Frequency Constant/continuous  -AM      Date Pain First Started 10/16/17  -AM      Clinical Progression Gradually improving  -AM      Patient's Stated Pain Goal No pain  -AM      Pain Intervention(s) Medication (See MAR);Cold applied;Ambulation/increased activity  -AM      Result of Injury No  -AM      Work-Related Injury No  -AM      Multiple Pain Sites No  -AM      Recorded by [AM] Wayne Olmos PTA      Vision Assessment/Intervention    Visual Impairment --  -AM      Recorded by [AM] Wayne Olmos PTA      Cognitive Assessment/Intervention    Current Cognitive/Communication Assessment functional  -AM      Orientation Status oriented x 4  -AM      Follows Commands/Answers Questions 100% of the time  -AM      Personal Safety  Interventions gait belt;nonskid shoes/slippers when out of bed;supervised activity  -AM      Recorded by [AM] Wayne Olmos PTA      ROM (Range of Motion)    General ROM lower extremity range of motion deficits identified  -AM      Recorded by [AM] Wayne Olmos PTA      General LE Assessment    ROM knee, right: LE ROM deficit  -AM      Recorded by [AM] Wayne Olmos PTA      Right Knee    Extension/Flexion AROM within functional limits   12-96  -AM      Recorded by [AM] Wayne Olmos PTA      Mobility Assessment/Training    Extremity Weight-Bearing Status right lower extremity  -AM      Right Lower Extremity Weight-Bearing weight-bearing as tolerated  -AM      Recorded by [AM] Wayne Olmos PTA      Bed Mobility, Assessment/Treatment    Bed Mobility, Assistive Device bed rails;head of bed elevated  -AM      Bed Mobility, Roll Left, [place] not tested  -AM      Bed Mobility, Roll Right, [place] not tested  -AM      Bed Mobility, Scoot/Bridge, [place] not tested  -AM      Bed Mob, Supine to Sit, [place] conditional independence  -AM      Bed Mob, Sit to Supine, [place] not tested  -AM      Bed Mob, Sidelying to Sit, [place] not tested  -AM      Bed Mob, Sit to Sidelying, [place] not tested  -AM      Bed Mobility, Safety Issues decreased use of arms for pushing/pulling;decreased use of legs for bridging/pushing  -AM      Bed Mobility, Impairments ROM decreased;strength decreased;pain  -AM      Recorded by [AM] Wayne Olmos PTA      Transfer Assessment/Treatment    Transfers, Bed-Chair [place] maximum assist (25% patient effort);2 person assist required  -AM      Transfers, Chair-Bed [place] maximum assist (25% patient effort);2 person assist required  -AM      Transfers, Bed-Chair-Bed, Assist Device rolling walker  -AM      Transfers, Sit-Stand [place] maximum assist (25% patient effort);2 person assist required  -AM      Transfers, Stand-Sit  [place] maximum assist (25% patient effort);2 person assist required  -AM      Transfers, Sit-Stand-Sit, Assist Device rolling walker  -AM      Toilet Transfer, [place] not tested  -AM      Walk-In Shower Transfer, [place] not tested  -AM      Bathtub Transfer, [place] not tested  -AM      Transfer, Maintain Weight Bearing Status able to maintain weight bearing status  -AM      Transfer, Safety Issues step length decreased  -AM      Transfer, Impairments ROM decreased;strength decreased;pain  -AM      Recorded by [AM] Wayne Olmos PTA      Gait Assessment/Treatment    Gait, [place] Level not tested  -AM      Recorded by [AM] Wayne Olmos PTA      Stairs Assessment/Treatment    Stairs, [place] Level not tested  -AM      Recorded by [AM] Wayne Olmos PTA      Therapy Exercises    Bilateral Lower Extremities AROM:;20 reps;supine;sitting;ankle pumps/circles;glut sets;heel slides;quad sets;SLR  -AM      Recorded by [AM] Wayne Olmos PTA      Sensory Assessment/Intervention    Light Touch --  -AM      LLE Light Touch --  -AM      RLE Light Touch --  -AM      Recorded by [AM] Wayne Olmos PTA      Positioning and Restraints    Pre-Treatment Position in bed  -AM      Post Treatment Position chair  -AM      In Chair reclined;call light within reach;encouraged to call for assist;exit alarm on;legs elevated  -AM      Recorded by [AM] Wanye Olmos PTA        User Key  (r) = Recorded By, (t) = Taken By, (c) = Cosigned By    Initials Name Effective Dates    SWEETIE Paredes, PTA 08/11/15 -     AM Wayne Olmos, PTA 10/17/16 -     DENEEN Vivas/ZOEY 10/17/16 -                 OT Goals       10/19/17 0912 10/18/17 0945 10/17/17 1130    Transfer Training OT LTG    Transfer Training OT LTG, Date Established   10/17/17  -RB    Transfer Training OT LTG, Time to Achieve   by discharge  -RB    Transfer Training OT LTG, Activity Type   all transfers  -RB    Transfer  Training OT LTG, [place] Level   supervision required;conditional independence  -    Transfer Training OT LTG, Date Goal Reviewed 10/19/17  - 10/18/17  -     Transfer Training OT LTG, Outcome goal met  -[facility] ongoing  -     Static Standing Balance OT LTG    Static Standing Balance OT LTG, Date Established   10/17/17  -    Static Standing Balance OT LTG, Time to Achieve   by discharge  -    Static Standing Balance OT LTG, [place] Level   conditional independence;supervision required   5 minutes with functional activity.  -    Static Standing Balance OT LTG, Assist Device   assistive device   R/W.  -RB    Static Standing Balance OT LTG, Date Goal Reviewed 10/19/17  - 10/18/17  -     Static Standing Balance OT LTG, Outcome goal met  -[facility] ongoing  -     Patient Education OT STG    Patient Education OT STG, Date Established   10/17/17  -    Patient Education OT STG, Time to Achieve   4 days  -    Patient Education OT STG, Education Type   energy conservation;home safety;adaptive equipment mgmt;joint protection;positioning;posture/body mechanics  -    Patient Education OT STG, Education Understanding   demonstrates adequately;verbalizes understanding  -    Patient Education OT STG, Date Goal Reviewed  10/18/17  -     Patient Education OT STG Outcome goal met  -[facility] ongoing  -     ADL OT LTG    ADL OT LTG, Date Established   10/17/17  -    ADL OT LTG, Time to Achieve   by discharge  -    ADL OT LTG, Activity Type   ADL skills   Sponge bath and dress or walk-in shower.  -    ADL OT LTG, [place] Level   modified independent;standby assist;assistive device   R/W.  -    ADL OT LTG, Date Goal Reviewed 10/19/17  - 10/18/17  -     ADL OT LTG, Outcome  goal ongoing  -       User Key  (r) = Recorded By, (t) = Taken By, (c) = Cosigned By    Initials Name Provider Type    RB Flynn Kaplan, OT Occupational Therapist    DENEEN Vivas/L Occupational Therapy  Assistant          Occupational Therapy Education     Title: PT OT SLP Therapies (Active)     Topic: Occupational Therapy (Active)     Point: Precautions (Done)    Description: Instruct learner(s) on prescribed precautions during self-care and functional transfers.    Learning Progress Summary    Learner Readiness Method Response Comment Documented by Status   Patient Acceptance E VU Pt edu on use of gait belt and non skid socks when OOB. RB 10/17/17 1128 Done                      User Key     Initials Effective Dates Name Provider Type Discipline     06/15/16 -  Flynn Kaplan, OT Occupational Therapist OT                  OT Recommendation and Plan  Anticipated Discharge Disposition: home with home health, home with outpatient services  Planned Therapy Interventions: activity intolerance, ADL retraining, balance training, bed mobility training, strengthening, transfer training  Therapy Frequency:  (3-14x/wk)           Outcome Measures       10/19/17 1035 10/18/17 1400 10/18/17 1045    How much help from another person do you currently need...    Turning from your back to your side while in flat bed without using bedrails? 4  -AM 4  -AM 4  -AM    Moving from lying on back to sitting on the side of a flat bed without bedrails? 4  -AM 4  -AM 4  -AM    Moving to and from a bed to a chair (including a wheelchair)? 4  -AM 3  -AM 3  -AM    Standing up from a chair using your arms (e.g., wheelchair, bedside chair)? 4  -AM 3  -AM 3  -AM    Climbing 3-5 steps with a railing? 3  -AM 3  -AM 1  -AM    To walk in hospital room? 4  -AM 3  -AM 3  -AM    AM-PAC 6 Clicks Score 23  -AM 20  -AM 18  -AM    Functional Assessment    Outcome Measure Options AM-PAC 6 Clicks Basic Mobility (PT)  -AM AM-PAC 6 Clicks Basic Mobility (PT)  -AM AM-PAC 6 Clicks Basic Mobility (PT)  -AM      10/17/17 1050 10/17/17 0925 10/16/17 1303    How much help from another person do you currently need...    Turning from your back to your side while in flat  bed without using bedrails? 4  -AM  3  -CZ    Moving from lying on back to sitting on the side of a flat bed without bedrails? 4  -AM  3  -CZ    Moving to and from a bed to a chair (including a wheelchair)? 2  -AM  2  -CZ    Standing up from a chair using your arms (e.g., wheelchair, bedside chair)? 2  -AM  2  -CZ    Climbing 3-5 steps with a railing? 1  -AM  2  -CZ    To walk in hospital room? 1  -AM  2  -CZ    AM-PAC 6 Clicks Score 14  -AM  14  -CZ    How much help from another is currently needed...    Putting on and taking off regular lower body clothing?  2  -RB     Bathing (including washing, rinsing, and drying)  2  -RB     Toileting (which includes using toilet bed pan or urinal)  2  -RB     Putting on and taking off regular upper body clothing  3  -RB     Taking care of personal grooming (such as brushing teeth)  4  -RB     Eating meals  4  -RB     Score  17  -RB     Functional Assessment    Outcome Measure Options AM-PAC 6 Clicks Basic Mobility (PT)  -AM AM-PAC 6 Clicks Daily Activity (OT)  -RB AM-PAC 6 Clicks Basic Mobility (PT)  -      User Key  (r) = Recorded By, (t) = Taken By, (c) = Cosigned By    Initials Name Provider Type     Flynn Kaplan, OT Occupational Therapist    GUILLERMO Olmos, PTA Physical Therapy Assistant    HERBERT Blake, PT Physical Therapist           Time Calculation:         Time Calculation- OT       10/19/17 1122          Time Calculation- OT    OT Start Time 0912  -      OT Stop Time 1000  -      OT Time Calculation (min) 48 min  -      OT Received On 10/19/17  -        User Key  (r) = Recorded By, (t) = Taken By, (c) = Cosigned By    Initials Name Provider Type     DENEEN Ayers/L Occupational Therapy Assistant           Therapy Charges for Today     Code Description Service Date Service Provider Modifiers Qty    10597966295  OT SELF CARE/MGMT/TRAIN EA 15 MIN 10/18/2017 TATIANA Ayers GO 1    [id number]  OT ASSTV TECHNICAL ASSMT-15 MIN  10/18/2017 DENEEN Ayers/L  1    [id number] HC OT THER PROC EA 15 MIN 10/18/2017 DENEEN Ayers/L GO 1    [id number] HC OT THER SUPP EA 15 MIN 10/18/2017 DENEEN Ayers/L GO 1    [id number] HC OT ASSTV TECHNICAL ASSMT-15 MIN 10/19/2017 DENEEN Ayers/L  1    [id number] HC OT SELF CARE/MGMT/TRAIN EA 15 MIN 10/19/2017 DENEEN Ayers/L GO 1    [id number] HC OT THERAPEUTIC ACT EA 15 MIN 10/19/2017 DENEEN Ayers/L GO 1    [id number] HC OT THER SUPP EA 15 MIN 10/19/2017 DENEEN Ayers/L GO 1          OT G-codes  OT Professional Judgement Used?: Yes  OT Functional Scales Options: AM-PAC 6 Clicks Daily Activity (OT)  Score: 17  Functional Limitation: Self care  Self Care Current Status (): At least 40 percent but less than 60 percent impaired, limited or restricted  Self Care Goal Status (): At least 20 percent but less than 40 percent impaired, limited or restricted    DENEEN Ayers/ZOEY  10/19/2017

## 2017-10-19 NOTE — THERAPY DISCHARGE NOTE
Acute Care - Physical Therapy Treatment Note/Discharge  [facility]     Patient Name: Tiffani Serra  : 1960  MRN: [id number]  Today's Date: 10/19/2017  Onset of Illness/Injury or Date of Surgery Date: 10/16/17  Date of Referral to PT: 10/16/17  Referring Physician: Sanam    Admit Date: 10/16/2017    Visit Dx:    ICD-10-CM ICD-9-CM   1. Primary osteoarthritis of right knee M17.11 715.16   2. Impaired physical mobility Z74.09 781.99   3. Impaired mobility and activities of daily living Z74.09 799.89     Patient Active Problem List   Diagnosis   • Osteoarthritis of right knee   • Acute foot pain   • Knee pain, acute   • Eversion deformity of left foot   • Plantar fasciitis of left foot   • Pes planus of left foot   • Primary osteoarthritis of right knee       Physical Therapy Education     Title: PT OT SLP Therapies (Active)     Topic: Physical Therapy (Resolved)     Point: Mobility training (Resolved)    Learning Progress Summary    Learner Readiness Method Response Comment Documented by Status   Patient Acceptance E NR Educated patient in proper technique for bed mobility,transfers and standing.  10/16/17 1516 Active               Point: Home exercise program (Resolved)    Learning Progress Summary    Learner Readiness Method Response Comment Documented by Status   Patient Eager E,TB,D,H VU,DU HEP given AM 10/19/17 1035 Done                      User Key     Initials Effective Dates Name Provider Type Discipline    AM 10/17/16 -  Wayne Olmos, PTA Physical Therapy Assistant PT     17 -  Lukas Blake, PT Physical Therapist PT                    IP PT Goals       10/18/17 1400 10/17/17 1522 10/16/17 1303    Bed Mobility PT STG    Bed Mobility PT STG, Date Established   10/16/17  -CZ    Bed Mobility PT STG, Time to Achieve   2 days  -CZ    Bed Mobility PT STG, Activity Type   all bed mobility  -CZ    Bed Mobility PT STG, [place] Level   conditional independence  -CZ    Bed Mobility  PT STG, Additional Goal   HOB flat, no bed rails.   -CZ    Bed Mobility PT STG, Date Goal Reviewed  10/17/17  -JW 10/16/17  -CZ    Bed Mobility PT STG, Outcome  goal ongoing -JW goal ongoing  -CZ    Transfer Training PT STG    Transfer Training PT STG, Date Established   10/16/17  -CZ    Transfer Training PT STG, Time to Achieve   2 days  -CZ    Transfer Training PT STG, Activity Type   bed to chair /chair to bed;sit to stand/stand to sit  -CZ    Transfer Training PT STG, Assist Device   walker, rolling  -CZ    Transfer Training PT STG, Date Goal Reviewed 10/18/17  -AM 10/17/17  -JW 10/16/17  -CZ    Transfer Training PT STG, Outcome goal met  -AM goal ongoing -JW goal ongoing  -CZ    Gait Training PT LTG    Gait Training Goal PT LTG, Date Established   10/16/17  -CZ    Gait Training Goal PT LTG, Time to Achieve   by discharge  -CZ    Gait Training Goal PT LTG, [place] Level   conditional independence  -CZ    Gait Training Goal PT LTG, Assist Device   walker, rolling  -CZ    Gait Training Goal PT LTG, Distance to Achieve   150'x1.  -CZ    Gait Training Goal PT LTG, Date Goal Reviewed  10/17/17  -JW 10/16/17  -CZ    Gait Training Goal PT LTG, Outcome  goal ongoing -JW goal ongoing  -CZ    Stair Training PT LTG    Stair Training Goal PT LTG, Date Established   10/16/17  -CZ    Stair Training Goal PT LTG, Time to Achieve   by discharge  -CZ    Stair Training Goal PT LTG, Number of Steps   4  -CZ    Stair Training Goal PT LTG, [place] Level   conditional independence  -CZ    Stair Training Goal PT LTG, Assist Device   1 handrail  -CZ    Stair Training Goal PT LTG, Date Goal Reviewed  10/17/17  -JW 10/16/17  -CZ    Stair Training Goal PT LTG, Outcome  goal ongoing  -JW goal ongoing  -CZ      User Key  (r) = Recorded By, (t) = Taken By, (c) = Cosigned By    Initials Name Provider Type    SWEETIE Paredes, PTA Physical Therapy Assistant    AM Wayne Olmos, PTA Physical Therapy Assistant    HERBERT OROZCO  JONI Blake Physical Therapist              Adult Rehabilitation Note       10/19/17 1035 10/19/17 0912 10/18/17 1400    Rehab Assessment/Intervention    Discipline physical therapy assistant  -AM occupational therapy assistant  -JH physical therapy assistant  -AM    Document Type therapy note (daily note)  -AM therapy note (daily note)  - therapy note (daily note)  -AM    Subjective Information agree to therapy;complains of;pain  -AM agree to therapy  -JH agree to therapy;complains of;pain  -AM    Patient Effort, Rehab Treatment good  -AM good  -JH good  -AM    Symptoms Noted During/After Treatment none  -AM  increased pain  -AM    Precautions/Limitations fall precautions;other (see comments)   WBAT R LE  -AM  fall precautions;other (see comments)   WBAT RLE  -AM    Equipment Issued to Patient gait belt  -AM  gait belt  -AM    Recorded by [AM] Wayne Olmos PTA [JH] DENEEN Ayers/ZOEY [AM] Wayne Olmos PTA    Vital Signs    Pre Systolic BP Rehab 128  -AM  127  -AM    Pre Treatment Diastolic BP 68  -AM  62  -AM    Post Systolic BP Rehab 146  -AM  145  -AM    Post Treatment Diastolic BP 70  -AM  67  -AM    Pretreatment Heart Rate (beats/min) 95  -AM  96  -AM    Posttreatment Heart Rate (beats/min) 90  -AM  91  -AM    Pre SpO2 (%) 97  -AM  97  -AM    O2 Delivery Pre Treatment room air  -AM  room air  -AM    Post SpO2 (%) 94  -AM  96  -AM    O2 Delivery Post Treatment room air  -AM  room air  -AM    Pre Patient Position Sitting  -AM  Sitting  -AM    Intra Patient Position Standing  -AM  Standing  -AM    Post Patient Position Supine  -AM  Supine  -AM    Recorded by [AM] Wayne Olmos PTA  [AM] Wayne Olmos PTA    Pain Assessment    Pain Assessment 0-10  -AM 0-10  -JH 0-10  -AM    Pain Score 2  -AM 6  -JH 4  -AM    Post Pain Score 2  -AM 8  -JH 7  -AM    Pain Type Acute pain;Surgical pain  -AM Acute pain;Surgical pain  -JH Acute pain;Surgical pain  -AM    Pain Location Knee  -AM Knee  -JH Knee  -AM     Pain Orientation Right  -AM Right  -JH Right  -AM    Pain Descriptors Sore  -AM  Sore  -AM    Pain Frequency Constant/continuous  -AM Constant/continuous  -JH Constant/continuous  -AM    Date Pain First Started 10/16/17  -AM  10/16/17  -AM    Clinical Progression Gradually improving  -AM  Gradually improving  -AM    Patient's Stated Pain Goal No pain  -AM  No pain  -AM    Pain Intervention(s) Medication (See MAR);Cold applied;Ambulation/increased activity  -AM Medication (See MAR);Repositioned  - Medication (See MAR);Cold applied;Ambulation/increased activity  -AM    Result of Injury No  -AM  No  -AM    Work-Related Injury No  -AM  No  -AM    Multiple Pain Sites No  -AM  No  -AM    Recorded by [AM] Wayne Olmos PTA [] DENEEN Ayers/ZOEY [AM] Wayne Olmos PTA    Cognitive Assessment/Intervention    Current Cognitive/Communication Assessment functional  -AM functional  -JH functional  -AM    Orientation Status oriented x 4  -AM oriented x 4  -JH oriented x 4  -AM    Follows Commands/Answers Questions 100% of the time  -% of the time  -% of the time  -AM    Personal Safety Interventions gait belt;nonskid shoes/slippers when out of bed;supervised activity  -AM  gait belt;nonskid shoes/slippers when out of bed;supervised activity  -AM    Recorded by [AM] Wayne Olmos PTA [JH] DENEEN Ayers/ZOEY [AM] Wayne Olmos PTA    ROM (Range of Motion)    General ROM lower extremity range of motion deficits identified  -AM  lower extremity range of motion deficits identified  -AM    Recorded by [AM] Wayne Olmos PTA  [AM] Wayne Olmos PTA    General LE Assessment    ROM knee, right: LE ROM deficit  -AM  knee, right: LE ROM deficit  -AM    Recorded by [AM] Wayne Olmos PTA  [AM] Wayne Olmos PTA    Right Knee    Extension/Flexion AROM within functional limits   4-80  -AM  within functional limits   6-80  -AM    Recorded by [AM] Wayne Olmos PTA  [AM] Wayne Olmos PTA     Mobility Assessment/Training    Extremity Weight-Bearing Status right lower extremity  -AM  right lower extremity  -AM    Right Lower Extremity Weight-Bearing weight-bearing as tolerated  -AM  weight-bearing as tolerated  -AM    Recorded by [AM] Wayne Olmos PTA  [AM] Wayne Olmos PTA    Bed Mobility, Assessment/Treatment    Bed Mobility, Roll Left, [place] not tested  -AM  not tested  -AM    Bed Mobility, Roll Right, [place] not tested  -AM  not tested  -AM    Bed Mobility, Scoot/Bridge, [place] not tested  -AM  not tested  -AM    Bed Mob, Supine to Sit, [place] independent  -AM  not tested  -AM    Bed Mob, Sit to Supine, [place] independent  -AM  contact guard assist  -AM    Bed Mob, Sidelying to Sit, [place] not tested  -AM  not tested  -AM    Bed Mob, Sit to Sidelying, [place] not tested  -AM  not tested  -AM    Bed Mobility, Safety Issues --  -AM  decreased use of arms for pushing/pulling;decreased use of legs for bridging/pushing  -AM    Bed Mobility, Impairments --  -AM  ROM decreased;strength decreased;pain  -AM    Recorded by [AM] Wayne Olmos PTA  [AM] Wayne Olmos PTA    Transfer Assessment/Treatment    Transfers, Bed-Chair [place] conditional independence  -AM  stand by assist  -AM    Transfers, Chair-Bed [place] conditional independence  -AM  stand by assist  -AM    Transfers, Bed-Chair-Bed, Assist Device rolling walker  -AM  rolling walker  -AM    Transfers, Sit-Stand [place] conditional independence  -AM supervision required  -JH stand by assist  -AM    Transfers, Stand-Sit [place] conditional independence  -AM supervision required  -JH stand by assist  -AM    Transfers, Sit-Stand-Sit, Assist Device rolling walker  -AM  rolling walker  -AM    Toilet Transfer, [place] conditional independence  -AM supervision required  -JH supervision required  -AM    Toilet Transfer, Assistive Device bedside commode without drop  arms;rolling walker  -AM rolling walker  - bedside commode without drop arms;rolling walker  -AM    Walk-In Shower Transfer, [place] not tested  -AM  not tested  -AM    Bathtub Transfer, [place] not tested  -AM  not tested  -AM    Transfer, Maintain Weight Bearing Status able to maintain weight bearing status  -AM  able to maintain weight bearing status  -AM    Transfer, Safety Issues step length decreased  -AM  step length decreased  -AM    Transfer, Impairments ROM decreased;strength decreased;pain  -AM  ROM decreased;strength decreased;pain  -AM    Recorded by [AM] Wayne Olmos PTA [JH] DENEEN Ayers/ZOEY [AM] Wayne Olmos PTA    Gait Assessment/Treatment    Gait, [place] Level conditional independence  -AM  stand by assist  -AM    Gait, Assistive Device rolling walker  -AM  rolling walker  -AM    Gait, Distance (Feet) 150  -AM  150   75+75  -AM    Gait, Gait Pattern Analysis 3-point gait  -AM  3-point gait  -AM    Gait, Gait Deviations bilateral:;nabeel decreased  -AM  bilateral:;nabeel decreased  -AM    Gait, Maintain Weight Bearing Status able to maintain weight bearing status  -AM  able to maintain weight bearing status  -AM    Gait, Safety Issues step length decreased  -AM  step length decreased  -AM    Gait, Impairments ROM decreased;strength decreased;pain  -AM  ROM decreased;strength decreased;pain  -AM    Recorded by [AM] Wayne Olmos PTA  [AM] Wayne Olmos PTA    Stairs Assessment/Treatment    Number of Stairs   1 step  -AM    Stairs, Handrail Location --  -AM  none  -AM    Stairs, [place] Level not tested  -AM  contact guard assist  -AM    Stairs, Assistive Device --  -AM  walker  -AM    Stairs, Technique Used --  -AM  step to step (ascending);step to step (descending)  -AM    Stairs, Maintain Weight Bearing Status   able to maintain weight bearing status  -AM    Stairs, Impairments --  -AM  ROM decreased;strength decreased;pain  -AM    Recorded by [AM]  Wayne Olmos PTA  [AM] Wayne Olmos PTA    Therapy Exercises    Bilateral Lower Extremities AROM:;20 reps;supine;sitting;ankle pumps/circles;glut sets;heel slides;quad sets  -AM  AROM:;20 reps;supine;sitting;ankle pumps/circles;glut sets;heel slides;quad sets  -AM    Recorded by [AM] Wayne Olmos PTA  [AM] Wayne Olmos PTA    Positioning and Restraints    Pre-Treatment Position sitting in chair/recliner  -AM  sitting in chair/recliner  -AM    Post Treatment Position bed  -AM  bed  -AM    In Bed supine;call light within reach;encouraged to call for assist;exit alarm on  -AM  supine;call light within reach;encouraged to call for assist;exit alarm on  -AM    Recorded by [AM] Wayne Olmos PTA  [AM] Wayne Olmos PTA      10/18/17 1045 10/18/17 0945 10/17/17 1522    Rehab Assessment/Intervention    Discipline physical therapy assistant  - occupational therapy assistant  - physical therapy assistant  -    Document Type therapy note (daily note)  -AM therapy note (daily note)  - therapy note (daily note)  -    Subjective Information agree to therapy;complains of;pain  -AM agree to therapy  - agree to therapy  -    Patient Effort, Rehab Treatment good  -AM good  - good  -    Symptoms Noted During/After Treatment increased pain  -AM      Precautions/Limitations fall precautions;other (see comments)   WBAT R LE  -AM fall precautions  - fall precautions  -    Equipment Issued to Patient gait belt  -AM      Recorded by [AM] Wayne Olmos PTA [] DENEEN Ayers/ZOEY [] Kadi Paredes PTA    Vital Signs    Pre Systolic BP Rehab 135  -AM      Pre Treatment Diastolic BP 75  -AM      Post Systolic BP Rehab 135  -AM      Post Treatment Diastolic BP 75  -AM      Pretreatment Heart Rate (beats/min) 93  -AM      Posttreatment Heart Rate (beats/min) 100  -AM      Pre SpO2 (%) 96  -AM      O2 Delivery Pre Treatment room air  -AM      Post SpO2 (%) 96  -AM      O2 Delivery Post  Treatment room air  -AM      Pre Patient Position Sitting  -AM  Sitting  -JW    Intra Patient Position Standing  -AM      Post Patient Position Sitting  -AM  Sitting  -JW    Recorded by [AM] Wayne Olmos PTA  [] Kadi Paredes PTA    Pain Assessment    Pain Assessment 0-10  -AM No/denies pain  - 0-10  -JW    Pain Score 5  -AM  5  -JW    Post Pain Score 9  -AM  5  -JW    Pain Type Acute pain;Surgical pain  -AM  Surgical pain;Acute pain  -    Pain Location Knee  -AM  Knee  -JW    Pain Orientation Right  -AM  Right  -JW    Pain Descriptors Sore  -AM      Pain Frequency Constant/continuous  -AM      Date Pain First Started 10/16/17  -AM      Clinical Progression Gradually improving  -AM      Patient's Stated Pain Goal No pain  -AM      Pain Intervention(s) Medication (See MAR);Cold applied;Ambulation/increased activity  -AM  Medication (See MAR)  -    Result of Injury No  -AM      Work-Related Injury No  -AM      Multiple Pain Sites No  -AM      Recorded by [AM] Wayne Olmos PTA [] DENEEN Ayers/ZOEY [JW] Kadi Paredes PTA    Cognitive Assessment/Intervention    Current Cognitive/Communication Assessment functional  -AM functional  -JH functional  -JW    Orientation Status oriented x 4  -AM oriented x 4  -JH oriented x 4  -JW    Follows Commands/Answers Questions 100% of the time  -% of the time  -% of the time  -JW    Personal Safety Interventions gait belt;nonskid shoes/slippers when out of bed;supervised activity  -AM  gait belt;nonskid shoes/slippers when out of bed  -JW    Recorded by [AM] Wayne Olmos PTA [] DENEEN Ayers/ZOEY [JW] Kadi Paredes PTA    ROM (Range of Motion)    General ROM lower extremity range of motion deficits identified  -AM      Recorded by [AM] Wayne Olmos PTA      General LE Assessment    ROM knee, right: LE ROM deficit  -AM      Recorded by [AM] Wayne Olmos PTA      Right Knee    Extension/Flexion AROM within  functional limits   6-80  -AM      Recorded by [AM] Wayne Olmos PTA      Mobility Assessment/Training    Extremity Weight-Bearing Status right lower extremity  -AM      Right Lower Extremity Weight-Bearing weight-bearing as tolerated  -AM      Recorded by [AM] Wayne Olmos PTA      Bed Mobility, Assessment/Treatment    Bed Mobility, Roll Left, [place] not tested  -AM      Bed Mobility, Roll Right, [place] not tested  -AM      Bed Mobility, Scoot/Bridge, [place] not tested  -AM      Bed Mob, Supine to Sit, [place] not tested  -AM      Bed Mob, Sit to Supine, [place] not tested  -AM      Bed Mob, Sidelying to Sit, [place] not tested  -AM      Bed Mob, Sit to Sidelying, [place] not tested  -AM      Recorded by [AM] Wayne Olmos PTA      Transfer Assessment/Treatment    Transfers, Bed-Chair [place] contact guard assist  -AM      Transfers, Chair-Bed [place] contact guard assist  -AM      Transfers, Bed-Chair-Bed, Assist Device rolling walker  -AM      Transfers, Sit-Stand [place] contact guard assist  -AM      Transfers, Stand-Sit [place] contact guard assist  -AM      Transfers, Sit-Stand-Sit, Assist Device rolling walker  -AM      Toilet Transfer, [place] contact guard assist  -AM      Toilet Transfer, Assistive Device bedside commode without drop arms;rolling walker  -AM      Walk-In Shower Transfer, [place] not tested  -AM      Bathtub Transfer, [place] not tested  -AM      Transfer, Maintain Weight Bearing Status able to maintain weight bearing status  -AM      Transfer, Safety Issues step length decreased  -AM      Transfer, Impairments ROM decreased;strength decreased;pain  -AM      Transfer, Comment   pt defers as she wanted to stay up in recliner longer  -JW    Recorded by [AM] Wayne Olmos PTA  [JW] Kadi Paredes PTA    Gait Assessment/Treatment    Gait, [place] Level contact guard assist  -AM      Gait,  Assistive Device rolling walker  -AM      Gait, Distance (Feet) 30   15+15  -AM      Gait, Gait Pattern Analysis 3-point gait  -AM      Gait, Gait Deviations bilateral:;[place]abe[place] decreased  -AM      Gait, Maintain Weight Bearing Status able to maintain weight bearing status  -AM      Gait, Safety Issues step length decreased  -AM      Gait, Impairments ROM decreased;strength decreased;pain  -AM      Recorded by [AM] Wayne Olmos PTA      Stairs Assessment/Treatment    Stairs, [place] Level not tested  -AM      Recorded by [AM] Wayne Olmos PTA      Lower Body Dressing Assessment/Training    LB Dressing Assess/Train, Clothing Type  socks  -     LB Dressing Assess/Train, Assist Device  reacher;sock-aid  -     LB Dressing Assess/Train, Position  sitting  -     Recorded by  [] TATIANA Ayers     Grooming Assessment/Training    Grooming Assess/Train, Position  sitting  -     Grooming Assess/Train, [place]ep[place] Level  set up required;supervision required  -     Recorded by  [] TATIANA Ayers     Therapy Exercises    Right Lower Extremity   AAROM:;10 reps;heel slides;hip abduction/adduction   2 sets  -    Left Lower Extremity   AROM:;10 reps;hip abduction/adduction;heel slides   2 sets  -JW    Bilateral Lower Extremities AROM:;20 reps;supine;sitting;ankle pumps/circles;glut sets;heel slides;quad sets  -AM  AROM:;20 reps;ankle pumps/circles;glut sets;quad sets  -JW    Bilateral Upper Extremity  AROM:;15 reps;supine;sitting;elbow flexion/extension;hand pumps;pronation/supination  -     BUE Resistance  manual resistance- minimal  -     Recorded by [AM] Wayne Olmos PTA [] DENEEN Ayers/ZOEY [JW] Kadi Paredes PTA    Modality Treatment    Modality Treatment Location 1   right knee  -    Modality Performed   cryotherapy  -    Recorded by   [JW] Kadi Paredes PTA    Positioning and Restraints    Pre-Treatment Position sitting in chair/recliner  -AM in bed   -JH sitting in chair/recliner  -JW    Post Treatment Position chair  -AM bed  -JH chair  -JW    In Bed  sitting;call light within reach;encouraged to call for assist  -JH     In Chair reclined;call light within reach;encouraged to call for assist;legs elevated  -AM  reclined;call light within reach;encouraged to call for assist  -JW    Recorded by [AM] Wayne Olmos PTA [] DENEEN Ayers/ZOEY [] Kadi Paredes PTA      10/17/17 1050          Rehab Assessment/Intervention    Discipline physical therapy assistant  -AM      Document Type therapy note (daily note)  -AM      Subjective Information agree to therapy;complains of;pain  -AM      Patient Effort, Rehab Treatment good  -AM      Symptoms Noted During/After Treatment none  -AM      Precautions/Limitations fall precautions;other (see comments)   WBAT R LE  -AM      Equipment Issued to Patient gait belt  -AM      Recorded by [AM] Wayne Olmos PTA      Vital Signs    Pre Systolic BP Rehab 119  -AM      Pre Treatment Diastolic BP 61  -AM      Post Systolic BP Rehab 121  -AM      Post Treatment Diastolic BP 73  -AM      Pretreatment Heart Rate (beats/min) 79  -AM      Posttreatment Heart Rate (beats/min) 74  -AM      Pre SpO2 (%) 94  -AM      O2 Delivery Pre Treatment room air  -AM      Post SpO2 (%) 96  -AM      O2 Delivery Post Treatment room air  -AM      Pre Patient Position Supine  -AM      Intra Patient Position Standing  -AM      Post Patient Position Sitting  -AM      Recorded by [AM] Wayne Olmos PTA      Pain Assessment    Pain Assessment 0-10  -AM      Pain Score 5  -AM      Post Pain Score 5  -AM      Pain Type Acute pain;Surgical pain  -AM      Pain Location Knee  -AM      Pain Orientation Right  -AM      Pain Descriptors Sore  -AM      Pain Frequency Constant/continuous  -AM      Date Pain First Started 10/16/17  -AM      Clinical Progression Gradually improving  -AM      Patient's Stated Pain Goal No pain  -AM      Pain  Intervention(s) Medication (See MAR);Cold applied;Ambulation/increased activity  -AM      Result of Injury No  -AM      Work-Related Injury No  -AM      Multiple Pain Sites No  -AM      Recorded by [AM] Wayne Olmos PTA      Vision Assessment/Intervention    Visual Impairment --  -AM      Recorded by [AM] Wayne Olmos PTA      Cognitive Assessment/Intervention    Current Cognitive/Communication Assessment functional  -AM      Orientation Status oriented x 4  -AM      Follows Commands/Answers Questions 100% of the time  -AM      Personal Safety Interventions gait belt;nonskid shoes/slippers when out of bed;supervised activity  -AM      Recorded by [AM] Wayne Olmos PTA      ROM (Range of Motion)    General ROM lower extremity range of motion deficits identified  -AM      Recorded by [AM] Wayne Olmos PTA      General LE Assessment    ROM knee, right: LE ROM deficit  -AM      Recorded by [AM] Wayne Olmos PTA      Right Knee    Extension/Flexion AROM within functional limits   12-96  -AM      Recorded by [AM] Wayne Olmos PTA      Mobility Assessment/Training    Extremity Weight-Bearing Status right lower extremity  -AM      Right Lower Extremity Weight-Bearing weight-bearing as tolerated  -AM      Recorded by [AM] Wayne Olmos PTA      Bed Mobility, Assessment/Treatment    Bed Mobility, Assistive Device bed rails;head of bed elevated  -AM      Bed Mobility, Roll Left, [place] not tested  -AM      Bed Mobility, Roll Right, [place] not tested  -AM      Bed Mobility, Scoot/Bridge, [place] not tested  -AM      Bed Mob, Supine to Sit, [place] conditional independence  -AM      Bed Mob, Sit to Supine, [place] not tested  -AM      Bed Mob, Sidelying to Sit, [place] not tested  -AM      Bed Mob, Sit to Sidelying, [place] not tested  -AM      Bed Mobility, Safety Issues decreased use of arms for pushing/pulling;decreased use of legs for bridging/pushing  -AM       Bed Mobility, Impairments ROM decreased;strength decreased;pain  -AM      Recorded by [AM] Wayne Olmos PTA      Transfer Assessment/Treatment    Transfers, Bed-Chair [place] maximum assist (25% patient effort);2 person assist required  -AM      Transfers, Chair-Bed [place] maximum assist (25% patient effort);2 person assist required  -AM      Transfers, Bed-Chair-Bed, Assist Device rolling walker  -AM      Transfers, Sit-Stand [place] maximum assist (25% patient effort);2 person assist required  -AM      Transfers, Stand-Sit [place] maximum assist (25% patient effort);2 person assist required  -AM      Transfers, Sit-Stand-Sit, Assist Device rolling walker  -AM      Toilet Transfer, [place] not tested  -AM      Walk-In Shower Transfer, [place] not tested  -AM      Bathtub Transfer, [place] not tested  -AM      Transfer, Maintain Weight Bearing Status able to maintain weight bearing status  -AM      Transfer, Safety Issues step length decreased  -AM      Transfer, Impairments ROM decreased;strength decreased;pain  -AM      Recorded by [AM] Wayne Olmos PTA      Gait Assessment/Treatment    Gait, [place] Level not tested  -AM      Recorded by [AM] Wayne Olmos PTA      Stairs Assessment/Treatment    Stairs, [place] Level not tested  -AM      Recorded by [AM] Wayne Olmos PTA      Therapy Exercises    Bilateral Lower Extremities AROM:;20 reps;supine;sitting;ankle pumps/circles;glut sets;heel slides;quad sets;SLR  -AM      Recorded by [AM] Wayne Olmos PTA      Sensory Assessment/Intervention    Light Touch --  -AM      LLE Light Touch --  -AM      RLE Light Touch --  -AM      Recorded by [AM] Wayne Olmos PTA      Positioning and Restraints    Pre-Treatment Position in bed  -AM      Post Treatment Position chair  -AM      In Chair reclined;call light within reach;encouraged to call for assist;exit alarm on;legs elevated  -AM      Recorded by [AM]  Wayne Olmos, PTA        User Key  (r) = Recorded By, (t) = Taken By, (c) = Cosigned By    Initials Name Effective Dates    JW Kadi KRISTEN Paredes, PTA 08/11/15 -     AM Wayne Olmos, PTA 10/17/16 -      DENEEN Ayers/ZOEY 10/17/16 -           PT Recommendation and Plan  Anticipated Discharge Disposition: home with home health  Planned Therapy Interventions: balance training, bed mobility training, gait training, home exercise program, patient/family education, ROM (Range of Motion), stair training, strengthening, stretching, transfer training  PT Frequency: other (see comments) (5-14x/week)  Plan of Care Review  Plan Of Care Reviewed With: patient  Progress: progress toward functional goals as expected  Outcome Summary/Follow up Plan: Pt met 1 PT goal this date. Pt able to t/f in/out bed CGA. ROM 6-80. Pt able to amb 75+75+15+15 w/RW SBA. Pt able to go up/down steps CGA. Pt would benefit from HH vs OP PT once discharged from this faciltiy.           Outcome Measures       10/19/17 1035 10/18/17 1400 10/18/17 1045    How much help from another person do you currently need...    Turning from your back to your side while in flat bed without using bedrails? 4  -AM 4  -AM 4  -AM    Moving from lying on back to sitting on the side of a flat bed without bedrails? 4  -AM 4  -AM 4  -AM    Moving to and from a bed to a chair (including a wheelchair)? 4  -AM 3  -AM 3  -AM    Standing up from a chair using your arms (e.g., wheelchair, bedside chair)? 4  -AM 3  -AM 3  -AM    Climbing 3-5 steps with a railing? 3  -AM 3  -AM 1  -AM    To walk in hospital room? 4  -AM 3  -AM 3  -AM    AM-PAC 6 Clicks Score 23  -AM 20  -AM 18  -AM    Functional Assessment    Outcome Measure Options AM-PAC 6 Clicks Basic Mobility (PT)  -AM AM-PAC 6 Clicks Basic Mobility (PT)  -AM AM-PAC 6 Clicks Basic Mobility (PT)  -AM      10/17/17 1050 10/17/17 0925 10/16/17 1303    How much help from another person do you currently need...     Turning from your back to your side while in flat bed without using bedrails? 4  -AM  3  -CZ    Moving from lying on back to sitting on the side of a flat bed without bedrails? 4  -AM  3  -CZ    Moving to and from a bed to a chair (including a wheelchair)? 2  -AM  2  -CZ    Standing up from a chair using your arms (e.g., wheelchair, bedside chair)? 2  -AM  2  -CZ    Climbing 3-5 steps with a railing? 1  -AM  2  -CZ    To walk in hospital room? 1  -AM  2  -CZ    AM-PAC 6 Clicks Score 14  -AM  14  -CZ    How much help from another is currently needed...    Putting on and taking off regular lower body clothing?  2  -RB     Bathing (including washing, rinsing, and drying)  2  -RB     Toileting (which includes using toilet bed pan or urinal)  2  -RB     Putting on and taking off regular upper body clothing  3  -RB     Taking care of personal grooming (such as brushing teeth)  4  -RB     Eating meals  4  -RB     Score  17  -RB     Functional Assessment    Outcome Measure Options AM-PAC 6 Clicks Basic Mobility (PT)  -AM AM-PAC 6 Clicks Daily Activity (OT)  -RB AM-PAC 6 Clicks Basic Mobility (PT)  -CZ      User Key  (r) = Recorded By, (t) = Taken By, (c) = Cosigned By    Initials Name Provider Type    KENZIE Kaplan, OT Occupational Therapist    GUILLERMO Olmos PTA Physical Therapy Assistant    HERBERT Blake, PT Physical Therapist           Time Calculation:         PT Charges       10/19/17 1035          Time Calculation    Start Time 1035  -AM      Stop Time 1115  -AM      Time Calculation (min) 40 min  -AM      PT Received On 10/19/17  -AM      PT - Next Appointment 10/19/17  -AM      Time Calculation- PT    Total Timed Code Minutes- PT 40 minute(s)  -AM        User Key  (r) = Recorded By, (t) = Taken By, (c) = Cosigned By    Initials Name Provider Type    GUILLERMO Olmos PTA Physical Therapy Assistant          Therapy Charges for Today     Code Description Service Date Service Provider Modifiers Qty     20665870071 HC GAIT TRAINING EA 15 MIN 10/18/2017 Wayne Olmos PTA GP 1     Duration:  15m (10:45 AM - 11:00 AM)      [id number] HC PT THER PROC EA 15 MIN 10/18/2017 Wayne Olmos PTA GP 2     Duration:  25m (11:00 AM - 11:25 AM)      [id number] HC GAIT TRAINING EA 15 MIN 10/18/2017 Wayne Olmos PTA GP 1     Duration:  15m ( 2:25 PM -  2:40 PM)      [id number] HC PT THER PROC EA 15 MIN 10/18/2017 Wayne Olmos PTA GP 2     Duration:  25m ( 2:00 PM -  2:25 PM)      [id number] HC GAIT TRAINING EA 15 MIN 10/19/2017 Wayne Olmos PTA GP 1     Duration:  15m (10:35 AM - 10:50 AM)      [id number] HC PT THER PROC EA 15 MIN 10/19/2017 Wayne Olmos PTA GP 2     Duration:  25m (10:50 AM - 11:15 AM)            PT G-Codes  PT Professional Judgement Used?: Yes  Outcome Measure Options: AM-PAC 6 Clicks Basic Mobility (PT)  Score: 14  Functional Limitation: Mobility: Walking and moving around  Mobility: Walking and Moving Around Current Status (): At least 40 percent but less than 60 percent impaired, limited or restricted  Mobility: Walking and Moving Around Goal Status (): At least 20 percent but less than 40 percent impaired, limited or restricted    PT Discharge Summary  Anticipated Discharge Disposition: home with home health  Reason for Discharge: Discharge from facility, Per MD order  Outcomes Achieved: Patient able to partially acheive established goals  Discharge Destination: Home with home health    Wayne Olmos PTA  10/19/2017

## 2017-10-19 NOTE — DISCHARGE PLACEMENT REQUEST
"Tiffani Serra (57 y.o. Female)     Date of Birth Social Security Number Address Home Phone MRN    1960  [address] [place] [id number] [phone] [id number]    Restoration Marital Status          None        Admission Date Admission Type Admitting Provider Attending Provider Department, Room/Bed    10/16/17 Elective Yury Marion MD [place], Joseph E, MD [address], 379/1    Discharge Date Discharge Disposition Discharge Destination                      Attending Provider: Yury Marion MD     Allergies:  Other, Keflex [Cephalexin], Penicillins, Tape    Isolation:  None   Infection:  None   Code Status:  FULL    Ht:  64\" (162.6 cm)   Wt:  278 lb (126 kg)    Admission Cmt:  None   Principal Problem:  Primary osteoarthritis of right knee [M17.11] More...                 Active Insurance as of 10/16/2017     Primary Coverage     Payor Plan Insurance Group Employer/Plan Group    [facility] BLUE CROSS [facility] [employer] BLUE [facility] PPO [id number]     Payor Plan Address Payor Plan Phone Number Effective From Effective To    [address] [phone] 6/1/2015     [place] [address]       Subscriber Name Subscriber Birth Date Member ID       FELICE SERRA [birth year]/1956 [id number]                 Emergency Contacts      (Rel.) Home Phone Work Phone Mobile Phone    Felice Serra (Spouse) [phone] -- --          "

## 2017-10-19 NOTE — PROGRESS NOTES
"Anticoagulation by Pharmacy - Warfarin Day 4 of 28    Tiffani Serra is a 57 y.o.female  [Ht: 64\" (162.6 cm); Wt: 278 lb (126 kg)] on Warfarin 6 mg PO  for indication of VTE prophylaxis s/p right TKA.    Goal INR: 1.7-2.5  Today's INR:   Lab Results   Component Value Date    INR 1.23 (H) 10/19/2017         Lab Results   Component Value Date    INR 1.23 (H) 10/19/2017    INR 1.19 10/18/2017    INR 1.10 10/17/2017    PROTIME 15.5 (H) 10/19/2017    PROTIME 15.1 10/18/2017    PROTIME 14.1 10/17/2017     Lab Results   Component Value Date    HGB 10.6 (L) 10/17/2017    HGB 13.5 10/04/2017    HGB 11.9 (L) 01/19/2017     Lab Results   Component Value Date    HCT 30.0 (L) 10/17/2017    HCT 39.9 10/04/2017    HCT 36.4 01/19/2017     Assessment/Plan:  INR 1.23 and slightly up from yesterday. Will increase dose to 6mg daily in effort to reach INR target. Pharmacy will continue to follow and adjust accordingly.    Addendum : Day 4 of 28    Darci Person RPH  10/19/17 9:17 AM     "

## 2017-10-19 NOTE — PROGRESS NOTES
Anticoagulation- Warfarin     Completed Discharge Warfarin Counseling    Discussed the followin. Reason for Medication and Length of therapy  2. Drug-Drug Interactions  3. Drug-Diet Interactions  4. Drug- Alcohol Interactions  5. Signs and Symptoms of Bleeding  6. Fall risk- go to the Emergency Room  7. Home procedures:  Patient is going home with [facility]  8. Sent the Rx to: [facility] for warfarin 6mg, Number: 25  Si tablet every night or As Directed + 0 refills  9. Gave Warfarin booklet  10. Answered all Questions  11. Called in lab orders to [facility] for 10/20 during admission process    Darci Person [facility]  10/19/17  12:22 PM

## 2017-10-19 NOTE — DISCHARGE SUMMARY
ORTHOPAEDIC DISCHARGE SUMMARY    NAME: Tiffani Serra   PHYSICIAN: Yury Marion MD  : 1960  MRN: [id number]    ADMITTED: 10/16/2017   DISCHARGED:  10 /19/17      ADMISSION DIAGNOSES:  Principal Problem:    Primary osteoarthritis of right knee      DISCHARGE DIAGNOSES:   Principal Problem:    Primary osteoarthritis of right knee      SERVICE: Orthopaedic. Attending, Yury Marion MD      CONSULTS: 0      PROCEDURES: rt tka      LABS:   Lab Results (last 24 hours)     Procedure Component Value Units Date/Time    Protime-INR [[id number]]  (Abnormal) Collected:  10/19/17 0554    Specimen:  Blood Updated:  10/19/17 0634     Protime 15.5 (H) Seconds      INR 1.23 (H)    Narrative:       Therapeutic range for most indications is 2.0-3.0 INR,  or 2.5-3.5 for mechanical heart valves.    Wound Culture - Wound, Knee, Right [[id number]]  (Normal) Collected:  10/16/17 0816    Specimen:  Wound from Knee, Right Updated:  10/19/17 0650     Wound Culture No growth at 3 days     Gram Stain Result No WBCs or organisms seen    Extra Tubes [[id number]] Collected:  10/19/17 0554    Specimen:  Blood from Blood, Venous Line Updated:  10/19/17 0701    Narrative:       The following orders were created for panel order Extra Tubes.  Procedure                               Abnormality         Status                     ---------                               -----------         ------                     Lavender Top[[id number]]                                     Final result               Green Top (Gel)[[id number]]                                  Final result                 Please view results for these tests on the individual orders.    Lavender Top [[id number]] Collected:  10/19/17 0554    Specimen:  Blood Updated:  10/19/17 0701     Extra Tube hold for add-on      Auto resulted       Green Top (Gel) [[id number]] Collected:  10/19/17 0554    Specimen:  Blood Updated:  10/19/17 0701     Extra Tube Hold for add-ons.      Auto  resulted.               SUMMARY:  57 year old admitted for elective tkafor OA of the left knee that had not responded to conservative treatment, patient di very well folloing the procedure at this time she is going home with home health and will see me in office in 10 days.        DISPOSITION:   home   DISCHARGE MEDICATIONS   Tiffani Serra   Home Medication Instructions TERRY:[id number]    Printed on:10/19/17 110   Medication Information                      ferrous sulfate 325 (65 FE) MG tablet  Take 325 mg by mouth Daily With Breakfast.             mesalamine (APRISO) 0.375 g 24 hr capsule  Take 375 mg by mouth Daily.             oxyCODONE-acetaminophen (PERCOCET) 7.5-325 MG per tablet  Take 1 tablet by mouth Every 6 (Six) Hours As Needed for Severe Pain  for up to 7 days.             PHARMACY TO DOSE WARFARIN  Continuous As Needed (tka protocol inr 2.5).                 INSTRUCTIONS:  Activity: ad romulo    Diet:  regular.  Special instructions: Patient instructed to call office or call physician through hospital  [phone].    FOLLOW UP:   Additional Instructions for the Follow-ups that You Need to Schedule     Discharge Follow-Up With Specified Provider    As directed    To:  Dr. sullivan in 10 days       Referral to Home Health    As directed    Face to Face Visit Date:  10/19/2017   Follow-up Provider for Plan of Care?:  I will be treating the patient on an ongoing basis.  Please send me the Plan of Care for signature.   Follow-up Provider:  RAHAT SULLIVAN   Reason/Clinical Findings:  tka protocol   Describe mobility limitations that make leaving home difficult:  tka protocol   Nursing/Therapeutic Services Requested:   Skilled Nursing  Physical Therapy      Skilled nursing orders:  Wound care dressing/changes   Frequency:  1 Week 1             Follow-up Information     Follow up with Maria Antonia Hood DO .    Specialty:  Family Medicine    Contact information:    [address]  [place]  [address]  [phone]              Time: 50 minutes        Yury Marion MD

## 2017-10-20 ENCOUNTER — ANTICOAGULATION VISIT (OUTPATIENT)
Dept: PHARMACY | Facility: HOSPITAL | Age: 57
End: 2017-10-20

## 2017-10-20 DIAGNOSIS — Z79.01 LONG-TERM (CURRENT) USE OF ANTICOAGULANTS: ICD-10-CM

## 2017-10-20 LAB
ABO + RH BLD: NORMAL
ABO + RH BLD: NORMAL
BH BB BLOOD EXPIRATION DATE: NORMAL
BH BB BLOOD EXPIRATION DATE: NORMAL
BH BB BLOOD TYPE BARCODE: 600
BH BB BLOOD TYPE BARCODE: 600
BH BB DISPENSE STATUS: NORMAL
BH BB DISPENSE STATUS: NORMAL
BH BB PRODUCT CODE: NORMAL
BH BB PRODUCT CODE: NORMAL
BH BB UNIT NUMBER: NORMAL
BH BB UNIT NUMBER: NORMAL
INR PPP: 1.2
UNIT  ABO: NORMAL
UNIT  ABO: NORMAL
UNIT  RH: NORMAL
UNIT  RH: NORMAL

## 2017-10-20 NOTE — PLAN OF CARE
Problem: Inpatient Occupational Therapy  Goal: Transfer Training Goal 1 LTG- OT  Outcome: Outcome(s) achieved Date Met:  10/20/17    10/17/17 1130 10/19/17 0912 10/20/17 1724   Transfer Training OT LTG   Transfer Training OT LTG, Date Established 10/17/17 --  --    Transfer Training OT LTG, Time to Achieve by discharge --  --    Transfer Training OT LTG, Activity Type all transfers --  --    Transfer Training OT LTG, [place] Level supervision required;conditional independence --  --    Transfer Training OT LTG, Date Goal Reviewed --  --  10/20/17   Transfer Training OT LTG, Outcome --  goal met --    Transfer Training OT LTG, Reason Goal Not Met --  --  discharged from facility       Goal: Static Standing Balance Goal LTG- OT  Outcome: Unable to achieve outcome(s) by discharge Date Met:  10/20/17    10/17/17 1130 10/20/17 1724   Static Standing Balance OT LTG   Static Standing Balance OT LTG, Date Established 10/17/17 --    Static Standing Balance OT LTG, Time to Achieve by discharge --    Static Standing Balance OT LTG, [place] Level conditional independence;supervision required  (5 minutes with functional activity.) --    Static Standing Balance OT LTG, Assist Device assistive device  (R/W.) --    Static Standing Balance OT LTG, Date Goal Reviewed --  10/20/17   Static Standing Balance OT LTG, Outcome --  goal met   Static Standing Balance OT LTG, Reason Goal Not Met --  discharged from facility       Goal: Patient Education Goal STG- OT  Outcome: Outcome(s) achieved Date Met:  10/20/17    10/17/17 1130 10/20/17 1724   Patient Education OT STG   Patient Education OT STG, Date Established 10/17/17 --    Patient Education OT STG, Time to Achieve 4 days --    Patient Education OT STG, Education Type energy conservation;home safety;adaptive equipment mgmt;joint protection;positioning;posture/body mechanics --    Patient Education OT STG, Education Understanding demonstrates adequately;verbalizes  understanding --    Patient Education OT STG, Date Goal Reviewed --  10/20/17   Patient Education OT STG Outcome --  goal met   Patient Education OT STG, Reason Goal Not Met --  discharged from facility       Goal: ADL Goal LTG- OT  Outcome: Unable to achieve outcome(s) by discharge Date Met:  10/20/17    10/17/17 1130 10/20/17 1724   ADL OT LTG   ADL OT LTG, Date Established 10/17/17 --    ADL OT LTG, Time to Achieve by discharge --    ADL OT LTG, Activity Type ADL skills  (Sponge bath and dress or walk-in shower.) --    ADL OT LTG, [place] Level modified independent;standby assist;assistive device  (R/W.) --    ADL OT LTG, Date Goal Reviewed --  10/20/17   ADL OT LTG, Outcome --  goal not met   ADL OT LTG, Reason Goal Not Met --  discharged from facility

## 2017-10-20 NOTE — PROGRESS NOTES
Spoke with ROBERTO Tracy from [facility]. Reviewed current lab.  No s/s of bleeding. No new meds. One missed dose yesterday. Reviewed schedule for follow week. Pt read back regimen and verbalized understanding. All questions answered. Next INR on 10/24 provided by [facility].    Darci Person RPH  10/20/17  10:15 AM

## 2017-10-20 NOTE — THERAPY DISCHARGE NOTE
Acute Care - Occupational Therapy Discharge Summary  [facility]     Patient Name: Tiffani Serar  : 1960  MRN: [id number]    Today's Date: 10/20/2017  Onset of Illness/Injury or Date of Surgery Date: 10/16/17    Date of Referral to OT: 10/16/17  Referring Physician: Sanam      Admit Date: 10/16/2017        OT Recommendation and Plan    Visit Dx:    ICD-10-CM ICD-9-CM   1. Primary osteoarthritis of right knee M17.11 715.16   2. Impaired physical mobility Z74.09 781.99   3. Impaired mobility and activities of daily living Z74.09 799.89                     OT Goals       10/20/17 1724 10/19/17 0912 10/18/17 0945    Transfer Training OT LTG    Transfer Training OT LTG, Date Goal Reviewed 10/20/17  - 10/19/17  - 10/18/17  -    Transfer Training OT LTG, Outcome  goal met  - goal ongoing  -    Transfer Training OT LTG, Reason Goal Not Met discharged from facility  -      Static Standing Balance OT LTG    Static Standing Balance OT LTG, Date Goal Reviewed 10/20/17  - 10/19/17  - 10/18/17  -    Static Standing Balance OT LTG, Outcome goal met  - goal met  - goal ongoing  -    Static Standing Balance OT LTG, Reason Goal Not Met discharged from facility  -      Patient Education OT STG    Patient Education OT STG, Date Goal Reviewed 10/20/17  -  10/18/17  -    Patient Education OT STG Outcome goal met  - goal met  - goal ongoing  -    Patient Education OT STG, Reason Goal Not Met discharged from facility  -      ADL OT LTG    ADL OT LTG, Date Goal Reviewed 10/20/17  - 10/19/17  - 10/18/17  -    ADL OT LTG, Outcome goal not met  -  goal ongoing  -    ADL OT LTG, Reason Goal Not Met discharged from facility  -        10/17/17 1130          Transfer Training OT LTG    Transfer Training OT LTG, Date Established 10/17/17  -RB      Transfer Training OT LTG, Time to Achieve by discharge  -RB      Transfer Training OT LTG, Activity Type all transfers  -RB      Transfer  Training OT LTG, [place] Level supervision required;conditional independence  -RB      Static Standing Balance OT LTG    Static Standing Balance OT LTG, Date Established 10/17/17  -RB      Static Standing Balance OT LTG, Time to Achieve by discharge  -RB      Static Standing Balance OT LTG, [place] Level conditional independence;supervision required   5 minutes with functional activity.  -RB      Static Standing Balance OT LTG, Assist Device assistive device   R/W.  -RB      Patient Education OT STG    Patient Education OT STG, Date Established 10/17/17  -RB      Patient Education OT STG, Time to Achieve 4 days  -RB      Patient Education OT STG, Education Type energy conservation;home safety;adaptive equipment mgmt;joint protection;positioning;posture/body mechanics  -RB      Patient Education OT STG, Education Understanding demonstrates adequately;verbalizes understanding  -RB      ADL OT LTG    ADL OT LTG, Date Established 10/17/17  -RB      ADL OT LTG, Time to Achieve by discharge  -RB      ADL OT LTG, Activity Type ADL skills   Sponge bath and dress or walk-in shower.  -RB      ADL OT LTG, [place] Level modified independent;standby assist;assistive device   R/W.  -RB        User Key  (r) = Recorded By, (t) = Taken By, (c) = Cosigned By    Initials Name Provider Type    RB Flynn Kaplan OT Occupational Therapist    DENEEN Vivas/L Occupational Therapy Assistant    GUILHERME Pierce OT Occupational Therapist                Outcome Measures       10/19/17 1035 10/18/17 1400 10/18/17 1045    How much help from another person do you currently need...    Turning from your back to your side while in flat bed without using bedrails? 4  -AM 4  -AM 4  -AM    Moving from lying on back to sitting on the side of a flat bed without bedrails? 4  -AM 4  -AM 4  -AM    Moving to and from a bed to a chair (including a wheelchair)? 4  -AM 3  -AM 3  -AM    Standing up from a chair using your arms (e.g.,  wheelchair, bedside chair)? 4  -AM 3  -AM 3  -AM    Climbing 3-5 steps with a railing? 3  -AM 3  -AM 1  -AM    To walk in hospital room? 4  -AM 3  -AM 3  -AM    AM-PAC 6 Clicks Score 23  -AM 20  -AM 18  -AM    Functional Assessment    Outcome Measure Options AM-PAC 6 Clicks Basic Mobility (PT)  -AM AM-PAC 6 Clicks Basic Mobility (PT)  -AM AM-PAC 6 Clicks Basic Mobility (PT)  -AM      User Key  (r) = Recorded By, (t) = Taken By, (c) = Cosigned By    Initials Name Provider Type    AM Wayne Olmos PTA Physical Therapy Assistant              OT Discharge Summary  Anticipated Discharge Disposition: home  Reason for Discharge: Discharge from facility, Per MD order  Outcomes Achieved: Refer to plan of care for updates on goals achieved  Discharge Destination: Home with assist      Jennifer Pierce OT  10/20/2017

## 2017-10-21 LAB
BACTERIA SPEC AEROBE CULT: NORMAL
GRAM STN SPEC: NORMAL

## 2017-10-24 ENCOUNTER — ANTICOAGULATION VISIT (OUTPATIENT)
Dept: PHARMACY | Facility: HOSPITAL | Age: 57
End: 2017-10-24

## 2017-10-24 DIAGNOSIS — Z79.01 LONG-TERM (CURRENT) USE OF ANTICOAGULANTS: ICD-10-CM

## 2017-10-24 LAB — INR PPP: 2.2

## 2017-10-24 PROCEDURE — 85610 PROTHROMBIN TIME: CPT | Performed by: ORTHOPAEDIC SURGERY

## 2017-10-24 NOTE — PROGRESS NOTES
Spoke with Tiffani. Reviewed current lab.  No s/s of bleeding. No new meds. No missed doses. Reviewed schedule for follow week. Pt read back regimen and verbalized understanding. All questions answered. Next INR on 10/26 provided by [facility].    Darci Person RP  10/24/17  1:15 PM

## 2017-10-26 ENCOUNTER — ANTICOAGULATION VISIT (OUTPATIENT)
Dept: PHARMACY | Facility: HOSPITAL | Age: 57
End: 2017-10-26

## 2017-10-26 DIAGNOSIS — Z79.01 LONG-TERM (CURRENT) USE OF ANTICOAGULANTS: ICD-10-CM

## 2017-10-26 LAB — INR PPP: 2.8

## 2017-10-26 NOTE — PROGRESS NOTES
Spoke with [facility] for Ms Serra. Reviewed current lab.  No s/s of bleeding. No new meds. No missed doses. No changes in diet. INR today is 2.8, most likely from a bolus of 9 mg last Friday and a daily increased in dose.  Reviewed schedule for follow week, HOLD tonight due to supratherapeutic INR, then 6 mg Fr, 3 mg Sa, 6 mg Dick, then recheck INR on Monday. Pt read back regimen and verbalized understanding. All questions answered. Next INR on 10/30 provided by [facility].    Mathew Loving [facility]  10/26/17  9:50 AM

## 2017-10-27 ENCOUNTER — OFFICE VISIT (OUTPATIENT)
Dept: ORTHOPEDIC SURGERY | Facility: CLINIC | Age: 57
End: 2017-10-27

## 2017-10-27 VITALS — SYSTOLIC BLOOD PRESSURE: 132 MMHG | DIASTOLIC BLOOD PRESSURE: 74 MMHG

## 2017-10-27 DIAGNOSIS — Z96.651 STATUS POST TOTAL RIGHT KNEE REPLACEMENT: Primary | ICD-10-CM

## 2017-10-27 PROCEDURE — 99024 POSTOP FOLLOW-UP VISIT: CPT | Performed by: ORTHOPAEDIC SURGERY

## 2017-10-27 NOTE — PROGRESS NOTES
Subjective   Tiffani Serra is a 57 y.o. female. 1960- Post op- Right total knee       History of Present Illness   Patient is here for post op visit- right total knee arthroplasty. Surgery date 10/16. Patient states that she has had home health PT come to her home weekly. Patient states she has followed all directives given to her during her last office visit. Staples will be removed during today's office visit.     The following portions of the patient's history were reviewed and updated as appropriate:   She  has a past medical history of Abdominal pain; Abscess of labia; Acute posthemorrhagic anemia (01/08/2015); Anemia; Anemia due to blood loss (11/20/2014); Cancer; Contact dermatitis (01/30/2013); Cystitis; Diarrhea (04/11/2016); Foot pain; Generalized abdominal pain (02/23/2015); Hypercholesterolemia; Hypertensive disorder; Infection of skin and subcutaneous tissue (01/30/2013); Iron deficiency anemia (04/11/2016); Irritable bowel syndrome; Knee pain, right; Left sided ulcerative colitis (10/08/2015); Malaise and fatigue (11/02/2011); Obesity; Pseudomembranous enterocolitis (11/02/2012); Rectal hemorrhage (07/01/2015); Scapulalgia (10/24/2014); Sialoadenitis (07/26/2013); Ulcerative colitis (04/11/2016); Urinary tract infectious disease (07/14/2012); and Vitamin D deficiency (05/14/2012).  She  does not have any pertinent problems on file.  She  has a past surgical history that includes Lymph node biopsy (05/12/2009); Bladder surgery (2001); Colonoscopy (07/25/2011); Colonoscopy (06/15/2016); Colonoscopy (08/15/2008); Knee arthroscopy (02/21/2005); Excision Lesion (10/17/1969); Wrist ganglion excision (10/17/1969); Total abdominal hysterectomy w/ bilateral salpingoophorectomy (2001); Total knee arthroplasty (08/08/2007); and Joint replacement (Left).  Her family history includes Coronary artery disease in her other; Hypertension in her other.  She  reports that she has never smoked. She has never  used smokeless tobacco. She reports that she does not drink alcohol or use illicit drugs.  Current Outpatient Prescriptions   Medication Sig Dispense Refill   • ferrous sulfate 325 (65 FE) MG tablet Take 325 mg by mouth Daily With Breakfast.     • mesalamine (APRISO) 0.375 g 24 hr capsule Take 375 mg by mouth Daily.     • nystatin-triamcinolone (MYCOLOG II) 853798-7.1 UNIT/GM-% cream      • warfarin (COUMADIN) 6 MG tablet Take 1 tablet by mouth Daily for 25 days. Or as directed by [facility] 25 tablet 0     No current facility-administered medications for this visit.      Current Outpatient Prescriptions on File Prior to Visit   Medication Sig   • ferrous sulfate 325 (65 FE) MG tablet Take 325 mg by mouth Daily With Breakfast.   • mesalamine (APRISO) 0.375 g 24 hr capsule Take 375 mg by mouth Daily.   • [] oxyCODONE-acetaminophen (PERCOCET) 7.5-325 MG per tablet Take 1 tablet by mouth Every 6 (Six) Hours As Needed for Severe Pain  for up to 7 days.   • warfarin (COUMADIN) 6 MG tablet Take 1 tablet by mouth Daily for 25 days. Or as directed by [facility]     No current facility-administered medications on file prior to visit.      She is allergic to other; keflex [cephalexin]; penicillins; and tape..    Review of Systems  REVIEW OF SYSTEMS:  Negative, other than presenting complaint.  HEENT: No headaches, diplopia, blurred vision, tinnitus, vertigo, epistaxis, hoarseness or sore throat.  Pulmonary: No cough, sputum, hemoptysis, dyspnea, wheezing, or chest pain.  Cardiac: No chest pain, palpitations, orthopnea, paroxysmal nocturnal dyspnea, shortness of breath, or pedal edema.  Gastrointestinal: No diarrhea, melena, or constipation.  Genitourinary: No dysuria, hematuria, nocturia, frequency, bladder or bowel incontinence.  Hematology: No history of any anemia, fatigue, fever, or chills or night sweats.  Dermatology: No rashes, pruritus, or increased pigmentation changes of the skin.     Objective    Physical Exam  /74 (BP Location: Right arm, Patient Position: Sitting)  LMP  (LMP Unknown)    Social History     Social History   • Marital status:      Spouse name: N/A   • Number of children: N/A   • Years of education: N/A     Occupational History   • Not on file.     Social History Main Topics   • Smoking status: Never Smoker   • Smokeless tobacco: Never Used   • Alcohol use No   • Drug use: No   • Sexual activity: Defer     Other Topics Concern   • Not on file     Social History Narrative       HEENT: Normocephalic.  PERRLA.  TM's clear bilaterally.  Oropharynx: Clear.  Neck: Supple, with no adenopathy.  Chest: Equal bilateral expansion.  Clear to auscultation and percussion.  Heart: Regular sinus rhythm, S1 and S2 normal.  No murmurs or extra heart sounds heard.  Abdomen: Soft, nontender, and no organomegaly.  Neurological: cranial nerves II-XII normal Vascular: pulses are present  Dermatological: no rashes  or blemishes, or any abnormality of the skin.      Incision line to the right knee is healing well. Staples are ready to be removed.     Allergies verified. Area to the right knee was prepped with alcohol. Staples were removed to the right knee without difficulty. Steri strips were placed along the incision line. Patient was instructed to not get this area wet for the next 7 days. Patient is to continue PT for the next 4 weeks. Patient will return in 4 weeks with xrays for recheck of the right total knee.         Assessment/Plan   Problems Addressed this Visit        Other    Status post total right knee replacement - Primary

## 2017-10-30 ENCOUNTER — ANTICOAGULATION VISIT (OUTPATIENT)
Dept: PHARMACY | Facility: HOSPITAL | Age: 57
End: 2017-10-30

## 2017-10-30 DIAGNOSIS — Z79.01 LONG-TERM (CURRENT) USE OF ANTICOAGULANTS: ICD-10-CM

## 2017-10-30 LAB — INR PPP: 1.5 (ref 1.7–2.5)

## 2017-10-30 PROCEDURE — 85610 PROTHROMBIN TIME: CPT | Performed by: ORTHOPAEDIC SURGERY

## 2017-10-30 NOTE — PROGRESS NOTES
Spoke with patient. Reviewed current lab.  No s/s of bleeding. No new meds. No missed doses. Reviewed schedule for follow week. Pt read back regimen and verbalized understanding. All questions answered. Next INR on 11/06 provided by [facility]. This will be the last lab needed. Therapy ends 11/13/17.    Jessica Borrero [facility]  10/30/17  1:49 PM

## 2017-10-31 ENCOUNTER — OFFICE VISIT (OUTPATIENT)
Dept: ORTHOPEDIC SURGERY | Facility: CLINIC | Age: 57
End: 2017-10-31

## 2017-10-31 VITALS — WEIGHT: 266 LBS | HEIGHT: 64 IN | BODY MASS INDEX: 45.41 KG/M2

## 2017-10-31 DIAGNOSIS — Z96.651 STATUS POST TOTAL RIGHT KNEE REPLACEMENT: Primary | ICD-10-CM

## 2017-10-31 PROCEDURE — 99024 POSTOP FOLLOW-UP VISIT: CPT | Performed by: ORTHOPAEDIC SURGERY

## 2017-10-31 NOTE — PROGRESS NOTES
Subjective   Tiffani Serra is a 57 y.o. female. Staple removal-Right total knee arthroplasty.     History of Present Illness Patient is here today for removal of remainder of staples in right knee.     The following portions of the patient's history were reviewed and updated as appropriate:   She  has a past medical history of Abdominal pain; Abscess of labia; Acute posthemorrhagic anemia (01/08/2015); Anemia; Anemia due to blood loss (11/20/2014); Cancer; Contact dermatitis (01/30/2013); Cystitis; Diarrhea (04/11/2016); Foot pain; Generalized abdominal pain (02/23/2015); Hypercholesterolemia; Hypertensive disorder; Infection of skin and subcutaneous tissue (01/30/2013); Iron deficiency anemia (04/11/2016); Irritable bowel syndrome; Knee pain, right; Left sided ulcerative colitis (10/08/2015); Malaise and fatigue (11/02/2011); Obesity; Pseudomembranous enterocolitis (11/02/2012); Rectal hemorrhage (07/01/2015); Scapulalgia (10/24/2014); Sialoadenitis (07/26/2013); Ulcerative colitis (04/11/2016); Urinary tract infectious disease (07/14/2012); and Vitamin D deficiency (05/14/2012).  She  does not have any pertinent problems on file.  She  has a past surgical history that includes Lymph node biopsy (05/12/2009); Bladder surgery (2001); Colonoscopy (07/25/2011); Colonoscopy (06/15/2016); Colonoscopy (08/15/2008); Knee arthroscopy (02/21/2005); Excision Lesion (10/17/1969); Wrist ganglion excision (10/17/1969); Total abdominal hysterectomy w/ bilateral salpingoophorectomy (2001); Total knee arthroplasty (08/08/2007); and Joint replacement (Left).  Her family history includes Coronary artery disease in her other; Hypertension in her other.  She  reports that she has never smoked. She has never used smokeless tobacco. She reports that she does not drink alcohol or use illicit drugs.  Current Outpatient Prescriptions   Medication Sig Dispense Refill   • ferrous sulfate 325 (65 FE) MG tablet Take 325 mg by mouth Daily  "With Breakfast.     • mesalamine (APRISO) 0.375 g 24 hr capsule Take 375 mg by mouth Daily.     • nystatin-triamcinolone (MYCOLOG II) 742473-0.1 UNIT/GM-% cream      • warfarin (COUMADIN) 6 MG tablet Take 1 tablet by mouth Daily for 25 days. Or as directed by [facility] 25 tablet 0     No current facility-administered medications for this visit.      Current Outpatient Prescriptions on File Prior to Visit   Medication Sig   • ferrous sulfate 325 (65 FE) MG tablet Take 325 mg by mouth Daily With Breakfast.   • mesalamine (APRISO) 0.375 g 24 hr capsule Take 375 mg by mouth Daily.   • nystatin-triamcinolone (MYCOLOG II) 361709-3.1 UNIT/GM-% cream    • warfarin (COUMADIN) 6 MG tablet Take 1 tablet by mouth Daily for 25 days. Or as directed by [facility]     No current facility-administered medications on file prior to visit.      She is allergic to other; keflex [cephalexin]; penicillins; and tape..    Review of Systems  REVIEW OF SYSTEMS:  Negative, other than presenting complaint.  HEENT: No headaches, diplopia, blurred vision, tinnitus, vertigo, epistaxis, hoarseness or sore throat.  Pulmonary: No cough, sputum, hemoptysis, dyspnea, wheezing, or chest pain.  Cardiac: No chest pain, palpitations, orthopnea, paroxysmal nocturnal dyspnea, shortness of breath, or pedal edema.  Gastrointestinal: No diarrhea, melena, or constipation.  Genitourinary: No dysuria, hematuria, nocturia, frequency, bladder or bowel incontinence.  Hematology: No history of any anemia, fatigue, fever, or chills or night sweats.  Dermatology: No rashes, pruritus, or increased pigmentation changes of the skin.   Ht 64\" (162.6 cm)  Wt 266 lb (121 kg)  LMP  (LMP Unknown)  BMI 45.66 kg/m2    Social History     Social History   • Marital status:      Spouse name: N/A   • Number of children: N/A   • Years of education: N/A     Occupational History   • Not on file.     Social History Main Topics   • Smoking status: Never Smoker   • " Smokeless tobacco: Never Used   • Alcohol use No   • Drug use: No   • Sexual activity: Defer     Other Topics Concern   • Not on file     Social History Narrative       HEENT: Normocephalic.  PERRLA.  TM's clear bilaterally.  Oropharynx: Clear.  Neck: Supple, with no adenopathy.  Chest: Equal bilateral expansion.  Clear to auscultation and percussion.  Heart: Regular sinus rhythm, S1 and S2 normal.  No murmurs or extra heart sounds heard.  Abdomen: Soft, nontender, and no organomegaly.  Neurological: cranial nerves II-XII normal Vascular: pulses are present  Dermatological: no rashes  or blemishes, or any abnormality of the skin.      Objective   Physical Exam  Incision healed staples removed. And wound ster-stripped. Continue with therapy  See in 6 weeks.      Assessment/Plan   Tiffani was seen today for suture / staple removal.    Diagnoses and all orders for this visit:    Status post total right knee replacement  -     Ambulatory Referral to Physical Therapy Evaluate and treat

## 2017-11-01 ENCOUNTER — LAB REQUISITION (OUTPATIENT)
Dept: LAB | Facility: HOSPITAL | Age: 57
End: 2017-11-01

## 2017-11-01 ENCOUNTER — ANTICOAGULATION VISIT (OUTPATIENT)
Dept: PHARMACY | Facility: HOSPITAL | Age: 57
End: 2017-11-01

## 2017-11-01 DIAGNOSIS — Z79.01 LONG TERM CURRENT USE OF ANTICOAGULANT: ICD-10-CM

## 2017-11-01 DIAGNOSIS — Z79.01 LONG-TERM (CURRENT) USE OF ANTICOAGULANTS: ICD-10-CM

## 2017-11-01 LAB
INR PPP: 1.5 (ref 0.8–1.2)
INR PPP: 2.2 (ref 0.8–1.2)
PROTHROMBIN TIME: 18.6 SECONDS (ref 11–15)
PROTHROMBIN TIME: 26.4 SECONDS (ref 11–15)

## 2017-11-02 ENCOUNTER — ANTICOAGULATION VISIT (OUTPATIENT)
Dept: PHARMACY | Facility: HOSPITAL | Age: 57
End: 2017-11-02

## 2017-11-02 DIAGNOSIS — Z79.01 LONG-TERM (CURRENT) USE OF ANTICOAGULANTS: ICD-10-CM

## 2017-11-02 LAB — INR PPP: 1.7 (ref 1.7–2.5)

## 2017-11-02 NOTE — PROGRESS NOTES
Spoke with ROBERTO Mason from [facility] while at her last home visit with Tiffani. Reviewed current lab.  No s/s of bleeding. No new meds. No missed doses. Reviewed schedule for follow week. Pt read back regimen and verbalized understanding. All questions answered. [facility]. Standing order sent to [facility] labs.    Darci Person [facility]  11/02/17  10:46 AM

## 2017-11-07 ENCOUNTER — HOSPITAL ENCOUNTER (OUTPATIENT)
Dept: PHYSICAL THERAPY | Facility: HOSPITAL | Age: 57
Setting detail: THERAPIES SERIES
Discharge: HOME | End: 2017-11-07
Attending: ORTHOPAEDIC SURGERY

## 2017-11-07 ENCOUNTER — ANTICOAGULATION VISIT (OUTPATIENT)
Dept: PHARMACY | Facility: HOSPITAL | Age: 57
End: 2017-11-07

## 2017-11-07 ENCOUNTER — LAB (OUTPATIENT)
Dept: LAB | Facility: HOSPITAL | Age: 57
End: 2017-11-07

## 2017-11-07 DIAGNOSIS — Z79.01 LONG-TERM (CURRENT) USE OF ANTICOAGULANTS: ICD-10-CM

## 2017-11-07 DIAGNOSIS — Z96.651 STATUS POST TOTAL RIGHT KNEE REPLACEMENT: Primary | ICD-10-CM

## 2017-11-07 LAB
INR PPP: 1.63 (ref 0.8–1.2)
PROTHROMBIN TIME: 19.4 SECONDS (ref 11.1–15.3)

## 2017-11-07 PROCEDURE — 85610 PROTHROMBIN TIME: CPT | Performed by: ORTHOPAEDIC SURGERY

## 2017-11-07 PROCEDURE — 97161 PT EVAL LOW COMPLEX 20 MIN: CPT | Performed by: PHYSICAL THERAPIST

## 2017-11-07 PROCEDURE — 36415 COLL VENOUS BLD VENIPUNCTURE: CPT

## 2017-11-07 PROCEDURE — 97110 THERAPEUTIC EXERCISES: CPT | Performed by: PHYSICAL THERAPIST

## 2017-11-08 NOTE — PROGRESS NOTES
Spoke with Ms. Serra. Reviewed current lab.  No s/s of bleeding. No new meds. No missed doses. No changes in diet. INR is 1.63 today, slightly subtherapeutic.  Tu 6mg, We 3 mg, Th 6 mg, Fr 3 mg, Sa 6 mg, Dick 3 mg.  I think if we add one more day of 6 mg / week we might be at goal.  Her two back to back 6 mg days would be Mo / Tu.  We will see what her INR is next Monday, when her ride is able to bring her to the lab.  Reviewed schedule for follow week. Pt read back regimen and verbalized understanding. All questions answered. [facility], I don't believe she has [facility] anymore from what she informed me.     Mathew Loving, [facility]  11/07/17  7:10 PM

## 2017-11-08 NOTE — THERAPY EVALUATION
Outpatient Physical Therapy Ortho Initial Evaluation  [facility] [place]     Patient Name: Tiffani Serra  : 1960  MRN: [id number]  Today's Date: 2017      Visit Date: 2017     Subjective Improvement: N/A      Attendance:   Approved:   28 visits        MD follow up: 17          date:     17      Patient Active Problem List   Diagnosis   • Osteoarthritis of right knee   • Acute foot pain   • Knee pain, acute   • Eversion deformity of left foot   • Plantar fasciitis of left foot   • Pes planus of left foot   • Primary osteoarthritis of right knee   • Long-term (current) use of anticoagulants   • Status post total right knee replacement        Past Medical History:   Diagnosis Date   • Abdominal pain    • Abscess of labia    • Acute posthemorrhagic anemia 2015   • Anemia     history of, mild   • Anemia due to blood loss 2014   • Cancer     cosmo/right leg mole   • Contact dermatitis 2013    on labia and surrounding regions   • Cystitis     recurrent   • Diarrhea 2016   • Foot pain     porokeratoma causin metatarsalgia, right foot   • Generalized abdominal pain 2015   • Hypercholesterolemia    • Hypertensive disorder    • Infection of skin and subcutaneous tissue 2013   • Iron deficiency anemia 2016    improving   • Irritable bowel syndrome    • Knee pain, right    • Left sided ulcerative colitis 10/08/2015   • Malaise and fatigue 2011   • Obesity    • Pseudomembranous enterocolitis 2012   • Rectal hemorrhage 2015   • Scapulalgia 10/24/2014   • Sialoadenitis 2013   • Ulcerative colitis 2016    chronic, for 29 years.  flare   • Urinary tract infectious disease 2012   • Vitamin D deficiency 2012        Past Surgical History:   Procedure Laterality Date   • BLADDER SURGERY      bladder tacking x 3   • COLONOSCOPY  2011    Colitis found in rectum & sigmoid colon. Biopsy taken   •  COLONOSCOPY  06/15/2016    Erythematous mucosa in the rectum.Biopsied.One polyp in the transverse colon. Biopsied   • COLONOSCOPY  08/15/2008    Colitis of the rectum, the sigmoid and the transverse colon. Multiple biopsies were obtained from the rectum, the sigmoid and the transverse colon. Multiple biopsies were obtained from the cecum, the transverse colon, sigmoid & rectum   • EXCISION LESION  10/17/1969    removal of sebaceous cyst of neck   • JOINT REPLACEMENT Left    • KNEE ARTHROSCOPY  02/21/2005    Tears of the meidal and lateral meniscus and osteoarthritis of the knee. Arthroscopic medial and lateral meniscectomies of the right knee with chondroplasty of the medial, lateral femoral condyles and patella   • LYMPH NODE BIOPSY  05/12/2009    [place] lymph node biopsy, superficial and deep, within the right groin involving superficial femoral nodes and also the external iliac nodes. Melanoma of the right leg   • TOTAL ABDOMINAL HYSTERECTOMY WITH SALPINGO OOPHORECTOMY  2001   • TOTAL KNEE ARTHROPLASTY  08/08/2007    severe osteoarthritis of the left knee.     • WRIST GANGLION EXCISION  10/17/1969    left wrist     Allergies   Allergen Reactions   • Other      Garlic diarrhea/heartburn   • Keflex [Cephalexin] Rash   • Penicillins Rash     nylon   • Tape Rash     Silk tape/swells       Current Outpatient Prescriptions:   •  ferrous sulfate 325 (65 FE) MG tablet, Take 325 mg by mouth Daily With Breakfast., Disp: , Rfl:   •  mesalamine (APRISO) 0.375 g 24 hr capsule, Take 375 mg by mouth Daily., Disp: , Rfl:   •  nystatin-triamcinolone (MYCOLOG II) 153460-6.1 UNIT/GM-% cream, , Disp: , Rfl:   •  warfarin (COUMADIN) 6 MG tablet, Take 1 tablet by mouth Daily for 25 days. Or as directed by [facility] Pharmacists, Disp: 25 tablet, Rfl: 0      Visit Dx:     ICD-10-CM ICD-9-CM   1. Status post total right knee replacement Z96.651 V43.65             Patient History       11/07/17 1400          History    Chief Complaint  Pain;Muscle weakness  -KG      Type of Pain Knee pain  -KG      Date Current Problem(s) Began 10/16/17  -KG      Brief Description of Current Complaint Pt presents s/p R TKA on 10/16/17. Pt received home health PT after d/c from hospital. Pt was discharged from home health on 11/2/17. Pt reports overall things are going well with knee. Reports she has to use a sheet to help raise her RLE onto the bed when lying down. Pt was walking with no AD prior to the surgery. Currently using RW, has hurry cane at home. Reports she sometimes uses no AD in the home & cruises using furniture/countertops. Reports she has a walk-in shower with no shower chair. Has 4 steps to enter home but reports they are more like landings as there is plenty of room for her & her walker. Hx of L TKA ~10 years ago.  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  -KG      What clinical tests have you had for this problem? X-ray  -KG      Results of Clinical Tests OA  -KG      Pain     Pain Location Knee  -KG      Pain at Present 0  -KG      Pain at Best 0  -KG      Pain at Worst 5  -KG      Pain Frequency Intermittent  -KG      Pain Description Aching;Sore  -KG      What Performance Factors Make the Current Problem(s) WORSE? Standing for short periods of time  -KG      What Performance Factors Make the Current Problem(s) BETTER? Ice/rest, doing exercises  -KG      Pain Comments Pain mostly occurs at night, lateral & medial knee  -KG      Tolerance Time- Standing Unable to stand for short periods in one spot  -KG      Tolerance Time- Lying Unable to find comfortable position at night  -KG      Is your sleep disturbed? Yes  -KG      Difficulties at work? N/A  -KG      Difficulties with ADL's?  assists when needed  -KG      Difficulties with recreational activities? N/A  -KG      Fall Risk  Assessment    Any falls in the past year: No  -KG        User Key  (r) = Recorded By, (t) = Taken By, (c) = Cosigned By    Initials Name Provider Type    KG Allie Tena, PT Physical Therapist                PT Ortho       11/07/17 1400    Subjective Comments    Subjective Comments Refer to therapy pt history for details.  -KG    Precautions and Contraindications    Precautions/Limitations no known precautions/limitations  -KG    Precautions s/p R TKA 10/16/17  -KG    Contraindications No e-stim per insurance coverage & hx of skin CA  -KG    Subjective Pain    Able to rate subjective pain? yes  -KG    Pre-Treatment Pain Level 0  -KG    Post-Treatment Pain Level 0  -KG    Subjective Pain Comment At rest  -KG    Posture/Observations    Posture/Observations Comments No signs of acute distress. Ambulates with RW, slow nabeel, step through pattern. Decreased heel strike/TKE RLE. Incision has healed well at this time. ~4 steri strips present at distal incision; removed this date. Mild edema noted in R knee vs. L. Min assist required for RLE for sup>sit on mat table.  -KG    Special Tests/Palpation    Special Tests/Palpation Knee  -KG    Knee Palpation    Medial Joint Line Right:;Tender  -KG    Lateral Joint Line Right:;Tender  -KG    Posterior Joint Line Right:;Tender  -KG    Medial Gastroc Head Right:;Tender;Guarded/taut  -KG    Knee Palpation? Yes  -KG    Patellar Accessory Motions    Patellar Accessory Motions Tested? Yes  -KG    Superior glide Right:;Hypomobile  -KG    Inferior glide Right:;Hypomobile  -KG    Medial glide Right:;Hypomobile  -KG    Lateral glide Right:;Hypomobile  -KG    ROM (Range of Motion)    General ROM lower extremity range of motion deficits identified  -KG    General ROM Detail L knee AROM 2-108 deg  -KG    General LE Assessment    ROM knee, right: LE ROM deficit  -KG    Right Knee    Extension/Flexion AROM Deficit 8-102 deg  -KG    MMT (Manual Muscle Testing)    General MMT Assessment  Detail Fair quad set noted on R. Pt unable to perform active SLR this date without assist from PT. R knee MMT deferred at this time. R hip flex 4-/5. L knee/hip grossly 4+/5 in all planes.   -KG    Flexibility    Flexibility Tested? Lower Extremity  -KG    Lower Extremity Flexibility    Hamstrings Right:;Moderately limited  -KG    Gastrocnemius Right:;Moderately limited  -KG      User Key  (r) = Recorded By, (t) = Taken By, (c) = Cosigned By    Initials Name Provider Type    TASHA Tena PT Physical Therapist                            Therapy Education       11/07/17 1400          Therapy Education    Education Details POC education. Ice/edema education. HEP: QS, SAQ, SLR flex, towel gastroc stretch, heel prop ext stretch  -KG      Given HEP;Symptoms/condition management;Edema management  -KG      Program New  -KG      How Provided Verbal;Demonstration;Written  -KG      Provided to Patient  -KG      Level of Understanding Teach back education performed;Verbalized;Demonstrated  -KG        User Key  (r) = Recorded By, (t) = Taken By, (c) = Cosigned By    Initials Name Provider Type    KG Allie Tena PT Physical Therapist                PT OP Goals       11/07/17 1400       PT Short Term Goals    STG Date to Achieve 11/28/17  -KG     STG 1 Independent/compliant with HEP  -KG     STG 1 Progress New  -KG     STG 2 Perform R active SLR 2x10 independently with no quad lag present  -KG     STG 2 Progress New  -KG     STG 3 Demonstrate R knee ext AROM to 2 deg or less  -KG     STG 3 Progress New  -KG     STG 4 Demonstrate R knee flex AROM to 115 deg or greater  -KG     STG 4 Progress New  -KG     STG 5 Ambulate with SPC independently, good TKE/heel strike during stance  -KG     STG 5 Progress New  -KG     Long Term Goals    LTG Date to Achieve 12/19/17  -KG     LTG 1 Subjectively report 60% improvement or greater  -KG     LTG 1 Progress New  -KG     LTG 2 Demonstrate R knee flex/ext MMT to 4+/5 or greater  -KG      LTG 2 Progress New  -KG     LTG 3 Demonstrate R hip flex MMT to 4+/5 or greater  -KG     LTG 3 Progress New  -KG     LTG 4 Ambualte independently with no AD, good TKE/heel strike during stance  -KG     LTG 4 Progress New  -KG     LTG 5 Ascend/descend 3 steps, reciprocal pattern, no increased pain/compensation  -KG     LTG 5 Progress New  -KG     Time Calculation    PT Goal Re-Cert Due Date 11/28/17  -KG       User Key  (r) = Recorded By, (t) = Taken By, (c) = Cosigned By    Initials Name Provider Type    KG Allie Tena, PT Physical Therapist                PT Assessment/Plan       11/07/17 1400       PT Assessment    Functional Limitations Impaired gait;Decreased safety during functional activities;Limitations in community activities;Limitation in home management;Performance in leisure activities;Performance in self-care ADL  -KG     Impairments Balance;Gait;Edema;Impaired muscle endurance;Muscle strength;Pain;Range of motion  -KG     Assessment Comments Pt presents s/p R TKA on 10/16/17. Pt demonstrate an overall decrease in R knee ROM & strength, limiting performance of ADL's at this time. Pt unable to perform active SLR indepedently. Pt will benefit from skilled PT services to address these limitations for improvements in overall functional strength/mobility & facilitate return to PLOF.  -KG     Rehab Potential Good  -KG     Patient/caregiver participated in establishment of treatment plan and goals Yes  -KG     Patient would benefit from skilled therapy intervention Yes  -KG     PT Plan    PT Frequency 2x/week  -KG     Predicted Duration of Therapy Intervention (days/wks) 4-6 weeks  -KG     Planned CPT's? PT EVAL LOW COMPLEXITY: 60692;PT RE-EVAL: 08460;PT THER PROC EA 15 MIN: 53972;PT THER ACT EA 15 MIN: 50910;PT MANUAL THERAPY EA 15 MIN: 56003;PT NEUROMUSC RE-EDUCATION EA 15 MIN: 84236;PT GAIT TRAINING EA 15 MIN: 90295;PT SELF CARE/HOME MGMT/TRAIN EA 15: 23046;PT THER SUPP EA 15 MIN  -KG     Physical  "Therapy Interventions (Optional Details) balance training;gait training;home exercise program;joint mobilization;manual therapy techniques;modalities;neuromuscular re-education;patient/family education;ROM (Range of Motion);strengthening;stretching  -KG     PT Plan Comments HEP, LE stretching, knee/quad strengthening, knee ROM, gait training, balance, manual therapy prn, ice prn for pain. E-stim not covered per insurance. Pt also has hx of skin CA lateral calf area.  -KG       User Key  (r) = Recorded By, (t) = Taken By, (c) = Cosigned By    Initials Name Provider Type    TASHA Tena PT Physical Therapist                Modalities       11/07/17 1400          Ice    Patient denies application of Ice Yes  -KG      Patient reports will apply ice at home to involved area Yes  -KG        User Key  (r) = Recorded By, (t) = Taken By, (c) = Cosigned By    Initials Name Provider Type    TASHA Tena, PT Physical Therapist              Exercises       11/07/17 1400          Subjective Comments    Subjective Comments Refer to therapy pt history for details.  -KG      Subjective Pain    Able to rate subjective pain? yes  -KG      Pre-Treatment Pain Level 0  -KG      Post-Treatment Pain Level 0  -KG      Subjective Pain Comment At rest  -KG      Aquatics    Aquatics performed? No  -KG      Exercise 1    Exercise Name 1 Quad sets + Heel prop  -KG      Sets 1 1  -KG      Reps 1 10  -KG      Time (Seconds) 1 5\" hold  -KG      Exercise 2    Exercise Name 2 Supine SLR Flex  -KG      Sets 2 2  -KG      Reps 2 10  -KG      Additional Comments with PT assist  -KG      Exercise 3    Exercise Name 3 SAQ  -KG      Sets 3 2  -KG      Reps 3 10  -KG      Exercise 4    Exercise Name 4 Longsitting AA heel slides with strap  -KG      Sets 4 1  -KG      Reps 4 15  -KG      Exercise 5    Exercise Name 5 Longsitting towel/gastroc stretch  -KG      Reps 5 2  -KG      Time (Seconds) 5 30  -KG      Exercise 6    Exercise Name 6 Pro " II for ROM/endurance  -KG      Time (Minutes) 6 5 min  -KG      Additional Comments L1.0  -KG        User Key  (r) = Recorded By, (t) = Taken By, (c) = Cosigned By    Initials Name Provider Type    TASHA Tena PT Physical Therapist                              Outcome Measures       11/07/17 1400          Lower Extremity Functional Index    Any of your usual work, housework or school activities 3  -KG      Your usual hobbies, recreational or sporting activities 2  -KG      Getting into or out of the bath 2  -KG      Walking between rooms 4  -KG      Putting on your shoes or socks 4  -KG      Squatting 0  -KG      Lifting an object, like a bag of groceries from the floor 3  -KG      Performing light activities around your home 4  -KG      Performing heavy activities around your home 3  -KG      Getting into or out of a car 3  -KG      Walking 2 blocks 0  -KG      Walking a mile 0  -KG      Going up or down 10 stairs (about 1 flight of stairs) 3  -KG      Standing for 1 hour 1  -KG      Sitting for 1 hour 3  -KG      Running on even ground 0  -KG      Running on uneven ground 0  -KG      Making sharp turns while running fast 0  -KG      Hopping 0  -KG      Rolling over in bed 3  -KG      Total 38  -KG      Functional Assessment    Outcome Measure Options Lower Extremity Functional Scale (LEFS)  -KG        User Key  (r) = Recorded By, (t) = Taken By, (c) = Cosigned By    Initials Name Provider Type    TASHA Tena PT Physical Therapist            Time Calculation:   Start Time: 1406  Stop Time: 1458  Time Calculation (min): 52 min  Total Timed Code Minutes- PT: 37 minute(s)     Therapy Charges for Today     Code Description Service Date Service Provider Modifiers Qty    12639223486 HC PT EVAL LOW COMPLEXITY 1 11/7/2017 Allie Tena, PT GP 1    89374597667 HC PT THER PROC EA 15 MIN 11/7/2017 Allie Tena, PT GP 2          PT G-Codes  Outcome Measure Options: Lower Extremity Functional Scale  (LEFS)         Allie Tena, PT  11/7/2017

## 2017-11-13 ENCOUNTER — ANTICOAGULATION VISIT (OUTPATIENT)
Dept: PHARMACY | Facility: HOSPITAL | Age: 57
End: 2017-11-13

## 2017-11-13 ENCOUNTER — APPOINTMENT (OUTPATIENT)
Dept: PHYSICAL THERAPY | Facility: HOSPITAL | Age: 57
End: 2017-11-13

## 2017-11-13 ENCOUNTER — TELEPHONE (OUTPATIENT)
Dept: PHARMACY | Facility: HOSPITAL | Age: 57
End: 2017-11-13

## 2017-11-13 DIAGNOSIS — Z79.01 LONG-TERM (CURRENT) USE OF ANTICOAGULANTS: ICD-10-CM

## 2017-11-13 NOTE — TELEPHONE ENCOUNTER
Spoke with Ms. Serra, instructed how to get rid of extra medication.  All questions answered.  Told patient to take last dose tonight.

## 2017-11-15 ENCOUNTER — HOSPITAL ENCOUNTER (OUTPATIENT)
Dept: PHYSICAL THERAPY | Facility: HOSPITAL | Age: 57
Setting detail: THERAPIES SERIES
Discharge: HOME | End: 2017-11-15

## 2017-11-15 DIAGNOSIS — Z96.651 STATUS POST TOTAL RIGHT KNEE REPLACEMENT: Primary | ICD-10-CM

## 2017-11-15 PROCEDURE — 97110 THERAPEUTIC EXERCISES: CPT

## 2017-11-15 NOTE — THERAPY TREATMENT NOTE
Outpatient Physical Therapy Ortho Treatment Note  [facility]     Patient Name: Tiffani Serra  : 1960  MRN: [id number]  Today's Date: 11/15/2017      Visit Date: 11/15/2017  Visits 2/3. Sierra . MD f/santosh .   Visit Dx:    ICD-10-CM ICD-9-CM   1. Status post total right knee replacement Z96.651 V43.65       Patient Active Problem List   Diagnosis   • Osteoarthritis of right knee   • Acute foot pain   • Knee pain, acute   • Eversion deformity of left foot   • Plantar fasciitis of left foot   • Pes planus of left foot   • Primary osteoarthritis of right knee   • Status post total right knee replacement        Past Medical History:   Diagnosis Date   • Abdominal pain    • Abscess of labia    • Acute posthemorrhagic anemia 2015   • Anemia     history of, mild   • Anemia due to blood loss 2014   • Cancer     cosmo/right leg mole   • Contact dermatitis 2013    on labia and surrounding regions   • Cystitis     recurrent   • Diarrhea 2016   • Foot pain     porokeratoma causin metatarsalgia, right foot   • Generalized abdominal pain 2015   • Hypercholesterolemia    • Hypertensive disorder    • Infection of skin and subcutaneous tissue 2013   • Iron deficiency anemia 2016    improving   • Irritable bowel syndrome    • Knee pain, right    • Left sided ulcerative colitis 10/08/2015   • Malaise and fatigue 2011   • Obesity    • Pseudomembranous enterocolitis 2012   • Rectal hemorrhage 2015   • Scapulalgia 10/24/2014   • Sialoadenitis 2013   • Ulcerative colitis 2016    chronic, for 29 years.  flare   • Urinary tract infectious disease 2012   • Vitamin D deficiency 2012        Past Surgical History:   Procedure Laterality Date   • BLADDER SURGERY      bladder tacking x 3   • COLONOSCOPY  2011    Colitis found in rectum & sigmoid colon. Biopsy taken   • COLONOSCOPY  06/15/2016    Erythematous mucosa in the  rectum.Biopsied.One polyp in the transverse colon. Biopsied   • COLONOSCOPY  08/15/2008    Colitis of the rectum, the sigmoid and the transverse colon. Multiple biopsies were obtained from the rectum, the sigmoid and the transverse colon. Multiple biopsies were obtained from the cecum, the transverse colon, sigmoid & rectum   • EXCISION LESION  10/17/1969    removal of sebaceous cyst of neck   • JOINT REPLACEMENT Left    • KNEE ARTHROSCOPY  02/21/2005    Tears of the meidal and lateral meniscus and osteoarthritis of the knee. Arthroscopic medial and lateral meniscectomies of the right knee with chondroplasty of the medial, lateral femoral condyles and patella   • LYMPH NODE BIOPSY  05/12/2009    [place] lymph node biopsy, superficial and deep, within the right groin involving superficial femoral nodes and also the external iliac nodes. Melanoma of the right leg   • TOTAL ABDOMINAL HYSTERECTOMY WITH SALPINGO OOPHORECTOMY  2001   • TOTAL KNEE ARTHROPLASTY  08/08/2007    severe osteoarthritis of the left knee.     • WRIST GANGLION EXCISION  10/17/1969    left wrist             PT Ortho       11/15/17 1400    Subjective Comments    Subjective Comments Pt reports some mild knee pain. She tries to do HEP off/on throughout the day.   -JW    Precautions and Contraindications    Precautions/Limitations no known precautions/limitations  -JW    Precautions s/p R TKA 10/16/17  -JW    Contraindications No e-stim per insurance coverage & hx of skin CA  -JW    Subjective Pain    Able to rate subjective pain? yes  -JW    Pre-Treatment Pain Level 2  -JW    Posture/Observations    Posture/Observations Comments Amb with RW. Pt has her own SPC as well.   -JW      User Key  (r) = Recorded By, (t) = Taken By, (c) = Cosigned By    Initials Name Provider Type    SWEETIE Forbes PTA Physical Therapy Assistant                            PT Assessment/Plan       11/15/17 1400       PT Assessment    Functional Limitations Impaired  gait;Decreased safety during functional activities;Limitations in community activities;Limitation in home management;Performance in leisure activities;Performance in self-care ADL  -     Impairments Balance;Gait;Edema;Impaired muscle endurance;Muscle strength;Pain;Range of motion  -JW     Assessment Comments Fair evin of increased TE intensity today. ROM and strength need increasing. Good demo of amb with cane.   -JW     Rehab Potential Good  -JW     PT Plan    PT Frequency 2x/week  -JW     Predicted Duration of Therapy Intervention (days/wks) 4-6 weeks  -JW     PT Plan Comments Cont PT per POC.   -JW       User Key  (r) = Recorded By, (t) = Taken By, (c) = Cosigned By    Initials Name Provider Type    SWEETIE Forbes PTA Physical Therapy Assistant                Modalities       11/15/17 1400          Ice    Ice Applied Yes  -JW      Location R Knee  -JW      Rx Minutes 10 mins  -JW      Ice S/P Rx Yes  -JW        User Key  (r) = Recorded By, (t) = Taken By, (c) = Cosigned By    Initials Name Provider Type    SWEETIE Forbes PTA Physical Therapy Assistant                Exercises       11/15/17 1400          Subjective Comments    Subjective Comments Pt reports some mild knee pain. She tries to do HEP off/on throughout the day.   -JW      Subjective Pain    Able to rate subjective pain? yes  -JW      Pre-Treatment Pain Level 2  -JW      Exercise 1    Exercise Name 1 PRO2  -JW      Time (Minutes) 1 7'  -JW      Exercise 2    Exercise Name 2 Man ext stretch  -JW      Sets 2 2  -JW      Reps 2 10  -JW      Exercise 3    Exercise Name 3 Heelprop  -JW      Time (Minutes) 3 3  -JW      Exercise 4    Exercise Name 4 SAQ  -JW      Reps 4 25  -JW      Exercise 5    Exercise Name 5 SLR with min assist  -JW      Sets 5 2  -JW      Reps 5 10  -JW      Exercise 6    Exercise Name 6 LAQ  -JW      Reps 6 20  -JW      Exercise 7    Exercise Name 7 Seated knee flex stretch  -JW      Time (Minutes) 7 1'  -JW      Exercise 8     Exercise Name 8 Seated HS  -JW      Reps 8 20  -JW      Exercise 9    Exercise Name 9 Amb with SPC  -JW      Reps 9 --   1 lap around gym  -JW      Exercise 10    Exercise Name 10 Incline bd stretch  -JW      Time (Minutes) 10 1'  -JW      Exercise 11    Exercise Name 11 Step ups, 4in  -JW      Sets 11 2  -JW      Reps 11 10  -JW        User Key  (r) = Recorded By, (t) = Taken By, (c) = Cosigned By    Initials Name Provider Type    SWEETIE Forbes PTA Physical Therapy Assistant                               PT OP Goals       11/15/17 1400       PT Short Term Goals    STG Date to Achieve 11/28/17  -JW     STG 1 Independent/compliant with HEP  -JW     STG 1 Progress Progressing  -JW     STG 2 Perform R active SLR 2x10 independently with no quad lag present  -JW     STG 2 Progress Progressing  -JW     STG 3 Demonstrate R knee ext AROM to 2 deg or less  -JW     STG 3 Progress Progressing  -JW     STG 4 Demonstrate R knee flex AROM to 115 deg or greater  -JW     STG 4 Progress Progressing  -JW     STG 5 Ambulate with SPC independently, good TKE/heel strike during stance  -JW     STG 5 Progress Progressing  -JW     Long Term Goals    LTG Date to Achieve 12/19/17  -JW     LTG 1 Subjectively report 60% improvement or greater  -JW     LTG 1 Progress Progressing  -JW     LTG 2 Demonstrate R knee flex/ext MMT to 4+/5 or greater  -JW     LTG 2 Progress Progressing  -JW     LTG 3 Demonstrate R hip flex MMT to 4+/5 or greater  -JW     LTG 3 Progress Progressing  -JW     LTG 4 Ambualte independently with no AD, good TKE/heel strike during stance  -JW     LTG 4 Progress Progressing  -JW     LTG 5 Ascend/descend 3 steps, reciprocal pattern, no increased pain/compensation  -JW     LTG 5 Progress Progressing  -JW       User Key  (r) = Recorded By, (t) = Taken By, (c) = Cosigned By    Initials Name Provider Type    SWEETIE Forbes PTA Physical Therapy Assistant                    Time Calculation:   Start Time: 1359  Stop  Time: 1504  Time Calculation (min): 65 min  PT Non-Billable Time (min): 10 min  Total Timed Code Minutes- PT: 55 minute(s)    Therapy Charges for Today     Code Description Service Date Service Provider Modifiers Qty    00421276711 HC PT THER SUPP EA 15 MIN 11/15/2017 Ricardo Forbes PTA GP 1    24633166281 HC PT THER PROC EA 15 MIN 11/15/2017 Ricardo Forbes PTA GP 4                    Ricardo Forbes PTA  11/15/2017

## 2017-11-20 ENCOUNTER — HOSPITAL ENCOUNTER (OUTPATIENT)
Dept: PHYSICAL THERAPY | Facility: HOSPITAL | Age: 57
Setting detail: THERAPIES SERIES
Discharge: HOME | End: 2017-11-20
Attending: ORTHOPAEDIC SURGERY

## 2017-11-20 DIAGNOSIS — Z96.651 STATUS POST TOTAL RIGHT KNEE REPLACEMENT: Primary | ICD-10-CM

## 2017-11-20 PROCEDURE — 97110 THERAPEUTIC EXERCISES: CPT

## 2017-11-20 NOTE — THERAPY TREATMENT NOTE
Outpatient Physical Therapy Ortho Treatment Note  [facility]     Patient Name: Tiffani Serra  : 1960  MRN: [id number]  Today's Date: 2017      Visit Date: 2017   Attendance: 3  Subjective improvement: 75%  Recert: 17  MD Appointment: 17 @ 14:00 due to redness incision      Visit Dx:    ICD-10-CM ICD-9-CM   1. Status post total right knee replacement Z96.651 V43.65       Patient Active Problem List   Diagnosis   • Osteoarthritis of right knee   • Acute foot pain   • Knee pain, acute   • Eversion deformity of left foot   • Plantar fasciitis of left foot   • Pes planus of left foot   • Primary osteoarthritis of right knee   • Status post total right knee replacement        Past Medical History:   Diagnosis Date   • Abdominal pain    • Abscess of labia    • Acute posthemorrhagic anemia 2015   • Anemia     history of, mild   • Anemia due to blood loss 2014   • Cancer     cosmo/right leg mole   • Contact dermatitis 2013    on labia and surrounding regions   • Cystitis     recurrent   • Diarrhea 2016   • Foot pain     porokeratoma causin metatarsalgia, right foot   • Generalized abdominal pain 2015   • Hypercholesterolemia    • Hypertensive disorder    • Infection of skin and subcutaneous tissue 2013   • Iron deficiency anemia 2016    improving   • Irritable bowel syndrome    • Knee pain, right    • Left sided ulcerative colitis 10/08/2015   • Malaise and fatigue 2011   • Obesity    • Pseudomembranous enterocolitis 2012   • Rectal hemorrhage 2015   • Scapulalgia 10/24/2014   • Sialoadenitis 2013   • Ulcerative colitis 2016    chronic, for 29 years.  flare   • Urinary tract infectious disease 2012   • Vitamin D deficiency 2012        Past Surgical History:   Procedure Laterality Date   • BLADDER SURGERY      bladder tacking x 3   • COLONOSCOPY  2011    Colitis found in rectum &  "sigmoid colon. Biopsy taken   • COLONOSCOPY  06/15/2016    Erythematous mucosa in the rectum.Biopsied.One polyp in the transverse colon. Biopsied   • COLONOSCOPY  08/15/2008    Colitis of the rectum, the sigmoid and the transverse colon. Multiple biopsies were obtained from the rectum, the sigmoid and the transverse colon. Multiple biopsies were obtained from the cecum, the transverse colon, sigmoid & rectum   • EXCISION LESION  10/17/1969    removal of sebaceous cyst of neck   • JOINT REPLACEMENT Left    • KNEE ARTHROSCOPY  02/21/2005    Tears of the meidal and lateral meniscus and osteoarthritis of the knee. Arthroscopic medial and lateral meniscectomies of the right knee with chondroplasty of the medial, lateral femoral condyles and patella   • LYMPH NODE BIOPSY  05/12/2009    Otter lymph node biopsy, superficial and deep, within the right groin involving superficial femoral nodes and also the external iliac nodes. Melanoma of the right leg   • VT TOTAL KNEE ARTHROPLASTY Right 10/16/2017    Procedure: TOTAL KNEE ARTHROPLASTY;  Surgeon: Yury Marion MD;  Location: [facility];  Service: Orthopedics   • TOTAL ABDOMINAL HYSTERECTOMY WITH SALPINGO OOPHORECTOMY  2001   • TOTAL KNEE ARTHROPLASTY  08/08/2007    severe osteoarthritis of the left knee.     • WRIST GANGLION EXCISION  10/17/1969    left wrist             PT Ortho       11/20/17 1500    Subjective Comments    Subjective Comments Pt reports having pain at ITB region and medial knee. Pt also reports that on saturday she had a dime size redness that was very TTP at superior incision appeared red since saturday morning, pt states she was laying down for bed and \"it busted\". Pt reports the the bump been there a while and she MD aware of it.  PTA attempted to call MD, but he was in sx and req pt to make an office appointment for tomorrow. Pt states she been putting a tripple antibiotic  ointment and peroxide and keeping it covered with a large bandaide. " Appointment made with Dr. Marion for 11/21/17 at 14:00.   -EM    Precautions and Contraindications    Precautions/Limitations no known precautions/limitations  -EM    Precautions s/p R TKA 10/16/17  -EM    Contraindications No e-stim per insurance coverage & hx of skin CA  -EM    Posture/Observations    Posture/Observations Comments Presents amb with SC, amb throughout tx with no AD. Superior incision redness noted with open area-likely from a internal stitch working to surface.  Another possible stitch mid incision with no redness noted.  Occasional spasms in hamstrings and IT band during stead flexion. Quad lag present during SLR.   -EM    Right Knee    Extension/Flexion AROM Deficit 9-111° AAROM  -EM      User Key  (r) = Recorded By, (t) = Taken By, (c) = Cosigned By    Initials Name Provider Type    EM Julian Beck, PTA Physical Therapy Assistant                            PT Assessment/Plan       11/20/17 1500       PT Assessment    Functional Limitations Impaired gait;Decreased safety during functional activities;Limitations in community activities;Limitation in home management;Performance in leisure activities;Performance in self-care ADL  -EM     Impairments Balance;Gait;Edema;Impaired muscle endurance;Muscle strength;Pain;Range of motion  -EM     Assessment Comments Pt evin tx well, but had increased pain post tx. Quad lag present during SLR. Pt continues to progress towards meeting goals.Possible signs/symptoms of infection in superior incision. Follow up w/ MD  -EM     Rehab Potential Good  -EM     Patient/caregiver participated in establishment of treatment plan and goals Yes  -EM     Patient would benefit from skilled therapy intervention Yes  -EM     PT Plan    PT Frequency 2x/week  -EM     Predicted Duration of Therapy Intervention (days/wks) 4-6 weeks  -EM     PT Plan Comments Focus on extension therex and hamstring and gastroc stretches and continue gait training w/out AD. Monitor incision. Update HEP  "next tx.  -EM       User Key  (r) = Recorded By, (t) = Taken By, (c) = Cosigned By    Initials Name Provider Type    EM Julian Beck PTA Physical Therapy Assistant                Modalities       11/20/17 1500          Ice    Ice Applied Yes   Ice to go  -EM        User Key  (r) = Recorded By, (t) = Taken By, (c) = Cosigned By    Initials Name Provider Type    EILEEN STEELE KANDY Beck Physical Therapy Assistant                Exercises       11/20/17 1500          Subjective Comments    Subjective Comments Pt reports having pain at ITB region and medial knee. Pt also reports that on saturday she had a dime size redness that was very TTP at superior incision appeared red since saturday morning, pt states she was laying down for bed and \"it busted\". Pt reports the the bump been there a while and she MD aware of it.  PTA attempted to call MD, but he was in sx and req pt to make an office appointment for tomorrow. Pt states she been putting a tripple antibiotic  ointment and peroxide and keeping it covered with a large bandaide. Appointment made with Dr. Marion for 11/21/17 at 14:00.   -EM      Subjective Pain    Able to rate subjective pain? yes  -EM      Pre-Treatment Pain Level 0  -EM      Post-Treatment Pain Level 4  -EM      Exercise 1    Exercise Name 1 Pro II: 4.0  -EM      Time (Minutes) 1 10'  -EM      Exercise 2    Exercise Name 2 St Knee Flexion S  -EM      Sets 2 3  -EM      Time (Seconds) 2 30\"  -EM      Exercise 3    Exercise Name 3 St Hamstring S  -EM      Sets 3 3  -EM      Time (Seconds) 3 30\"  -EM      Exercise 4    Exercise Name 4 Incline Calf S  -EM      Sets 4 3  -EM      Reps 4 --  -EM      Time (Seconds) 4 30\"  -EM      Exercise 5    Exercise Name 5 SLR  -EM      Sets 5 2  -EM      Reps 5 10  -EM      Exercise 6    Exercise Name 6 SAQ   -EM      Sets 6 1  -EM      Reps 6 30  -EM      Exercise 7    Exercise Name 7 Heel Slides w/ straps  -EM      Sets 7 1  -EM      Reps 7 30  -EM      Exercise 8    " Exercise Name 8 LAQ   -EM      Sets 8 1  -EM      Reps 8 30  -EM      Exercise 9    Exercise Name 9 QS w/ heelprop  -EM      Sets 9 1  -EM      Reps 9 30  -EM        User Key  (r) = Recorded By, (t) = Taken By, (c) = Cosigned By    Initials Name Provider Type    EM Julian Beck PTA Physical Therapy Assistant                               PT OP Goals       11/20/17 1500       PT Short Term Goals    STG Date to Achieve 11/28/17  -EM     STG 1 Independent/compliant with HEP  -EM     STG 1 Progress Progressing  -EM     STG 2 Perform R active SLR 2x10 independently with no quad lag present  -EM     STG 2 Progress Progressing  -EM     STG 3 Demonstrate R knee ext AROM to 2 deg or less  -EM     STG 3 Progress Progressing  -EM     STG 4 Demonstrate R knee flex AROM to 115 deg or greater  -EM     STG 4 Progress Progressing  -EM     STG 5 Ambulate with SPC independently, good TKE/heel strike during stance  -EM     STG 5 Progress Partially Met  -EM     Long Term Goals    LTG Date to Achieve 12/19/17  -EM     LTG 1 Subjectively report 60% improvement or greater  -EM     LTG 1 Progress Met  -EM     LTG 2 Demonstrate R knee flex/ext MMT to 4+/5 or greater  -EM     LTG 2 Progress Progressing  -EM     LTG 3 Demonstrate R hip flex MMT to 4+/5 or greater  -EM     LTG 3 Progress Progressing  -EM     LTG 4 Ambualte independently with no AD, good TKE/heel strike during stance  -EM     LTG 4 Progress Progressing  -EM     LTG 5 Ascend/descend 3 steps, reciprocal pattern, no increased pain/compensation  -EM     LTG 5 Progress Progressing  -EM       User Key  (r) = Recorded By, (t) = Taken By, (c) = Cosigned By    Initials Name Provider Type    EM Julian Beck PTA Physical Therapy Assistant                Therapy Education       11/20/17 1500          Therapy Education    Education Details Pt edu to keep R knee dry. Pt ed on cryotherapy-3 to 5x a day, 20 mins at a time.   -EM      Given Symptoms/condition management  -EM      Program  New  -EM      How Provided Verbal  -EM      Provided to Patient  -EM      Level of Understanding Verbalized  -EM        User Key  (r) = Recorded By, (t) = Taken By, (c) = Cosigned By    Initials Name Provider Type    EM Julian Beck PTA Physical Therapy Assistant                Time Calculation:   Start Time: 1502  Stop Time: 1607  Time Calculation (min): 65 min  Total Timed Code Minutes- PT: 65 minute(s)    Therapy Charges for Today     Code Description Service Date Service Provider Modifiers Qty    [id number]  PT THER PROC EA 15 MIN 11/20/2017 Julian Beck PTA GP 4                    Julian Beck PTA  11/20/2017

## 2017-11-21 ENCOUNTER — OFFICE VISIT (OUTPATIENT)
Dept: ORTHOPEDIC SURGERY | Facility: CLINIC | Age: 57
End: 2017-11-21

## 2017-11-21 VITALS — BODY MASS INDEX: 44.9 KG/M2 | WEIGHT: 263 LBS | HEIGHT: 64 IN

## 2017-11-21 DIAGNOSIS — Z96.651 STATUS POST TOTAL RIGHT KNEE REPLACEMENT: Primary | ICD-10-CM

## 2017-11-21 PROCEDURE — 99024 POSTOP FOLLOW-UP VISIT: CPT | Performed by: ORTHOPAEDIC SURGERY

## 2017-11-21 RX ORDER — CLINDAMYCIN HYDROCHLORIDE 300 MG/1
300 CAPSULE ORAL 2 TIMES DAILY WITH MEALS
Qty: 60 CAPSULE | Refills: 0 | Status: SHIPPED | OUTPATIENT
Start: 2017-11-21 | End: 2018-01-11

## 2017-11-21 NOTE — PROGRESS NOTES
Subjective   Tiffani Serra is a 57 y.o. female. Wound check of Right TKA    History of Present Illness Patient is here today for wound check of Right TKA. Patient states that it bubbled up at the top and states that she felt it pop. Patient has a suture that has come through and has been draining since Sat. 11/18/2017.     The following portions of the patient's history were reviewed and updated as appropriate:   She  has a past medical history of Abdominal pain; Abscess of labia; Acute posthemorrhagic anemia (01/08/2015); Anemia; Anemia due to blood loss (11/20/2014); Cancer; Contact dermatitis (01/30/2013); Cystitis; Diarrhea (04/11/2016); Foot pain; Generalized abdominal pain (02/23/2015); Hypercholesterolemia; Hypertensive disorder; Infection of skin and subcutaneous tissue (01/30/2013); Iron deficiency anemia (04/11/2016); Irritable bowel syndrome; Knee pain, right; Left sided ulcerative colitis (10/08/2015); Malaise and fatigue (11/02/2011); Obesity; Pseudomembranous enterocolitis (11/02/2012); Rectal hemorrhage (07/01/2015); Scapulalgia (10/24/2014); Sialoadenitis (07/26/2013); Ulcerative colitis (04/11/2016); Urinary tract infectious disease (07/14/2012); and Vitamin D deficiency (05/14/2012).  She  does not have any pertinent problems on file.  She  has a past surgical history that includes Lymph node biopsy (05/12/2009); Bladder surgery (2001); Colonoscopy (07/25/2011); Colonoscopy (06/15/2016); Colonoscopy (08/15/2008); Knee arthroscopy (02/21/2005); Excision Lesion (10/17/1969); Wrist ganglion excision (10/17/1969); Total abdominal hysterectomy w/ bilateral salpingoophorectomy (2001); Total knee arthroplasty (08/08/2007); Joint replacement (Left); and total knee arthroplasty (Right, 10/16/2017).  Her family history includes Coronary artery disease in her other; Hypertension in her other.  She  reports that she has never smoked. She has never used smokeless tobacco. She reports that she does not  "drink alcohol or use illicit drugs.  Current Outpatient Prescriptions   Medication Sig Dispense Refill   • ferrous sulfate 325 (65 FE) MG tablet Take 325 mg by mouth Daily With Breakfast.     • mesalamine (APRISO) 0.375 g 24 hr capsule Take 375 mg by mouth Daily.     • nystatin-triamcinolone (MYCOLOG II) 627653-7.1 UNIT/GM-% cream      • clindamycin (CLEOCIN) 300 MG capsule Take 1 capsule by mouth 2 (Two) Times a Day With Meals. 60 capsule 0     No current facility-administered medications for this visit.      Current Outpatient Prescriptions on File Prior to Visit   Medication Sig   • ferrous sulfate 325 (65 FE) MG tablet Take 325 mg by mouth Daily With Breakfast.   • mesalamine (APRISO) 0.375 g 24 hr capsule Take 375 mg by mouth Daily.   • nystatin-triamcinolone (MYCOLOG II) 961681-8.1 UNIT/GM-% cream      No current facility-administered medications on file prior to visit.      She is allergic to other; keflex [cephalexin]; penicillins; and tape..    Review of Systems  REVIEW OF SYSTEMS:  Negative, other than presenting complaint.  HEENT: No headaches, diplopia, blurred vision, tinnitus, vertigo, epistaxis, hoarseness or sore throat.  Pulmonary: No cough, sputum, hemoptysis, dyspnea, wheezing, or chest pain.  Cardiac: No chest pain, palpitations, orthopnea, paroxysmal nocturnal dyspnea, shortness of breath, or pedal edema.  Gastrointestinal: No diarrhea, melena, or constipation.  Genitourinary: No dysuria, hematuria, nocturia, frequency, bladder or bowel incontinence.  Hematology: No history of any anemia, fatigue, fever, or chills or night sweats.  Dermatology: No rashes, pruritus, or increased pigmentation changes of the skin.   Ht 64\" (162.6 cm)  Wt 263 lb (119 kg)  LMP  (LMP Unknown)  BMI 45.14 kg/m2    Social History     Social History   • Marital status:      Spouse name: N/A   • Number of children: N/A   • Years of education: N/A     Occupational History   • Not on file.     Social History Main " Topics   • Smoking status: Never Smoker   • Smokeless tobacco: Never Used   • Alcohol use No   • Drug use: No   • Sexual activity: Defer     Other Topics Concern   • Not on file     Social History Narrative       HEENT: Normocephalic.  PERRLA.  TM's clear bilaterally.  Oropharynx: Clear.  Neck: Supple, with no adenopathy.  Chest: Equal bilateral expansion.  Clear to auscultation and percussion.  Heart: Regular sinus rhythm, S1 and S2 normal.  No murmurs or extra heart sounds heard.  Abdomen: Soft, nontender, and no organomegaly.  Neurological: cranial nerves II-XII normal Vascular: pulses are present  Dermatological: no rashes  or blemishes, or any abnormality of the skin.      Objective   Physical Exam    Incision line to the right knee has well healed. Subcutaneous suture has made it to the surface and will be removed.    Area to the right knee was prepped with alcohol.   The ends of a sub-Q vicryl stitch are visualized at the proximal apsect of the incision.  Suture was removed without difficulty under aseptic conditions. Bacitracin was applied over the area. Band aid was applied.    Clindamycin 300 mg- B.I.D was sent to the patient's pharmacy of choice.      Assessment/Plan   Tiffani was seen today for follow-up.    Diagnoses and all orders for this visit:    Status post total right knee replacement  -     clindamycin (CLEOCIN) 300 MG capsule; Take 1 capsule by mouth 2 (Two) Times a Day With Meals.    she will continue to progress motion and activity as tolerated.  Clean wound with soap and water.  F/u in 1 month with repeat xrays

## 2017-11-27 ENCOUNTER — HOSPITAL ENCOUNTER (OUTPATIENT)
Dept: PHYSICAL THERAPY | Facility: HOSPITAL | Age: 57
Setting detail: THERAPIES SERIES
Discharge: HOME | End: 2017-11-27
Attending: ORTHOPAEDIC SURGERY

## 2017-11-27 DIAGNOSIS — Z96.651 STATUS POST TOTAL RIGHT KNEE REPLACEMENT: Primary | ICD-10-CM

## 2017-11-27 PROCEDURE — 97110 THERAPEUTIC EXERCISES: CPT

## 2017-11-27 NOTE — THERAPY TREATMENT NOTE
Outpatient Physical Therapy Ortho Treatment Note  [facility]     Patient Name: Tiffani Serra  : 1960  MRN: [id number]  Today's Date: 2017      Visit Date: 2017   Attendance: 4/  Subjective improvement: 80%  Recert:17  MD Appointment: TBD    Visit Dx:    ICD-10-CM ICD-9-CM   1. Status post total right knee replacement Z96.651 V43.65       Patient Active Problem List   Diagnosis   • Osteoarthritis of right knee   • Acute foot pain   • Knee pain, acute   • Eversion deformity of left foot   • Plantar fasciitis of left foot   • Pes planus of left foot   • Primary osteoarthritis of right knee   • Status post total right knee replacement        Past Medical History:   Diagnosis Date   • Abdominal pain    • Abscess of labia    • Acute posthemorrhagic anemia 2015   • Anemia     history of, mild   • Anemia due to blood loss 2014   • Cancer     cosmo/right leg mole   • Contact dermatitis 2013    on labia and surrounding regions   • Cystitis     recurrent   • Diarrhea 2016   • Foot pain     porokeratoma causin metatarsalgia, right foot   • Generalized abdominal pain 2015   • Hypercholesterolemia    • Hypertensive disorder    • Infection of skin and subcutaneous tissue 2013   • Iron deficiency anemia 2016    improving   • Irritable bowel syndrome    • Knee pain, right    • Left sided ulcerative colitis 10/08/2015   • Malaise and fatigue 2011   • Obesity    • Pseudomembranous enterocolitis 2012   • Rectal hemorrhage 2015   • Scapulalgia 10/24/2014   • Sialoadenitis 2013   • Ulcerative colitis 2016    chronic, for 29 years.  flare   • Urinary tract infectious disease 2012   • Vitamin D deficiency 2012        Past Surgical History:   Procedure Laterality Date   • BLADDER SURGERY      bladder tacking x 3   • COLONOSCOPY  2011    Colitis found in rectum & sigmoid colon. Biopsy taken   •  COLONOSCOPY  06/15/2016    Erythematous mucosa in the rectum.Biopsied.One polyp in the transverse colon. Biopsied   • COLONOSCOPY  08/15/2008    Colitis of the rectum, the sigmoid and the transverse colon. Multiple biopsies were obtained from the rectum, the sigmoid and the transverse colon. Multiple biopsies were obtained from the cecum, the transverse colon, sigmoid & rectum   • EXCISION LESION  10/17/1969    removal of sebaceous cyst of neck   • JOINT REPLACEMENT Left    • KNEE ARTHROSCOPY  02/21/2005    Tears of the meidal and lateral meniscus and osteoarthritis of the knee. Arthroscopic medial and lateral meniscectomies of the right knee with chondroplasty of the medial, lateral femoral condyles and patella   • LYMPH NODE BIOPSY  05/12/2009    [place] lymph node biopsy, superficial and deep, within the right groin involving superficial femoral nodes and also the external iliac nodes. Melanoma of the right leg   • [place] TOTAL KNEE ARTHROPLASTY Right 10/16/2017    Procedure: TOTAL KNEE ARTHROPLASTY;  Surgeon: Yury Marion MD;  Location: [facility];  Service: Orthopedics   • TOTAL ABDOMINAL HYSTERECTOMY WITH SALPINGO OOPHORECTOMY  2001   • TOTAL KNEE ARTHROPLASTY  08/08/2007    severe osteoarthritis of the left knee.     • WRIST GANGLION EXCISION  10/17/1969    left wrist             PT Ortho       11/27/17 1300    Precautions and Contraindications    Precautions/Limitations no known precautions/limitations  -EM    Precautions s/p R TKA 10/16/17  -EM    Contraindications No e-stim per insurance coverage & hx of skin CA  -EM    Posture/Observations    Posture/Observations Comments Presents w/ SC and mild antalgic gait. Throughout tx, pt amb w/out AD. Incision improved and no s/s of infection. Excessive pronation of B ankle (L>R) noted.  Mild rash on superior incision 2° adhesive allergy.   -EM    Right Knee    Extension/Flexion AROM Deficit 4-110° AROM sup, 0-114° AAROM sup   -EM    Stairs Assessment/Treatment     "Number of Stairs 3 steps x4  -EM    Stairs, Handrail Location both sides  -EM    Stairs, Comment Pt amb up/down steps with correct and safe technique  -EM      User Key  (r) = Recorded By, (t) = Taken By, (c) = Cosigned By    Initials Name Provider Type    EILEEN Beck PTA Physical Therapy Assistant                            PT Assessment/Plan       11/27/17 1300       PT Assessment    Functional Limitations Impaired gait;Decreased safety during functional activities;Limitations in community activities;Limitation in home management;Performance in leisure activities;Performance in self-care ADL  -EM     Impairments Balance;Gait;Edema;Impaired muscle endurance;Muscle strength;Pain;Range of motion  -EM     Assessment Comments Pt evin tx well. Pt demo slight increase in R knee flexion, but continues the inability to full extend the knee. Pt demo an increase in pain when doing supine extension therex.  -EM     Rehab Potential Good  -EM     Patient/caregiver participated in establishment of treatment plan and goals Yes  -EM     Patient would benefit from skilled therapy intervention Yes  -EM     PT Plan    PT Frequency 2x/week  -EM     Predicted Duration of Therapy Intervention (days/wks) 4-6 weeks   -EM     PT Plan Comments Speak w/ PT about possible custom shoe orthodics to correct pronation.  Add TKE w/ TB to encourage knee ext.   -EM       User Key  (r) = Recorded By, (t) = Taken By, (c) = Cosigned By    Initials Name Provider Type    EILEEN Beck PTA Physical Therapy Assistant                Modalities       11/27/17 1300          Ice    Ice Applied Yes   Ice to go  -EM        User Key  (r) = Recorded By, (t) = Taken By, (c) = Cosigned By    Initials Name Provider Type    EILEEN Beck PTA Physical Therapy Assistant                Exercises       11/27/17 1300          Subjective Comments    Subjective Comments Pt reports, \"my knee feels quite stiff today\". Pt reports that Dr. Hobson(due to  " "having a emergency) removed stitch at top of R knee incision and prescribed antibiotics. Pt c/o knee hurting her when she went to [place] to briefly shop w/out a scooter. Pt HEP compliant. Pt reports that she is able to do 80% of her household chores except she is unable to properly mow the lawn.   -EM      Subjective Pain    Able to rate subjective pain? yes  -EM      Pre-Treatment Pain Level 2  -EM      Post-Treatment Pain Level 4  -EM      Exercise 1    Exercise Name 1 Pro II: 4.0  -EM      Time (Minutes) 1 10'  -EM      Exercise 2    Exercise Name 2 St Knee Flexion S  -EM      Sets 2 3  -EM      Time (Seconds) 2 30\"  -EM      Exercise 3    Exercise Name 3 St Hamstring S  -EM      Sets 3 3  -EM      Time (Seconds) 3 30\"  -EM      Exercise 4    Exercise Name 4 Incline Calf S  -EM      Sets 4 3  -EM      Time (Seconds) 4 30\"  -EM      Exercise 5    Exercise Name 5 Fwd Step ups-4\"  -EM      Sets 5 2  -EM      Reps 5 10  -EM      Exercise 6    Exercise Name 6 Shallow MS  -EM      Sets 6 2  -EM      Reps 6 10  -EM      Exercise 7    Exercise Name 7 Quad sets w/ heel prop  -EM      Sets 7 1  -EM      Reps 7 30  -EM      Exercise 8    Exercise Name 8 LAQ   -EM      Sets 8 1  -EM      Reps 8 30  -EM      Exercise 9    Exercise Name 9 Seated Marches  -EM      Sets 9 2  -EM      Reps 9 15  -EM      Exercise 10    Exercise Name 10 Seated Heel Slides w/ straps  -EM      Sets 10 1  -EM      Reps 10 30  -EM      Exercise 11    Exercise Name 11 Patellar mobs: Inf/superior/medial/lateral  -EM      Time (Minutes) 11 4'  -EM        User Key  (r) = Recorded By, (t) = Taken By, (c) = Cosigned By    Initials Name Provider Type    EM Julian Beck PTA Physical Therapy Assistant                        Manual Rx (last 36 hours)      Manual Treatments       11/27/17 1300          Manual Rx 1    Manual Rx 1 Location Patellar mobs: Inf/superior, medial/lateral  -EM      Manual Rx 1 Grade Grade 2  -EM      Manual Rx 1 Duration 4'  -EM   "      User Key  (r) = Recorded By, (t) = Taken By, (c) = Cosigned By    Initials Name Provider Type    EM Julian Beck PTA Physical Therapy Assistant                PT OP Goals       11/27/17 1300       PT Short Term Goals    STG Date to Achieve 11/28/17  -EM     STG 1 Independent/compliant with HEP  -EM     STG 1 Progress Met  -EM     STG 2 Perform R active SLR 2x10 independently with no quad lag present  -EM     STG 2 Progress Progressing  -EM     STG 3 Demonstrate R knee ext AROM to 2 deg or less  -EM     STG 3 Progress Progressing  -EM     STG 4 Demonstrate R knee flex AROM to 115 deg or greater  -EM     STG 4 Progress Progressing  -EM     STG 5 Ambulate with SPC independently, good TKE/heel strike during stance  -EM     STG 5 Progress Partially Met  -EM     Long Term Goals    LTG Date to Achieve 12/19/17  -EM     LTG 1 Subjectively report 60% improvement or greater  -EM     LTG 1 Progress Met  -EM     LTG 2 Demonstrate R knee flex/ext MMT to 4+/5 or greater  -EM     LTG 2 Progress Progressing  -EM     LTG 3 Demonstrate R hip flex MMT to 4+/5 or greater  -EM     LTG 3 Progress Progressing  -EM     LTG 4 Ambualte independently with no AD, good TKE/heel strike during stance  -EM     LTG 4 Progress Progressing  -EM     LTG 5 Ascend/descend 3 steps, reciprocal pattern, no increased pain/compensation  -EM     LTG 5 Progress Progressing  -EM     Time Calculation    PT Goal Re-Cert Due Date 11/28/17  -EM       User Key  (r) = Recorded By, (t) = Taken By, (c) = Cosigned By    Initials Name Provider Type    EM Julian Bekc PTA Physical Therapy Assistant                Therapy Education       11/27/17 1400          Therapy Education    Education Details HEP issued: quad sets, patellar mobs (inf/sup, med/lat), SAQ, LAQ, minisquats, & Hamstring stretch  -EM      Given HEP  -EM      Program New  -EM      How Provided Verbal;Demonstration;Written  -EM      Provided to Patient  -EM      Level of Understanding  Verbalized;Demonstrated  -EM        User Key  (r) = Recorded By, (t) = Taken By, (c) = Cosigned By    Initials Name Provider Type    EM Julian Beck PTA Physical Therapy Assistant                Time Calculation:   Start Time: 1301  Stop Time: 1414  Time Calculation (min): 73 min  Total Timed Code Minutes- PT: 73 minute(s)    Therapy Charges for Today     Code Description Service Date Service Provider Modifiers Qty    22765651780 HC PT THER PROC EA 15 MIN 11/27/2017 Julian Beck PTA GP 5                    Julian Beck PTA  11/27/2017

## 2017-11-29 ENCOUNTER — APPOINTMENT (OUTPATIENT)
Dept: PHYSICAL THERAPY | Facility: HOSPITAL | Age: 57
End: 2017-11-29
Attending: ORTHOPAEDIC SURGERY

## 2017-11-30 ENCOUNTER — HOSPITAL ENCOUNTER (OUTPATIENT)
Dept: PHYSICAL THERAPY | Facility: HOSPITAL | Age: 57
Setting detail: THERAPIES SERIES
Discharge: HOME | End: 2017-11-30
Attending: ORTHOPAEDIC SURGERY

## 2017-11-30 DIAGNOSIS — Z96.651 STATUS POST TOTAL RIGHT KNEE REPLACEMENT: Primary | ICD-10-CM

## 2017-11-30 PROCEDURE — 97110 THERAPEUTIC EXERCISES: CPT

## 2017-12-05 ENCOUNTER — APPOINTMENT (OUTPATIENT)
Dept: PHYSICAL THERAPY | Facility: HOSPITAL | Age: 57
End: 2017-12-05
Attending: ORTHOPAEDIC SURGERY

## 2017-12-08 DIAGNOSIS — Z96.651 STATUS POST TOTAL RIGHT KNEE REPLACEMENT: Primary | ICD-10-CM

## 2017-12-11 ENCOUNTER — OFFICE VISIT (OUTPATIENT)
Dept: ORTHOPEDIC SURGERY | Facility: CLINIC | Age: 57
End: 2017-12-11

## 2017-12-11 DIAGNOSIS — Z96.651 STATUS POST TOTAL RIGHT KNEE REPLACEMENT: Primary | ICD-10-CM

## 2017-12-11 DIAGNOSIS — M17.11 PRIMARY OSTEOARTHRITIS OF RIGHT KNEE: ICD-10-CM

## 2017-12-11 DIAGNOSIS — M25.561 ACUTE PAIN OF RIGHT KNEE: ICD-10-CM

## 2017-12-11 PROCEDURE — 99024 POSTOP FOLLOW-UP VISIT: CPT | Performed by: NURSE PRACTITIONER

## 2017-12-11 RX ORDER — CYCLOBENZAPRINE HCL 10 MG
TABLET ORAL
COMMUNITY
Start: 2017-11-24 | End: 2018-01-17 | Stop reason: SDDI

## 2017-12-11 RX ORDER — GABAPENTIN 300 MG/1
CAPSULE ORAL
Qty: 30 CAPSULE | Refills: 0 | Status: SHIPPED | OUTPATIENT
Start: 2017-12-11 | End: 2018-01-11

## 2017-12-11 NOTE — PROGRESS NOTES
Tiffani Serra is a 57 y.o. female returns for     Chief Complaint   Patient presents with   • Right Knee - Follow-up       HISTORY OF PRESENT ILLNESS: Patient is s/p right tka 10/16/17.  Patient attends PT at [facility].   She has reported at nerve pain in the lateral aspect of the thigh       CONCURRENT MEDICAL HISTORY:    Past Medical History:   Diagnosis Date   • Abdominal pain    • Abscess of labia    • Acute posthemorrhagic anemia 01/08/2015   • Anemia     history of, mild   • Anemia due to blood loss 11/20/2014   • Cancer     cosmo/right leg mole   • Contact dermatitis 01/30/2013    on labia and surrounding regions   • Cystitis     recurrent   • Diarrhea 04/11/2016   • Foot pain     porokeratoma causin metatarsalgia, right foot   • Generalized abdominal pain 02/23/2015   • Hypercholesterolemia    • Hypertensive disorder    • Infection of skin and subcutaneous tissue 01/30/2013   • Iron deficiency anemia 04/11/2016    improving   • Irritable bowel syndrome    • Knee pain, right    • Left sided ulcerative colitis 10/08/2015   • Malaise and fatigue 11/02/2011   • Obesity    • Pseudomembranous enterocolitis 11/02/2012   • Rectal hemorrhage 07/01/2015   • Scapulalgia 10/24/2014   • Sialoadenitis 07/26/2013   • Ulcerative colitis 04/11/2016    chronic, for 29 years.  flare   • Urinary tract infectious disease 07/14/2012   • Vitamin D deficiency 05/14/2012       Allergies   Allergen Reactions   • Other      Garlic diarrhea/heartburn   • Keflex [Cephalexin] Rash   • Penicillins Rash     nylon   • Tape Rash     Silk tape/swells         Current Outpatient Prescriptions:   •  clindamycin (CLEOCIN) 300 MG capsule, Take 1 capsule by mouth 2 (Two) Times a Day With Meals., Disp: 60 capsule, Rfl: 0  •  cyclobenzaprine (FLEXERIL) 10 MG tablet, , Disp: , Rfl:   •  ferrous sulfate 325 (65 FE) MG tablet, Take 325 mg by mouth Daily With Breakfast., Disp: , Rfl:   •  gabapentin (NEURONTIN) 300 MG capsule, 1 tab po at night, Disp: 30  capsule, Rfl: 0  •  mesalamine (APRISO) 0.375 g 24 hr capsule, Take 375 mg by mouth Daily., Disp: , Rfl:   •  nystatin-triamcinolone (MYCOLOG II) 579082-7.1 UNIT/GM-% cream, , Disp: , Rfl:     Past Surgical History:   Procedure Laterality Date   • BLADDER SURGERY  2001    bladder tacking x 3   • COLONOSCOPY  07/25/2011    Colitis found in rectum & sigmoid colon. Biopsy taken   • COLONOSCOPY  06/15/2016    Erythematous mucosa in the rectum.Biopsied.One polyp in the transverse colon. Biopsied   • COLONOSCOPY  08/15/2008    Colitis of the rectum, the sigmoid and the transverse colon. Multiple biopsies were obtained from the rectum, the sigmoid and the transverse colon. Multiple biopsies were obtained from the cecum, the transverse colon, sigmoid & rectum   • EXCISION LESION  10/17/1969    removal of sebaceous cyst of neck   • JOINT REPLACEMENT Left    • KNEE ARTHROSCOPY  02/21/2005    Tears of the meidal and lateral meniscus and osteoarthritis of the knee. Arthroscopic medial and lateral meniscectomies of the right knee with chondroplasty of the medial, lateral femoral condyles and patella   • LYMPH NODE BIOPSY  05/12/2009    [place] lymph node biopsy, superficial and deep, within the right groin involving superficial femoral nodes and also the external iliac nodes. Melanoma of the right leg   • MI TOTAL KNEE ARTHROPLASTY Right 10/16/2017    Procedure: TOTAL KNEE ARTHROPLASTY;  Surgeon: Yury Marion MD;  Location: [facility];  Service: Orthopedics   • TOTAL ABDOMINAL HYSTERECTOMY WITH SALPINGO OOPHORECTOMY  2001   • TOTAL KNEE ARTHROPLASTY  08/08/2007    severe osteoarthritis of the left knee.     • WRIST GANGLION EXCISION  10/17/1969    left wrist       ROS  No fevers or chills.  No chest pain or shortness of air.  No GI or  disturbances.    PHYSICAL EXAMINATION:       LMP  (LMP Unknown)    Physical Exam   Constitutional: She is oriented to person, place, and time. Vital signs are normal. She appears  well-developed and well-nourished. She is cooperative.   HENT:   Head: Normocephalic and atraumatic.   Neck: Trachea normal and phonation normal.   Pulmonary/Chest: Effort normal. No respiratory distress.   Abdominal: Soft. Normal appearance. She exhibits no distension.   Musculoskeletal:        Right knee: She exhibits no effusion.   Neurological: She is alert and oriented to person, place, and time. GCS eye subscore is 4. GCS verbal subscore is 5. GCS motor subscore is 6.   Skin: Skin is warm, dry and intact.   Psychiatric: She has a normal mood and affect. Her speech is normal and behavior is normal. Judgment and thought content normal. Cognition and memory are normal.   Vitals reviewed.      GAIT:     []  Normal  [x]  Antalgic    Assistive device: []  None  []  Walker     []  Crutches  []  Cane     []  Wheelchair  []  Stretcher    Right Knee Exam     Tenderness   Right knee tenderness location: distal lateral thigh     Range of Motion   Extension: normal   Right knee flexion: 115.     Other   Erythema: absent  Scars: present  Sensation: normal  Pulse: present  Swelling: none  Other tests: no effusion present              No results found.          ASSESSMENT:    Diagnoses and all orders for this visit:    Status post total right knee replacement    Primary osteoarthritis of right knee    Acute pain of right knee    Other orders  -     cyclobenzaprine (FLEXERIL) 10 MG tablet;   -     gabapentin (NEURONTIN) 300 MG capsule; 1 tab po at night          PLAN  Recommend low dose gabapentin for pain in the lateral thigh.   Continue to progress ROM and activity as tolerated and follow up in one month for recheck.      No Follow-up on file.    MEGAN Mata

## 2017-12-12 ENCOUNTER — APPOINTMENT (OUTPATIENT)
Dept: PHYSICAL THERAPY | Facility: HOSPITAL | Age: 57
End: 2017-12-12
Attending: ORTHOPAEDIC SURGERY

## 2017-12-14 ENCOUNTER — HOSPITAL ENCOUNTER (OUTPATIENT)
Dept: PHYSICAL THERAPY | Facility: HOSPITAL | Age: 57
Setting detail: THERAPIES SERIES
Discharge: HOME | End: 2017-12-14
Attending: ORTHOPAEDIC SURGERY

## 2017-12-14 DIAGNOSIS — Z96.651 STATUS POST TOTAL RIGHT KNEE REPLACEMENT: Primary | ICD-10-CM

## 2017-12-14 PROCEDURE — 97110 THERAPEUTIC EXERCISES: CPT

## 2017-12-14 NOTE — THERAPY DISCHARGE NOTE
Outpatient Physical Therapy Ortho Treatment Note/Discharge Summary  [facility]     Patient Name: Tiffani Serra  : 1960  MRN: [id number]  Today's Date: 2017      Attendance:   Subjective improvement: 90-95%  Discharge 17  MD Appointment: TBD    Visit Date: 2017    Visit Dx:    ICD-10-CM ICD-9-CM   1. Status post total right knee replacement Z96.651 V43.65       Patient Active Problem List   Diagnosis   • Osteoarthritis of right knee   • Acute foot pain   • Knee pain, acute   • Eversion deformity of left foot   • Plantar fasciitis of left foot   • Pes planus of left foot   • Primary osteoarthritis of right knee   • Status post total right knee replacement        Past Medical History:   Diagnosis Date   • Abdominal pain    • Abscess of labia    • Acute posthemorrhagic anemia 2015   • Anemia     history of, mild   • Anemia due to blood loss 2014   • Cancer     cosmo/right leg mole   • Contact dermatitis 2013    on labia and surrounding regions   • Cystitis     recurrent   • Diarrhea 2016   • Foot pain     porokeratoma causin metatarsalgia, right foot   • Generalized abdominal pain 2015   • Hypercholesterolemia    • Hypertensive disorder    • Infection of skin and subcutaneous tissue 2013   • Iron deficiency anemia 2016    improving   • Irritable bowel syndrome    • Knee pain, right    • Left sided ulcerative colitis 10/08/2015   • Malaise and fatigue 2011   • Obesity    • Pseudomembranous enterocolitis 2012   • Rectal hemorrhage 2015   • Scapulalgia 10/24/2014   • Sialoadenitis 2013   • Ulcerative colitis 2016    chronic, for 29 years.  flare   • Urinary tract infectious disease 2012   • Vitamin D deficiency 2012        Past Surgical History:   Procedure Laterality Date   • BLADDER SURGERY      bladder tacking x 3   • COLONOSCOPY  2011    Colitis found in rectum & sigmoid colon.  Biopsy taken   • COLONOSCOPY  06/15/2016    Erythematous mucosa in the rectum.Biopsied.One polyp in the transverse colon. Biopsied   • COLONOSCOPY  08/15/2008    Colitis of the rectum, the sigmoid and the transverse colon. Multiple biopsies were obtained from the rectum, the sigmoid and the transverse colon. Multiple biopsies were obtained from the cecum, the transverse colon, sigmoid & rectum   • EXCISION LESION  10/17/1969    removal of sebaceous cyst of neck   • JOINT REPLACEMENT Left    • KNEE ARTHROSCOPY  02/21/2005    Tears of the meidal and lateral meniscus and osteoarthritis of the knee. Arthroscopic medial and lateral meniscectomies of the right knee with chondroplasty of the medial, lateral femoral condyles and patella   • LYMPH NODE BIOPSY  05/12/2009    [place] lymph node biopsy, superficial and deep, within the right groin involving superficial femoral nodes and also the external iliac nodes. Melanoma of the right leg   • [place] TOTAL KNEE ARTHROPLASTY Right 10/16/2017    Procedure: TOTAL KNEE ARTHROPLASTY;  Surgeon: Yury Marion MD;  Location: [facility];  Service: Orthopedics   • TOTAL ABDOMINAL HYSTERECTOMY WITH SALPINGO OOPHORECTOMY  2001   • TOTAL KNEE ARTHROPLASTY  08/08/2007    severe osteoarthritis of the left knee.     • WRIST GANGLION EXCISION  10/17/1969    left wrist             PT Ortho       12/14/17 1700    Subjective Comments    Subjective Comments Patient wants to be done with therapy because she feels like she has been doing really good. States it is hard to get here because she has to watch her grandkids on short notice sometimes.   -NH    Precautions and Contraindications    Precautions/Limitations no known precautions/limitations  -NH    Precautions s/p R TKA 10/16/17  -NH    Contraindications No e-stim per insurance coverage & hx of skin CA  -NH    Subjective Pain    Able to rate subjective pain? yes  -NH    Post-Treatment Pain Level 4  -NH    Posture/Observations     Posture/Observations Comments Patient reports she hasn't been using cane for a couple weeks but she carries it with her in the car.   -NH    Knee Palpation    Medial Joint Line Right:;Tender  -NH    Patellar Accessory Motions    Superior glide Right:;Hypomobile  -NH    Inferior glide Right:;Hypomobile  -NH    Medial glide Right:;Hypomobile  -NH    Lateral glide Right:;Hypomobile  -NH    Right Knee    Extension/Flexion AROM Deficit 1-0-106   L knee measured to be 110 degrees flexion  -NH    MMT (Manual Muscle Testing)    General MMT Assessment lower extremity strength deficits identified  -NH    Lower Extremity    Lower Ext Manual Muscle Testing left knee strength deficit;right knee strength deficit  -NH    Left Knee    Knee Extension Gross Movement (4+/5) good plus  -NH    Knee Flexion Gross Movement (4+/5) good plus  -NH    Right Knee    Knee Extension Gross Movement (4/5) good  -NH    Knee Flexion Gross Movement (4/5) good  -NH    Lower Extremity Flexibility    Hamstrings Right:;Moderately limited  -NH    Gastrocnemius Right:;Moderately limited  -NH      User Key  (r) = Recorded By, (t) = Taken By, (c) = Cosigned By    Initials Name Provider Type    NH Jeanna Eastman, PT Physical Therapist                            PT Assessment/Plan       12/14/17 1700       PT Assessment    Functional Limitations --  -NH     Impairments --  -NH     Assessment Comments Patient unable to reach ROM goal for ROM; however, ROM is within 5 degrees of unaffected LE. Patient able to demonstrate reciprocal stairs with notable use of HR. Patient is being discharged from physical therapy at this time per patient request. Patient would have benefit from further therapy for further strengthening but Patient was able to verbalize understanding of comprehensive HEP. PT stressed compliance to HEP after discharge from therapy to continue to make improves s/p TKA. Patient verbalized understanding that she is to continue with exercises every  "day.   -NH     Rehab Potential Good  -NH     Patient/caregiver participated in establishment of treatment plan and goals Yes  -NH     Patient would benefit from skilled therapy intervention Yes  -NH     PT Plan    PT Plan Comments Discharged to [facility]  -NH       User Key  (r) = Recorded By, (t) = Taken By, (c) = Cosigned By    Initials Name Provider Type    NH Jeanna Eastman, PT Physical Therapist                    Exercises       12/14/17 1700          Subjective Comments    Subjective Comments Patient wants to be done with therapy because she feels like she has been doing really good. States it is hard to get here because she has to watch her grandkids on short notice sometimes.   -NH      Subjective Pain    Able to rate subjective pain? yes  -NH      Pre-Treatment Pain Level 4  -NH      Post-Treatment Pain Level 4  -NH      Exercise 1    Exercise Name 1 Pro II: 4.0-seat 9  -NH      Time (Minutes) 1 10'  -NH      Exercise 2    Exercise Name 2 St Knee Flexion S  -NH      Sets 2 3  -NH      Time (Seconds) 2 30\"  -NH      Exercise 3    Exercise Name 3 St Hamstring S  -NH      Sets 3 3  -NH      Time (Seconds) 3 30\"  -NH      Exercise 4    Exercise Name 4 Incline Calf S  -NH      Sets 4 3  -NH      Time (Seconds) 4 30\"  -NH      Exercise 5    Exercise Name 5 Fwd Step ups-6\"  -NH      Sets 5 2  -NH      Reps 5 10  -NH      Exercise 7    Exercise Name 7 sit to stand  -NH      Sets 7 2  -NH      Reps 7 10  -NH      Exercise 8    Exercise Name 8 LAQ   -NH      Sets 8 1  -NH      Reps 8 30  -NH      Exercise 13    Exercise Name 13 Sup SLR  -NH      Sets 13 1  -NH      Reps 13 12  -NH        User Key  (r) = Recorded By, (t) = Taken By, (c) = Cosigned By    Initials Name Provider Type    NH Jeanna Eastman, PT Physical Therapist                        Manual Rx (last 36 hours)      Manual Treatments       12/14/17 1700          Manual Rx 1    Manual Rx 1 Location Patellar mobs: Inf/superior, medial/lateral  -NH      Manual Rx " 1 Grade Grade 2  -NH      Manual Rx 1 Duration 4'  -NH        User Key  (r) = Recorded By, (t) = Taken By, (c) = Cosigned By    Initials Name Provider Type    NH Jeanna Eastman, PT Physical Therapist                PT OP Goals       12/14/17 1700       PT Short Term Goals    STG Date to Achieve 11/28/17  -NH     STG 1 Independent/compliant with HEP  -NH     STG 1 Progress Met  -NH     STG 2 Perform R active SLR 2x10 independently with no quad lag present  -NH     STG 2 Progress Not Met  -NH     STG 3 Demonstrate R knee ext AROM to 2 deg or less  -NH     STG 3 Progress Met  -NH     STG 4 Demonstrate R knee flex AROM to 115 deg or greater  -NH     STG 4 Progress Not Met  -NH     STG 5 Ambulate with SPC independently, good TKE/heel strike during stance  -NH     STG 5 Progress Met  -NH     Long Term Goals    LTG Date to Achieve 12/19/17  -NH     LTG 1 Subjectively report 60% improvement or greater  -NH     LTG 1 Progress Met  -NH     LTG 1 Progress Comments 90-95%  -NH     LTG 2 Demonstrate R knee flex/ext MMT to 4+/5 or greater  -NH     LTG 2 Progress Not Met  -NH     LTG 3 Demonstrate R hip flex MMT to 4+/5 or greater  -NH     LTG 3 Progress Not Met  -NH     LTG 4 Ambualte independently with no AD, good TKE/heel strike during stance  -NH     LTG 4 Progress Partially Met  -NH     LTG 4 Progress Comments No AD, slightly reduce heel strike  -NH     LTG 5 Ascend/descend 3 steps, reciprocal pattern, no increased pain/compensation  -NH     LTG 5 Progress Partially Met  -NH     Time Calculation    PT Goal Re-Cert Due Date 01/04/18  -NH       User Key  (r) = Recorded By, (t) = Taken By, (c) = Cosigned By    Initials Name Provider Type    NH Jeanna Eastman, PT Physical Therapist          Therapy Education  Education Details: Adherance to HEP even after discharge  Given: HEP  Program: Reinforced  How Provided: Verbal, Demonstration  Provided to: Patient  Level of Understanding: Verbalized, Demonstrated    Outcome Measure  Options: Lower Extremity Functional Scale (LEFS)  Lower Extremity Functional Index  Any of your usual work, housework or school activities: No difficulty  Your usual hobbies, recreational or sporting activities: A little bit of difficulty  Getting into or out of the bath: No difficulty  Walking between rooms: No difficulty  Putting on your shoes or socks: No difficulty  Squatting: A little bit of difficulty  Lifting an object, like a bag of groceries from the floor: No difficulty  Performing light activities around your home: No difficulty  Performing heavy activities around your home: No difficulty  Getting into or out of a car: No difficulty  Walking 2 blocks: A little bit of difficulty  Walking a mile: A little bit of difficulty  Going up or down 10 stairs (about 1 flight of stairs): No difficulty  Standing for 1 hour: Quite a bit of difficulty  Sitting for 1 hour: A little bit of difficulty  Running on even ground: Extreme difficulty or unable to perform activity  Running on uneven ground: Extreme difficulty or unable to perform activity  Making sharp turns while running fast: Extreme difficulty or unable to perform activity  Hopping: Extreme difficulty or unable to perform activity  Rolling over in bed: No difficulty  Total: 56      Time Calculation:   Start Time: 1655  Stop Time: 1750  Time Calculation (min): 55 min    Therapy Charges for Today     Code Description Service Date Service Provider Modifiers Qty    96056691668 HC PT THER PROC EA 15 MIN 12/14/2017 Jeanna Eastman, PT GP 4          PT G-Codes  Outcome Measure Options: Lower Extremity Functional Scale (LEFS)     OP PT Discharge Summary  Date of Discharge: 12/14/17  Reason for Discharge: Patient/Caregiver request  Outcomes Achieved: Patient able to partially acheive established goals  Discharge Destination: Home with home program  Discharge Instructions: Discharged to [facility] per Patient request      Jeanna Eastman, PT  12/14/2017

## 2018-01-02 ENCOUNTER — APPOINTMENT (OUTPATIENT)
Dept: LAB | Facility: HOSPITAL | Age: 58
End: 2018-01-02

## 2018-01-02 LAB
ALBUMIN SERPL-MCNC: 4.3 G/DL (ref 3.4–4.8)
ALBUMIN/GLOB SERPL: 1.3 G/DL (ref 1.1–1.8)
ALP SERPL-CCNC: 99 U/L (ref 38–126)
ALT SERPL W P-5'-P-CCNC: 52 U/L (ref 9–52)
ANION GAP SERPL CALCULATED.3IONS-SCNC: 12 MMOL/L (ref 5–15)
AST SERPL-CCNC: 33 U/L (ref 14–36)
BASOPHILS # BLD AUTO: 0.02 10*3/MM3 (ref 0–0.2)
BASOPHILS NFR BLD AUTO: 0.3 % (ref 0–2)
BILIRUB SERPL-MCNC: 0.5 MG/DL (ref 0.2–1.3)
BUN BLD-MCNC: 13 MG/DL (ref 7–21)
BUN/CREAT SERPL: 18.6 (ref 7–25)
CALCIUM SPEC-SCNC: 9.4 MG/DL (ref 8.4–10.2)
CHLORIDE SERPL-SCNC: 103 MMOL/L (ref 95–110)
CO2 SERPL-SCNC: 25 MMOL/L (ref 22–31)
CREAT BLD-MCNC: 0.7 MG/DL (ref 0.5–1)
DEPRECATED RDW RBC AUTO: 42.1 FL (ref 36.4–46.3)
EOSINOPHIL # BLD AUTO: 0.05 10*3/MM3 (ref 0–0.7)
EOSINOPHIL NFR BLD AUTO: 0.8 % (ref 0–7)
ERYTHROCYTE [DISTWIDTH] IN BLOOD BY AUTOMATED COUNT: 13.3 % (ref 11.5–14.5)
GFR SERPL CREATININE-BSD FRML MDRD: 86 ML/MIN/1.73 (ref 51–120)
GLOBULIN UR ELPH-MCNC: 3.3 GM/DL (ref 2.3–3.5)
GLUCOSE BLD-MCNC: 123 MG/DL (ref 60–100)
HCT VFR BLD AUTO: 39.1 % (ref 35–45)
HGB BLD-MCNC: 12.8 G/DL (ref 12–15.5)
IMM GRANULOCYTES # BLD: 0.02 10*3/MM3 (ref 0–0.02)
IMM GRANULOCYTES NFR BLD: 0.3 % (ref 0–0.5)
IRON 24H UR-MRATE: 31 MCG/DL (ref 37–170)
LYMPHOCYTES # BLD AUTO: 1.5 10*3/MM3 (ref 0.6–4.2)
LYMPHOCYTES NFR BLD AUTO: 24.1 % (ref 10–50)
MCH RBC QN AUTO: 28.4 PG (ref 26.5–34)
MCHC RBC AUTO-ENTMCNC: 32.7 G/DL (ref 31.4–36)
MCV RBC AUTO: 86.9 FL (ref 80–98)
MONOCYTES # BLD AUTO: 0.35 10*3/MM3 (ref 0–0.9)
MONOCYTES NFR BLD AUTO: 5.6 % (ref 0–12)
NEUTROPHILS # BLD AUTO: 4.28 10*3/MM3 (ref 2–8.6)
NEUTROPHILS NFR BLD AUTO: 68.9 % (ref 37–80)
PLATELET # BLD AUTO: 239 10*3/MM3 (ref 150–450)
PMV BLD AUTO: 9.2 FL (ref 8–12)
POTASSIUM BLD-SCNC: 3.4 MMOL/L (ref 3.5–5.1)
PROT SERPL-MCNC: 7.6 G/DL (ref 6.3–8.6)
RBC # BLD AUTO: 4.5 10*6/MM3 (ref 3.77–5.16)
SODIUM BLD-SCNC: 140 MMOL/L (ref 137–145)
WBC NRBC COR # BLD: 6.22 10*3/MM3 (ref 3.2–9.8)

## 2018-01-02 PROCEDURE — 80053 COMPREHEN METABOLIC PANEL: CPT | Performed by: PHYSICIAN ASSISTANT

## 2018-01-02 PROCEDURE — 85025 COMPLETE CBC W/AUTO DIFF WBC: CPT | Performed by: PHYSICIAN ASSISTANT

## 2018-01-02 PROCEDURE — 36415 COLL VENOUS BLD VENIPUNCTURE: CPT | Performed by: PHYSICIAN ASSISTANT

## 2018-01-02 PROCEDURE — 83540 ASSAY OF IRON: CPT | Performed by: PHYSICIAN ASSISTANT

## 2018-01-02 PROCEDURE — 87230 TOXIN/ANTITOXIN ASY TISS CUL: CPT | Performed by: PHYSICIAN ASSISTANT

## 2018-01-10 LAB — Lab: NORMAL

## 2018-01-11 ENCOUNTER — OFFICE VISIT (OUTPATIENT)
Dept: ORTHOPEDIC SURGERY | Facility: CLINIC | Age: 58
End: 2018-01-11

## 2018-01-11 VITALS — BODY MASS INDEX: 44.9 KG/M2 | HEIGHT: 64 IN | WEIGHT: 263 LBS

## 2018-01-11 DIAGNOSIS — Z96.651 STATUS POST TOTAL RIGHT KNEE REPLACEMENT: Primary | ICD-10-CM

## 2018-01-11 PROCEDURE — 99024 POSTOP FOLLOW-UP VISIT: CPT | Performed by: NURSE PRACTITIONER

## 2018-01-11 NOTE — PROGRESS NOTES
Tiffani Serra is a 57 y.o. female returns for     Chief Complaint   Patient presents with   • Right Knee - Follow-up       HISTORY OF PRESENT ILLNESS:  This is a 57-year-old  female in the office today status post 12 weeks total knee arthroplasty which was performed by Dr. Marion.  She reports that she has no problems and has discharged herself from physical therapy.  She's continued her home exercises and reports an occasional ache and pain but overall she feels much better than she did prior to the procedure.     CONCURRENT MEDICAL HISTORY:    Past Medical History:   Diagnosis Date   • Abdominal pain    • Abscess of labia    • Acute posthemorrhagic anemia 01/08/2015   • Anemia     history of, mild   • Anemia due to blood loss 11/20/2014   • Cancer     cosmo/right leg mole   • Contact dermatitis 01/30/2013    on labia and surrounding regions   • Cystitis     recurrent   • Diarrhea 04/11/2016   • Foot pain     porokeratoma causin metatarsalgia, right foot   • Generalized abdominal pain 02/23/2015   • Hypercholesterolemia    • Hypertensive disorder    • Infection of skin and subcutaneous tissue 01/30/2013   • Iron deficiency anemia 04/11/2016    improving   • Irritable bowel syndrome    • Knee pain, right    • Left sided ulcerative colitis 10/08/2015   • Malaise and fatigue 11/02/2011   • Obesity    • Pseudomembranous enterocolitis 11/02/2012   • Rectal hemorrhage 07/01/2015   • Scapulalgia 10/24/2014   • Sialoadenitis 07/26/2013   • Ulcerative colitis 04/11/2016    chronic, for 29 years.  flare   • Urinary tract infectious disease 07/14/2012   • Vitamin D deficiency 05/14/2012       Allergies   Allergen Reactions   • Other      Garlic diarrhea/heartburn   • Keflex [Cephalexin] Rash   • Penicillins Rash     nylon   • Tape Rash     Silk tape/swells         Current Outpatient Prescriptions:   •  cyclobenzaprine (FLEXERIL) 10 MG tablet, , Disp: , Rfl:   •  ferrous sulfate 325 (65 FE) MG tablet, Take 325 mg by  "mouth Daily With Breakfast., Disp: , Rfl:   •  mesalamine (APRISO) 0.375 g 24 hr capsule, Take 375 mg by mouth Daily., Disp: , Rfl:   •  nystatin-triamcinolone (MYCOLOG II) 951028-0.1 UNIT/GM-% cream, , Disp: , Rfl:     Past Surgical History:   Procedure Laterality Date   • BLADDER SURGERY  2001    bladder tacking x 3   • COLONOSCOPY  07/25/2011    Colitis found in rectum & sigmoid colon. Biopsy taken   • COLONOSCOPY  06/15/2016    Erythematous mucosa in the rectum.Biopsied.One polyp in the transverse colon. Biopsied   • COLONOSCOPY  08/15/2008    Colitis of the rectum, the sigmoid and the transverse colon. Multiple biopsies were obtained from the rectum, the sigmoid and the transverse colon. Multiple biopsies were obtained from the cecum, the transverse colon, sigmoid & rectum   • EXCISION LESION  10/17/1969    removal of sebaceous cyst of neck   • JOINT REPLACEMENT Left    • KNEE ARTHROSCOPY  02/21/2005    Tears of the meidal and lateral meniscus and osteoarthritis of the knee. Arthroscopic medial and lateral meniscectomies of the right knee with chondroplasty of the medial, lateral femoral condyles and patella   • LYMPH NODE BIOPSY  05/12/2009    [place] lymph node biopsy, superficial and deep, within the right groin involving superficial femoral nodes and also the external iliac nodes. Melanoma of the right leg   • [place] TOTAL KNEE ARTHROPLASTY Right 10/16/2017    Procedure: TOTAL KNEE ARTHROPLASTY;  Surgeon: Yury Marion MD;  [facility];  Service: Orthopedics   • TOTAL ABDOMINAL HYSTERECTOMY WITH SALPINGO OOPHORECTOMY  2001   • TOTAL KNEE ARTHROPLASTY  08/08/2007    severe osteoarthritis of the left knee.     • WRIST GANGLION EXCISION  10/17/1969    left wrist       ROS  No fevers or chills.  No chest pain or shortness of air.  No GI or  disturbances.    PHYSICAL EXAMINATION:       Ht 162.6 cm (64\")  Wt 119 kg (263 lb)  LMP  (LMP Unknown)  BMI 45.14 kg/m2    Physical Exam   Constitutional: She is " oriented to person, place, and time. Vital signs are normal. She appears well-developed and well-nourished. She is cooperative.   HENT:   Head: Normocephalic and atraumatic.   Neck: Trachea normal and phonation normal.   Pulmonary/Chest: Effort normal. No respiratory distress.   Abdominal: Soft. Normal appearance. She exhibits no distension.   Musculoskeletal:        Right knee: She exhibits no effusion.   Neurological: She is alert and oriented to person, place, and time. GCS eye subscore is 4. GCS verbal subscore is 5. GCS motor subscore is 6.   Skin: Skin is warm, dry and intact.   Psychiatric: She has a normal mood and affect. Her speech is normal and behavior is normal. Judgment and thought content normal. Cognition and memory are normal.   Vitals reviewed.      GAIT:     [x]  Normal  []  Antalgic    Assistive device: [x]  None  []  Walker     []  Crutches  []  Cane     []  Wheelchair  []  Stretcher    Right Knee Exam     Tenderness   The patient is experiencing no tenderness.         Range of Motion   Extension: normal   Right knee flexion: 110.     Other   Erythema: absent  Sensation: normal  Pulse: present  Swelling: mild  Other tests: no effusion present      Left Knee Exam   Left knee exam is normal.    Muscle Strength     The patient has normal left knee strength.              No results found.          ASSESSMENT:    Diagnoses and all orders for this visit:    Status post total right knee replacement          PLAN  Recommend continued home exercises progression of activity and range of motion as tolerated based on pain.  Follow-up in 3 months with myself or Dr. Marion for recheck.  No Follow-up on file.    MEGAN Mata

## 2018-01-17 ENCOUNTER — OFFICE VISIT (OUTPATIENT)
Dept: GASTROENTEROLOGY | Facility: CLINIC | Age: 58
End: 2018-01-17

## 2018-01-17 VITALS
HEIGHT: 64 IN | BODY MASS INDEX: 45.89 KG/M2 | WEIGHT: 268.8 LBS | SYSTOLIC BLOOD PRESSURE: 146 MMHG | DIASTOLIC BLOOD PRESSURE: 88 MMHG | HEART RATE: 84 BPM

## 2018-01-17 DIAGNOSIS — D50.9 IRON DEFICIENCY ANEMIA, UNSPECIFIED IRON DEFICIENCY ANEMIA TYPE: ICD-10-CM

## 2018-01-17 DIAGNOSIS — R10.9 CHRONIC ABDOMINAL PAIN: ICD-10-CM

## 2018-01-17 DIAGNOSIS — G89.29 CHRONIC ABDOMINAL PAIN: ICD-10-CM

## 2018-01-17 DIAGNOSIS — K51.818 OTHER ULCERATIVE COLITIS WITH OTHER COMPLICATION (HCC): Primary | ICD-10-CM

## 2018-01-17 DIAGNOSIS — R19.7 DIARRHEA, UNSPECIFIED TYPE: ICD-10-CM

## 2018-01-17 PROCEDURE — 99213 OFFICE O/P EST LOW 20 MIN: CPT | Performed by: PHYSICIAN ASSISTANT

## 2018-01-17 NOTE — PROGRESS NOTES
Chief Complaint   Patient presents with   • Ulcerative Colitis   • Anemia   • Abdominal Pain   • Diarrhea       ENDO PROCEDURE ORDERED:    Subjective    Tiffani Serra is a 57 y.o. female. she is here today for follow-up.    History of Present Illness    Patient is seen on a recheck of her ulcerative colitis, abdominal pain and history of anemia.  Last seen 06/07/2017.  Patient returned today.  She had knee surgery in October and missed her followup.  She feels she is still doing well on the Apriso 2 daily.  She denied abdominal pain, diarrhea, or blood in her stool.  She denied heartburn, nausea, vomiting or dysphagia.  Weight is down 8 pounds since last visit, she has been trying to lose weight.  Last colonoscopy 06/15/2016.      Laboratory on 01/02/2018 showed serum iron 31.  CBC was normal.  CMP showed a glucose of 123, potassium 3.4, otherwise normal.  She had a C. difficile cytotoxin antibody that was negative.  I tried to cancel the order, it had been put in over a year ago and should not have been done, but the lab would not cancel it because it was already performed.      ASSESSMENT AND PLAN:  Ulcerative colitis, iron deficiency anemia appears stable on current regimen. I encouraged her to continue on iron supplementation. S he will need colonoscopy this year.  Recommend followup in 6 months with CBC, CMP, and iron studies prior.  She will follow up sooner if needed.  Further pending clinical course and the results of the above.           The following portions of the patient's history were reviewed and updated as appropriate:   Past Medical History:   Diagnosis Date   • Abdominal pain    • Abscess of labia    • Acute posthemorrhagic anemia 01/08/2015   • Anemia     history of, mild   • Anemia due to blood loss 11/20/2014   • Cancer     cosmo/right leg mole   • Contact dermatitis 01/30/2013    on labia and surrounding regions   • Cystitis     recurrent   • Diarrhea 04/11/2016   • Foot pain      porokeratoma causin metatarsalgia, right foot   • Generalized abdominal pain 02/23/2015   • Hypercholesterolemia    • Hypertensive disorder    • Infection of skin and subcutaneous tissue 01/30/2013   • Iron deficiency anemia 04/11/2016    improving   • Irritable bowel syndrome    • Knee pain, right    • Left sided ulcerative colitis 10/08/2015   • Malaise and fatigue 11/02/2011   • Obesity    • Pseudomembranous enterocolitis 11/02/2012   • Rectal hemorrhage 07/01/2015   • Scapulalgia 10/24/2014   • Sialoadenitis 07/26/2013   • Ulcerative colitis 04/11/2016    chronic, for 29 years.  flare   • Urinary tract infectious disease 07/14/2012   • Vitamin D deficiency 05/14/2012     Past Surgical History:   Procedure Laterality Date   • BLADDER SURGERY  2001    bladder tacking x 3   • COLONOSCOPY  07/25/2011    Colitis found in rectum & sigmoid colon. Biopsy taken   • COLONOSCOPY  06/15/2016    Erythematous mucosa in the rectum.Biopsied.One polyp in the transverse colon. Biopsied   • COLONOSCOPY  08/15/2008    Colitis of the rectum, the sigmoid and the transverse colon. Multiple biopsies were obtained from the rectum, the sigmoid and the transverse colon. Multiple biopsies were obtained from the cecum, the transverse colon, sigmoid & rectum   • EXCISION LESION  10/17/1969    removal of sebaceous cyst of neck   • JOINT REPLACEMENT Left    • KNEE ARTHROSCOPY  02/21/2005    Tears of the meidal and lateral meniscus and osteoarthritis of the knee. Arthroscopic medial and lateral meniscectomies of the right knee with chondroplasty of the medial, lateral femoral condyles and patella   • LYMPH NODE BIOPSY  05/12/2009    [place] lymph node biopsy, superficial and deep, within the right groin involving superficial femoral nodes and also the external iliac nodes. Melanoma of the right leg   • MI TOTAL KNEE ARTHROPLASTY Right 10/16/2017    Procedure: TOTAL KNEE ARTHROPLASTY;  Surgeon: Yury Marion MD;  Location: [facility];  Service:  "Orthopedics   • TOTAL ABDOMINAL HYSTERECTOMY WITH SALPINGO OOPHORECTOMY     • TOTAL KNEE ARTHROPLASTY  2007    severe osteoarthritis of the left knee.     • WRIST GANGLION EXCISION  10/17/1969    left wrist     Family History   Problem Relation Age of Onset   • Coronary artery disease Other    • Hypertension Other      OB History      Para Term  AB Living    1 1 1       SAB TAB Ectopic Multiple Live Births                Allergies   Allergen Reactions   • Other      Garlic diarrhea/heartburn   • Keflex [Cephalexin] Rash   • Penicillins Rash     nylon   • Tape Rash     Silk tape/swells     Social History     Social History   • Marital status:      Spouse name: N/A   • Number of children: N/A   • Years of education: N/A     Social History Main Topics   • Smoking status: Never Smoker   • Smokeless tobacco: Never Used   • Alcohol use No   • Drug use: No   • Sexual activity: Defer     Other Topics Concern   • None     Social History Narrative       Current Outpatient Prescriptions:   •  ferrous sulfate 325 (65 FE) MG tablet, Take 325 mg by mouth Daily With Breakfast., Disp: , Rfl:   •  mesalamine (APRISO) 0.375 g 24 hr capsule, Take 375 mg by mouth Daily. 2 daily, Disp: , Rfl:   Review of Systems  Review of Systems       Objective    /88 (BP Location: Left arm)  Pulse 84  Ht 162.6 cm (64.02\")  Wt 122 kg (268 lb 12.8 oz)  LMP  (LMP Unknown)  BMI 46.12 kg/m2  Physical Exam   Constitutional: She is oriented to person, place, and time. She appears well-developed and well-nourished. No distress.   Ill appearing   HENT:   Head: Normocephalic and atraumatic.   Eyes: EOM are normal. Pupils are equal, round, and reactive to light.   Neck: Normal range of motion.   Cardiovascular: Normal rate, regular rhythm and normal heart sounds.    Pulmonary/Chest: Effort normal and breath sounds normal.   Abdominal: Soft. She exhibits no shifting dullness, no distension, no abdominal bruit, no ascites " and no mass. Bowel sounds are decreased. There is no hepatosplenomegaly. There is tenderness in the periumbilical area. There is no rigidity, no rebound, no guarding and no CVA tenderness. No hernia. Hernia confirmed negative in the ventral area.   Obese, mild diffuse   Musculoskeletal: Normal range of motion.   Neurological: She is alert and oriented to person, place, and time.   Skin: Skin is warm. She is not diaphoretic.   Psychiatric: She has a normal mood and affect. Her behavior is normal. Judgment and thought content normal.   Nursing note and vitals reviewed.    Assessment/Plan      1. Other ulcerative colitis with other complication    2. Chronic abdominal pain    3. Iron deficiency anemia, unspecified iron deficiency anemia type    4. Diarrhea, unspecified type    .   Tiffani was seen today for ulcerative colitis, anemia, abdominal pain and diarrhea.    Diagnoses and all orders for this visit:    Other ulcerative colitis with other complication  -     CBC Auto Differential; Future  -     Comprehensive Metabolic Panel; Future  -     Iron and TIBC; Future  -     Ferritin; Future    Chronic abdominal pain  -     CBC Auto Differential; Future  -     Comprehensive Metabolic Panel; Future  -     Iron and TIBC; Future  -     Ferritin; Future    Iron deficiency anemia, unspecified iron deficiency anemia type  -     CBC Auto Differential; Future  -     Comprehensive Metabolic Panel; Future  -     Iron and TIBC; Future  -     Ferritin; Future    Diarrhea, unspecified type  -     CBC Auto Differential; Future  -     Comprehensive Metabolic Panel; Future  -     Iron and TIBC; Future  -     Ferritin; Future        Orders placed during this encounter include:  Orders Placed This Encounter   Procedures   • CBC Auto Differential     Due prior to follow up in July     Standing Status:   Future     Standing Expiration Date:   7/31/2018   • Comprehensive Metabolic Panel     Due prior to follow up in July     Standing Status:    Future     Standing Expiration Date:   7/31/2018   • Iron and TIBC     Due prior to follow up in July     Standing Status:   Future     Standing Expiration Date:   7/31/2018   • Ferritin     Due prior to follow up in July     Standing Status:   Future     Standing Expiration Date:   7/31/2018       Medications prescribed:  No orders of the defined types were placed in this encounter.    Discontinued Medications       Reason for Discontinue    cyclobenzaprine (FLEXERIL) 10 MG tablet Non-compliance    nystatin-triamcinolone (MYCOLOG II) 903671-2.1 UNIT/GM-% cream Non-compliance        Requested Prescriptions      No prescriptions requested or ordered in this encounter       Review and/or summary of lab tests, radiology, procedures, medications. Review and summary of old records and obtaining of history. The risks and benefits of my recommendations, as well as other treatment options were discussed with the patient today. Questions were answered.    Follow-up: Return in about 6 months (around 7/17/2018), or if symptoms worsen or fail to improve.     * Surgery not found *      This document has been electronically signed by Joao Arnett PA-C on January 18, 2018 5:36 PM      Results for orders placed or performed in visit on 11/07/17   Protime-INR   Result Value Ref Range    Protime 19.4 (H) 11.1 - 15.3 Seconds    INR 1.63 (H) 0.80 - 1.20   Results for orders placed or performed in visit on 11/02/17   Protime-INR   Result Value Ref Range    INR 1.70 1.7 - 2.5   Results for orders placed or performed in visit on 10/24/17   Protime-INR, Fingerstick   Result Value Ref Range    Protime 26.4 (H) 11.0 - 15.0 Seconds    INR 2.20 (H) 0.80 - 1.20   Results for orders placed or performed in visit on 10/30/17   Protime-INR, Fingerstick   Result Value Ref Range    Protime 18.6 (H) 11.0 - 15.0 Seconds    INR 1.50 (H) 0.80 - 1.20   Results for orders placed or performed in visit on 10/30/17   Protime-INR   Result Value Ref Range     INR 1.50 (A) 1.7 - 2.5   Results for orders placed or performed in visit on 10/26/17   Protime-INR   Result Value Ref Range    INR 2.80    Results for orders placed or performed in visit on 10/24/17   Protime-INR   Result Value Ref Range    INR 2.20    Results for orders placed or performed in visit on 10/20/17   Protime-INR   Result Value Ref Range    INR 1.20    Results for orders placed or performed during the hospital encounter of 10/16/17   PREVIOUS HISTORY   Result Value Ref Range    Previous History Previous Record on File    Gold Top - SST   Result Value Ref Range    Extra Tube Hold for add-ons.    Green Top (Gel)   Result Value Ref Range    Extra Tube Hold for add-ons.    Green Top (Gel)   Result Value Ref Range    Extra Tube Hold for add-ons.    Lavender Top   Result Value Ref Range    Extra Tube hold for add-on    Lavender Top   Result Value Ref Range    Extra Tube hold for add-on    Hemoglobin & Hematocrit, Blood   Result Value Ref Range    Hemoglobin 10.6 (L) 12.0 - 15.5 g/dL    Hematocrit 30.0 (L) 35.0 - 45.0 %   Tissue Pathology Exam - Bone, Knee, Right   Result Value Ref Range    Case Report       Surgical Pathology Report                         Case: [id number]                                  Authorizing Provider:  Yury Marion MD          Collected:           10/16/2017 08:33 AM          [facility]             Received:            10/16/2017 11:26 AM                                 [place] [facility]                                                              Pathologist:           Larry Stafford MD                                                         Specimen:    Knee, Right, bone and soft tissue right knee                                               Final Diagnosis       BONE AND SOFT TISSUE FRAGMENTS WITH OSTEOARTHRITIC CHANGES, RIGHT KNEE.      Gross Description       The specimen consists of fragments of degenerated bone and soft tissues from the right  knee measuring from 2.0-7.8 cm in greatest dimension.  Representative sections are embedded.      Embedded Images     Wound Culture - Wound, Knee, Right   Result Value Ref Range    Wound Culture No growth at 5 days     Gram Stain Result No WBCs or organisms seen    Protime-INR   Result Value Ref Range    Protime 15.5 (H) 11.1 - 15.3 Seconds    INR 1.23 (H) 0.80 - 1.20   Protime-INR   Result Value Ref Range    Protime 15.1 11.1 - 15.3 Seconds    INR 1.19 0.80 - 1.20   Protime-INR   Result Value Ref Range    Protime 14.1 11.1 - 15.3 Seconds    INR 1.10 0.80 - 1.20   Protime-INR   Result Value Ref Range    Protime 14.1 11.1 - 15.3 Seconds    INR 1.10 0.80 - 1.20   Prepare RBC, 2 Units   Result Value Ref Range    Product Code S3635Z77     Unit Number [id number]     UNIT  ABO A     UNIT  RH NEG     Dispense Status RE     Blood Type ANEG     Blood Expiration Date 262087097949     Blood Type Barcode 0600     Product Code H1058N23     Unit Number [id number]     UNIT  ABO A     UNIT  RH NEG     Dispense Status RE     Blood Type ANEG     Blood Expiration Date 545104420192     Blood Type Barcode 0600    Type & Screen   Result Value Ref Range    ABO Type A     RH type Negative     Antibody Screen Negative    Results for orders placed or performed in visit on 10/04/17   PREVIOUS HISTORY   Result Value Ref Range    Previous History Previous Record on File    MRSA Screen, PCR - Swab, Nares   Result Value Ref Range    MRSA, PCR Negative Negative   CBC (No Diff)   Result Value Ref Range    WBC 8.10 3.20 - 9.80 10*3/mm3    RBC 4.45 3.77 - 5.16 10*6/mm3    Hemoglobin 13.5 12.0 - 15.5 g/dL    Hematocrit 39.9 35.0 - 45.0 %    MCV 89.7 80.0 - 98.0 fL    MCH 30.3 26.5 - 34.0 pg    MCHC 33.8 31.4 - 36.0 g/dL    RDW 12.6 11.5 - 14.5 %    RDW-SD 40.9 36.4 - 46.3 fl    MPV 9.2 8.0 - 12.0 fL    Platelets 225 150 - 450 10*3/mm3   Type & Screen   Result Value Ref Range    ABO Type A     RH type Negative     Antibody Screen Negative     Results for orders placed or performed in visit on 06/07/17   CBC Auto Differential   Result Value Ref Range    WBC 6.22 3.20 - 9.80 10*3/mm3    RBC 4.50 3.77 - 5.16 10*6/mm3    Hemoglobin 12.8 12.0 - 15.5 g/dL    Hematocrit 39.1 35.0 - 45.0 %    MCV 86.9 80.0 - 98.0 fL    MCH 28.4 26.5 - 34.0 pg    MCHC 32.7 31.4 - 36.0 g/dL    RDW 13.3 11.5 - 14.5 %    RDW-SD 42.1 36.4 - 46.3 fl    MPV 9.2 8.0 - 12.0 fL    Platelets 239 150 - 450 10*3/mm3    Neutrophil % 68.9 37.0 - 80.0 %    Lymphocyte % 24.1 10.0 - 50.0 %    Monocyte % 5.6 0.0 - 12.0 %    Eosinophil % 0.8 0.0 - 7.0 %    Basophil % 0.3 0.0 - 2.0 %    Immature Grans % 0.3 0.0 - 0.5 %    Neutrophils, Absolute 4.28 2.00 - 8.60 10*3/mm3    Lymphocytes, Absolute 1.50 0.60 - 4.20 10*3/mm3    Monocytes, Absolute 0.35 0.00 - 0.90 10*3/mm3    Eosinophils, Absolute 0.05 0.00 - 0.70 10*3/mm3    Basophils, Absolute 0.02 0.00 - 0.20 10*3/mm3    Immature Grans, Absolute 0.02 0.00 - 0.02 10*3/mm3   Iron   Result Value Ref Range    Iron 31 (L) 37 - 170 mcg/dL   Comprehensive Metabolic Panel   Result Value Ref Range    Glucose 123 (H) 60 - 100 mg/dL    BUN 13 7 - 21 mg/dL    Creatinine 0.70 0.50 - 1.00 mg/dL    Sodium 140 137 - 145 mmol/L    Potassium 3.4 (L) 3.5 - 5.1 mmol/L    Chloride 103 95 - 110 mmol/L    CO2 25.0 22.0 - 31.0 mmol/L    Calcium 9.4 8.4 - 10.2 mg/dL    Total Protein 7.6 6.3 - 8.6 g/dL    Albumin 4.30 3.40 - 4.80 g/dL    ALT (SGPT) 52 9 - 52 U/L    AST (SGOT) 33 14 - 36 U/L    Alkaline Phosphatase 99 38 - 126 U/L    Total Bilirubin 0.5 0.2 - 1.3 mg/dL    eGFR Non  Amer 86 51 - 120 mL/min/1.73    Globulin 3.3 2.3 - 3.5 gm/dL    A/G Ratio 1.3 1.1 - 1.8 g/dL    BUN/Creatinine Ratio 18.6 7.0 - 25.0    Anion Gap 12.0 5.0 - 15.0 mmol/L     *Note: Due to a large number of results and/or encounters for the requested time period, some results have not been displayed. A complete set of results can be found in Results Review.       Some portions of this note have  been dictated using voice recognition software and may contain errors and/or omissions.

## 2018-02-08 ENCOUNTER — DOCUMENTATION (OUTPATIENT)
Dept: PHYSICAL THERAPY | Facility: HOSPITAL | Age: 58
End: 2018-02-08

## 2018-02-08 DIAGNOSIS — Z96.651 STATUS POST TOTAL RIGHT KNEE REPLACEMENT: Primary | ICD-10-CM

## 2018-02-09 NOTE — THERAPY DISCHARGE NOTE
Outpatient Physical Therapy Ortho Progress Note/Discharge Summary   [facility]       Patient Name: Tiffani Serra  : 1960  MRN: [id number]  Today's Date: 2018      Visit Date: 2018    Visit Dx:    ICD-10-CM ICD-9-CM   1. Status post total right knee replacement Z96.651 V43.65       Patient Active Problem List   Diagnosis   • Osteoarthritis of right knee   • Acute foot pain   • Knee pain, acute   • Eversion deformity of left foot   • Plantar fasciitis of left foot   • Pes planus of left foot   • Primary osteoarthritis of right knee   • Status post total right knee replacement        Past Medical History:   Diagnosis Date   • Abdominal pain    • Abscess of labia    • Acute posthemorrhagic anemia 2015   • Anemia     history of, mild   • Anemia due to blood loss 2014   • Cancer     cosmo/right leg mole   • Contact dermatitis 2013    on labia and surrounding regions   • Cystitis     recurrent   • Diarrhea 2016   • Foot pain     porokeratoma causin metatarsalgia, right foot   • Generalized abdominal pain 2015   • Hypercholesterolemia    • Hypertensive disorder    • Infection of skin and subcutaneous tissue 2013   • Iron deficiency anemia 2016    improving   • Irritable bowel syndrome    • Knee pain, right    • Left sided ulcerative colitis 10/08/2015   • Malaise and fatigue 2011   • Obesity    • Pseudomembranous enterocolitis 2012   • Rectal hemorrhage 2015   • Scapulalgia 10/24/2014   • Sialoadenitis 2013   • Ulcerative colitis 2016    chronic, for 29 years.  flare   • Urinary tract infectious disease 2012   • Vitamin D deficiency 2012        Past Surgical History:   Procedure Laterality Date   • BLADDER SURGERY      bladder tacking x 3   • COLONOSCOPY  2011    Colitis found in rectum & sigmoid colon. Biopsy taken   • COLONOSCOPY  06/15/2016    Erythematous mucosa in the rectum.Biopsied.One  polyp in the transverse colon. Biopsied   • COLONOSCOPY  08/15/2008    Colitis of the rectum, the sigmoid and the transverse colon. Multiple biopsies were obtained from the rectum, the sigmoid and the transverse colon. Multiple biopsies were obtained from the cecum, the transverse colon, sigmoid & rectum   • EXCISION LESION  10/17/1969    removal of sebaceous cyst of neck   • JOINT REPLACEMENT Left    • KNEE ARTHROSCOPY  02/21/2005    Tears of the meidal and lateral meniscus and osteoarthritis of the knee. Arthroscopic medial and lateral meniscectomies of the right knee with chondroplasty of the medial, lateral femoral condyles and patella   • LYMPH NODE BIOPSY  05/12/2009    [place] lymph node biopsy, superficial and deep, within the right groin involving superficial femoral nodes and also the external iliac nodes. Melanoma of the right leg   • OR TOTAL KNEE ARTHROPLASTY Right 10/16/2017    Procedure: TOTAL KNEE ARTHROPLASTY;  Surgeon: Yury Marion MD;  Location: [facility];  Service: Orthopedics   • TOTAL ABDOMINAL HYSTERECTOMY WITH SALPINGO OOPHORECTOMY  2001   • TOTAL KNEE ARTHROPLASTY  08/08/2007    severe osteoarthritis of the left knee.     • WRIST GANGLION EXCISION  10/17/1969    left wrist                                                        PT OP Goals       02/08/18 1800       PT Short Term Goals    STG Date to Achieve 11/28/17  -     STG 1 Independent/compliant with HEP  -     STG 1 Progress Met  -     STG 2 Perform R active SLR 2x10 independently with no quad lag present  -     STG 2 Progress Not Met  -     STG 3 Demonstrate R knee ext AROM to 2 deg or less  -     STG 3 Progress Met  -     STG 4 Demonstrate R knee flex AROM to 115 deg or greater  -     STG 4 Progress Not Met  -     STG 5 Ambulate with [facility] independently, good TKE/heel strike during stance  -     STG 5 Progress Met  [facility]     Long Term Goals    LTG Date to Achieve 12/19/17  -     LTG 1 Subjectively report 60%  improvement or greater  -     LTG 1 Progress Met  -     LTG 2 Demonstrate R knee flex/ext MMT to 4+/5 or greater  -     LTG 2 Progress Not Met  -     LTG 3 Demonstrate R hip flex MMT to 4+/5 or greater  -     LTG 3 Progress Not Met  -     LTG 4 Ambualte independently with no AD, good TKE/heel strike during stance  -     LTG 4 Progress Partially Met  -AH     LTG 5 Ascend/descend 3 steps, reciprocal pattern, no increased pain/compensation  -     LTG 5 Progress Partially Met  -       User Key  (r) = Recorded By, (t) = Taken By, (c) = Cosigned By    Initials Name Provider Type     Cade Hussein, PT Physical Therapist                         Time Calculation:                  OP PT Discharge Summary  Date of Discharge: 02/08/18  Reason for Discharge: Patient/Caregiver request  Outcomes Achieved: Patient able to partially acheive established goals  Discharge Destination: Home with home program  Discharge Instructions: Discharged to [facility] per Patient request      Cade Hussein, PT, DPT  2/8/2018

## 2018-04-16 DIAGNOSIS — R10.9 CHRONIC ABDOMINAL PAIN: ICD-10-CM

## 2018-04-16 DIAGNOSIS — G89.29 CHRONIC ABDOMINAL PAIN: ICD-10-CM

## 2018-04-16 DIAGNOSIS — K51.818 OTHER ULCERATIVE COLITIS WITH OTHER COMPLICATION (HCC): ICD-10-CM

## 2018-04-16 DIAGNOSIS — R19.7 DIARRHEA, UNSPECIFIED TYPE: ICD-10-CM

## 2018-04-16 RX ORDER — MESALAMINE 375 MG/1
CAPSULE, EXTENDED RELEASE ORAL
Qty: 160 CAPSULE | Refills: 2 | Status: SHIPPED | OUTPATIENT
Start: 2018-04-16 | End: 2019-01-30 | Stop reason: SDUPTHER

## 2018-07-17 ENCOUNTER — LAB (OUTPATIENT)
Dept: LAB | Facility: HOSPITAL | Age: 58
End: 2018-07-17

## 2018-07-17 DIAGNOSIS — G89.29 CHRONIC ABDOMINAL PAIN: ICD-10-CM

## 2018-07-17 DIAGNOSIS — R10.9 CHRONIC ABDOMINAL PAIN: ICD-10-CM

## 2018-07-17 DIAGNOSIS — R19.7 DIARRHEA, UNSPECIFIED TYPE: ICD-10-CM

## 2018-07-17 DIAGNOSIS — D50.9 IRON DEFICIENCY ANEMIA, UNSPECIFIED IRON DEFICIENCY ANEMIA TYPE: ICD-10-CM

## 2018-07-17 DIAGNOSIS — K51.818 OTHER ULCERATIVE COLITIS WITH OTHER COMPLICATION (HCC): ICD-10-CM

## 2018-07-17 LAB
ALBUMIN SERPL-MCNC: 4.4 G/DL (ref 3.4–4.8)
ALBUMIN/GLOB SERPL: 1.4 G/DL (ref 1.1–1.8)
ALP SERPL-CCNC: 94 U/L (ref 38–126)
ALT SERPL W P-5'-P-CCNC: 32 U/L (ref 9–52)
ANION GAP SERPL CALCULATED.3IONS-SCNC: 8 MMOL/L (ref 5–15)
AST SERPL-CCNC: 29 U/L (ref 14–36)
BASOPHILS # BLD AUTO: 0.03 10*3/MM3 (ref 0–0.2)
BASOPHILS NFR BLD AUTO: 0.3 % (ref 0–2)
BILIRUB SERPL-MCNC: 0.4 MG/DL (ref 0.2–1.3)
BUN BLD-MCNC: 17 MG/DL (ref 7–21)
BUN/CREAT SERPL: 24.6 (ref 7–25)
CALCIUM SPEC-SCNC: 9.1 MG/DL (ref 8.4–10.2)
CHLORIDE SERPL-SCNC: 101 MMOL/L (ref 95–110)
CO2 SERPL-SCNC: 28 MMOL/L (ref 22–31)
CREAT BLD-MCNC: 0.69 MG/DL (ref 0.5–1)
DEPRECATED RDW RBC AUTO: 42.4 FL (ref 36.4–46.3)
EOSINOPHIL # BLD AUTO: 0.1 10*3/MM3 (ref 0–0.7)
EOSINOPHIL NFR BLD AUTO: 1.1 % (ref 0–7)
ERYTHROCYTE [DISTWIDTH] IN BLOOD BY AUTOMATED COUNT: 12.7 % (ref 11.5–14.5)
FERRITIN SERPL-MCNC: 42.2 NG/ML (ref 11.1–264)
GFR SERPL CREATININE-BSD FRML MDRD: 87 ML/MIN/1.73 (ref 51–120)
GLOBULIN UR ELPH-MCNC: 3.1 GM/DL (ref 2.3–3.5)
GLUCOSE BLD-MCNC: 100 MG/DL (ref 60–100)
HCT VFR BLD AUTO: 39.5 % (ref 35–45)
HGB BLD-MCNC: 13.3 G/DL (ref 12–15.5)
IMM GRANULOCYTES # BLD: 0.03 10*3/MM3 (ref 0–0.02)
IMM GRANULOCYTES NFR BLD: 0.3 % (ref 0–0.5)
IRON 24H UR-MRATE: 59 MCG/DL (ref 37–170)
IRON SATN MFR SERPL: 17 % (ref 15–50)
LYMPHOCYTES # BLD AUTO: 2.15 10*3/MM3 (ref 0.6–4.2)
LYMPHOCYTES NFR BLD AUTO: 24.2 % (ref 10–50)
MCH RBC QN AUTO: 30.9 PG (ref 26.5–34)
MCHC RBC AUTO-ENTMCNC: 33.7 G/DL (ref 31.4–36)
MCV RBC AUTO: 91.9 FL (ref 80–98)
MONOCYTES # BLD AUTO: 0.53 10*3/MM3 (ref 0–0.9)
MONOCYTES NFR BLD AUTO: 6 % (ref 0–12)
NEUTROPHILS # BLD AUTO: 6.06 10*3/MM3 (ref 2–8.6)
NEUTROPHILS NFR BLD AUTO: 68.1 % (ref 37–80)
PLATELET # BLD AUTO: 214 10*3/MM3 (ref 150–450)
PMV BLD AUTO: 9.3 FL (ref 8–12)
POTASSIUM BLD-SCNC: 3.9 MMOL/L (ref 3.5–5.1)
PROT SERPL-MCNC: 7.5 G/DL (ref 6.3–8.6)
RBC # BLD AUTO: 4.3 10*6/MM3 (ref 3.77–5.16)
SODIUM BLD-SCNC: 137 MMOL/L (ref 137–145)
TIBC SERPL-MCNC: 357 MCG/DL (ref 265–497)
WBC NRBC COR # BLD: 8.9 10*3/MM3 (ref 3.2–9.8)

## 2018-07-17 PROCEDURE — 80053 COMPREHEN METABOLIC PANEL: CPT

## 2018-07-17 PROCEDURE — 83540 ASSAY OF IRON: CPT

## 2018-07-17 PROCEDURE — 82728 ASSAY OF FERRITIN: CPT

## 2018-07-17 PROCEDURE — 85025 COMPLETE CBC W/AUTO DIFF WBC: CPT

## 2018-07-17 PROCEDURE — 83550 IRON BINDING TEST: CPT

## 2018-07-17 PROCEDURE — 36415 COLL VENOUS BLD VENIPUNCTURE: CPT

## 2018-07-25 ENCOUNTER — OFFICE VISIT (OUTPATIENT)
Dept: GASTROENTEROLOGY | Facility: CLINIC | Age: 58
End: 2018-07-25

## 2018-07-25 VITALS
HEIGHT: 62 IN | BODY MASS INDEX: 48.95 KG/M2 | HEART RATE: 76 BPM | SYSTOLIC BLOOD PRESSURE: 144 MMHG | WEIGHT: 266 LBS | DIASTOLIC BLOOD PRESSURE: 90 MMHG

## 2018-07-25 DIAGNOSIS — D50.9 IRON DEFICIENCY ANEMIA, UNSPECIFIED IRON DEFICIENCY ANEMIA TYPE: ICD-10-CM

## 2018-07-25 DIAGNOSIS — K51.818 OTHER ULCERATIVE COLITIS WITH OTHER COMPLICATION (HCC): Primary | ICD-10-CM

## 2018-07-25 DIAGNOSIS — R19.7 DIARRHEA, UNSPECIFIED TYPE: ICD-10-CM

## 2018-07-25 DIAGNOSIS — G89.29 CHRONIC ABDOMINAL PAIN: ICD-10-CM

## 2018-07-25 DIAGNOSIS — R10.9 CHRONIC ABDOMINAL PAIN: ICD-10-CM

## 2018-07-25 PROCEDURE — 99213 OFFICE O/P EST LOW 20 MIN: CPT | Performed by: PHYSICIAN ASSISTANT

## 2018-07-25 NOTE — PROGRESS NOTES
Chief Complaint   Patient presents with   • Ulcerative Colitis   • Anemia   • Diarrhea       ENDO PROCEDURE ORDERED:    Subjective    Tiffani Serra is a 58 y.o. female. she is here today for follow-up.    History of Present Illness    Patient seen on a recheck of her ulcerative colitis, anemia, diarrhea. Last seen 01/17/2018. Patient currently denies significant abdominal pain, nausea, vomiting. Bowels are moving without blood. She is taking 2 Apriso daily. She is on iron. Weight is down 3 pounds since last visit. She is still trying to pay off her medical expenses. Last colonoscopy 06/15/2016.     Laboratory on 07/17/2018: Iron studies are low-normal with normal CBC and CMP.     ASSESSMENT/PLAN: Patient encouraged to continue dietary modification and significant weight loss. I did recommend surveillance colonoscopy for her ulcerative colitis and increased risk for colon cancer. She cites continued medical debts as her reason for refusing to have another colonoscopy at this time. She does understand the risks. We will continue on her Apriso and iron. Recommend followup in 6 months with CBC, CMP, iron studies prior. If she reconsiders, we will be happy to schedule her for colonoscopy.       The following portions of the patient's history were reviewed and updated as appropriate:   Past Medical History:   Diagnosis Date   • Abdominal pain    • Abscess of labia    • Acute posthemorrhagic anemia 01/08/2015   • Anemia     history of, mild   • Anemia due to blood loss 11/20/2014   • Cancer (CMS/HCC)     cosmo/right leg mole   • Contact dermatitis 01/30/2013    on labia and surrounding regions   • Cystitis     recurrent   • Diarrhea 04/11/2016   • Foot pain     porokeratoma causin metatarsalgia, right foot   • Generalized abdominal pain 02/23/2015   • Hypercholesterolemia    • Hypertensive disorder    • Infection of skin and subcutaneous tissue 01/30/2013   • Iron deficiency anemia 04/11/2016    improving   • Irritable  bowel syndrome    • Knee pain, right    • Left sided ulcerative colitis (CMS/HCC) 10/08/2015   • Malaise and fatigue 11/02/2011   • Obesity    • Pseudomembranous enterocolitis 11/02/2012   • Rectal hemorrhage 07/01/2015   • Scapulalgia 10/24/2014   • Sialoadenitis 07/26/2013   • Ulcerative colitis (CMS/HCC) 04/11/2016    chronic, for 29 years.  flare   • Urinary tract infectious disease 07/14/2012   • Vitamin D deficiency 05/14/2012     Past Surgical History:   Procedure Laterality Date   • BLADDER SURGERY  2001    bladder tacking x 3   • COLONOSCOPY  07/25/2011    Colitis found in rectum & sigmoid colon. Biopsy taken   • COLONOSCOPY  06/15/2016    Erythematous mucosa in the rectum.Biopsied.One polyp in the transverse colon. Biopsied   • COLONOSCOPY  08/15/2008    Colitis of the rectum, the sigmoid and the transverse colon. Multiple biopsies were obtained from the rectum, the sigmoid and the transverse colon. Multiple biopsies were obtained from the cecum, the transverse colon, sigmoid & rectum   • EXCISION LESION  10/17/1969    removal of sebaceous cyst of neck   • JOINT REPLACEMENT Left    • KNEE ARTHROSCOPY  02/21/2005    Tears of the meidal and lateral meniscus and osteoarthritis of the knee. Arthroscopic medial and lateral meniscectomies of the right knee with chondroplasty of the medial, lateral femoral condyles and patella   • LYMPH NODE BIOPSY  05/12/2009    [place] lymph node biopsy, superficial and deep, within the right groin involving superficial femoral nodes and also the external iliac nodes. Melanoma of the right leg   • RI TOTAL KNEE ARTHROPLASTY Right 10/16/2017    Procedure: TOTAL KNEE ARTHROPLASTY;  Surgeon: Yury Marion MD;  Location: [facility];  Service: Orthopedics   • TOTAL ABDOMINAL HYSTERECTOMY WITH SALPINGO OOPHORECTOMY  2001   • TOTAL KNEE ARTHROPLASTY  08/08/2007    severe osteoarthritis of the left knee.     • WRIST GANGLION EXCISION  10/17/1969    left wrist     Family History  "  Problem Relation Age of Onset   • Coronary artery disease Other    • Hypertension Other      OB History      Para Term  AB Living    1 1 1          SAB TAB Ectopic Molar Multiple Live Births                       Allergies   Allergen Reactions   • Other      Garlic diarrhea/heartburn   • Keflex [Cephalexin] Rash   • Penicillins Rash     nylon   • Tape Rash     Silk tape/swells     Social History     Social History   • Marital status:      Social History Main Topics   • Smoking status: Never Smoker   • Smokeless tobacco: Never Used   • Alcohol use No   • Drug use: No   • Sexual activity: Defer     Other Topics Concern   • Not on file       Current Outpatient Prescriptions:   •  APRISO 0.375 g 24 hr capsule, TAKE 4 CAPSULES BY MOUTH ONCE DAILY (Patient taking differently: TAKE 2 CAPSULES BY MOUTH ONCE DAILY), Disp: 160 capsule, Rfl: 2  •  Ca Phosphate-Cholecalciferol (CALCIUM/VITAMIN D3 GUMMIES PO), Take  by mouth Daily., Disp: , Rfl:   •  ferrous sulfate 325 (65 FE) MG tablet, Take 325 mg by mouth Daily With Breakfast., Disp: , Rfl:   Review of Systems  Review of Systems       Objective    /90 (BP Location: Left arm)   Pulse 76   Ht 157.5 cm (62\")   Wt 121 kg (266 lb)   LMP  (LMP Unknown)   BMI 48.65 kg/m²   Physical Exam   Constitutional: She is oriented to person, place, and time. She appears well-developed and well-nourished. No distress.   Ill appearing   HENT:   Head: Normocephalic and atraumatic.   Eyes: Pupils are equal, round, and reactive to light. EOM are normal.   Neck: Normal range of motion.   Cardiovascular: Normal rate, regular rhythm and normal heart sounds.    Pulmonary/Chest: Effort normal and breath sounds normal.   Abdominal: Soft. She exhibits no shifting dullness, no distension, no abdominal bruit, no ascites and no mass. Bowel sounds are decreased. There is no hepatosplenomegaly. There is tenderness in the periumbilical area. There is no rigidity, no rebound, " no guarding and no CVA tenderness. No hernia. Hernia confirmed negative in the ventral area.   Obese, mild diffuse   Musculoskeletal: Normal range of motion.   Neurological: She is alert and oriented to person, place, and time.   Skin: Skin is warm. She is not diaphoretic.   Psychiatric: She has a normal mood and affect. Her behavior is normal. Judgment and thought content normal.   Nursing note and vitals reviewed.    Assessment/Plan      1. Other ulcerative colitis with other complication (CMS/HCC)    2. Chronic abdominal pain    3. Iron deficiency anemia, unspecified iron deficiency anemia type    4. Diarrhea, unspecified type    .   Tiffani was seen today for ulcerative colitis, anemia and diarrhea.    Diagnoses and all orders for this visit:    Other ulcerative colitis with other complication (CMS/HCC)  -     Iron and TIBC; Future  -     Ferritin; Future  -     CBC Auto Differential; Future  -     Comprehensive Metabolic Panel; Future    Chronic abdominal pain  -     Iron and TIBC; Future  -     Ferritin; Future  -     CBC Auto Differential; Future  -     Comprehensive Metabolic Panel; Future    Iron deficiency anemia, unspecified iron deficiency anemia type  -     Iron and TIBC; Future  -     Ferritin; Future  -     CBC Auto Differential; Future  -     Comprehensive Metabolic Panel; Future    Diarrhea, unspecified type  -     Iron and TIBC; Future  -     Ferritin; Future  -     CBC Auto Differential; Future  -     Comprehensive Metabolic Panel; Future        Orders placed during this encounter include:  Orders Placed This Encounter   Procedures   • Iron and TIBC     Due before follow up in Jan     Standing Status:   Future     Standing Expiration Date:   1/31/2019   • Ferritin     Due before follow up in Jan     Standing Status:   Future     Standing Expiration Date:   1/31/2019   • CBC Auto Differential     Due before follow up in Jan     Standing Status:   Future     Standing Expiration Date:   1/31/2019   •  Comprehensive Metabolic Panel     Due before follow up in [place]     Standing Status:   Future     Standing Expiration Date:   1/31/2019       Medications prescribed:  No orders of the defined types were placed in this encounter.    Discontinued Medications       Reason for Discontinue    mesalamine (APRISO) 0.375 g 24 hr capsule Duplicate order        Requested Prescriptions      No prescriptions requested or ordered in this encounter       Review and/or summary of lab tests, radiology, procedures, medications. Review and summary of old records and obtaining of history. The risks and benefits of my recommendations, as well as other treatment options were discussed with the patient today. Questions were answered.    Follow-up: Return in about 6 months (around 1/25/2019), or if symptoms worsen or fail to improve.     * Surgery not found *      This document has been electronically signed by Joao Arnett PA-C on July 27, 2018 3:00 PM      Results for orders placed or performed in visit on 07/17/18   CBC Auto Differential   Result Value Ref Range    WBC 8.90 3.20 - 9.80 10*3/mm3    RBC 4.30 3.77 - 5.16 10*6/mm3    Hemoglobin 13.3 12.0 - 15.5 g/dL    Hematocrit 39.5 35.0 - 45.0 %    MCV 91.9 80.0 - 98.0 fL    MCH 30.9 26.5 - 34.0 pg    MCHC 33.7 31.4 - 36.0 g/dL    RDW 12.7 11.5 - 14.5 %    RDW-SD 42.4 36.4 - 46.3 fl    MPV 9.3 8.0 - 12.0 fL    Platelets 214 150 - 450 10*3/mm3    Neutrophil % 68.1 37.0 - 80.0 %    Lymphocyte % 24.2 10.0 - 50.0 %    Monocyte % 6.0 0.0 - 12.0 %    Eosinophil % 1.1 0.0 - 7.0 %    Basophil % 0.3 0.0 - 2.0 %    Immature Grans % 0.3 0.0 - 0.5 %    Neutrophils, Absolute 6.06 2.00 - 8.60 10*3/mm3    Lymphocytes, Absolute 2.15 0.60 - 4.20 10*3/mm3    Monocytes, Absolute 0.53 0.00 - 0.90 10*3/mm3    Eosinophils, Absolute 0.10 0.00 - 0.70 10*3/mm3    Basophils, Absolute 0.03 0.00 - 0.20 10*3/mm3    Immature Grans, Absolute 0.03 (H) 0.00 - 0.02 10*3/mm3   Iron and TIBC   Result Value Ref Range     Iron 59 37 - 170 mcg/dL    TIBC 357 265 - 497 mcg/dL    Iron Saturation 17 15 - 50 %   Ferritin   Result Value Ref Range    Ferritin 42.20 11.10 - 264.00 ng/mL   Comprehensive Metabolic Panel   Result Value Ref Range    Glucose 100 60 - 100 mg/dL    BUN 17 7 - 21 mg/dL    Creatinine 0.69 0.50 - 1.00 mg/dL    Sodium 137 137 - 145 mmol/L    Potassium 3.9 3.5 - 5.1 mmol/L    Chloride 101 95 - 110 mmol/L    CO2 28.0 22.0 - 31.0 mmol/L    Calcium 9.1 8.4 - 10.2 mg/dL    Total Protein 7.5 6.3 - 8.6 g/dL    Albumin 4.40 3.40 - 4.80 g/dL    ALT (SGPT) 32 9 - 52 U/L    AST (SGOT) 29 14 - 36 U/L    Alkaline Phosphatase 94 38 - 126 U/L    Total Bilirubin 0.4 0.2 - 1.3 mg/dL    eGFR Non  Amer 87 51 - 120 mL/min/1.73    Globulin 3.1 2.3 - 3.5 gm/dL    A/G Ratio 1.4 1.1 - 1.8 g/dL    BUN/Creatinine Ratio 24.6 7.0 - 25.0    Anion Gap 8.0 5.0 - 15.0 mmol/L   Results for orders placed or performed in visit on 11/07/17   Protime-INR   Result Value Ref Range    Protime 19.4 (H) 11.1 - 15.3 Seconds    INR 1.63 (H) 0.80 - 1.20   Results for orders placed or performed in visit on 11/02/17   Protime-INR   Result Value Ref Range    INR 1.70 1.7 - 2.5   Results for orders placed or performed in visit on 10/24/17   Protime-INR, Fingerstick   Result Value Ref Range    Protime 26.4 (H) 11.0 - 15.0 Seconds    INR 2.20 (H) 0.80 - 1.20   Results for orders placed or performed in visit on 10/30/17   Protime-INR, Fingerstick   Result Value Ref Range    Protime 18.6 (H) 11.0 - 15.0 Seconds    INR 1.50 (H) 0.80 - 1.20   Results for orders placed or performed in visit on 10/30/17   Protime-INR   Result Value Ref Range    INR 1.50 (A) 1.7 - 2.5   Results for orders placed or performed in visit on 10/26/17   Protime-INR   Result Value Ref Range    INR 2.80    Results for orders placed or performed in visit on 10/24/17   Protime-INR   Result Value Ref Range    INR 2.20    Results for orders placed or performed in visit on 10/20/17   Protime-INR    Result Value Ref Range    INR 1.20    Results for orders placed or performed during the hospital encounter of 10/16/17   PREVIOUS HISTORY   Result Value Ref Range    Previous History Previous Record on File    Gold Top - SST   Result Value Ref Range    Extra Tube Hold for add-ons.    Green Top (Gel)   Result Value Ref Range    Extra Tube Hold for add-ons.    Green Top (Gel)   Result Value Ref Range    Extra Tube Hold for add-ons.    Lavender Top   Result Value Ref Range    Extra Tube hold for add-on    Lavender Top   Result Value Ref Range    Extra Tube hold for add-on    Hemoglobin & Hematocrit, Blood   Result Value Ref Range    Hemoglobin 10.6 (L) 12.0 - 15.5 g/dL    Hematocrit 30.0 (L) 35.0 - 45.0 %   Tissue Pathology Exam - Bone, Knee, Right   Result Value Ref Range    Case Report       Surgical Pathology Report                         Case: [id number]                                  Authorizing Provider:  Yury Marion MD          Collected:           10/16/2017 08:33 AM          Ordering Location:     [facility]             Received:            10/16/2017 11:26 AM                                 [place]                                                              Pathologist:           Larry Stafford MD                                                         Specimen:    Knee, Right, bone and soft tissue right knee                                               Final Diagnosis       BONE AND SOFT TISSUE FRAGMENTS WITH OSTEOARTHRITIC CHANGES, RIGHT KNEE.      Gross Description       The specimen consists of fragments of degenerated bone and soft tissues from the right knee measuring from 2.0-7.8 cm in greatest dimension.  Representative sections are embedded.      Embedded Images     Wound Culture - Wound, Knee, Right   Result Value Ref Range    Wound Culture No growth at 5 days     Gram Stain Result No WBCs or organisms seen    Protime-INR   Result Value Ref Range    Protime 15.5 (H) 11.1 -  15.3 Seconds    INR 1.23 (H) 0.80 - 1.20   Protime-INR   Result Value Ref Range    Protime 15.1 11.1 - 15.3 Seconds    INR 1.19 0.80 - 1.20   Protime-INR   Result Value Ref Range    Protime 14.1 11.1 - 15.3 Seconds    INR 1.10 0.80 - 1.20   Protime-INR   Result Value Ref Range    Protime 14.1 11.1 - 15.3 Seconds    INR 1.10 0.80 - 1.20   Prepare RBC, 2 Units   Result Value Ref Range    Product Code N6526N49     Unit Number [id number]     UNIT  ABO A     UNIT  RH NEG     Dispense Status RE     Blood Type ANEG     Blood Expiration Date 894117803041     Blood Type Barcode 0600     Product Code P6482B76     Unit Number [id number]     UNIT  ABO A     UNIT  RH NEG     Dispense Status RE     Blood Type ANEG     Blood Expiration Date [id number]     Blood Type Barcode 0600    Type & Screen   Result Value Ref Range    ABO Type A     RH type Negative     Antibody Screen Negative    Results for orders placed or performed in visit on 10/04/17   PREVIOUS HISTORY   Result Value Ref Range    Previous History Previous Record on File    MRSA Screen, PCR - Swab, Nares   Result Value Ref Range    MRSA, PCR Negative Negative     *Note: Due to a large number of results and/or encounters for the requested time period, some results have not been displayed. A complete set of results can be found in Results Review.       Some portions of this note have been dictated using voice recognition software and may contain errors and/or omissions.

## 2018-07-25 NOTE — PATIENT INSTRUCTIONS

## 2018-12-10 ENCOUNTER — DOCUMENTATION (OUTPATIENT)
Dept: GASTROENTEROLOGY | Facility: CLINIC | Age: 58
End: 2018-12-10

## 2018-12-10 NOTE — PROGRESS NOTES
Patient requested her lab orders be faxed to [facility] at [phone]. This has been done per patient request.

## 2019-01-22 ENCOUNTER — LAB (OUTPATIENT)
Dept: LAB | Facility: HOSPITAL | Age: 59
End: 2019-01-22

## 2019-01-22 DIAGNOSIS — K51.818 OTHER ULCERATIVE COLITIS WITH OTHER COMPLICATION (HCC): ICD-10-CM

## 2019-01-22 DIAGNOSIS — G89.29 CHRONIC ABDOMINAL PAIN: ICD-10-CM

## 2019-01-22 DIAGNOSIS — D50.9 IRON DEFICIENCY ANEMIA, UNSPECIFIED IRON DEFICIENCY ANEMIA TYPE: ICD-10-CM

## 2019-01-22 DIAGNOSIS — R19.7 DIARRHEA, UNSPECIFIED TYPE: ICD-10-CM

## 2019-01-22 DIAGNOSIS — R10.9 CHRONIC ABDOMINAL PAIN: ICD-10-CM

## 2019-01-22 LAB
ALBUMIN SERPL-MCNC: 4.3 G/DL (ref 3.4–4.8)
ALBUMIN/GLOB SERPL: 1.6 G/DL (ref 1.1–1.8)
ALP SERPL-CCNC: 95 U/L (ref 38–126)
ALT SERPL W P-5'-P-CCNC: 30 U/L (ref 9–52)
ANION GAP SERPL CALCULATED.3IONS-SCNC: 6 MMOL/L (ref 5–15)
AST SERPL-CCNC: 32 U/L (ref 14–36)
BASOPHILS # BLD AUTO: 0.02 10*3/MM3 (ref 0–0.2)
BASOPHILS NFR BLD AUTO: 0.3 % (ref 0–2)
BILIRUB SERPL-MCNC: 0.6 MG/DL (ref 0.2–1.3)
BUN BLD-MCNC: 13 MG/DL (ref 7–21)
BUN/CREAT SERPL: 17.6 (ref 7–25)
CALCIUM SPEC-SCNC: 9.5 MG/DL (ref 8.4–10.2)
CHLORIDE SERPL-SCNC: 103 MMOL/L (ref 95–110)
CO2 SERPL-SCNC: 29 MMOL/L (ref 22–31)
CREAT BLD-MCNC: 0.74 MG/DL (ref 0.5–1)
DEPRECATED RDW RBC AUTO: 41.2 FL (ref 36.4–46.3)
EOSINOPHIL # BLD AUTO: 0.12 10*3/MM3 (ref 0–0.7)
EOSINOPHIL NFR BLD AUTO: 1.9 % (ref 0–7)
ERYTHROCYTE [DISTWIDTH] IN BLOOD BY AUTOMATED COUNT: 12.4 % (ref 11.5–14.5)
FERRITIN SERPL-MCNC: 46.1 NG/ML (ref 11.1–264)
GFR SERPL CREATININE-BSD FRML MDRD: 80 ML/MIN/1.73 (ref 51–120)
GLOBULIN UR ELPH-MCNC: 2.7 GM/DL (ref 2.3–3.5)
GLUCOSE BLD-MCNC: 94 MG/DL (ref 60–100)
HCT VFR BLD AUTO: 39.3 % (ref 35–45)
HGB BLD-MCNC: 13.3 G/DL (ref 12–15.5)
IMM GRANULOCYTES # BLD AUTO: 0.02 10*3/MM3 (ref 0–0.02)
IMM GRANULOCYTES NFR BLD AUTO: 0.3 % (ref 0–0.5)
IRON 24H UR-MRATE: 74 MCG/DL (ref 37–170)
IRON SATN MFR SERPL: 21 % (ref 15–50)
LYMPHOCYTES # BLD AUTO: 1.72 10*3/MM3 (ref 0.6–4.2)
LYMPHOCYTES NFR BLD AUTO: 27.5 % (ref 10–50)
MCH RBC QN AUTO: 30.9 PG (ref 26.5–34)
MCHC RBC AUTO-ENTMCNC: 33.8 G/DL (ref 31.4–36)
MCV RBC AUTO: 91.4 FL (ref 80–98)
MONOCYTES # BLD AUTO: 0.42 10*3/MM3 (ref 0–0.9)
MONOCYTES NFR BLD AUTO: 6.7 % (ref 0–12)
NEUTROPHILS # BLD AUTO: 3.96 10*3/MM3 (ref 2–8.6)
NEUTROPHILS NFR BLD AUTO: 63.3 % (ref 37–80)
PLATELET # BLD AUTO: 223 10*3/MM3 (ref 150–450)
PMV BLD AUTO: 9.3 FL (ref 8–12)
POTASSIUM BLD-SCNC: 4.2 MMOL/L (ref 3.5–5.1)
PROT SERPL-MCNC: 7 G/DL (ref 6.3–8.6)
RBC # BLD AUTO: 4.3 10*6/MM3 (ref 3.77–5.16)
SODIUM BLD-SCNC: 138 MMOL/L (ref 137–145)
TIBC SERPL-MCNC: 358 MCG/DL (ref 265–497)
WBC NRBC COR # BLD: 6.26 10*3/MM3 (ref 3.2–9.8)

## 2019-01-22 PROCEDURE — 82728 ASSAY OF FERRITIN: CPT

## 2019-01-22 PROCEDURE — 83540 ASSAY OF IRON: CPT

## 2019-01-22 PROCEDURE — 36415 COLL VENOUS BLD VENIPUNCTURE: CPT

## 2019-01-22 PROCEDURE — 80053 COMPREHEN METABOLIC PANEL: CPT

## 2019-01-22 PROCEDURE — 85025 COMPLETE CBC W/AUTO DIFF WBC: CPT

## 2019-01-22 PROCEDURE — 83550 IRON BINDING TEST: CPT

## 2019-01-30 ENCOUNTER — OFFICE VISIT (OUTPATIENT)
Dept: GASTROENTEROLOGY | Facility: CLINIC | Age: 59
End: 2019-01-30

## 2019-01-30 VITALS
HEIGHT: 62 IN | SYSTOLIC BLOOD PRESSURE: 146 MMHG | WEIGHT: 280.4 LBS | BODY MASS INDEX: 51.6 KG/M2 | HEART RATE: 92 BPM | DIASTOLIC BLOOD PRESSURE: 92 MMHG

## 2019-01-30 DIAGNOSIS — K51.818 OTHER ULCERATIVE COLITIS WITH OTHER COMPLICATION (HCC): Primary | ICD-10-CM

## 2019-01-30 DIAGNOSIS — R10.9 CHRONIC ABDOMINAL PAIN: ICD-10-CM

## 2019-01-30 DIAGNOSIS — D50.9 IRON DEFICIENCY ANEMIA, UNSPECIFIED IRON DEFICIENCY ANEMIA TYPE: ICD-10-CM

## 2019-01-30 DIAGNOSIS — G89.29 CHRONIC ABDOMINAL PAIN: ICD-10-CM

## 2019-01-30 DIAGNOSIS — R19.7 DIARRHEA, UNSPECIFIED TYPE: ICD-10-CM

## 2019-01-30 PROBLEM — K51.90 ULCERATIVE COLITIS (HCC): Status: ACTIVE | Noted: 2019-01-30

## 2019-01-30 PROCEDURE — 99214 OFFICE O/P EST MOD 30 MIN: CPT | Performed by: PHYSICIAN ASSISTANT

## 2019-01-30 RX ORDER — MESALAMINE 0.38 G/1
1500 CAPSULE, EXTENDED RELEASE ORAL DAILY
Qty: 120 CAPSULE | Refills: 3 | Status: SHIPPED | OUTPATIENT
Start: 2019-01-30 | End: 2019-07-31 | Stop reason: SDUPTHER

## 2019-01-30 RX ORDER — DEXTROSE AND SODIUM CHLORIDE 5; .45 G/100ML; G/100ML
30 INJECTION, SOLUTION INTRAVENOUS CONTINUOUS PRN
Status: CANCELLED | OUTPATIENT
Start: 2019-03-04

## 2019-02-28 RX ORDER — PSEUDOEPHEDRINE HYDROCHLORIDE 60 MG/1
60 TABLET, FILM COATED ORAL EVERY 4 HOURS PRN
COMMUNITY
End: 2019-07-31

## 2019-03-04 ENCOUNTER — ANESTHESIA (OUTPATIENT)
Dept: GASTROENTEROLOGY | Facility: HOSPITAL | Age: 59
End: 2019-03-04

## 2019-03-04 ENCOUNTER — ANESTHESIA EVENT (OUTPATIENT)
Dept: GASTROENTEROLOGY | Facility: HOSPITAL | Age: 59
End: 2019-03-04

## 2019-03-04 ENCOUNTER — HOSPITAL ENCOUNTER (OUTPATIENT)
Facility: HOSPITAL | Age: 59
Setting detail: HOSPITAL OUTPATIENT SURGERY
Discharge: HOME | End: 2019-03-04
Attending: INTERNAL MEDICINE | Admitting: INTERNAL MEDICINE

## 2019-03-04 VITALS
RESPIRATION RATE: 17 BRPM | HEART RATE: 78 BPM | SYSTOLIC BLOOD PRESSURE: 119 MMHG | DIASTOLIC BLOOD PRESSURE: 60 MMHG | BODY MASS INDEX: 46.37 KG/M2 | OXYGEN SATURATION: 95 % | HEIGHT: 64 IN | WEIGHT: 271.61 LBS | TEMPERATURE: 97.2 F

## 2019-03-04 DIAGNOSIS — K51.818 OTHER ULCERATIVE COLITIS WITH OTHER COMPLICATION (HCC): ICD-10-CM

## 2019-03-04 PROCEDURE — 88305 TISSUE EXAM BY PATHOLOGIST: CPT | Performed by: PATHOLOGY

## 2019-03-04 PROCEDURE — 45385 COLONOSCOPY W/LESION REMOVAL: CPT | Performed by: INTERNAL MEDICINE

## 2019-03-04 PROCEDURE — 88305 TISSUE EXAM BY PATHOLOGIST: CPT | Performed by: INTERNAL MEDICINE

## 2019-03-04 PROCEDURE — 25010000002 PROPOFOL 10 MG/ML EMULSION: Performed by: NURSE ANESTHETIST, CERTIFIED REGISTERED

## 2019-03-04 RX ORDER — DEXTROSE AND SODIUM CHLORIDE 5; .45 G/100ML; G/100ML
30 INJECTION, SOLUTION INTRAVENOUS CONTINUOUS PRN
Status: DISCONTINUED | OUTPATIENT
Start: 2019-03-04 | End: 2019-03-04 | Stop reason: HOSPADM

## 2019-03-04 RX ORDER — PROMETHAZINE HYDROCHLORIDE 25 MG/1
25 TABLET ORAL ONCE AS NEEDED
Status: DISCONTINUED | OUTPATIENT
Start: 2019-03-04 | End: 2019-03-04 | Stop reason: HOSPADM

## 2019-03-04 RX ORDER — PROMETHAZINE HYDROCHLORIDE 25 MG/ML
12.5 INJECTION, SOLUTION INTRAMUSCULAR; INTRAVENOUS ONCE AS NEEDED
Status: DISCONTINUED | OUTPATIENT
Start: 2019-03-04 | End: 2019-03-04 | Stop reason: HOSPADM

## 2019-03-04 RX ORDER — LIDOCAINE HYDROCHLORIDE 10 MG/ML
INJECTION, SOLUTION INFILTRATION; PERINEURAL AS NEEDED
Status: DISCONTINUED | OUTPATIENT
Start: 2019-03-04 | End: 2019-03-04 | Stop reason: SURG

## 2019-03-04 RX ORDER — ONDANSETRON 2 MG/ML
4 INJECTION INTRAMUSCULAR; INTRAVENOUS ONCE AS NEEDED
Status: DISCONTINUED | OUTPATIENT
Start: 2019-03-04 | End: 2019-03-04 | Stop reason: HOSPADM

## 2019-03-04 RX ORDER — PROPOFOL 10 MG/ML
VIAL (ML) INTRAVENOUS AS NEEDED
Status: DISCONTINUED | OUTPATIENT
Start: 2019-03-04 | End: 2019-03-04 | Stop reason: SURG

## 2019-03-04 RX ORDER — PROMETHAZINE HYDROCHLORIDE 25 MG/1
25 SUPPOSITORY RECTAL ONCE AS NEEDED
Status: DISCONTINUED | OUTPATIENT
Start: 2019-03-04 | End: 2019-03-04 | Stop reason: HOSPADM

## 2019-03-04 RX ORDER — DEXAMETHASONE SODIUM PHOSPHATE 4 MG/ML
8 INJECTION, SOLUTION INTRA-ARTICULAR; INTRALESIONAL; INTRAMUSCULAR; INTRAVENOUS; SOFT TISSUE ONCE AS NEEDED
Status: DISCONTINUED | OUTPATIENT
Start: 2019-03-04 | End: 2019-03-04 | Stop reason: HOSPADM

## 2019-03-04 RX ADMIN — PROPOFOL 40 MG: 10 INJECTION, EMULSION INTRAVENOUS at 15:57

## 2019-03-04 RX ADMIN — PROPOFOL 50 MG: 10 INJECTION, EMULSION INTRAVENOUS at 15:51

## 2019-03-04 RX ADMIN — PROPOFOL 40 MG: 10 INJECTION, EMULSION INTRAVENOUS at 15:54

## 2019-03-04 RX ADMIN — DEXTROSE AND SODIUM CHLORIDE: 5; 450 INJECTION, SOLUTION INTRAVENOUS at 15:38

## 2019-03-04 RX ADMIN — DEXTROSE AND SODIUM CHLORIDE 30 ML/HR: 5; 450 INJECTION, SOLUTION INTRAVENOUS at 15:21

## 2019-03-04 RX ADMIN — LIDOCAINE HYDROCHLORIDE 100 MG: 10 INJECTION, SOLUTION INFILTRATION; PERINEURAL at 15:48

## 2019-03-04 RX ADMIN — PROPOFOL 70 MG: 10 INJECTION, EMULSION INTRAVENOUS at 15:48

## 2019-03-04 NOTE — H&P
No chief complaint on file.      ENDO PROCEDURE ORDERED: COLON surveylance BABAR    Subjective    Tiffani Serra is a 58 y.o. female. she is here today for follow-up.    History of Present Illness    Patient seen on a recheck of her ulcerative colitis, anemia, diarrhea. Last seen 07/25/2018. She states she has been doing very well on current regimen. She is still taking her Apriso. Generally she takes 2 a day, but she does increase that if she has any increasing discomfort or diarrhea or bleeding. Overall she denied abdominal pain, heartburn, nausea, vomiting, dysphagia. No significant bleeding. Weight is up 14 pounds since last visit, which we had a long discussion about dietary modification and significant weight loss. Recently her brother was diagnosed with colon cancer. He had never had a colonoscopy. Her last colonoscopy was 06/15/2016. She is more agreeable to having a repeat colonoscopy now given her brother’s situation.   Review of Systems   Constitutional: Negative for activity change, appetite change, chills, diaphoresis, fatigue, fever and unexpected weight change.   HENT: Negative for congestion, sore throat and trouble swallowing.    Respiratory: Negative for apnea, cough, choking, chest tightness, shortness of breath, wheezing and stridor.    Cardiovascular: Negative for chest pain, palpitations and leg swelling.   Gastrointestinal: Negative for abdominal distention, abdominal pain, anal bleeding, blood in stool, constipation, diarrhea, nausea, rectal pain and vomiting.   Musculoskeletal: Negative for arthralgias.   Skin: Negative for color change, pallor, rash and wound.   Neurological: Negative for dizziness, syncope, weakness, light-headedness and headaches.   Psychiatric/Behavioral: Negative for agitation, behavioral problems, confusion and decreased concentration.       Recent laboratory on 01/22/2019 showed normal CBC, CMP, iron studies.     ASSESSMENT/PLAN: Patient with long-standing  ulcerative colitis, at risk for lesion, with now family history of colon cancer. She is scheduled for surveillance colonoscopy. She would like to do this on a Monday. I refilled her Apriso. Her iron deficiency is currently resolved. Will plan followup after the above. Further pending clinical course and the results of the above.        The following portions of the patient's history were reviewed and updated as appropriate:   Past Medical History:   Diagnosis Date   • Abdominal pain    • Abscess of labia    • Acute posthemorrhagic anemia 01/08/2015   • Anemia     history of, mild   • Anemia due to blood loss 11/20/2014   • Cancer (CMS/HCC)     cosmo/right leg mole   • Contact dermatitis 01/30/2013    on labia and surrounding regions   • Cystitis     recurrent   • Diarrhea 04/11/2016   • Foot pain     porokeratoma causin metatarsalgia, right foot   • Generalized abdominal pain 02/23/2015   • Hypercholesterolemia    • Hypertensive disorder    • Infection of skin and subcutaneous tissue 01/30/2013   • Iron deficiency anemia 04/11/2016    improving   • Irritable bowel syndrome    • Knee pain, right    • Left sided ulcerative colitis (CMS/HCC) 10/08/2015   • Malaise and fatigue 11/02/2011   • Obesity    • Pseudomembranous enterocolitis 11/02/2012   • Rectal hemorrhage 07/01/2015   • Scapulalgia 10/24/2014   • Sialoadenitis 07/26/2013   • Ulcerative colitis (CMS/HCC) 04/11/2016    chronic, for 29 years.  flare   • Urinary tract infectious disease 07/14/2012   • Vitamin D deficiency 05/14/2012     Past Surgical History:   Procedure Laterality Date   • BLADDER SURGERY  2001    bladder tacking x 3   • COLONOSCOPY  07/25/2011    Colitis found in rectum & sigmoid colon. Biopsy taken   • COLONOSCOPY  06/15/2016    Erythematous mucosa in the rectum.Biopsied.One polyp in the transverse colon. Biopsied   • COLONOSCOPY  08/15/2008    Colitis of the rectum, the sigmoid and the transverse colon. Multiple biopsies were obtained from the  rectum, the sigmoid and the transverse colon. Multiple biopsies were obtained from the cecum, the transverse colon, sigmoid & rectum   • EXCISION LESION  10/17/1969    removal of sebaceous cyst of neck   • JOINT REPLACEMENT Left    • KNEE ARTHROSCOPY  2005    Tears of the meidal and lateral meniscus and osteoarthritis of the knee. Arthroscopic medial and lateral meniscectomies of the right knee with chondroplasty of the medial, lateral femoral condyles and patella   • LYMPH NODE BIOPSY  2009    [facility] lymph node biopsy, superficial and deep, within the right groin involving superficial femoral nodes and also the external iliac nodes. Melanoma of the right leg   • SD TOTAL KNEE ARTHROPLASTY Right 10/16/2017    Procedure: TOTAL KNEE ARTHROPLASTY;  Surgeon: Yury Marion MD;  Location: [facility];  Service: Orthopedics   • TOTAL ABDOMINAL HYSTERECTOMY WITH SALPINGO OOPHORECTOMY     • TOTAL KNEE ARTHROPLASTY  2007    severe osteoarthritis of the left knee.     • WRIST GANGLION EXCISION  10/17/1969    left wrist     Family History   Problem Relation Age of Onset   • Coronary artery disease Other    • Hypertension Other      OB History      Para Term  AB Living    1 1 1          SAB TAB Ectopic Molar Multiple Live Births                       Allergies   Allergen Reactions   • Other      Garlic diarrhea/heartburn   • Keflex [Cephalexin] Rash   • Penicillins Rash     nylon   • Tape Rash     Silk tape/swells     Social History     Socioeconomic History   • Marital status:      Spouse name: Not on file   • Number of children: Not on file   • Years of education: Not on file   • Highest education level: Not on file   Tobacco Use   • Smoking status: Never Smoker   • Smokeless tobacco: Never Used   Substance and Sexual Activity   • Alcohol use: No   • Drug use: No   • Sexual activity: Defer     No current facility-administered medications for this encounter.   Review of  "Systems  Review of Systems       Objective    Ht 162.6 cm (64\")   Wt 122 kg (270 lb)   LMP  (LMP Unknown)   BMI 46.35 kg/m²   Physical Exam   Constitutional: She is oriented to person, place, and time. She appears well-developed and well-nourished. No distress.   Ill appearing   HENT:   Head: Normocephalic and atraumatic.   Eyes: EOM are normal. Pupils are equal, round, and reactive to light.   Neck: Normal range of motion.   Cardiovascular: Normal rate, regular rhythm and normal heart sounds.   Pulmonary/Chest: Effort normal and breath sounds normal.   Abdominal: Soft. She exhibits no shifting dullness, no distension, no abdominal bruit, no ascites and no mass. Bowel sounds are decreased. There is no hepatosplenomegaly. There is tenderness in the periumbilical area. There is no rigidity, no rebound, no guarding and no CVA tenderness. No hernia. Hernia confirmed negative in the ventral area.   Obese, mild diffuse   Musculoskeletal: Normal range of motion.   Neurological: She is alert and oriented to person, place, and time.   Skin: Skin is warm. She is not diaphoretic.   Psychiatric: She has a normal mood and affect. Her behavior is normal. Judgment and thought content normal.   Nursing note and vitals reviewed.    Assessment/Plan      No diagnosis found..   Tiffani was seen today for ulcerative colitis, anemia and diarrhea.    Diagnoses and all orders for this visit:    Other ulcerative colitis with other complication (CMS/HCC)  -     mesalamine (APRISO) 0.375 g 24 hr capsule; Take 4 capsules by mouth Daily.  -     Case Request; Standing  -     dextrose 5 % and sodium chloride 0.45 % infusion; Infuse 30 mL/hr into a venous catheter Continuous As Needed (Start Prior to Procedure).  -     Case Request    Chronic abdominal pain  -     mesalamine (APRISO) 0.375 g 24 hr capsule; Take 4 capsules by mouth Daily.    Iron deficiency anemia, unspecified iron deficiency anemia type  Comments:  resolved    Diarrhea, " unspecified type  -     mesalamine (APRISO) 0.375 g 24 hr capsule; Take 4 capsules by mouth Daily.    Other orders  -     Follow Anesthesia Guidelines / Standing Orders; Future  -     Obtain Informed Consent; Standing  -     POC Glucose Once; Standing  -     PEG-KCl-NaCl-NaSulf-Na Asc-C (PLENVU) 140 g reconstituted solution solution; Take 140 g by mouth 1 (One) Time for 1 dose. As directed per instruction sheet for colonoscopy        Orders placed during this encounter include:  No orders of the defined types were placed in this encounter.      Medications prescribed:  No orders of the defined types were placed in this encounter.      Requested Prescriptions     Signed Prescriptions Disp Refills   • mesalamine (APRISO) 0.375 g 24 hr capsule 120 capsule 3     Sig: Take 4 capsules by mouth Daily.   • PEG-KCl-NaCl-NaSulf-Na Asc-C (PLENVU) 140 g reconstituted solution solution 1 each 0     Sig: Take 140 g by mouth 1 (One) Time for 1 dose. As directed per instruction sheet for colonoscopy       Review and/or summary of lab tests, radiology, procedures, medications. Review and summary of old records and obtaining of history. The risks and benefits of my recommendations, as well as other treatment options were discussed with the patient today. Questions were answered.    Follow-up: No Follow-up on file.     COLONOSCOPY (N/A)      This document has been electronically signed by Henok Dixon MD on March 4, 2019 2:31 PM      Results for orders placed or performed in visit on 01/22/19   CBC Auto Differential   Result Value Ref Range    WBC 6.26 3.20 - 9.80 10*3/mm3    RBC 4.30 3.77 - 5.16 10*6/mm3    Hemoglobin 13.3 12.0 - 15.5 g/dL    Hematocrit 39.3 35.0 - 45.0 %    MCV 91.4 80.0 - 98.0 fL    MCH 30.9 26.5 - 34.0 pg    MCHC 33.8 31.4 - 36.0 g/dL    RDW 12.4 11.5 - 14.5 %    RDW-SD 41.2 36.4 - 46.3 fl    MPV 9.3 8.0 - 12.0 fL    Platelets 223 150 - 450 10*3/mm3    Neutrophil % 63.3 37.0 - 80.0 %    Lymphocyte % 27.5 10.0  - 50.0 %    Monocyte % 6.7 0.0 - 12.0 %    Eosinophil % 1.9 0.0 - 7.0 %    Basophil % 0.3 0.0 - 2.0 %    Immature Grans % 0.3 0.0 - 0.5 %    Neutrophils, Absolute 3.96 2.00 - 8.60 10*3/mm3    Lymphocytes, Absolute 1.72 0.60 - 4.20 10*3/mm3    Monocytes, Absolute 0.42 0.00 - 0.90 10*3/mm3    Eosinophils, Absolute 0.12 0.00 - 0.70 10*3/mm3    Basophils, Absolute 0.02 0.00 - 0.20 10*3/mm3    Immature Grans, Absolute 0.02 0.00 - 0.02 10*3/mm3   Iron and TIBC   Result Value Ref Range    Iron 74 37 - 170 mcg/dL    TIBC 358 265 - 497 mcg/dL    Iron Saturation 21 15 - 50 %   Ferritin   Result Value Ref Range    Ferritin 46.10 11.10 - 264.00 ng/mL   Comprehensive Metabolic Panel   Result Value Ref Range    Glucose 94 60 - 100 mg/dL    BUN 13 7 - 21 mg/dL    Creatinine 0.74 0.50 - 1.00 mg/dL    Sodium 138 137 - 145 mmol/L    Potassium 4.2 3.5 - 5.1 mmol/L    Chloride 103 95 - 110 mmol/L    CO2 29.0 22.0 - 31.0 mmol/L    Calcium 9.5 8.4 - 10.2 mg/dL    Total Protein 7.0 6.3 - 8.6 g/dL    Albumin 4.30 3.40 - 4.80 g/dL    ALT (SGPT) 30 9 - 52 U/L    AST (SGOT) 32 14 - 36 U/L    Alkaline Phosphatase 95 38 - 126 U/L    Total Bilirubin 0.6 0.2 - 1.3 mg/dL    eGFR Non  Amer 80 51 - 120 mL/min/1.73    Globulin 2.7 2.3 - 3.5 gm/dL    A/G Ratio 1.6 1.1 - 1.8 g/dL    BUN/Creatinine Ratio 17.6 7.0 - 25.0    Anion Gap 6.0 5.0 - 15.0 mmol/L   Results for orders placed or performed in visit on 07/17/18   CBC Auto Differential   Result Value Ref Range    WBC 8.90 3.20 - 9.80 10*3/mm3    RBC 4.30 3.77 - 5.16 10*6/mm3    Hemoglobin 13.3 12.0 - 15.5 g/dL    Hematocrit 39.5 35.0 - 45.0 %    MCV 91.9 80.0 - 98.0 fL    MCH 30.9 26.5 - 34.0 pg    MCHC 33.7 31.4 - 36.0 g/dL    RDW 12.7 11.5 - 14.5 %    RDW-SD 42.4 36.4 - 46.3 fl    MPV 9.3 8.0 - 12.0 fL    Platelets 214 150 - 450 10*3/mm3    Neutrophil % 68.1 37.0 - 80.0 %    Lymphocyte % 24.2 10.0 - 50.0 %    Monocyte % 6.0 0.0 - 12.0 %    Eosinophil % 1.1 0.0 - 7.0 %    Basophil % 0.3 0.0 -  2.0 %    Immature Grans % 0.3 0.0 - 0.5 %    Neutrophils, Absolute 6.06 2.00 - 8.60 10*3/mm3    Lymphocytes, Absolute 2.15 0.60 - 4.20 10*3/mm3    Monocytes, Absolute 0.53 0.00 - 0.90 10*3/mm3    Eosinophils, Absolute 0.10 0.00 - 0.70 10*3/mm3    Basophils, Absolute 0.03 0.00 - 0.20 10*3/mm3    Immature Grans, Absolute 0.03 (H) 0.00 - 0.02 10*3/mm3   Iron and TIBC   Result Value Ref Range    Iron 59 37 - 170 mcg/dL    TIBC 357 265 - 497 mcg/dL    Iron Saturation 17 15 - 50 %   Ferritin   Result Value Ref Range    Ferritin 42.20 11.10 - 264.00 ng/mL   Comprehensive Metabolic Panel   Result Value Ref Range    Glucose 100 60 - 100 mg/dL    BUN 17 7 - 21 mg/dL    Creatinine 0.69 0.50 - 1.00 mg/dL    Sodium 137 137 - 145 mmol/L    Potassium 3.9 3.5 - 5.1 mmol/L    Chloride 101 95 - 110 mmol/L    CO2 28.0 22.0 - 31.0 mmol/L    Calcium 9.1 8.4 - 10.2 mg/dL    Total Protein 7.5 6.3 - 8.6 g/dL    Albumin 4.40 3.40 - 4.80 g/dL    ALT (SGPT) 32 9 - 52 U/L    AST (SGOT) 29 14 - 36 U/L    Alkaline Phosphatase 94 38 - 126 U/L    Total Bilirubin 0.4 0.2 - 1.3 mg/dL    eGFR Non  Amer 87 51 - 120 mL/min/1.73    Globulin 3.1 2.3 - 3.5 gm/dL    A/G Ratio 1.4 1.1 - 1.8 g/dL    BUN/Creatinine Ratio 24.6 7.0 - 25.0    Anion Gap 8.0 5.0 - 15.0 mmol/L   Results for orders placed or performed in visit on 11/07/17   Protime-INR   Result Value Ref Range    Protime 19.4 (H) 11.1 - 15.3 Seconds    INR 1.63 (H) 0.80 - 1.20   Results for orders placed or performed in visit on 11/02/17   Protime-INR   Result Value Ref Range    INR 1.70 1.7 - 2.5   Results for orders placed or performed in visit on 10/24/17   Protime-INR, Fingerstick   Result Value Ref Range    Protime 26.4 (H) 11.0 - 15.0 Seconds    INR 2.20 (H) 0.80 - 1.20   Results for orders placed or performed in visit on 10/30/17   Protime-INR, Fingerstick   Result Value Ref Range    Protime 18.6 (H) 11.0 - 15.0 Seconds    INR 1.50 (H) 0.80 - 1.20   Results for orders placed or  performed in visit on 10/30/17   Protime-INR   Result Value Ref Range    INR 1.50 (A) 1.7 - 2.5     *Note: Due to a large number of results and/or encounters for the requested time period, some results have not been displayed. A complete set of results can be found in Results Review.       Some portions of this note have been dictated using voice recognition software and may contain errors and/or omissions.

## 2019-03-04 NOTE — ANESTHESIA PREPROCEDURE EVALUATION
Anesthesia Evaluation     Patient summary reviewed and Nursing notes reviewed   NPO Solid Status: > 8 hours  NPO Liquid Status: > 8 hours           Airway   Mallampati: III  TM distance: <3 FB  Neck ROM: full  Possible difficult intubation  Dental - normal exam     Pulmonary - normal exam   Cardiovascular - normal exam    (+) hypertension, hyperlipidemia,       Neuro/Psych  GI/Hepatic/Renal/Endo    (+) obesity, morbid obesity, GI bleeding,     Musculoskeletal     Abdominal    Substance History      OB/GYN          Other   (+) arthritis   history of cancer                    Anesthesia Plan    ASA 3     MAC     intravenous induction   Anesthetic plan, all risks, benefits, and alternatives have been provided, discussed and informed consent has been obtained with: patient.    Plan discussed with CRNA.

## 2019-03-04 NOTE — ANESTHESIA POSTPROCEDURE EVALUATION
Patient: Tiffani Serra    Procedure Summary     Date:  03/04/19 Room / Location:  [facility] ENDOSCOPY 1 / [facility] ENDOSCOPY    Anesthesia Start:  1539 Anesthesia Stop:  1602    Procedure:  COLONOSCOPY (N/A ) Diagnosis:       Other ulcerative colitis with other complication (CMS/HCC)      (Other ulcerative colitis with other complication (CMS/HCC) [K51.818])    Surgeon:  Henok Dixon MD Provider:  Benjy Sanchez CRNA    Anesthesia Type:  MAC ASA Status:  3          Anesthesia Type: MAC  Last vitals  BP   (!) 186/82 (03/04/19 1459)   Temp   98.1 °F (36.7 °C) (03/04/19 1459)   Pulse   90 (03/04/19 1459)   Resp   18 (03/04/19 1459)     SpO2   97 % (03/04/19 1459)     Post Anesthesia Care and Evaluation    Patient location during evaluation: bedside  Patient participation: waiting for patient participation  Level of consciousness: responsive to physical stimuli  Pain score: 0  Pain management: adequate  Airway patency: patent  Anesthetic complications: No anesthetic complications  PONV Status: none  Cardiovascular status: acceptable  Respiratory status: acceptable  Hydration status: acceptable

## 2019-03-06 LAB
LAB AP CASE REPORT: NORMAL
PATH REPORT.FINAL DX SPEC: NORMAL
PATH REPORT.GROSS SPEC: NORMAL

## 2019-03-27 ENCOUNTER — OFFICE VISIT (OUTPATIENT)
Dept: GASTROENTEROLOGY | Facility: CLINIC | Age: 59
End: 2019-03-27

## 2019-03-27 VITALS
BODY MASS INDEX: 52.04 KG/M2 | DIASTOLIC BLOOD PRESSURE: 72 MMHG | WEIGHT: 282.8 LBS | HEART RATE: 100 BPM | SYSTOLIC BLOOD PRESSURE: 136 MMHG | HEIGHT: 62 IN

## 2019-03-27 DIAGNOSIS — Z80.0 FAMILY HISTORY OF COLON CANCER: ICD-10-CM

## 2019-03-27 DIAGNOSIS — K51.40 PSEUDOPOLYPOSIS OF COLON WITHOUT COMPLICATION, UNSPECIFIED PART OF COLON (HCC): ICD-10-CM

## 2019-03-27 DIAGNOSIS — K51.818 OTHER ULCERATIVE COLITIS WITH OTHER COMPLICATION (HCC): Primary | ICD-10-CM

## 2019-03-27 DIAGNOSIS — K63.5 HYPERPLASTIC POLYP OF SIGMOID COLON: ICD-10-CM

## 2019-03-27 DIAGNOSIS — R10.9 CHRONIC ABDOMINAL PAIN: ICD-10-CM

## 2019-03-27 DIAGNOSIS — G89.29 CHRONIC ABDOMINAL PAIN: ICD-10-CM

## 2019-03-27 PROCEDURE — 99213 OFFICE O/P EST LOW 20 MIN: CPT | Performed by: PHYSICIAN ASSISTANT

## 2019-05-21 ENCOUNTER — TELEPHONE (OUTPATIENT)
Dept: GASTROENTEROLOGY | Facility: CLINIC | Age: 59
End: 2019-05-21

## 2019-05-21 NOTE — TELEPHONE ENCOUNTER
----- Message from Shahla Camarena sent at 5/21/2019  9:10 AM CDT -----  Patient called and stated that they are needing their   mesalamine (APRISO) 0.375 g 24 hr capsule. The Pharmacy told them that there was paperwork faxed to the office that had to be signed and they have not received it back yet. Please call back at [phone].     Her Pharmacy is [facility] - [place], [place] - [address] - [phone]  - [phone] (Phone)  [phone] (Fax)

## 2019-05-21 NOTE — TELEPHONE ENCOUNTER
Patient has been contacted and made aware that her Rx for Apriso is requiring a prior auth from her insurance company. The PA was submitted on Friday and additional documentation was faxed yesterday (5/20/2019) we are awaiting a determination from her insurance company. I will make her aware once that is received.

## 2019-05-22 ENCOUNTER — TELEPHONE (OUTPATIENT)
Dept: GASTROENTEROLOGY | Facility: CLINIC | Age: 59
End: 2019-05-22

## 2019-05-22 NOTE — TELEPHONE ENCOUNTER
Patient and [facility] has been contacted and made aware that the Prior Auth for Apriso has been approved through her insurance company from 5/21/2019 until 5/20/2020

## 2019-07-30 ENCOUNTER — LAB (OUTPATIENT)
Dept: LAB | Facility: HOSPITAL | Age: 59
End: 2019-07-30

## 2019-07-30 DIAGNOSIS — K51.818 OTHER ULCERATIVE COLITIS WITH OTHER COMPLICATION (HCC): ICD-10-CM

## 2019-07-30 DIAGNOSIS — R10.9 CHRONIC ABDOMINAL PAIN: ICD-10-CM

## 2019-07-30 DIAGNOSIS — G89.29 CHRONIC ABDOMINAL PAIN: ICD-10-CM

## 2019-07-30 DIAGNOSIS — K63.5 HYPERPLASTIC POLYP OF SIGMOID COLON: ICD-10-CM

## 2019-07-30 DIAGNOSIS — K51.40 PSEUDOPOLYPOSIS OF COLON WITHOUT COMPLICATION, UNSPECIFIED PART OF COLON (HCC): ICD-10-CM

## 2019-07-30 LAB
ALBUMIN SERPL-MCNC: 4 G/DL (ref 3.5–5.2)
ALBUMIN/GLOB SERPL: 1.3 G/DL
ALP SERPL-CCNC: 91 U/L (ref 39–117)
ALT SERPL W P-5'-P-CCNC: 25 U/L (ref 1–33)
ANION GAP SERPL CALCULATED.3IONS-SCNC: 15.2 MMOL/L (ref 5–15)
AST SERPL-CCNC: 19 U/L (ref 1–32)
BASOPHILS # BLD AUTO: 0.05 10*3/MM3 (ref 0–0.2)
BASOPHILS NFR BLD AUTO: 0.9 % (ref 0–1.5)
BILIRUB SERPL-MCNC: 0.5 MG/DL (ref 0.2–1.2)
BUN BLD-MCNC: 11 MG/DL (ref 8–23)
BUN/CREAT SERPL: 16.4 (ref 7–25)
CALCIUM SPEC-SCNC: 9.5 MG/DL (ref 8.6–10.5)
CHLORIDE SERPL-SCNC: 105 MMOL/L (ref 98–107)
CO2 SERPL-SCNC: 21.8 MMOL/L (ref 22–29)
CREAT BLD-MCNC: 0.67 MG/DL (ref 0.57–1)
DEPRECATED RDW RBC AUTO: 41.3 FL (ref 37–54)
EOSINOPHIL # BLD AUTO: 0.13 10*3/MM3 (ref 0–0.4)
EOSINOPHIL NFR BLD AUTO: 2.2 % (ref 0.3–6.2)
ERYTHROCYTE [DISTWIDTH] IN BLOOD BY AUTOMATED COUNT: 12.5 % (ref 12.3–15.4)
GFR SERPL CREATININE-BSD FRML MDRD: 90 ML/MIN/1.73
GLOBULIN UR ELPH-MCNC: 3.1 GM/DL
GLUCOSE BLD-MCNC: 104 MG/DL (ref 65–99)
HCT VFR BLD AUTO: 40.1 % (ref 34–46.6)
HGB BLD-MCNC: 13.3 G/DL (ref 12–15.9)
IMM GRANULOCYTES # BLD AUTO: 0.02 10*3/MM3 (ref 0–0.05)
IMM GRANULOCYTES NFR BLD AUTO: 0.3 % (ref 0–0.5)
LYMPHOCYTES # BLD AUTO: 1.61 10*3/MM3 (ref 0.7–3.1)
LYMPHOCYTES NFR BLD AUTO: 27.6 % (ref 19.6–45.3)
MCH RBC QN AUTO: 30.4 PG (ref 26.6–33)
MCHC RBC AUTO-ENTMCNC: 33.2 G/DL (ref 31.5–35.7)
MCV RBC AUTO: 91.6 FL (ref 79–97)
MONOCYTES # BLD AUTO: 0.36 10*3/MM3 (ref 0.1–0.9)
MONOCYTES NFR BLD AUTO: 6.2 % (ref 5–12)
NEUTROPHILS # BLD AUTO: 3.66 10*3/MM3 (ref 1.7–7)
NEUTROPHILS NFR BLD AUTO: 62.8 % (ref 42.7–76)
NRBC BLD AUTO-RTO: 0 /100 WBC (ref 0–0.2)
PLATELET # BLD AUTO: 241 10*3/MM3 (ref 140–450)
PMV BLD AUTO: 10.2 FL (ref 6–12)
POTASSIUM BLD-SCNC: 4.2 MMOL/L (ref 3.5–5.2)
PROT SERPL-MCNC: 7.1 G/DL (ref 6–8.5)
RBC # BLD AUTO: 4.38 10*6/MM3 (ref 3.77–5.28)
SODIUM BLD-SCNC: 142 MMOL/L (ref 136–145)
WBC NRBC COR # BLD: 5.83 10*3/MM3 (ref 3.4–10.8)

## 2019-07-30 PROCEDURE — 80053 COMPREHEN METABOLIC PANEL: CPT

## 2019-07-30 PROCEDURE — 85025 COMPLETE CBC W/AUTO DIFF WBC: CPT

## 2019-07-30 PROCEDURE — 36415 COLL VENOUS BLD VENIPUNCTURE: CPT

## 2019-07-31 ENCOUNTER — OFFICE VISIT (OUTPATIENT)
Dept: GASTROENTEROLOGY | Facility: CLINIC | Age: 59
End: 2019-07-31

## 2019-07-31 VITALS
SYSTOLIC BLOOD PRESSURE: 134 MMHG | BODY MASS INDEX: 52.12 KG/M2 | HEIGHT: 62 IN | DIASTOLIC BLOOD PRESSURE: 80 MMHG | WEIGHT: 283.2 LBS | HEART RATE: 98 BPM

## 2019-07-31 DIAGNOSIS — K51.40 PSEUDOPOLYPOSIS OF COLON WITHOUT COMPLICATION, UNSPECIFIED PART OF COLON (HCC): ICD-10-CM

## 2019-07-31 DIAGNOSIS — R10.9 CHRONIC ABDOMINAL PAIN: ICD-10-CM

## 2019-07-31 DIAGNOSIS — G89.29 CHRONIC ABDOMINAL PAIN: ICD-10-CM

## 2019-07-31 DIAGNOSIS — K51.818 OTHER ULCERATIVE COLITIS WITH OTHER COMPLICATION (HCC): Primary | ICD-10-CM

## 2019-07-31 DIAGNOSIS — R19.7 DIARRHEA, UNSPECIFIED TYPE: ICD-10-CM

## 2019-07-31 PROCEDURE — 99213 OFFICE O/P EST LOW 20 MIN: CPT | Performed by: PHYSICIAN ASSISTANT

## 2019-07-31 RX ORDER — MESALAMINE 0.38 G/1
1500 CAPSULE, EXTENDED RELEASE ORAL DAILY
Qty: 120 CAPSULE | Refills: 3 | Status: SHIPPED | OUTPATIENT
Start: 2019-07-31 | End: 2020-02-03 | Stop reason: SDUPTHER

## 2019-07-31 RX ORDER — ACETAMINOPHEN 500 MG
1000 TABLET ORAL EVERY 6 HOURS PRN
COMMUNITY

## 2020-01-28 ENCOUNTER — LAB (OUTPATIENT)
Dept: LAB | Facility: HOSPITAL | Age: 60
End: 2020-01-28

## 2020-01-28 DIAGNOSIS — K51.40 PSEUDOPOLYPOSIS OF COLON WITHOUT COMPLICATION, UNSPECIFIED PART OF COLON (HCC): ICD-10-CM

## 2020-01-28 DIAGNOSIS — R10.9 CHRONIC ABDOMINAL PAIN: ICD-10-CM

## 2020-01-28 DIAGNOSIS — G89.29 CHRONIC ABDOMINAL PAIN: ICD-10-CM

## 2020-01-28 DIAGNOSIS — K51.818 OTHER ULCERATIVE COLITIS WITH OTHER COMPLICATION (HCC): ICD-10-CM

## 2020-01-28 LAB
ALBUMIN SERPL-MCNC: 3.7 G/DL (ref 3.5–5.2)
ALBUMIN/GLOB SERPL: 1.2 G/DL
ALP SERPL-CCNC: 86 U/L (ref 39–117)
ALT SERPL W P-5'-P-CCNC: 21 U/L (ref 1–33)
ANION GAP SERPL CALCULATED.3IONS-SCNC: 14.1 MMOL/L (ref 5–15)
AST SERPL-CCNC: 17 U/L (ref 1–32)
BASOPHILS # BLD AUTO: 0.06 10*3/MM3 (ref 0–0.2)
BASOPHILS NFR BLD AUTO: 0.9 % (ref 0–1.5)
BILIRUB SERPL-MCNC: 0.5 MG/DL (ref 0.2–1.2)
BUN BLD-MCNC: 10 MG/DL (ref 8–23)
BUN/CREAT SERPL: 15.2 (ref 7–25)
CALCIUM SPEC-SCNC: 9 MG/DL (ref 8.6–10.5)
CHLORIDE SERPL-SCNC: 102 MMOL/L (ref 98–107)
CO2 SERPL-SCNC: 23.9 MMOL/L (ref 22–29)
CREAT BLD-MCNC: 0.66 MG/DL (ref 0.57–1)
DEPRECATED RDW RBC AUTO: 40.4 FL (ref 37–54)
EOSINOPHIL # BLD AUTO: 0.15 10*3/MM3 (ref 0–0.4)
EOSINOPHIL NFR BLD AUTO: 2.2 % (ref 0.3–6.2)
ERYTHROCYTE [DISTWIDTH] IN BLOOD BY AUTOMATED COUNT: 12.3 % (ref 12.3–15.4)
GFR SERPL CREATININE-BSD FRML MDRD: 91 ML/MIN/1.73
GLOBULIN UR ELPH-MCNC: 3 GM/DL
GLUCOSE BLD-MCNC: 95 MG/DL (ref 65–99)
HCT VFR BLD AUTO: 37.7 % (ref 34–46.6)
HGB BLD-MCNC: 12.9 G/DL (ref 12–15.9)
IMM GRANULOCYTES # BLD AUTO: 0.02 10*3/MM3 (ref 0–0.05)
IMM GRANULOCYTES NFR BLD AUTO: 0.3 % (ref 0–0.5)
LYMPHOCYTES # BLD AUTO: 2.4 10*3/MM3 (ref 0.7–3.1)
LYMPHOCYTES NFR BLD AUTO: 35 % (ref 19.6–45.3)
MCH RBC QN AUTO: 31.3 PG (ref 26.6–33)
MCHC RBC AUTO-ENTMCNC: 34.2 G/DL (ref 31.5–35.7)
MCV RBC AUTO: 91.5 FL (ref 79–97)
MONOCYTES # BLD AUTO: 0.42 10*3/MM3 (ref 0.1–0.9)
MONOCYTES NFR BLD AUTO: 6.1 % (ref 5–12)
NEUTROPHILS # BLD AUTO: 3.81 10*3/MM3 (ref 1.7–7)
NEUTROPHILS NFR BLD AUTO: 55.5 % (ref 42.7–76)
NRBC BLD AUTO-RTO: 0 /100 WBC (ref 0–0.2)
PLATELET # BLD AUTO: 243 10*3/MM3 (ref 140–450)
PMV BLD AUTO: 9.4 FL (ref 6–12)
POTASSIUM BLD-SCNC: 4.2 MMOL/L (ref 3.5–5.2)
PROT SERPL-MCNC: 6.7 G/DL (ref 6–8.5)
RBC # BLD AUTO: 4.12 10*6/MM3 (ref 3.77–5.28)
SODIUM BLD-SCNC: 140 MMOL/L (ref 136–145)
WBC NRBC COR # BLD: 6.86 10*3/MM3 (ref 3.4–10.8)

## 2020-01-28 PROCEDURE — 80053 COMPREHEN METABOLIC PANEL: CPT

## 2020-01-28 PROCEDURE — 85025 COMPLETE CBC W/AUTO DIFF WBC: CPT

## 2020-01-28 PROCEDURE — 36415 COLL VENOUS BLD VENIPUNCTURE: CPT

## 2020-02-03 ENCOUNTER — OFFICE VISIT (OUTPATIENT)
Dept: GASTROENTEROLOGY | Facility: CLINIC | Age: 60
End: 2020-02-03

## 2020-02-03 VITALS
DIASTOLIC BLOOD PRESSURE: 84 MMHG | HEIGHT: 62 IN | WEIGHT: 287 LBS | HEART RATE: 86 BPM | OXYGEN SATURATION: 93 % | SYSTOLIC BLOOD PRESSURE: 142 MMHG | BODY MASS INDEX: 52.81 KG/M2

## 2020-02-03 DIAGNOSIS — K51.818 OTHER ULCERATIVE COLITIS WITH OTHER COMPLICATION (HCC): ICD-10-CM

## 2020-02-03 DIAGNOSIS — R10.9 CHRONIC ABDOMINAL PAIN: ICD-10-CM

## 2020-02-03 DIAGNOSIS — K51.40 PSEUDOPOLYPOSIS OF COLON WITHOUT COMPLICATION, UNSPECIFIED PART OF COLON (HCC): Primary | ICD-10-CM

## 2020-02-03 DIAGNOSIS — R19.7 DIARRHEA, UNSPECIFIED TYPE: ICD-10-CM

## 2020-02-03 DIAGNOSIS — K51.80 OTHER ULCERATIVE COLITIS WITHOUT COMPLICATION (HCC): ICD-10-CM

## 2020-02-03 DIAGNOSIS — G89.29 CHRONIC ABDOMINAL PAIN: ICD-10-CM

## 2020-02-03 PROCEDURE — 99213 OFFICE O/P EST LOW 20 MIN: CPT | Performed by: PHYSICIAN ASSISTANT

## 2020-02-03 RX ORDER — MESALAMINE 0.38 G/1
1500 CAPSULE, EXTENDED RELEASE ORAL DAILY
Qty: 120 CAPSULE | Refills: 3 | Status: SHIPPED | OUTPATIENT
Start: 2020-02-03 | End: 2020-05-01 | Stop reason: SDUPTHER

## 2020-02-03 NOTE — PATIENT INSTRUCTIONS

## 2020-02-03 NOTE — PROGRESS NOTES
Chief Complaint   Patient presents with   • Ulcerative Colitis   • Abdominal Pain   • Diarrhea       ENDO PROCEDURE ORDERED:    Subjective    Tiffani Serra is a 59 y.o. female. she is here today for follow-up.    History of Present Illness    Patient is seen on a recheck of her ulcerative colitis, abdominal pain, and diarrhea.  Last seen 07/31/2019.  Patient states she has been doing well on the Apriso.  She states she takes 2 a day and that has done well.  She has not had any significant abdominal pain, diarrhea, or blood in her stool.  No mucus.  She denied significant abdominal pain, heartburn,  nausea or dysphagia.  Weight is up another 3.7 pounds since last visit.  She had multiple polyps with family history of colon cancer on 03/04/2019.     Laboratory on 01/28/2020 showed a fairly normal CBC and CMP.        A/P:  Patient with chronic ulcerative colitis with previous multiple polyps and family history of colon cancer.  Discussed repeating a colonoscopy at 1 year from her previous.  She declines today.  I suggested we should do a colonoscopy some time this year.  I refilled her Apriso.  Will plan follow-up in 6 months, sooner if needed.  Further pending clinical course and the results of the above.         The following portions of the patient's history were reviewed and updated as appropriate:   Past Medical History:   Diagnosis Date   • Abdominal pain    • Abscess of labia    • Acute posthemorrhagic anemia 01/08/2015   • Anemia     history of, mild   • Anemia due to blood loss 11/20/2014   • Cancer (CMS/HCC)     cosmo/right leg mole   • Contact dermatitis 01/30/2013    on labia and surrounding regions   • Cystitis     recurrent   • Diarrhea 04/11/2016   • Foot pain     porokeratoma causin metatarsalgia, right foot   • Generalized abdominal pain 02/23/2015   • Hypercholesterolemia    • Hypertensive disorder    • Infection of skin and subcutaneous tissue 01/30/2013   • Iron deficiency anemia 04/11/2016     improving   • Irritable bowel syndrome    • Knee pain, right    • Left sided ulcerative colitis (CMS/HCC) 10/08/2015   • Malaise and fatigue 11/02/2011   • Obesity    • Pseudomembranous enterocolitis 11/02/2012   • Rectal hemorrhage 07/01/2015   • Scapulalgia 10/24/2014   • Sialoadenitis 07/26/2013   • Ulcerative colitis (CMS/HCC) 04/11/2016    chronic, for 29 years.  flare   • Urinary tract infectious disease 07/14/2012   • Vitamin D deficiency 05/14/2012     Past Surgical History:   Procedure Laterality Date   • BLADDER SURGERY  2001    bladder tacking x 3   • COLONOSCOPY  07/25/2011    Colitis found in rectum & sigmoid colon. Biopsy taken   • COLONOSCOPY  06/15/2016    Erythematous mucosa in the rectum.Biopsied.One polyp in the transverse colon. Biopsied   • COLONOSCOPY  08/15/2008    Colitis of the rectum, the sigmoid and the transverse colon. Multiple biopsies were obtained from the rectum, the sigmoid and the transverse colon. Multiple biopsies were obtained from the cecum, the transverse colon, sigmoid & rectum   • COLONOSCOPY N/A 3/4/2019    Procedure: COLONOSCOPY;  Surgeon: Henok Dixon MD;  Location: [facility] ENDOSCOPY;  Service: Gastroenterology   • EXCISION LESION  10/17/1969    removal of sebaceous cyst of neck   • JOINT REPLACEMENT Left    • KNEE ARTHROSCOPY  02/21/2005    Tears of the meidal and lateral meniscus and osteoarthritis of the knee. Arthroscopic medial and lateral meniscectomies of the right knee with chondroplasty of the medial, lateral femoral condyles and patella   • LYMPH NODE BIOPSY  05/12/2009    Girard lymph node biopsy, superficial and deep, within the right groin involving superficial femoral nodes and also the external iliac nodes. Melanoma of the right leg   • CA TOTAL KNEE ARTHROPLASTY Right 10/16/2017    Procedure: TOTAL KNEE ARTHROPLASTY;  Surgeon: Yury Marion MD;  Location: [facility] OR;  Service: Orthopedics   • TOTAL ABDOMINAL HYSTERECTOMY WITH SALPINGO OOPHORECTOMY   "   • TOTAL KNEE ARTHROPLASTY  2007    severe osteoarthritis of the left knee.     • WRIST GANGLION EXCISION  10/17/1969    left wrist     Family History   Problem Relation Age of Onset   • Coronary artery disease Other    • Hypertension Other      OB History        1    Para   1    Term   1            AB        Living           SAB        TAB        Ectopic        Molar        Multiple        Live Births                  Allergies   Allergen Reactions   • Other      Garlic diarrhea/heartburn   • Keflex [Cephalexin] Rash   • Penicillins Rash     nylon   • Tape Rash     Silk tape/swells     Social History     Socioeconomic History   • Marital status:      Spouse name: Not on file   • Number of children: Not on file   • Years of education: Not on file   • Highest education level: Not on file   Tobacco Use   • Smoking status: Never Smoker   • Smokeless tobacco: Never Used   Substance and Sexual Activity   • Alcohol use: No   • Drug use: No   • Sexual activity: Defer     Current Medications:  Prior to Admission medications    Medication Sig Start Date End Date Taking? Authorizing Provider   acetaminophen (TYLENOL) 500 MG tablet Take 1,000 mg by mouth Every 6 (Six) Hours As Needed for Mild Pain .    Provider, MD Ernesto   Ca Phosphate-Cholecalciferol (CALCIUM/VITAMIN D3 GUMMIES PO) Take  by mouth Daily.    Provider, MD Ernesto   ferrous sulfate 325 (65 FE) MG tablet Take 325 mg by mouth Daily With Breakfast.    Provider, MD Ernesto   mesalamine (APRISO) 0.375 g 24 hr capsule Take 4 capsules by mouth Daily. 19   Joao Arnett PA-C     Review of Systems  Review of Systems       Objective    /84 (BP Location: Right arm, Patient Position: Sitting)   Pulse 86   Ht 157.5 cm (62\")   Wt 130 kg (287 lb)   LMP  (LMP Unknown)   SpO2 93%   BMI 52.49 kg/m²   Physical Exam   Constitutional: She is oriented to person, place, and time. She appears well-developed and " well-nourished. No distress.   Ill appearing   HENT:   Head: Normocephalic and atraumatic.   Eyes: Pupils are equal, round, and reactive to light. EOM are normal.   Neck: Normal range of motion.   Cardiovascular: Normal rate, regular rhythm and normal heart sounds.   Pulmonary/Chest: Effort normal and breath sounds normal.   Abdominal: Soft. She exhibits no shifting dullness, no distension, no abdominal bruit, no ascites and no mass. Bowel sounds are decreased. There is no hepatosplenomegaly. There is tenderness in the periumbilical area. There is no rigidity, no rebound, no guarding and no CVA tenderness. No hernia. Hernia confirmed negative in the ventral area.   Obese, mild diffuse   Musculoskeletal: Normal range of motion.   Neurological: She is alert and oriented to person, place, and time.   Skin: Skin is warm. She is not diaphoretic.   Psychiatric: She has a normal mood and affect. Her behavior is normal. Judgment and thought content normal.   Nursing note and vitals reviewed.    Assessment/Plan      1. Pseudopolyposis of colon without complication, unspecified part of colon (CMS/HCC)    2. Other ulcerative colitis without complication (CMS/HCC)    3. Chronic abdominal pain    4. Other ulcerative colitis with other complication (CMS/HCC)    5. Diarrhea, unspecified type    .   Tiffani was seen today for ulcerative colitis, abdominal pain and diarrhea.    Diagnoses and all orders for this visit:    Pseudopolyposis of colon without complication, unspecified part of colon (CMS/HCC)    Other ulcerative colitis without complication (CMS/HCC)    Chronic abdominal pain  -     mesalamine (APRISO) 0.375 g 24 hr capsule; Take 4 capsules by mouth Daily.    Other ulcerative colitis with other complication (CMS/HCC)  -     mesalamine (APRISO) 0.375 g 24 hr capsule; Take 4 capsules by mouth Daily.    Diarrhea, unspecified type  -     mesalamine (APRISO) 0.375 g 24 hr capsule; Take 4 capsules by mouth Daily.        Orders  placed during this encounter include:  No orders of the defined types were placed in this encounter.      Medications prescribed:  New Medications Ordered This Visit   Medications   • mesalamine (APRISO) 0.375 g 24 hr capsule     Sig: Take 4 capsules by mouth Daily.     Dispense:  120 capsule     Refill:  3       Requested Prescriptions     Signed Prescriptions Disp Refills   • mesalamine (APRISO) 0.375 g 24 hr capsule 120 capsule 3     Sig: Take 4 capsules by mouth Daily.       Review and/or summary of lab tests, radiology, procedures, medications. Review and summary of old records and obtaining of history. The risks and benefits of my recommendations, as well as other treatment options were discussed with the patient today. Questions were answered.    Follow-up: Return in about 6 months (around 8/3/2020), or if symptoms worsen or fail to improve.     * Surgery not found *      This document has been electronically signed by Joao Arnett PA-C on February 7, 2020 2:55 PM      Results for orders placed or performed in visit on 01/28/20   CBC Auto Differential   Result Value Ref Range    WBC 6.86 3.40 - 10.80 10*3/mm3    RBC 4.12 3.77 - 5.28 10*6/mm3    Hemoglobin 12.9 12.0 - 15.9 g/dL    Hematocrit 37.7 34.0 - 46.6 %    MCV 91.5 79.0 - 97.0 fL    MCH 31.3 26.6 - 33.0 pg    MCHC 34.2 31.5 - 35.7 g/dL    RDW 12.3 12.3 - 15.4 %    RDW-SD 40.4 37.0 - 54.0 fl    MPV 9.4 6.0 - 12.0 fL    Platelets 243 140 - 450 10*3/mm3    Neutrophil % 55.5 42.7 - 76.0 %    Lymphocyte % 35.0 19.6 - 45.3 %    Monocyte % 6.1 5.0 - 12.0 %    Eosinophil % 2.2 0.3 - 6.2 %    Basophil % 0.9 0.0 - 1.5 %    Immature Grans % 0.3 0.0 - 0.5 %    Neutrophils, Absolute 3.81 1.70 - 7.00 10*3/mm3    Lymphocytes, Absolute 2.40 0.70 - 3.10 10*3/mm3    Monocytes, Absolute 0.42 0.10 - 0.90 10*3/mm3    Eosinophils, Absolute 0.15 0.00 - 0.40 10*3/mm3    Basophils, Absolute 0.06 0.00 - 0.20 10*3/mm3    Immature Grans, Absolute 0.02 0.00 - 0.05 10*3/mm3     nRBC 0.0 0.0 - 0.2 /100 WBC   Comprehensive Metabolic Panel   Result Value Ref Range    Glucose 95 65 - 99 mg/dL    BUN 10 8 - 23 mg/dL    Creatinine 0.66 0.57 - 1.00 mg/dL    Sodium 140 136 - 145 mmol/L    Potassium 4.2 3.5 - 5.2 mmol/L    Chloride 102 98 - 107 mmol/L    CO2 23.9 22.0 - 29.0 mmol/L    Calcium 9.0 8.6 - 10.5 mg/dL    Total Protein 6.7 6.0 - 8.5 g/dL    Albumin 3.70 3.50 - 5.20 g/dL    ALT (SGPT) 21 1 - 33 U/L    AST (SGOT) 17 1 - 32 U/L    Alkaline Phosphatase 86 39 - 117 U/L    Total Bilirubin 0.5 0.2 - 1.2 mg/dL    eGFR Non African Amer 91 >60 mL/min/1.73    Globulin 3.0 gm/dL    A/G Ratio 1.2 g/dL    BUN/Creatinine Ratio 15.2 7.0 - 25.0    Anion Gap 14.1 5.0 - 15.0 mmol/L   Results for orders placed or performed in visit on 07/30/19   CBC Auto Differential   Result Value Ref Range    WBC 5.83 3.40 - 10.80 10*3/mm3    RBC 4.38 3.77 - 5.28 10*6/mm3    Hemoglobin 13.3 12.0 - 15.9 g/dL    Hematocrit 40.1 34.0 - 46.6 %    MCV 91.6 79.0 - 97.0 fL    MCH 30.4 26.6 - 33.0 pg    MCHC 33.2 31.5 - 35.7 g/dL    RDW 12.5 12.3 - 15.4 %    RDW-SD 41.3 37.0 - 54.0 fl    MPV 10.2 6.0 - 12.0 fL    Platelets 241 140 - 450 10*3/mm3    Neutrophil % 62.8 42.7 - 76.0 %    Lymphocyte % 27.6 19.6 - 45.3 %    Monocyte % 6.2 5.0 - 12.0 %    Eosinophil % 2.2 0.3 - 6.2 %    Basophil % 0.9 0.0 - 1.5 %    Immature Grans % 0.3 0.0 - 0.5 %    Neutrophils, Absolute 3.66 1.70 - 7.00 10*3/mm3    Lymphocytes, Absolute 1.61 0.70 - 3.10 10*3/mm3    Monocytes, Absolute 0.36 0.10 - 0.90 10*3/mm3    Eosinophils, Absolute 0.13 0.00 - 0.40 10*3/mm3    Basophils, Absolute 0.05 0.00 - 0.20 10*3/mm3    Immature Grans, Absolute 0.02 0.00 - 0.05 10*3/mm3    nRBC 0.0 0.0 - 0.2 /100 WBC   Comprehensive Metabolic Panel   Result Value Ref Range    Glucose 104 (H) 65 - 99 mg/dL    BUN 11 8 - 23 mg/dL    Creatinine 0.67 0.57 - 1.00 mg/dL    Sodium 142 136 - 145 mmol/L    Potassium 4.2 3.5 - 5.2 mmol/L    Chloride 105 98 - 107 mmol/L    CO2 21.8 (L)  22.0 - 29.0 mmol/L    Calcium 9.5 8.6 - 10.5 mg/dL    Total Protein 7.1 6.0 - 8.5 g/dL    Albumin 4.00 3.50 - 5.20 g/dL    ALT (SGPT) 25 1 - 33 U/L    AST (SGOT) 19 1 - 32 U/L    Alkaline Phosphatase 91 39 - 117 U/L    Total Bilirubin 0.5 0.2 - 1.2 mg/dL    eGFR Non African Amer 90 >60 mL/min/1.73    Globulin 3.1 gm/dL    A/G Ratio 1.3 g/dL    BUN/Creatinine Ratio 16.4 7.0 - 25.0    Anion Gap 15.2 (H) 5.0 - 15.0 mmol/L   Results for orders placed or performed during the hospital encounter of 03/04/19   Tissue Pathology Exam   Result Value Ref Range    Case Report       Surgical Pathology Report                         Case: [id number]                                  Authorizing Provider:  Henok Dixon MD        Collected:           03/04/2019 03:54 PM          Ordering Location:     [facility]             Received:            03/05/2019 07:47 AM                                 [facility]                                                     Pathologist:           Gurwinder Bangura MD                                                         Specimens:   1) - Large Intestine, Sigmoid Colon, sigmoid polyp (cold snare)                                     2) - Large Intestine, Cecum, cecum bx                                                               3) - Large Intestine, Right / Ascending Colon, ascending colon bx                                   4) - Large Intestine, Transverse Colon, transverse colon bx                                         5) - Large Intestine, Left / Descending Colon, descending colon bx                                   6) - Large Intestine, Sigmoid Colon, sigmoid colon bx                                      Final Diagnosis       1.  POLYP, SIGMOID COLON:  BENIGN LYMPHOID AGGREGATE INVOLVING MUCOSA OF THE COLON.    2.  MUCOSA, CECUM:  NO SIGNIFICANT HISTOLOGIC ABNORMALITY.  NEGATIVE FOR DYSPLASIA.    3.  MUCOSA, ASCENDING COLON:  NO SIGNIFICANT HISTOLOGIC  ABNORMALITY.  NEGATIVE FOR DYSPLASIA.    4.  MUCOSA, TRANSVERSE COLON:  NO SIGNIFICANT HISTOLOGIC ABNORMALITY.  NEGATIVE FOR DYSPLASIA.    5.  MUCOSA, DESCENDING COLON:  NO SIGNIFICANT HISTOLOGIC ABNORMALITY.  NEGATIVE FOR DYSPLASIA.    6.  MUCOSA, SIGMOID COLON:  NO SIGNIFICANT HISTOLOGIC ABNORMALITY.  NEGATIVE FOR DYSPLASIA.        Gross Description       Received for examination are 6 containers, each of which have nodular bits of white soft tissue measuring 0.3-0.5 cc in aggregate.  All specimens are embedded as labeled:  1A polyp, sigmoid colon; 2A cecum; 3A ascending colon; 4A transverse colon; 5A descending colon; 6A sigmoid colon.        Results for orders placed or performed in visit on 01/22/19   CBC Auto Differential   Result Value Ref Range    WBC 6.26 3.20 - 9.80 10*3/mm3    RBC 4.30 3.77 - 5.16 10*6/mm3    Hemoglobin 13.3 12.0 - 15.5 g/dL    Hematocrit 39.3 35.0 - 45.0 %    MCV 91.4 80.0 - 98.0 fL    MCH 30.9 26.5 - 34.0 pg    MCHC 33.8 31.4 - 36.0 g/dL    RDW 12.4 11.5 - 14.5 %    RDW-SD 41.2 36.4 - 46.3 fl    MPV 9.3 8.0 - 12.0 fL    Platelets 223 150 - 450 10*3/mm3    Neutrophil % 63.3 37.0 - 80.0 %    Lymphocyte % 27.5 10.0 - 50.0 %    Monocyte % 6.7 0.0 - 12.0 %    Eosinophil % 1.9 0.0 - 7.0 %    Basophil % 0.3 0.0 - 2.0 %    Immature Grans % 0.3 0.0 - 0.5 %    Neutrophils, Absolute 3.96 2.00 - 8.60 10*3/mm3    Lymphocytes, Absolute 1.72 0.60 - 4.20 10*3/mm3    Monocytes, Absolute 0.42 0.00 - 0.90 10*3/mm3    Eosinophils, Absolute 0.12 0.00 - 0.70 10*3/mm3    Basophils, Absolute 0.02 0.00 - 0.20 10*3/mm3    Immature Grans, Absolute 0.02 0.00 - 0.02 10*3/mm3     *Note: Due to a large number of results and/or encounters for the requested time period, some results have not been displayed. A complete set of results can be found in Results Review.       Some portions of this note have been dictated using voice recognition software and may contain errors and/or omissions.

## 2020-04-15 ENCOUNTER — OFFICE VISIT (OUTPATIENT)
Dept: GASTROENTEROLOGY | Facility: CLINIC | Age: 60
End: 2020-04-15

## 2020-04-15 ENCOUNTER — APPOINTMENT (OUTPATIENT)
Dept: LAB | Facility: HOSPITAL | Age: 60
End: 2020-04-15

## 2020-04-15 VITALS
BODY MASS INDEX: 52.67 KG/M2 | SYSTOLIC BLOOD PRESSURE: 140 MMHG | DIASTOLIC BLOOD PRESSURE: 80 MMHG | OXYGEN SATURATION: 96 % | HEART RATE: 101 BPM | HEIGHT: 62 IN | WEIGHT: 286.2 LBS

## 2020-04-15 DIAGNOSIS — K51.011 ULCERATIVE PANCOLITIS WITH RECTAL BLEEDING (HCC): Primary | ICD-10-CM

## 2020-04-15 DIAGNOSIS — R10.9 FLANK PAIN: ICD-10-CM

## 2020-04-15 DIAGNOSIS — R10.84 GENERALIZED ABDOMINAL PAIN: ICD-10-CM

## 2020-04-15 DIAGNOSIS — R19.4 CHANGE IN BOWEL HABITS: ICD-10-CM

## 2020-04-15 PROCEDURE — 85652 RBC SED RATE AUTOMATED: CPT | Performed by: PHYSICIAN ASSISTANT

## 2020-04-15 PROCEDURE — 36415 COLL VENOUS BLD VENIPUNCTURE: CPT | Performed by: PHYSICIAN ASSISTANT

## 2020-04-15 PROCEDURE — 80053 COMPREHEN METABOLIC PANEL: CPT | Performed by: PHYSICIAN ASSISTANT

## 2020-04-15 PROCEDURE — 86140 C-REACTIVE PROTEIN: CPT | Performed by: PHYSICIAN ASSISTANT

## 2020-04-15 PROCEDURE — 99214 OFFICE O/P EST MOD 30 MIN: CPT | Performed by: PHYSICIAN ASSISTANT

## 2020-04-15 PROCEDURE — 85025 COMPLETE CBC W/AUTO DIFF WBC: CPT | Performed by: PHYSICIAN ASSISTANT

## 2020-04-15 RX ORDER — METRONIDAZOLE 500 MG/1
500 TABLET ORAL 2 TIMES DAILY
Qty: 20 TABLET | Refills: 0 | Status: SHIPPED | OUTPATIENT
Start: 2020-04-15 | End: 2020-05-21

## 2020-04-15 NOTE — PATIENT INSTRUCTIONS

## 2020-04-15 NOTE — PROGRESS NOTES
Chief Complaint   Patient presents with   • Ulcerative Colitis   • Abdominal Pain   • Diarrhea       ENDO PROCEDURE ORDERED:    Subjective    Tiffani Serra is a 60 y.o. female. she is here today for follow-up.    History of Present Illness    The patient is seen on a recheck of her ulcerative colitis, abdominal pain and diarrhea. Last seen 02/03/2020. The patient states last week she started to have large volume of formed stools. No mucus. She has had some blood in the stools, which started last week. She has been taking her Levsin regularly. She has had some questionable chills, but no fever. She is on her Aprezo 4 daily. Weight is down less than 1 pound since last visit. She does have a family history of colon cancer. Last colonoscopy 03/04/2019. She is complaining of 5 out of 10 lower back and flank discomfort. No dysphagia. No recent laboratory or radiographic studies.    ASSESSMENT/PLAN: Patient with increased abdominal pain, hematochezia with known ulcerative colitis, possible flare. I recommended a trial on Flagyl. We will also check urinalysis with culture and sensitivity micro for possible infection. We will also check CBC, CMP, sedimentation rate and CRP. We will also do a stool pathogen panel especially if her stool continues to be loose. We may need to consider repeat endoscopy. However, because of COVID-19 we will defer endoscopy at present. We will plan followup after the above, further pending clinical course and the results of the above.       The following portions of the patient's history were reviewed and updated as appropriate:   Past Medical History:   Diagnosis Date   • Abdominal pain    • Abscess of labia    • Acute posthemorrhagic anemia 01/08/2015   • Anemia     history of, mild   • Anemia due to blood loss 11/20/2014   • Cancer (CMS/HCC)     cosmo/right leg mole   • Contact dermatitis 01/30/2013    on labia and surrounding regions   • Cystitis     recurrent   • Diarrhea 04/11/2016   •  Foot pain     porokeratoma causin metatarsalgia, right foot   • Generalized abdominal pain 02/23/2015   • Hypercholesterolemia    • Hypertensive disorder    • Infection of skin and subcutaneous tissue 01/30/2013   • Iron deficiency anemia 04/11/2016    improving   • Irritable bowel syndrome    • Knee pain, right    • Left sided ulcerative colitis (CMS/HCC) 10/08/2015   • Malaise and fatigue 11/02/2011   • Obesity    • Pseudomembranous enterocolitis 11/02/2012   • Rectal hemorrhage 07/01/2015   • Scapulalgia 10/24/2014   • Sialoadenitis 07/26/2013   • Ulcerative colitis (CMS/HCC) 04/11/2016    chronic, for 29 years.  flare   • Urinary tract infectious disease 07/14/2012   • Vitamin D deficiency 05/14/2012     Past Surgical History:   Procedure Laterality Date   • BLADDER SURGERY  2001    bladder tacking x 3   • COLONOSCOPY  07/25/2011    Colitis found in rectum & sigmoid colon. Biopsy taken   • COLONOSCOPY  06/15/2016    Erythematous mucosa in the rectum.Biopsied.One polyp in the transverse colon. Biopsied   • COLONOSCOPY  08/15/2008    Colitis of the rectum, the sigmoid and the transverse colon. Multiple biopsies were obtained from the rectum, the sigmoid and the transverse colon. Multiple biopsies were obtained from the cecum, the transverse colon, sigmoid & rectum   • COLONOSCOPY N/A 3/4/2019    Procedure: COLONOSCOPY;  Surgeon: Henok Dixon MD;  Location: [facility] ENDOSCOPY;  Service: Gastroenterology   • EXCISION LESION  10/17/1969    removal of sebaceous cyst of neck   • JOINT REPLACEMENT Left    • KNEE ARTHROSCOPY  02/21/2005    Tears of the meidal and lateral meniscus and osteoarthritis of the knee. Arthroscopic medial and lateral meniscectomies of the right knee with chondroplasty of the medial, lateral femoral condyles and patella   • LYMPH NODE BIOPSY  05/12/2009    [place] lymph node biopsy, superficial and deep, within the right groin involving superficial femoral nodes and also the external iliac  nodes. Melanoma of the right leg   • OH TOTAL KNEE ARTHROPLASTY Right 10/16/2017    Procedure: TOTAL KNEE ARTHROPLASTY;  Surgeon: Yury Marion MD;  Location: [facility];  Service: Orthopedics   • TOTAL ABDOMINAL HYSTERECTOMY WITH SALPINGO OOPHORECTOMY     • TOTAL KNEE ARTHROPLASTY  2007    severe osteoarthritis of the left knee.     • WRIST GANGLION EXCISION  10/17/1969    left wrist     Family History   Problem Relation Age of Onset   • Coronary artery disease Other    • Hypertension Other      OB History        1    Para   1    Term   1            AB        Living           SAB        TAB        Ectopic        Molar        Multiple        Live Births                  Allergies   Allergen Reactions   • Other      Garlic diarrhea/heartburn   • Keflex [Cephalexin] Rash   • Penicillins Rash     nylon   • Tape Rash     Silk tape/swells     Social History     Socioeconomic History   • Marital status:      Spouse name: Not on file   • Number of children: Not on file   • Years of education: Not on file   • Highest education level: Not on file   Tobacco Use   • Smoking status: Never Smoker   • Smokeless tobacco: Never Used   Substance and Sexual Activity   • Alcohol use: No   • Drug use: No   • Sexual activity: Defer     Current Medications:  Prior to Admission medications    Medication Sig Start Date End Date Taking? Authorizing Provider   acetaminophen (TYLENOL) 500 MG tablet Take 1,000 mg by mouth Every 6 (Six) Hours As Needed for Mild Pain .   Yes Ernesto Delgado MD   Ca Phosphate-Cholecalciferol (CALCIUM/VITAMIN D3 GUMMIES PO) Take  by mouth Daily.   Yes ProviderErnesto MD   hyoscyamine (LEVSIN) 0.125 MG SL tablet Take 1 tablet by mouth Every 6 (Six) Hours As Needed for Cramping. 20  Yes Joao Arnett PA-C   mesalamine (APRISO) 0.375 g 24 hr capsule Take 4 capsules by mouth Daily. 2/3/20  Yes Joao Arnett PA-C   ferrous sulfate 325 (65 FE) MG tablet Take 325 mg  "by mouth Daily With Breakfast.    Provider, MD Ernesto   metroNIDAZOLE (FLAGYL) 500 MG tablet Take 1 tablet by mouth 2 (Two) Times a Day. 4/15/20   Joao Arnett PA-C     Review of Systems  Review of Systems       Objective    /80   Pulse 101   Ht 157.5 cm (62\")   Wt 130 kg (286 lb 3.2 oz)   LMP  (LMP Unknown)   SpO2 96%   BMI 52.35 kg/m²   Physical Exam   Constitutional: She is oriented to person, place, and time. She appears well-developed and well-nourished. No distress.   Ill appearing   HENT:   Head: Normocephalic and atraumatic.   Eyes: Pupils are equal, round, and reactive to light. EOM are normal.   Neck: Normal range of motion.   Cardiovascular: Normal rate, regular rhythm and normal heart sounds.   Pulmonary/Chest: Effort normal and breath sounds normal.   Abdominal: Soft. She exhibits no shifting dullness, no distension, no abdominal bruit, no ascites and no mass. Bowel sounds are decreased. There is no hepatosplenomegaly. There is tenderness in the periumbilical area. There is no rigidity, no rebound, no guarding and no CVA tenderness. No hernia. Hernia confirmed negative in the ventral area.   Obese, mild diffuse   Musculoskeletal: Normal range of motion.   Neurological: She is alert and oriented to person, place, and time.   Skin: Skin is warm. She is not diaphoretic.   Psychiatric: She has a normal mood and affect. Her behavior is normal. Judgment and thought content normal.   Nursing note and vitals reviewed.    Assessment/Plan      1. Ulcerative pancolitis with rectal bleeding (CMS/HCC)    2. Change in bowel habits    3. Generalized abdominal pain    4. Flank pain    .   Tiffani was seen today for ulcerative colitis, abdominal pain and diarrhea.    Diagnoses and all orders for this visit:    Ulcerative pancolitis with rectal bleeding (CMS/HCC)  -     CBC & Differential  -     Comprehensive Metabolic Panel  -     C-reactive Protein  -     Sedimentation Rate  -     Gastrointestinal " Panel, PCR - Stool, Per Rectum  -     Urinalysis With Microscopic - Urine, Clean Catch  -     Urine Culture - Urine, Urine, Clean Catch  -     CBC Auto Differential  -     Urinalysis without microscopic (no culture) - Urine, Clean Catch  -     Urinalysis, Microscopic Only - Urine, Clean Catch    Change in bowel habits  -     CBC & Differential  -     Comprehensive Metabolic Panel  -     C-reactive Protein  -     Sedimentation Rate  -     Gastrointestinal Panel, PCR - Stool, Per Rectum  -     Urinalysis With Microscopic - Urine, Clean Catch  -     Urine Culture - Urine, Urine, Clean Catch  -     CBC Auto Differential  -     Urinalysis without microscopic (no culture) - Urine, Clean Catch  -     Urinalysis, Microscopic Only - Urine, Clean Catch    Generalized abdominal pain  -     CBC & Differential  -     Comprehensive Metabolic Panel  -     C-reactive Protein  -     Sedimentation Rate  -     Gastrointestinal Panel, PCR - Stool, Per Rectum  -     Urinalysis With Microscopic - Urine, Clean Catch  -     Urine Culture - Urine, Urine, Clean Catch  -     CBC Auto Differential  -     Urinalysis without microscopic (no culture) - Urine, Clean Catch  -     Urinalysis, Microscopic Only - Urine, Clean Catch    Flank pain  -     CBC & Differential  -     Comprehensive Metabolic Panel  -     C-reactive Protein  -     Sedimentation Rate  -     Gastrointestinal Panel, PCR - Stool, Per Rectum  -     Urinalysis With Microscopic - Urine, Clean Catch  -     Urine Culture - Urine, Urine, Clean Catch  -     CBC Auto Differential  -     Urinalysis without microscopic (no culture) - Urine, Clean Catch  -     Urinalysis, Microscopic Only - Urine, Clean Catch    Other orders  -     metroNIDAZOLE (FLAGYL) 500 MG tablet; Take 1 tablet by mouth 2 (Two) Times a Day.        Orders placed during this encounter include:  Orders Placed This Encounter   Procedures   • Gastrointestinal Panel, PCR - Stool, Per Rectum   • Urine Culture - Urine, Urine,  Clean Catch   • Comprehensive Metabolic Panel   • C-reactive Protein   • Sedimentation Rate   • CBC Auto Differential   • Urinalysis without microscopic (no culture) - Urine, Clean Catch   • Urinalysis, Microscopic Only - Urine, Clean Catch   • CBC & Differential     Order Specific Question:   Manual Differential     Answer:   No   • Urinalysis With Microscopic - Urine, Clean Catch       Medications prescribed:  New Medications Ordered This Visit   Medications   • metroNIDAZOLE (FLAGYL) 500 MG tablet     Sig: Take 1 tablet by mouth 2 (Two) Times a Day.     Dispense:  20 tablet     Refill:  0       Requested Prescriptions     Signed Prescriptions Disp Refills   • metroNIDAZOLE (FLAGYL) 500 MG tablet 20 tablet 0     Sig: Take 1 tablet by mouth 2 (Two) Times a Day.       Review and/or summary of lab tests, radiology, procedures, medications. Review and summary of old records and obtaining of history. The risks and benefits of my recommendations, as well as other treatment options were discussed with the patient today. Questions were answered.    Follow-up: Return if symptoms worsen or fail to improve, for After the above.     * Surgery not found *      This document has been electronically signed by Joao Arnett PA-C on April 20, 2020 16:19      Results for orders placed or performed in visit on 04/15/20   Gastrointestinal Panel, PCR - Stool, Per Rectum   Result Value Ref Range    Campylobacter Not Detected Not Detected    Plesiomonas shigelloides Not Detected Not Detected    Salmonella Not Detected Not Detected    Vibrio Not Detected Not Detected    Vibrio cholerae Not Detected Not Detected    Yersinia enterocolitica Not Detected Not Detected    Enteroaggregative E. coli (EAEC) Not Detected Not Detected    Enteropathogenic E. coli (EPEC) Not Detected Not Detected    Enterotoxigenic E. coli (ETEC) lt/st Not Detected Not Detected    Shiga-like toxin-producing E. coli (STEC) stx1/stx2 Not Detected Not Detected    E.  coli O157 Not Detected Not Detected    Shigella/Enteroinvasive E. coli (EIEC) Not Detected Not Detected    Cryptosporidium Not Detected Not Detected    Cyclospora cayetanensis Not Detected Not Detected    Entamoeba histolytica Not Detected Not Detected    Giardia lamblia Not Detected Not Detected    Adenovirus F40/41 Not Detected Not Detected    Astrovirus Not Detected Not Detected    Norovirus GI/GII Not Detected Not Detected    Rotavirus A Not Detected Not Detected    Sapovirus (I, II, IV or V) Not Detected Not Detected   Urinalysis, Microscopic Only - Urine, Clean Catch   Result Value Ref Range    RBC, UA 0-2 None Seen, 0-2 /HPF    WBC, UA 6-12 (A) None Seen, 0-2 /HPF    Bacteria, UA 4+ (A) None Seen /HPF    Squamous Epithelial Cells, UA 7-12 (A) None Seen, 0-2 /HPF    Hyaline Casts, UA 13-20 None Seen /LPF    Methodology Manual Light Microscopy    CBC Auto Differential   Result Value Ref Range    WBC 7.70 3.40 - 10.80 10*3/mm3    RBC 4.31 3.77 - 5.28 10*6/mm3    Hemoglobin 13.5 12.0 - 15.9 g/dL    Hematocrit 39.6 34.0 - 46.6 %    MCV 91.9 79.0 - 97.0 fL    MCH 31.3 26.6 - 33.0 pg    MCHC 34.1 31.5 - 35.7 g/dL    RDW 12.7 12.3 - 15.4 %    RDW-SD 42.0 37.0 - 54.0 fl    MPV 9.3 6.0 - 12.0 fL    Platelets 253 140 - 450 10*3/mm3    Neutrophil % 65.8 42.7 - 76.0 %    Lymphocyte % 23.1 19.6 - 45.3 %    Monocyte % 6.6 5.0 - 12.0 %    Eosinophil % 3.6 0.3 - 6.2 %    Basophil % 0.5 0.0 - 1.5 %    Immature Grans % 0.4 0.0 - 0.5 %    Neutrophils, Absolute 5.06 1.70 - 7.00 10*3/mm3    Lymphocytes, Absolute 1.78 0.70 - 3.10 10*3/mm3    Monocytes, Absolute 0.51 0.10 - 0.90 10*3/mm3    Eosinophils, Absolute 0.28 0.00 - 0.40 10*3/mm3    Basophils, Absolute 0.04 0.00 - 0.20 10*3/mm3    Immature Grans, Absolute 0.03 0.00 - 0.05 10*3/mm3    nRBC 0.0 0.0 - 0.2 /100 WBC   Urinalysis without microscopic (no culture) - Urine, Clean Catch   Result Value Ref Range    Color, UA Yellow Yellow, Straw    Appearance, UA Cloudy (A) Clear     pH, UA 6.5 5.0 - 8.0    Specific Gravity, UA 1.016 1.005 - 1.030    Glucose, UA Negative Negative    Ketones, UA Negative Negative    Bilirubin, UA Negative Negative    Blood, UA Negative Negative    Protein, UA Negative Negative    Leuk Esterase, UA Small (1+) (A) Negative    Nitrite, UA Positive (A) Negative    Urobilinogen, UA 0.2 E.U./dL 0.2 - 1.0 E.U./dL   Sedimentation Rate   Result Value Ref Range    Sed Rate 4 0 - 30 mm/hr   Urine Culture - Urine, Urine, Clean Catch   Result Value Ref Range    Urine Culture >100,000 CFU/mL Mixed Mónica Isolated    C-reactive Protein   Result Value Ref Range    C-Reactive Protein 1.20 (H) 0.00 - 0.50 mg/dL   Comprehensive Metabolic Panel   Result Value Ref Range    Glucose 94 65 - 99 mg/dL    BUN 11 8 - 23 mg/dL    Creatinine 0.75 0.57 - 1.00 mg/dL    Sodium 140 136 - 145 mmol/L    Potassium 4.5 3.5 - 5.2 mmol/L    Chloride 104 98 - 107 mmol/L    CO2 22.0 22.0 - 29.0 mmol/L    Calcium 9.6 8.6 - 10.5 mg/dL    Total Protein 7.7 6.0 - 8.5 g/dL    Albumin 4.30 3.50 - 5.20 g/dL    ALT (SGPT) 23 1 - 33 U/L    AST (SGOT) 17 1 - 32 U/L    Alkaline Phosphatase 93 39 - 117 U/L    Total Bilirubin 0.4 0.2 - 1.2 mg/dL    eGFR Non African Amer 79 >60 mL/min/1.73    Globulin 3.4 gm/dL    A/G Ratio 1.3 g/dL    BUN/Creatinine Ratio 14.7 7.0 - 25.0    Anion Gap 14.0 5.0 - 15.0 mmol/L   Results for orders placed or performed in visit on 01/28/20   CBC Auto Differential   Result Value Ref Range    WBC 6.86 3.40 - 10.80 10*3/mm3    RBC 4.12 3.77 - 5.28 10*6/mm3    Hemoglobin 12.9 12.0 - 15.9 g/dL    Hematocrit 37.7 34.0 - 46.6 %    MCV 91.5 79.0 - 97.0 fL    MCH 31.3 26.6 - 33.0 pg    MCHC 34.2 31.5 - 35.7 g/dL    RDW 12.3 12.3 - 15.4 %    RDW-SD 40.4 37.0 - 54.0 fl    MPV 9.4 6.0 - 12.0 fL    Platelets 243 140 - 450 10*3/mm3    Neutrophil % 55.5 42.7 - 76.0 %    Lymphocyte % 35.0 19.6 - 45.3 %    Monocyte % 6.1 5.0 - 12.0 %    Eosinophil % 2.2 0.3 - 6.2 %    Basophil % 0.9 0.0 - 1.5 %    Immature  Grans % 0.3 0.0 - 0.5 %    Neutrophils, Absolute 3.81 1.70 - 7.00 10*3/mm3    Lymphocytes, Absolute 2.40 0.70 - 3.10 10*3/mm3    Monocytes, Absolute 0.42 0.10 - 0.90 10*3/mm3    Eosinophils, Absolute 0.15 0.00 - 0.40 10*3/mm3    Basophils, Absolute 0.06 0.00 - 0.20 10*3/mm3    Immature Grans, Absolute 0.02 0.00 - 0.05 10*3/mm3    nRBC 0.0 0.0 - 0.2 /100 WBC     *Note: Due to a large number of results and/or encounters for the requested time period, some results have not been displayed. A complete set of results can be found in Results Review.       Some portions of this note have been dictated using voice recognition software and may contain errors and/or omissions.

## 2020-04-16 LAB
ALBUMIN SERPL-MCNC: 4.3 G/DL (ref 3.5–5.2)
ALBUMIN/GLOB SERPL: 1.3 G/DL
ALP SERPL-CCNC: 93 U/L (ref 39–117)
ALT SERPL W P-5'-P-CCNC: 23 U/L (ref 1–33)
ANION GAP SERPL CALCULATED.3IONS-SCNC: 14 MMOL/L (ref 5–15)
AST SERPL-CCNC: 17 U/L (ref 1–32)
BASOPHILS # BLD AUTO: 0.04 10*3/MM3 (ref 0–0.2)
BASOPHILS NFR BLD AUTO: 0.5 % (ref 0–1.5)
BILIRUB SERPL-MCNC: 0.4 MG/DL (ref 0.2–1.2)
BUN BLD-MCNC: 11 MG/DL (ref 8–23)
BUN/CREAT SERPL: 14.7 (ref 7–25)
CALCIUM SPEC-SCNC: 9.6 MG/DL (ref 8.6–10.5)
CHLORIDE SERPL-SCNC: 104 MMOL/L (ref 98–107)
CO2 SERPL-SCNC: 22 MMOL/L (ref 22–29)
CREAT BLD-MCNC: 0.75 MG/DL (ref 0.57–1)
CRP SERPL-MCNC: 1.2 MG/DL (ref 0–0.5)
DEPRECATED RDW RBC AUTO: 42 FL (ref 37–54)
EOSINOPHIL # BLD AUTO: 0.28 10*3/MM3 (ref 0–0.4)
EOSINOPHIL NFR BLD AUTO: 3.6 % (ref 0.3–6.2)
ERYTHROCYTE [DISTWIDTH] IN BLOOD BY AUTOMATED COUNT: 12.7 % (ref 12.3–15.4)
ERYTHROCYTE [SEDIMENTATION RATE] IN BLOOD: 4 MM/HR (ref 0–30)
GFR SERPL CREATININE-BSD FRML MDRD: 79 ML/MIN/1.73
GLOBULIN UR ELPH-MCNC: 3.4 GM/DL
GLUCOSE BLD-MCNC: 94 MG/DL (ref 65–99)
HCT VFR BLD AUTO: 39.6 % (ref 34–46.6)
HGB BLD-MCNC: 13.5 G/DL (ref 12–15.9)
IMM GRANULOCYTES # BLD AUTO: 0.03 10*3/MM3 (ref 0–0.05)
IMM GRANULOCYTES NFR BLD AUTO: 0.4 % (ref 0–0.5)
LYMPHOCYTES # BLD AUTO: 1.78 10*3/MM3 (ref 0.7–3.1)
LYMPHOCYTES NFR BLD AUTO: 23.1 % (ref 19.6–45.3)
MCH RBC QN AUTO: 31.3 PG (ref 26.6–33)
MCHC RBC AUTO-ENTMCNC: 34.1 G/DL (ref 31.5–35.7)
MCV RBC AUTO: 91.9 FL (ref 79–97)
MONOCYTES # BLD AUTO: 0.51 10*3/MM3 (ref 0.1–0.9)
MONOCYTES NFR BLD AUTO: 6.6 % (ref 5–12)
NEUTROPHILS # BLD AUTO: 5.06 10*3/MM3 (ref 1.7–7)
NEUTROPHILS NFR BLD AUTO: 65.8 % (ref 42.7–76)
NRBC BLD AUTO-RTO: 0 /100 WBC (ref 0–0.2)
PLATELET # BLD AUTO: 253 10*3/MM3 (ref 140–450)
PMV BLD AUTO: 9.3 FL (ref 6–12)
POTASSIUM BLD-SCNC: 4.5 MMOL/L (ref 3.5–5.2)
PROT SERPL-MCNC: 7.7 G/DL (ref 6–8.5)
RBC # BLD AUTO: 4.31 10*6/MM3 (ref 3.77–5.28)
SODIUM BLD-SCNC: 140 MMOL/L (ref 136–145)
WBC NRBC COR # BLD: 7.7 10*3/MM3 (ref 3.4–10.8)

## 2020-04-17 ENCOUNTER — APPOINTMENT (OUTPATIENT)
Dept: LAB | Facility: HOSPITAL | Age: 60
End: 2020-04-17

## 2020-04-17 LAB
ADV 40+41 DNA STL QL NAA+NON-PROBE: NOT DETECTED
ASTRO TYP 1-8 RNA STL QL NAA+NON-PROBE: NOT DETECTED
BACTERIA UR QL AUTO: ABNORMAL /HPF
BILIRUB UR QL STRIP: NEGATIVE
C CAYETANENSIS DNA STL QL NAA+NON-PROBE: NOT DETECTED
CAMPY SP DNA.DIARRHEA STL QL NAA+PROBE: NOT DETECTED
CLARITY UR: ABNORMAL
COLOR UR: YELLOW
CRYPTOSP STL CULT: NOT DETECTED
E COLI DNA SPEC QL NAA+PROBE: NOT DETECTED
E HISTOLYT AG STL-ACNC: NOT DETECTED
EAEC PAA PLAS AGGR+AATA ST NAA+NON-PRB: NOT DETECTED
EC STX1 + STX2 GENES STL NAA+PROBE: NOT DETECTED
EPEC EAE GENE STL QL NAA+NON-PROBE: NOT DETECTED
ETEC LTA+ST1A+ST1B TOX ST NAA+NON-PROBE: NOT DETECTED
G LAMBLIA DNA SPEC QL NAA+PROBE: NOT DETECTED
GLUCOSE UR STRIP-MCNC: NEGATIVE MG/DL
HGB UR QL STRIP.AUTO: NEGATIVE
HYALINE CASTS UR QL AUTO: ABNORMAL /LPF
KETONES UR QL STRIP: NEGATIVE
LEUKOCYTE ESTERASE UR QL STRIP.AUTO: ABNORMAL
NITRITE UR QL STRIP: POSITIVE
NOROVIRUS GI+II RNA STL QL NAA+NON-PROBE: NOT DETECTED
P SHIGELLOIDES DNA STL QL NAA+PROBE: NOT DETECTED
PH UR STRIP.AUTO: 6.5 [PH] (ref 5–8)
PROT UR QL STRIP: NEGATIVE
RBC # UR: ABNORMAL /HPF
REF LAB TEST METHOD: ABNORMAL
RV RNA STL NAA+PROBE: NOT DETECTED
SALMONELLA DNA SPEC QL NAA+PROBE: NOT DETECTED
SAPO I+II+IV+V RNA STL QL NAA+NON-PROBE: NOT DETECTED
SHIGELLA SP+EIEC IPAH STL QL NAA+PROBE: NOT DETECTED
SP GR UR STRIP: 1.02 (ref 1–1.03)
SQUAMOUS #/AREA URNS HPF: ABNORMAL /HPF
UROBILINOGEN UR QL STRIP: ABNORMAL
V CHOLERAE DNA SPEC QL NAA+PROBE: NOT DETECTED
VIBRIO DNA SPEC NAA+PROBE: NOT DETECTED
WBC UR QL AUTO: ABNORMAL /HPF
YERSINIA STL CULT: NOT DETECTED

## 2020-04-17 PROCEDURE — 81001 URINALYSIS AUTO W/SCOPE: CPT | Performed by: PHYSICIAN ASSISTANT

## 2020-04-17 PROCEDURE — 0097U HC BIOFIRE FILMARRAY GI PANEL: CPT | Performed by: PHYSICIAN ASSISTANT

## 2020-04-17 PROCEDURE — 87086 URINE CULTURE/COLONY COUNT: CPT | Performed by: PHYSICIAN ASSISTANT

## 2020-04-18 LAB — BACTERIA SPEC AEROBE CULT: NORMAL

## 2020-04-20 RX ORDER — NITROFURANTOIN 25; 75 MG/1; MG/1
100 CAPSULE ORAL 2 TIMES DAILY
Qty: 14 CAPSULE | Refills: 0 | Status: SHIPPED | OUTPATIENT
Start: 2020-04-20 | End: 2020-05-21

## 2020-04-29 ENCOUNTER — OFFICE VISIT (OUTPATIENT)
Dept: GASTROENTEROLOGY | Facility: CLINIC | Age: 60
End: 2020-04-29

## 2020-04-29 ENCOUNTER — TELEPHONE (OUTPATIENT)
Dept: GASTROENTEROLOGY | Facility: CLINIC | Age: 60
End: 2020-04-29

## 2020-04-29 VITALS
OXYGEN SATURATION: 97 % | WEIGHT: 285.8 LBS | HEART RATE: 82 BPM | SYSTOLIC BLOOD PRESSURE: 130 MMHG | HEIGHT: 62 IN | BODY MASS INDEX: 52.59 KG/M2 | DIASTOLIC BLOOD PRESSURE: 80 MMHG

## 2020-04-29 DIAGNOSIS — R10.84 GENERALIZED ABDOMINAL PAIN: ICD-10-CM

## 2020-04-29 DIAGNOSIS — K51.818 OTHER ULCERATIVE COLITIS WITH OTHER COMPLICATION (HCC): ICD-10-CM

## 2020-04-29 DIAGNOSIS — K51.011 ULCERATIVE PANCOLITIS WITH RECTAL BLEEDING (HCC): Primary | ICD-10-CM

## 2020-04-29 PROCEDURE — 99213 OFFICE O/P EST LOW 20 MIN: CPT | Performed by: PHYSICIAN ASSISTANT

## 2020-04-29 NOTE — TELEPHONE ENCOUNTER
Tiffani pepe ([place]: [id number]) - [id number]  Apriso 0.375GM er capsules  Status: PA Response - Approved  Created: April 29th, 2020  Sent: April 29th, 2020    Pt is aware.   Pharmacy is aware.

## 2020-04-29 NOTE — PROGRESS NOTES
Chief Complaint   Patient presents with   • Ulcerative Colitis   • Abdominal Pain   • Diarrhea       ENDO PROCEDURE ORDERED:    Subjective    Tiffani Serra is a 60 y.o. female. she is here today for follow-up.    History of Present Illness    The patient is seen on a recheck of her chronic ulcerative colitis, abdominal pain, and diarrhea.  Last seen 04/15/2020.  Laboratory at that time; CBC, CMP, and sed rate were normal.  CRP was elevated at 1.2 she did complete the Flagyl.  She states she has had more formed stools, but is still having a lot of blood in her stool.  She has had some difficulty with her Apriso because of a high co-pay.  She did get a new insurance.  It is not clear if this needs a PA or is because of a high deductible.  We will try to contact the pharmacy to find out.  She is feeling better.  She states about 85% better than she was.  She is still having 4 out of 10 abdominal pain with bloating in her stomach and pain.  Weight is down half a pound since last visit.  She does have a family history of colon cancer.  Last colonoscopy was 03/04/2019.  I did discuss repeating a colonoscopy given her persistent symptoms.  She was concerned about being exposed to the COVID-19, as well as the cost with her new insurance of having another colonoscopy.    The patient had studies on 04/17/2020.  Her GI pathogen panel was negative.  Urinalysis showed some abnormalities with mixed an, but greater than 100,000 CFU, was treated with nitrofurantoin, which she states she completed.  She states she had fever and chills on Monday and Tuesday, but she believes that was from her kidney infection.  She states that seems to be completely resolved.  She does not think she would have the COVID-19.  Her  is an over the road , but has tried to be very careful in any exposure risks.    A/P: The patient with continued flare of ulcerative colitis.  She did not wish to take another round of the Flagyl.   Xifaxan would   likely be as expensive or more expensive than her Apriso.  We will try to find out about keeping her on the Apriso.  I was very reluctant to give her steroids and the patient was very reluctant to take steroids.  I have suggested we wait another week or so and see if her symptoms improve.  I discussed the possibility of cortisone enemas, but these are likely to be very expensive for her as well.  We will plan follow-up in a few months, sooner determined on the above.  The risks, benefits, and alternatives were discussed with the patient.  She agrees to the above.  She will keep us informed.       The following portions of the patient's history were reviewed and updated as appropriate:   Past Medical History:   Diagnosis Date   • Abdominal pain    • Abscess of labia    • Acute posthemorrhagic anemia 01/08/2015   • Anemia     history of, mild   • Anemia due to blood loss 11/20/2014   • Cancer (CMS/[facility])     cosmo/right leg mole   • Contact dermatitis 01/30/2013    on labia and surrounding regions   • Cystitis     recurrent   • Diarrhea 04/11/2016   • Foot pain     porokeratoma causin metatarsalgia, right foot   • Generalized abdominal pain 02/23/2015   • Hypercholesterolemia    • Hypertensive disorder    • Infection of skin and subcutaneous tissue 01/30/2013   • Iron deficiency anemia 04/11/2016    improving   • Irritable bowel syndrome    • Knee pain, right    • Left sided ulcerative colitis (CMS/HCC) 10/08/2015   • Malaise and fatigue 11/02/2011   • Obesity    • Pseudomembranous enterocolitis 11/02/2012   • Rectal hemorrhage 07/01/2015   • Scapulalgia 10/24/2014   • Sialoadenitis 07/26/2013   • Ulcerative colitis (CMS/HCC) 04/11/2016    chronic, for 29 years.  flare   • Urinary tract infectious disease 07/14/2012   • Vitamin D deficiency 05/14/2012     Past Surgical History:   Procedure Laterality Date   • BLADDER SURGERY  2001    bladder tacking x 3   • COLONOSCOPY  07/25/2011    Colitis found in  rectum & sigmoid colon. Biopsy taken   • COLONOSCOPY  06/15/2016    Erythematous mucosa in the rectum.Biopsied.One polyp in the transverse colon. Biopsied   • COLONOSCOPY  08/15/2008    Colitis of the rectum, the sigmoid and the transverse colon. Multiple biopsies were obtained from the rectum, the sigmoid and the transverse colon. Multiple biopsies were obtained from the cecum, the transverse colon, sigmoid & rectum   • COLONOSCOPY N/A 3/4/2019    Procedure: COLONOSCOPY;  Surgeon: Henok Dixon MD;  Location: [facility] ENDOSCOPY;  Service: Gastroenterology   • EXCISION LESION  10/17/1969    removal of sebaceous cyst of neck   • JOINT REPLACEMENT Left    • KNEE ARTHROSCOPY  2005    Tears of the meidal and lateral meniscus and osteoarthritis of the knee. Arthroscopic medial and lateral meniscectomies of the right knee with chondroplasty of the medial, lateral femoral condyles and patella   • LYMPH NODE BIOPSY  2009    Dinosaur lymph node biopsy, superficial and deep, within the right groin involving superficial femoral nodes and also the external iliac nodes. Melanoma of the right leg   • MN TOTAL KNEE ARTHROPLASTY Right 10/16/2017    Procedure: TOTAL KNEE ARTHROPLASTY;  Surgeon: Yury Marion MD;  Location: [facility] OR;  Service: Orthopedics   • TOTAL ABDOMINAL HYSTERECTOMY WITH SALPINGO OOPHORECTOMY     • TOTAL KNEE ARTHROPLASTY  2007    severe osteoarthritis of the left knee.     • WRIST GANGLION EXCISION  10/17/1969    left wrist     Family History   Problem Relation Age of Onset   • Coronary artery disease Other    • Hypertension Other      OB History        1    Para   1    Term   1            AB        Living           SAB        TAB        Ectopic        Molar        Multiple        Live Births                  Allergies   Allergen Reactions   • Other      Garlic diarrhea/heartburn   • Keflex [Cephalexin] Rash   • Penicillins Rash     nylon   • Tape Rash     Silk tape/swells  "    Social History     Socioeconomic History   • Marital status:      Spouse name: Not on file   • Number of children: Not on file   • Years of education: Not on file   • Highest education level: Not on file   Tobacco Use   • Smoking status: Never Smoker   • Smokeless tobacco: Never Used   Substance and Sexual Activity   • Alcohol use: No   • Drug use: No   • Sexual activity: Defer     Current Medications:  Prior to Admission medications    Medication Sig Start Date End Date Taking? Authorizing Provider   acetaminophen (TYLENOL) 500 MG tablet Take 1,000 mg by mouth Every 6 (Six) Hours As Needed for Mild Pain .   Yes Ernesto Delgado MD   Ca Phosphate-Cholecalciferol (CALCIUM/VITAMIN D3 GUMMIES PO) Take  by mouth Daily.   Yes Ernesto Delgado MD   ferrous sulfate 325 (65 FE) MG tablet Take 325 mg by mouth Daily With Breakfast.   Yes Ernesto Delgado MD   hyoscyamine (LEVSIN) 0.125 MG SL tablet Take 1 tablet by mouth Every 6 (Six) Hours As Needed for Cramping. 4/2/20  Yes Joao Arnett PA-C   mesalamine (APRISO) 0.375 g 24 hr capsule Take 4 capsules by mouth Daily. 2/3/20  Yes Joao Arnett PA-C   metroNIDAZOLE (FLAGYL) 500 MG tablet Take 1 tablet by mouth 2 (Two) Times a Day. 4/15/20  Yes Joao Arnett PA-C   nitrofurantoin, macrocrystal-monohydrate, (Macrobid) 100 MG capsule Take 1 capsule by mouth 2 (Two) Times a Day. 4/20/20  Yes Joao Arnett PA-C     Review of Systems  Review of Systems   Constitutional: Positive for activity change and chills.   HENT: Negative for trouble swallowing.    Gastrointestinal: Positive for abdominal distention, abdominal pain, blood in stool, diarrhea and nausea. Negative for anal bleeding, constipation, rectal pain and vomiting.          Objective    /80   Pulse 82   Ht 157.5 cm (62\")   Wt 130 kg (285 lb 12.8 oz)   LMP  (LMP Unknown)   SpO2 97%   BMI 52.27 kg/m²   Physical Exam   Constitutional: She is oriented to person, place, and " time. She appears well-developed and well-nourished. No distress.   Ill appearing   HENT:   Head: Normocephalic and atraumatic.   Eyes: Pupils are equal, round, and reactive to light. EOM are normal.   Neck: Normal range of motion.   Cardiovascular: Normal rate, regular rhythm and normal heart sounds.   Pulmonary/Chest: Effort normal and breath sounds normal.   Abdominal: Soft. She exhibits no shifting dullness, no distension, no abdominal bruit, no ascites and no mass. Bowel sounds are decreased. There is no hepatosplenomegaly. There is tenderness in the periumbilical area. There is no rigidity, no rebound, no guarding and no CVA tenderness. No hernia. Hernia confirmed negative in the ventral area.   Obese, mild diffuse   Musculoskeletal: Normal range of motion.   Neurological: She is alert and oriented to person, place, and time.   Skin: Skin is warm. She is not diaphoretic.   Psychiatric: She has a normal mood and affect. Her behavior is normal. Judgment and thought content normal.   Nursing note and vitals reviewed.    Assessment/Plan      1. Ulcerative pancolitis with rectal bleeding (CMS/HCC)    2. Generalized abdominal pain    3. Other ulcerative colitis with other complication (CMS/HCC)    .   Tiffani was seen today for ulcerative colitis, abdominal pain and diarrhea.    Diagnoses and all orders for this visit:    Ulcerative pancolitis with rectal bleeding (CMS/HCC)    Generalized abdominal pain    Other ulcerative colitis with other complication (CMS/HCC)        Orders placed during this encounter include:  No orders of the defined types were placed in this encounter.      Medications prescribed:  No orders of the defined types were placed in this encounter.      Requested Prescriptions      No prescriptions requested or ordered in this encounter       Review and/or summary of lab tests, radiology, procedures, medications. Review and summary of old records and obtaining of history. The risks and benefits of  my recommendations, as well as other treatment options were discussed with the patient today. Questions were answered.    Follow-up: Return in about 3 months (around 7/29/2020), or if symptoms worsen or fail to improve.     * Surgery not found *      This document has been electronically signed by Joao Arnett PA-C on April 30, 2020 10:58      Results for orders placed or performed in visit on 04/15/20   Gastrointestinal Panel, PCR - Stool, Per Rectum   Result Value Ref Range    Campylobacter Not Detected Not Detected    Plesiomonas shigelloides Not Detected Not Detected    Salmonella Not Detected Not Detected    Vibrio Not Detected Not Detected    Vibrio cholerae Not Detected Not Detected    Yersinia enterocolitica Not Detected Not Detected    Enteroaggregative E. coli (EAEC) Not Detected Not Detected    Enteropathogenic E. coli (EPEC) Not Detected Not Detected    Enterotoxigenic E. coli (ETEC) lt/st Not Detected Not Detected    Shiga-like toxin-producing E. coli (STEC) stx1/stx2 Not Detected Not Detected    E. coli O157 Not Detected Not Detected    Shigella/Enteroinvasive E. coli (EIEC) Not Detected Not Detected    Cryptosporidium Not Detected Not Detected    Cyclospora cayetanensis Not Detected Not Detected    Entamoeba histolytica Not Detected Not Detected    Giardia lamblia Not Detected Not Detected    Adenovirus F40/41 Not Detected Not Detected    Astrovirus Not Detected Not Detected    Norovirus GI/GII Not Detected Not Detected    Rotavirus A Not Detected Not Detected    Sapovirus (I, II, IV or V) Not Detected Not Detected   Urinalysis, Microscopic Only - Urine, Clean Catch   Result Value Ref Range    RBC, UA 0-2 None Seen, 0-2 /HPF    WBC, UA 6-12 (A) None Seen, 0-2 /HPF    Bacteria, UA 4+ (A) None Seen /HPF    Squamous Epithelial Cells, UA 7-12 (A) None Seen, 0-2 /HPF    Hyaline Casts, UA 13-20 None Seen /LPF    Methodology Manual Light Microscopy    CBC Auto Differential   Result Value Ref Range     WBC 7.70 3.40 - 10.80 10*3/mm3    RBC 4.31 3.77 - 5.28 10*6/mm3    Hemoglobin 13.5 12.0 - 15.9 g/dL    Hematocrit 39.6 34.0 - 46.6 %    MCV 91.9 79.0 - 97.0 fL    MCH 31.3 26.6 - 33.0 pg    MCHC 34.1 31.5 - 35.7 g/dL    RDW 12.7 12.3 - 15.4 %    RDW-SD 42.0 37.0 - 54.0 fl    MPV 9.3 6.0 - 12.0 fL    Platelets 253 140 - 450 10*3/mm3    Neutrophil % 65.8 42.7 - 76.0 %    Lymphocyte % 23.1 19.6 - 45.3 %    Monocyte % 6.6 5.0 - 12.0 %    Eosinophil % 3.6 0.3 - 6.2 %    Basophil % 0.5 0.0 - 1.5 %    Immature Grans % 0.4 0.0 - 0.5 %    Neutrophils, Absolute 5.06 1.70 - 7.00 10*3/mm3    Lymphocytes, Absolute 1.78 0.70 - 3.10 10*3/mm3    Monocytes, Absolute 0.51 0.10 - 0.90 10*3/mm3    Eosinophils, Absolute 0.28 0.00 - 0.40 10*3/mm3    Basophils, Absolute 0.04 0.00 - 0.20 10*3/mm3    Immature Grans, Absolute 0.03 0.00 - 0.05 10*3/mm3    nRBC 0.0 0.0 - 0.2 /100 WBC   Urinalysis without microscopic (no culture) - Urine, Clean Catch   Result Value Ref Range    Color, UA Yellow Yellow, Straw    Appearance, UA Cloudy (A) Clear    pH, UA 6.5 5.0 - 8.0    Specific Gravity, UA 1.016 1.005 - 1.030    Glucose, UA Negative Negative    Ketones, UA Negative Negative    Bilirubin, UA Negative Negative    Blood, UA Negative Negative    Protein, UA Negative Negative    Leuk Esterase, UA Small (1+) (A) Negative    Nitrite, UA Positive (A) Negative    Urobilinogen, UA 0.2 E.U./dL 0.2 - 1.0 E.U./dL   Sedimentation Rate   Result Value Ref Range    Sed Rate 4 0 - 30 mm/hr   Urine Culture - Urine, Urine, Clean Catch   Result Value Ref Range    Urine Culture >100,000 CFU/mL Mixed Mónica Isolated    C-reactive Protein   Result Value Ref Range    C-Reactive Protein 1.20 (H) 0.00 - 0.50 mg/dL   Comprehensive Metabolic Panel   Result Value Ref Range    Glucose 94 65 - 99 mg/dL    BUN 11 8 - 23 mg/dL    Creatinine 0.75 0.57 - 1.00 mg/dL    Sodium 140 136 - 145 mmol/L    Potassium 4.5 3.5 - 5.2 mmol/L    Chloride 104 98 - 107 mmol/L    CO2 22.0 22.0 -  29.0 mmol/L    Calcium 9.6 8.6 - 10.5 mg/dL    Total Protein 7.7 6.0 - 8.5 g/dL    Albumin 4.30 3.50 - 5.20 g/dL    ALT (SGPT) 23 1 - 33 U/L    AST (SGOT) 17 1 - 32 U/L    Alkaline Phosphatase 93 39 - 117 U/L    Total Bilirubin 0.4 0.2 - 1.2 mg/dL    eGFR Non African Amer 79 >60 mL/min/1.73    Globulin 3.4 gm/dL    A/G Ratio 1.3 g/dL    BUN/Creatinine Ratio 14.7 7.0 - 25.0    Anion Gap 14.0 5.0 - 15.0 mmol/L   Results for orders placed or performed in visit on 01/28/20   CBC Auto Differential   Result Value Ref Range    WBC 6.86 3.40 - 10.80 10*3/mm3    RBC 4.12 3.77 - 5.28 10*6/mm3    Hemoglobin 12.9 12.0 - 15.9 g/dL    Hematocrit 37.7 34.0 - 46.6 %    MCV 91.5 79.0 - 97.0 fL    MCH 31.3 26.6 - 33.0 pg    MCHC 34.2 31.5 - 35.7 g/dL    RDW 12.3 12.3 - 15.4 %    RDW-SD 40.4 37.0 - 54.0 fl    MPV 9.4 6.0 - 12.0 fL    Platelets 243 140 - 450 10*3/mm3    Neutrophil % 55.5 42.7 - 76.0 %    Lymphocyte % 35.0 19.6 - 45.3 %    Monocyte % 6.1 5.0 - 12.0 %    Eosinophil % 2.2 0.3 - 6.2 %    Basophil % 0.9 0.0 - 1.5 %    Immature Grans % 0.3 0.0 - 0.5 %    Neutrophils, Absolute 3.81 1.70 - 7.00 10*3/mm3    Lymphocytes, Absolute 2.40 0.70 - 3.10 10*3/mm3    Monocytes, Absolute 0.42 0.10 - 0.90 10*3/mm3    Eosinophils, Absolute 0.15 0.00 - 0.40 10*3/mm3    Basophils, Absolute 0.06 0.00 - 0.20 10*3/mm3    Immature Grans, Absolute 0.02 0.00 - 0.05 10*3/mm3    nRBC 0.0 0.0 - 0.2 /100 WBC     *Note: Due to a large number of results and/or encounters for the requested time period, some results have not been displayed. A complete set of results can be found in Results Review.       Some portions of this note have been dictated using voice recognition software and may contain errors and/or omissions.

## 2020-04-29 NOTE — PATIENT INSTRUCTIONS

## 2020-05-01 ENCOUNTER — DOCUMENTATION (OUTPATIENT)
Dept: GASTROENTEROLOGY | Facility: CLINIC | Age: 60
End: 2020-05-01

## 2020-05-01 DIAGNOSIS — G89.29 CHRONIC ABDOMINAL PAIN: ICD-10-CM

## 2020-05-01 DIAGNOSIS — R19.7 DIARRHEA, UNSPECIFIED TYPE: ICD-10-CM

## 2020-05-01 DIAGNOSIS — R10.9 CHRONIC ABDOMINAL PAIN: ICD-10-CM

## 2020-05-01 DIAGNOSIS — K51.818 OTHER ULCERATIVE COLITIS WITH OTHER COMPLICATION (HCC): ICD-10-CM

## 2020-05-01 RX ORDER — MESALAMINE 0.38 G/1
1500 CAPSULE, EXTENDED RELEASE ORAL DAILY
Qty: 120 CAPSULE | Refills: 3 | Status: CANCELLED | OUTPATIENT
Start: 2020-05-01

## 2020-05-01 RX ORDER — MESALAMINE 0.38 G/1
CAPSULE, EXTENDED RELEASE ORAL
Qty: 120 CAPSULE | Refills: 3 | Status: SHIPPED | OUTPATIENT
Start: 2020-05-01 | End: 2020-07-08 | Stop reason: SDUPTHER

## 2020-05-01 NOTE — PROGRESS NOTES
mesalamine (Apriso) 0.375 g 24 hr capsule is cheaper at [facility].  Pt asked to  medication there.

## 2020-05-05 ENCOUNTER — TELEPHONE (OUTPATIENT)
Dept: GASTROENTEROLOGY | Facility: CLINIC | Age: 60
End: 2020-05-05

## 2020-05-21 ENCOUNTER — OFFICE VISIT (OUTPATIENT)
Dept: GASTROENTEROLOGY | Facility: CLINIC | Age: 60
End: 2020-05-21

## 2020-05-21 VITALS
HEIGHT: 62 IN | DIASTOLIC BLOOD PRESSURE: 79 MMHG | BODY MASS INDEX: 52.37 KG/M2 | HEART RATE: 77 BPM | SYSTOLIC BLOOD PRESSURE: 142 MMHG | WEIGHT: 284.6 LBS

## 2020-05-21 DIAGNOSIS — K62.5 HEMORRHAGE OF ANUS AND RECTUM: ICD-10-CM

## 2020-05-21 DIAGNOSIS — R10.9 CHRONIC ABDOMINAL PAIN: Primary | ICD-10-CM

## 2020-05-21 DIAGNOSIS — G89.29 CHRONIC ABDOMINAL PAIN: Primary | ICD-10-CM

## 2020-05-21 DIAGNOSIS — K51.818 OTHER ULCERATIVE COLITIS WITH OTHER COMPLICATION (HCC): ICD-10-CM

## 2020-05-21 PROCEDURE — 99214 OFFICE O/P EST MOD 30 MIN: CPT | Performed by: PHYSICIAN ASSISTANT

## 2020-05-21 RX ORDER — DEXTROSE AND SODIUM CHLORIDE 5; .45 G/100ML; G/100ML
30 INJECTION, SOLUTION INTRAVENOUS CONTINUOUS PRN
Status: CANCELLED | OUTPATIENT
Start: 2020-05-22

## 2020-05-21 NOTE — PROGRESS NOTES
Chief Complaint   Patient presents with   • Ulcerative Pancolitis   • Rectal Bleeding       ENDO PROCEDURE ORDERED: Procto BABAR with bleeding and abd pain    Subjective    Tiffani Serra is a 60 y.o. female. she is here today for follow-up.    History of Present Illness    The patient is seen with continued possible flare of her ulcerative colitis, rectal bleeding and abdominal pain. Last seen 04/29/2020. She had previously completed the Flagyl and Macrobid for UTI. She is still having a lot of blow back and groin pain. She rates that a 7 out of 10. She states she has felt feverish but does not know if she has actually had a fever. She states her diarrhea has resolved but she is still passing a lot of blood. She is very concerned about that. She has had clots in it. She states she has had a lot of mucus in the stool and has a very strong odor. She is still taking her Apriso. She denies any pain with bowel movements. No nausea or vomiting. Weight is down 1 pound since last visit. Last colonoscopy 03/04/2019.    ASSESSMENT/PLAN: Patient with possible continued flare of her ulcerative colitis. Recommend proctosigmoidoscopy to evaluate. She thinks she may have hemorrhoids as well. We would consider diverticular disease, consider possible infectious etiology. I was concerned about putting her on any kind of steroids given current COVID-19. She was uncomfortable today. We will consider CT imaging. We will plan proctosigmoidoscopy. We will do biopsies. If her symptoms worsen she was encouraged to go to the emergency room immediately. Further pending clinical course and the results of the above.      The following portions of the patient's history were reviewed and updated as appropriate:   Past Medical History:   Diagnosis Date   • Abdominal pain    • Abscess of labia    • Acute posthemorrhagic anemia 01/08/2015   • Anemia     history of, mild   • Anemia due to blood loss 11/20/2014   • Cancer (CMS/HCC)     cosmo/right  leg mole   • Contact dermatitis 01/30/2013    on labia and surrounding regions   • Cystitis     recurrent   • Diarrhea 04/11/2016   • Foot pain     porokeratoma causin metatarsalgia, right foot   • Generalized abdominal pain 02/23/2015   • Hypercholesterolemia    • Hypertensive disorder    • Infection of skin and subcutaneous tissue 01/30/2013   • Iron deficiency anemia 04/11/2016    improving   • Irritable bowel syndrome    • Knee pain, right    • Left sided ulcerative colitis (CMS/HCC) 10/08/2015   • Malaise and fatigue 11/02/2011   • Obesity    • Pseudomembranous enterocolitis 11/02/2012   • Rectal hemorrhage 07/01/2015   • Scapulalgia 10/24/2014   • Sialoadenitis 07/26/2013   • Ulcerative colitis (CMS/HCC) 04/11/2016    chronic, for 29 years.  flare   • Urinary tract infectious disease 07/14/2012   • Vitamin D deficiency 05/14/2012     Past Surgical History:   Procedure Laterality Date   • BLADDER SURGERY  2001    bladder tacking x 3   • COLONOSCOPY  07/25/2011    Colitis found in rectum & sigmoid colon. Biopsy taken   • COLONOSCOPY  06/15/2016    Erythematous mucosa in the rectum.Biopsied.One polyp in the transverse colon. Biopsied   • COLONOSCOPY  08/15/2008    Colitis of the rectum, the sigmoid and the transverse colon. Multiple biopsies were obtained from the rectum, the sigmoid and the transverse colon. Multiple biopsies were obtained from the cecum, the transverse colon, sigmoid & rectum   • COLONOSCOPY N/A 3/4/2019    Procedure: COLONOSCOPY;  Surgeon: Henok Dixon MD;  Location: [facility] ENDOSCOPY;  Service: Gastroenterology   • EXCISION LESION  10/17/1969    removal of sebaceous cyst of neck   • JOINT REPLACEMENT Left    • KNEE ARTHROSCOPY  02/21/2005    Tears of the meidal and lateral meniscus and osteoarthritis of the knee. Arthroscopic medial and lateral meniscectomies of the right knee with chondroplasty of the medial, lateral femoral condyles and patella   • LYMPH NODE BIOPSY  05/12/2009     [place] lymph node biopsy, superficial and deep, within the right groin involving superficial femoral nodes and also the external iliac nodes. Melanoma of the right leg   • AR TOTAL KNEE ARTHROPLASTY Right 10/16/2017    Procedure: TOTAL KNEE ARTHROPLASTY;  Surgeon: Yury Marion MD;  Location: [facility];  Service: Orthopedics   • TOTAL ABDOMINAL HYSTERECTOMY WITH SALPINGO OOPHORECTOMY     • TOTAL KNEE ARTHROPLASTY  2007    severe osteoarthritis of the left knee.     • WRIST GANGLION EXCISION  10/17/1969    left wrist     Family History   Problem Relation Age of Onset   • Coronary artery disease Other    • Hypertension Other      OB History        1    Para   1    Term   1            AB        Living           SAB        TAB        Ectopic        Molar        Multiple        Live Births                  Allergies   Allergen Reactions   • Other      Garlic diarrhea/heartburn   • Keflex [Cephalexin] Rash   • Penicillins Rash     nylon   • Tape Rash     Silk tape/swells     Social History     Socioeconomic History   • Marital status:      Spouse name: Not on file   • Number of children: Not on file   • Years of education: Not on file   • Highest education level: Not on file   Tobacco Use   • Smoking status: Never Smoker   • Smokeless tobacco: Never Used   Substance and Sexual Activity   • Alcohol use: No   • Drug use: No   • Sexual activity: Defer     Current Medications:  Prior to Admission medications    Medication Sig Start Date End Date Taking? Authorizing Provider   acetaminophen (TYLENOL) 500 MG tablet Take 1,000 mg by mouth Every 6 (Six) Hours As Needed for Mild Pain .   Yes Ernesto Delgado MD   Ca Phosphate-Cholecalciferol (CALCIUM/VITAMIN D3 GUMMIES PO) Take  by mouth Daily.   Yes Ernesto Delgado MD   ferrous sulfate 325 (65 FE) MG tablet Take 325 mg by mouth Daily With Breakfast.   Yes Ernesto Delgado MD   hyoscyamine (LEVSIN) 0.125 MG SL tablet Take 1 tablet  "by mouth Every 6 (Six) Hours As Needed for Cramping. 4/2/20  Yes Joao Arnett PA-C   mesalamine (Apriso) 0.375 g 24 hr capsule Take 4 capsules by mouth Daily. 5/1/20  Yes Joao Arnett PA-C   metroNIDAZOLE (FLAGYL) 500 MG tablet Take 1 tablet by mouth 2 (Two) Times a Day. 4/15/20 5/21/20  Joao Arnett PA-C   nitrofurantoin, macrocrystal-monohydrate, (Macrobid) 100 MG capsule Take 1 capsule by mouth 2 (Two) Times a Day. 4/20/20 5/21/20  Joao Arnett PA-C     Review of Systems  Review of Systems       Objective    /79 (BP Location: Left arm)   Pulse 77   Ht 157.5 cm (62\")   Wt 129 kg (284 lb 9.6 oz)   LMP  (LMP Unknown)   BMI 52.05 kg/m²   Physical Exam   Constitutional: She is oriented to person, place, and time. She appears well-developed and well-nourished. No distress.   Ill appearing   HENT:   Head: Normocephalic and atraumatic.   Eyes: Pupils are equal, round, and reactive to light. EOM are normal.   Neck: Normal range of motion.   Cardiovascular: Normal rate, regular rhythm and normal heart sounds.   Pulmonary/Chest: Effort normal and breath sounds normal.   Abdominal: Soft. She exhibits no shifting dullness, no distension, no abdominal bruit, no ascites and no mass. Bowel sounds are decreased. There is no hepatosplenomegaly. There is tenderness in the periumbilical area. There is no rigidity, no rebound, no guarding and no CVA tenderness. No hernia. Hernia confirmed negative in the ventral area.   Obese, mild diffuse   Musculoskeletal: Normal range of motion.   Neurological: She is alert and oriented to person, place, and time.   Skin: Skin is warm. She is not diaphoretic.   Psychiatric: She has a normal mood and affect. Her behavior is normal. Judgment and thought content normal.   Nursing note and vitals reviewed.    Assessment/Plan      1. Chronic abdominal pain    2. Other ulcerative colitis with other complication (CMS/HCC)    3. Hemorrhage of anus and rectum    .   Tiffani " was seen today for ulcerative pancolitis and rectal bleeding.    Diagnoses and all orders for this visit:    Chronic abdominal pain  -     Case Request; Standing  -     Case Request  -     SARS-COV-2 PCR ([facility] IN-HOUSE PERFORMED), NP SWAB IN TRANSPORT MEDIA - Swab, Nasopharynx    Other ulcerative colitis with other complication (CMS/HCC)  -     Case Request; Standing  -     Case Request  -     SARS-COV-2 PCR ([facility] IN-HOUSE PERFORMED), NP SWAB IN TRANSPORT MEDIA - Swab, Nasopharynx    Hemorrhage of anus and rectum  -     Case Request; Standing  -     Case Request  -     SARS-COV-2 PCR ([facility] IN-HOUSE PERFORMED), NP SWAB IN TRANSPORT MEDIA - Swab, Nasopharynx    Other orders  -     Follow Anesthesia Guidelines / Standing Orders; Future  -     Obtain Informed Consent; Future        Orders placed during this encounter include:  Orders Placed This Encounter   Procedures   • SARS-COV-2 PCR ([facility] IN-HOUSE PERFORMED), NP SWAB IN TRANSPORT MEDIA - Swab, Nasopharynx     2535   • Follow Anesthesia Guidelines / Standing Orders     Standing Status:   Future   • Obtain Informed Consent     Standing Status:   Future     Order Specific Question:   Informed Consent Given For     Answer:   COLONOSCOPY       Medications prescribed:  No orders of the defined types were placed in this encounter.    Discontinued Medications       Reason for Discontinue     metroNIDAZOLE (FLAGYL) 500 MG tablet    *Therapy completed     nitrofurantoin, macrocrystal-monohydrate, (Macrobid) 100 MG capsule    *Therapy completed        Requested Prescriptions      No prescriptions requested or ordered in this encounter       Review and/or summary of lab tests, radiology, procedures, medications. Review and summary of old records and obtaining of history. The risks and benefits of my recommendations, as well as other treatment options were discussed with the patient today. Questions were answered.    Follow-up: Return in about 1  week (around 5/28/2020), or if symptoms worsen or fail to improve, for After the above.     FLEXIBLE SIGMOIDOSCOPY WITH BIOPSY--enemas on call to endoscopy--URGENT!!! (N/A)      This document has been electronically signed by Joao Arnett PA-C on May 21, 2020 17:27      Results for orders placed or performed in visit on 04/15/20   Gastrointestinal Panel, PCR - Stool, Per Rectum   Result Value Ref Range    Campylobacter Not Detected Not Detected    Plesiomonas shigelloides Not Detected Not Detected    Salmonella Not Detected Not Detected    Vibrio Not Detected Not Detected    Vibrio cholerae Not Detected Not Detected    Yersinia enterocolitica Not Detected Not Detected    Enteroaggregative E. coli (EAEC) Not Detected Not Detected    Enteropathogenic E. coli (EPEC) Not Detected Not Detected    Enterotoxigenic E. coli (ETEC) lt/st Not Detected Not Detected    Shiga-like toxin-producing E. coli (STEC) stx1/stx2 Not Detected Not Detected    E. coli O157 Not Detected Not Detected    Shigella/Enteroinvasive E. coli (EIEC) Not Detected Not Detected    Cryptosporidium Not Detected Not Detected    Cyclospora cayetanensis Not Detected Not Detected    Entamoeba histolytica Not Detected Not Detected    Giardia lamblia Not Detected Not Detected    Adenovirus F40/41 Not Detected Not Detected    Astrovirus Not Detected Not Detected    Norovirus GI/GII Not Detected Not Detected    Rotavirus A Not Detected Not Detected    Sapovirus (I, II, IV or V) Not Detected Not Detected   Urinalysis, Microscopic Only - Urine, Clean Catch   Result Value Ref Range    RBC, UA 0-2 None Seen, 0-2 /HPF    WBC, UA 6-12 (A) None Seen, 0-2 /HPF    Bacteria, UA 4+ (A) None Seen /HPF    Squamous Epithelial Cells, UA 7-12 (A) None Seen, 0-2 /HPF    Hyaline Casts, UA 13-20 None Seen /LPF    Methodology Manual Light Microscopy    CBC Auto Differential   Result Value Ref Range    WBC 7.70 3.40 - 10.80 10*3/mm3    RBC 4.31 3.77 - 5.28 10*6/mm3    Hemoglobin  13.5 12.0 - 15.9 g/dL    Hematocrit 39.6 34.0 - 46.6 %    MCV 91.9 79.0 - 97.0 fL    MCH 31.3 26.6 - 33.0 pg    MCHC 34.1 31.5 - 35.7 g/dL    RDW 12.7 12.3 - 15.4 %    RDW-SD 42.0 37.0 - 54.0 fl    MPV 9.3 6.0 - 12.0 fL    Platelets 253 140 - 450 10*3/mm3    Neutrophil % 65.8 42.7 - 76.0 %    Lymphocyte % 23.1 19.6 - 45.3 %    Monocyte % 6.6 5.0 - 12.0 %    Eosinophil % 3.6 0.3 - 6.2 %    Basophil % 0.5 0.0 - 1.5 %    Immature Grans % 0.4 0.0 - 0.5 %    Neutrophils, Absolute 5.06 1.70 - 7.00 10*3/mm3    Lymphocytes, Absolute 1.78 0.70 - 3.10 10*3/mm3    Monocytes, Absolute 0.51 0.10 - 0.90 10*3/mm3    Eosinophils, Absolute 0.28 0.00 - 0.40 10*3/mm3    Basophils, Absolute 0.04 0.00 - 0.20 10*3/mm3    Immature Grans, Absolute 0.03 0.00 - 0.05 10*3/mm3    nRBC 0.0 0.0 - 0.2 /100 WBC   Urinalysis without microscopic (no culture) - Urine, Clean Catch   Result Value Ref Range    Color, UA Yellow Yellow, Straw    Appearance, UA Cloudy (A) Clear    pH, UA 6.5 5.0 - 8.0    Specific Gravity, UA 1.016 1.005 - 1.030    Glucose, UA Negative Negative    Ketones, UA Negative Negative    Bilirubin, UA Negative Negative    Blood, UA Negative Negative    Protein, UA Negative Negative    Leuk Esterase, UA Small (1+) (A) Negative    Nitrite, UA Positive (A) Negative    Urobilinogen, UA 0.2 E.U./dL 0.2 - 1.0 E.U./dL   Sedimentation Rate   Result Value Ref Range    Sed Rate 4 0 - 30 mm/hr   Urine Culture - Urine, Urine, Clean Catch   Result Value Ref Range    Urine Culture >100,000 CFU/mL Mixed Mónica Isolated    C-reactive Protein   Result Value Ref Range    C-Reactive Protein 1.20 (H) 0.00 - 0.50 mg/dL   Comprehensive Metabolic Panel   Result Value Ref Range    Glucose 94 65 - 99 mg/dL    BUN 11 8 - 23 mg/dL    Creatinine 0.75 0.57 - 1.00 mg/dL    Sodium 140 136 - 145 mmol/L    Potassium 4.5 3.5 - 5.2 mmol/L    Chloride 104 98 - 107 mmol/L    CO2 22.0 22.0 - 29.0 mmol/L    Calcium 9.6 8.6 - 10.5 mg/dL    Total Protein 7.7 6.0 - 8.5 g/dL     Albumin 4.30 3.50 - 5.20 g/dL    ALT (SGPT) 23 1 - 33 U/L    AST (SGOT) 17 1 - 32 U/L    Alkaline Phosphatase 93 39 - 117 U/L    Total Bilirubin 0.4 0.2 - 1.2 mg/dL    eGFR Non African Amer 79 >60 mL/min/1.73    Globulin 3.4 gm/dL    A/G Ratio 1.3 g/dL    BUN/Creatinine Ratio 14.7 7.0 - 25.0    Anion Gap 14.0 5.0 - 15.0 mmol/L   Results for orders placed or performed in visit on 01/28/20   CBC Auto Differential   Result Value Ref Range    WBC 6.86 3.40 - 10.80 10*3/mm3    RBC 4.12 3.77 - 5.28 10*6/mm3    Hemoglobin 12.9 12.0 - 15.9 g/dL    Hematocrit 37.7 34.0 - 46.6 %    MCV 91.5 79.0 - 97.0 fL    MCH 31.3 26.6 - 33.0 pg    MCHC 34.2 31.5 - 35.7 g/dL    RDW 12.3 12.3 - 15.4 %    RDW-SD 40.4 37.0 - 54.0 fl    MPV 9.4 6.0 - 12.0 fL    Platelets 243 140 - 450 10*3/mm3    Neutrophil % 55.5 42.7 - 76.0 %    Lymphocyte % 35.0 19.6 - 45.3 %    Monocyte % 6.1 5.0 - 12.0 %    Eosinophil % 2.2 0.3 - 6.2 %    Basophil % 0.9 0.0 - 1.5 %    Immature Grans % 0.3 0.0 - 0.5 %    Neutrophils, Absolute 3.81 1.70 - 7.00 10*3/mm3    Lymphocytes, Absolute 2.40 0.70 - 3.10 10*3/mm3    Monocytes, Absolute 0.42 0.10 - 0.90 10*3/mm3    Eosinophils, Absolute 0.15 0.00 - 0.40 10*3/mm3    Basophils, Absolute 0.06 0.00 - 0.20 10*3/mm3    Immature Grans, Absolute 0.02 0.00 - 0.05 10*3/mm3    nRBC 0.0 0.0 - 0.2 /100 WBC     *Note: Due to a large number of results and/or encounters for the requested time period, some results have not been displayed. A complete set of results can be found in Results Review.       Some portions of this note have been dictated using voice recognition software and may contain errors and/or omissions.

## 2020-05-21 NOTE — PATIENT INSTRUCTIONS

## 2020-05-22 ENCOUNTER — ANESTHESIA (OUTPATIENT)
Dept: GASTROENTEROLOGY | Facility: HOSPITAL | Age: 60
End: 2020-05-22

## 2020-05-22 ENCOUNTER — HOSPITAL ENCOUNTER (OUTPATIENT)
Facility: HOSPITAL | Age: 60
Setting detail: HOSPITAL OUTPATIENT SURGERY
Discharge: HOME | End: 2020-05-22
Attending: INTERNAL MEDICINE | Admitting: INTERNAL MEDICINE

## 2020-05-22 ENCOUNTER — ANESTHESIA EVENT (OUTPATIENT)
Dept: GASTROENTEROLOGY | Facility: HOSPITAL | Age: 60
End: 2020-05-22

## 2020-05-22 VITALS
HEART RATE: 76 BPM | SYSTOLIC BLOOD PRESSURE: 148 MMHG | TEMPERATURE: 97.4 F | DIASTOLIC BLOOD PRESSURE: 79 MMHG | HEIGHT: 63 IN | OXYGEN SATURATION: 99 % | RESPIRATION RATE: 22 BRPM | BODY MASS INDEX: 49.53 KG/M2 | WEIGHT: 279.54 LBS

## 2020-05-22 DIAGNOSIS — K51.818 OTHER ULCERATIVE COLITIS WITH OTHER COMPLICATION (HCC): ICD-10-CM

## 2020-05-22 DIAGNOSIS — G89.29 CHRONIC ABDOMINAL PAIN: ICD-10-CM

## 2020-05-22 DIAGNOSIS — K62.5 HEMORRHAGE OF ANUS AND RECTUM: ICD-10-CM

## 2020-05-22 DIAGNOSIS — R10.9 CHRONIC ABDOMINAL PAIN: ICD-10-CM

## 2020-05-22 LAB — SARS-COV-2 RNA RESP QL NAA+PROBE: NOT DETECTED

## 2020-05-22 PROCEDURE — 87635 SARS-COV-2 COVID-19 AMP PRB: CPT | Performed by: PHYSICIAN ASSISTANT

## 2020-05-22 PROCEDURE — 25010000002 PROPOFOL 10 MG/ML EMULSION: Performed by: NURSE ANESTHETIST, CERTIFIED REGISTERED

## 2020-05-22 PROCEDURE — 45331 SIGMOIDOSCOPY AND BIOPSY: CPT | Performed by: INTERNAL MEDICINE

## 2020-05-22 RX ORDER — DEXTROSE AND SODIUM CHLORIDE 5; .45 G/100ML; G/100ML
30 INJECTION, SOLUTION INTRAVENOUS CONTINUOUS PRN
Status: DISCONTINUED | OUTPATIENT
Start: 2020-05-22 | End: 2020-05-22 | Stop reason: HOSPADM

## 2020-05-22 RX ORDER — LIDOCAINE HYDROCHLORIDE 20 MG/ML
INJECTION, SOLUTION INTRAVENOUS AS NEEDED
Status: DISCONTINUED | OUTPATIENT
Start: 2020-05-22 | End: 2020-05-22 | Stop reason: SURG

## 2020-05-22 RX ORDER — PROPOFOL 10 MG/ML
VIAL (ML) INTRAVENOUS AS NEEDED
Status: DISCONTINUED | OUTPATIENT
Start: 2020-05-22 | End: 2020-05-22 | Stop reason: SURG

## 2020-05-22 RX ADMIN — LIDOCAINE HYDROCHLORIDE 50 MG: 20 INJECTION, SOLUTION INTRAVENOUS at 15:06

## 2020-05-22 RX ADMIN — PROPOFOL 100 MG: 10 INJECTION, EMULSION INTRAVENOUS at 15:06

## 2020-05-22 RX ADMIN — DEXTROSE AND SODIUM CHLORIDE 30 ML/HR: 5; 450 INJECTION, SOLUTION INTRAVENOUS at 13:14

## 2020-05-22 RX ADMIN — PROPOFOL 30 MG: 10 INJECTION, EMULSION INTRAVENOUS at 15:09

## 2020-05-22 NOTE — ANESTHESIA PREPROCEDURE EVALUATION
Anesthesia Evaluation     Patient summary reviewed and Nursing notes reviewed   NPO Solid Status: > 8 hours  NPO Liquid Status: > 8 hours           Airway   Mallampati: III  TM distance: <3 FB  Neck ROM: full  Possible difficult intubation  Dental - normal exam     Pulmonary - normal exam   Cardiovascular - normal exam    (+) hypertension, hyperlipidemia,       Neuro/Psych  GI/Hepatic/Renal/Endo    (+) obesity, morbid obesity, GI bleeding ,     Musculoskeletal     Abdominal    Substance History      OB/GYN          Other   arthritis,    history of cancer                      Anesthesia Plan    ASA 3     MAC     intravenous induction     Anesthetic plan, all risks, benefits, and alternatives have been provided, discussed and informed consent has been obtained with: patient.    Plan discussed with CRNA.

## 2020-05-22 NOTE — ANESTHESIA POSTPROCEDURE EVALUATION
Patient: Tiffani Serra    Procedure Summary     Date:  05/22/20 Room / Location:  [facility] ENDOSCOPY 2 / [facility] ENDOSCOPY    Anesthesia Start:  1456 Anesthesia Stop:  1512    Procedure:  FLEXIBLE SIGMOIDOSCOPY WITH BIOPSY--enemas on call to endoscopy--URGENT!!! (N/A ) Diagnosis:       Chronic abdominal pain      Other ulcerative colitis with other complication (CMS/HCC)      Hemorrhage of anus and rectum      (Chronic abdominal pain [R10.9, G89.29])      (Other ulcerative colitis with other complication (CMS/HCC) [K51.818])      (Hemorrhage of anus and rectum [K62.5])    Surgeon:  Joseph Doss MD Provider:  Flynn Wyatt CRNA    Anesthesia Type:  MAC ASA Status:  3          Anesthesia Type: MAC    Vitals  No vitals data found for the desired time range.          Post Anesthesia Care and Evaluation    Patient location during evaluation: bedside  Patient participation: complete - patient participated  Level of consciousness: awake  Pain score: 0  Pain management: adequate  Airway patency: patent  Anesthetic complications: No anesthetic complications  PONV Status: none  Cardiovascular status: acceptable  Respiratory status: acceptable  Hydration status: acceptable

## 2020-05-27 ENCOUNTER — OFFICE VISIT (OUTPATIENT)
Dept: GASTROENTEROLOGY | Facility: CLINIC | Age: 60
End: 2020-05-27

## 2020-05-27 VITALS
HEIGHT: 62 IN | BODY MASS INDEX: 52.3 KG/M2 | SYSTOLIC BLOOD PRESSURE: 102 MMHG | DIASTOLIC BLOOD PRESSURE: 82 MMHG | HEART RATE: 84 BPM | WEIGHT: 284.2 LBS

## 2020-05-27 DIAGNOSIS — K51.011 ULCERATIVE PANCOLITIS WITH RECTAL BLEEDING (HCC): Primary | ICD-10-CM

## 2020-05-27 DIAGNOSIS — R19.7 DIARRHEA, UNSPECIFIED TYPE: ICD-10-CM

## 2020-05-27 DIAGNOSIS — G89.29 CHRONIC ABDOMINAL PAIN: ICD-10-CM

## 2020-05-27 DIAGNOSIS — K51.818 OTHER ULCERATIVE COLITIS WITH OTHER COMPLICATION (HCC): ICD-10-CM

## 2020-05-27 DIAGNOSIS — R10.9 CHRONIC ABDOMINAL PAIN: ICD-10-CM

## 2020-05-27 DIAGNOSIS — K62.5 HEMORRHAGE OF ANUS AND RECTUM: ICD-10-CM

## 2020-05-27 PROCEDURE — 99214 OFFICE O/P EST MOD 30 MIN: CPT | Performed by: PHYSICIAN ASSISTANT

## 2020-05-27 RX ORDER — PREDNISONE 1 MG/1
20 TABLET ORAL DAILY
Qty: 120 TABLET | Refills: 1 | Status: SHIPPED | OUTPATIENT
Start: 2020-05-27 | End: 2020-10-12

## 2020-05-27 RX ORDER — BUDESONIDE 3 MG/1
9 CAPSULE ORAL EVERY MORNING
Qty: 90 CAPSULE | Refills: 0 | Status: SHIPPED | OUTPATIENT
Start: 2020-05-27 | End: 2020-05-27

## 2020-05-27 RX ORDER — BUDESONIDE 3 MG/1
9 CAPSULE, COATED PELLETS ORAL EVERY MORNING
Qty: 90 CAPSULE | Refills: 0 | Status: SHIPPED | OUTPATIENT
Start: 2020-05-27 | End: 2020-07-08 | Stop reason: SDUPTHER

## 2020-05-27 RX ORDER — BUDESONIDE 9 MG/1
1 TABLET, FILM COATED, EXTENDED RELEASE ORAL DAILY
Qty: 30 TABLET | Refills: 3 | Status: SHIPPED | OUTPATIENT
Start: 2020-05-27 | End: 2020-05-27

## 2020-05-27 NOTE — PROGRESS NOTES
Chief Complaint   Patient presents with   • Ulcerative Colitis   • Abdominal Pain   • Rectal Bleeding       ENDO PROCEDURE ORDERED:    Subjective    Tiffani Serra is a 60 y.o. female. she is here today for follow-up.    History of Present Illness    Patient seen on a recheck of her ulcerative colitis and abdominal pain.  Last seen 05/21/2020.  She is complaining of 6 out of 10 diffuse lower abdominal pain with a lot of gas and spasms.  She is on Apriso.  No nausea or vomiting.  Weight is down one half a pound since last visit.  Patient did undergo flexible sigmoidoscopy on 05/22/2020 with scope to the level of the left colon showing moderate diffuse ulceration with nonbleeding hemorrhoids.  At the time I saw the patient, her biopsies were pending.  Sigmoid biopsy did return focal active colitis, rectal biopsy showed diffuse active proctitis.  These were consistent with chronic ulcerative colitis, and negative for dysplasia.      ASSESSMENT AND PLAN:  Patient with diffuse colitis.  Will need full colonoscopy at some point.  Will continue on Apriso 4 daily.  I did discuss the risks, benefits, and alternatives to further evaluation and treatment including progressing her medication.  She does have a high deductible with medication and this has made it difficult to her colitis adequately.  We will put her on prednisone 20 mg daily although I did caution her it is not recommended with the COVID-19 being a potential risk.  We will also try to get her on Uceris 9 mg daily as a more long-term treatment.  I have asked her to contact me within the next week to let us know how she is doing.  If she is improved we will try to taper her down off of the prednisone as she starts the Uceris.    We will plan followup in 4-6 weeks.  We will need to consider further laboratories depending on the results of the above.        The following portions of the patient's history were reviewed and updated as appropriate:   Past Medical  History:   Diagnosis Date   • Abdominal pain    • Abscess of labia    • Acute posthemorrhagic anemia 01/08/2015   • Anemia     history of, mild   • Anemia due to blood loss 11/20/2014   • Cancer (CMS/[facility])     cosmo/right leg mole   • Contact dermatitis 01/30/2013    on labia and surrounding regions   • Cystitis     recurrent   • Diarrhea 04/11/2016   • Foot pain     porokeratoma causin metatarsalgia, right foot   • Generalized abdominal pain 02/23/2015   • Hypercholesterolemia    • Hypertensive disorder    • Infection of skin and subcutaneous tissue 01/30/2013   • Iron deficiency anemia 04/11/2016    improving   • Irritable bowel syndrome    • Knee pain, right    • Left sided ulcerative colitis (CMS/HCC) 10/08/2015   • Malaise and fatigue 11/02/2011   • Obesity    • Pseudomembranous enterocolitis 11/02/2012   • Rectal hemorrhage 07/01/2015   • Scapulalgia 10/24/2014   • Sialoadenitis 07/26/2013   • Ulcerative colitis (CMS/HCC) 04/11/2016    chronic, for 29 years.  flare   • Urinary tract infectious disease 07/14/2012   • Vitamin D deficiency 05/14/2012     Past Surgical History:   Procedure Laterality Date   • BLADDER SURGERY  2001    bladder tacking x 3   • COLONOSCOPY  07/25/2011    Colitis found in rectum & sigmoid colon. Biopsy taken   • COLONOSCOPY  06/15/2016    Erythematous mucosa in the rectum.Biopsied.One polyp in the transverse colon. Biopsied   • COLONOSCOPY  08/15/2008    Colitis of the rectum, the sigmoid and the transverse colon. Multiple biopsies were obtained from the rectum, the sigmoid and the transverse colon. Multiple biopsies were obtained from the cecum, the transverse colon, sigmoid & rectum   • COLONOSCOPY N/A 3/4/2019    Procedure: COLONOSCOPY;  Surgeon: Henok Dixon MD;  Location: [facility] ENDOSCOPY;  Service: Gastroenterology   • EXCISION LESION  10/17/1969    removal of sebaceous cyst of neck   • JOINT REPLACEMENT Left    • KNEE ARTHROSCOPY  02/21/2005    Tears of the meidal and lateral  meniscus and osteoarthritis of the knee. Arthroscopic medial and lateral meniscectomies of the right knee with chondroplasty of the medial, lateral femoral condyles and patella   • LYMPH NODE BIOPSY  2009    [place] lymph node biopsy, superficial and deep, within the right groin involving superficial femoral nodes and also the external iliac nodes. Melanoma of the right leg   • MI TOTAL KNEE ARTHROPLASTY Right 10/16/2017    Procedure: TOTAL KNEE ARTHROPLASTY;  Surgeon: Yury Marion MD;  Location: [facility] OR;  Service: Orthopedics   • SIGMOIDOSCOPY N/A 2020    Procedure: FLEXIBLE SIGMOIDOSCOPY WITH BIOPSY--enemas on call to endoscopy--URGENT!!!;  Surgeon: Joseph Doss MD;  Location: [facility] ENDOSCOPY;  Service: Gastroenterology;  Laterality: N/A;   • TOTAL ABDOMINAL HYSTERECTOMY WITH SALPINGO OOPHORECTOMY     • TOTAL KNEE ARTHROPLASTY  2007    severe osteoarthritis of the left knee.     • WRIST GANGLION EXCISION  10/17/1969    left wrist     Family History   Problem Relation Age of Onset   • Coronary artery disease Other    • Hypertension Other      OB History        1    Para   1    Term   1            AB        Living           SAB        TAB        Ectopic        Molar        Multiple        Live Births                  Allergies   Allergen Reactions   • Other      Garlic diarrhea/heartburn   • Keflex [Cephalexin] Rash   • Penicillins Rash     nylon   • Tape Rash     Silk tape/swells     Social History     Socioeconomic History   • Marital status:      Spouse name: Not on file   • Number of children: Not on file   • Years of education: Not on file   • Highest education level: Not on file   Tobacco Use   • Smoking status: Never Smoker   • Smokeless tobacco: Never Used   Substance and Sexual Activity   • Alcohol use: No   • Drug use: No   • Sexual activity: Defer     Current Medications:  Prior to Admission medications    Medication Sig Start Date End Date Taking?  "Authorizing Provider   acetaminophen (TYLENOL) 500 MG tablet Take 1,000 mg by mouth Every 6 (Six) Hours As Needed for Mild Pain .   Yes ProviderErnesto MD   Ca Phosphate-Cholecalciferol (CALCIUM/VITAMIN D3 GUMMIES PO) Take  by mouth Daily.   Yes ProviderErnesto MD   ferrous sulfate 325 (65 FE) MG tablet Take 325 mg by mouth Daily With Breakfast.   Yes Ernesto Delgado MD   hyoscyamine (LEVSIN) 0.125 MG SL tablet Take 1 tablet by mouth Every 6 (Six) Hours As Needed for Cramping. 4/2/20  Yes Joao Arnett PA-C   mesalamine (Apriso) 0.375 g 24 hr capsule Take 4 capsules by mouth Daily. 5/1/20  Yes Joao Arnett PA-C   Budesonide ER 9 MG tablet sustained-release 24 hour Take 1 tablet by mouth Daily. 5/27/20   Joao Arnett PA-C   predniSONE (DELTASONE) 5 MG tablet Take 4 tablets by mouth Daily. 5/27/20   Joao Arnett PA-C     Review of Systems  Review of Systems       Objective    /82 (BP Location: Left arm)   Pulse 84   Ht 157.5 cm (62\")   Wt 129 kg (284 lb 3.2 oz)   LMP  (LMP Unknown)   BMI 51.98 kg/m²   Physical Exam   Constitutional: She is oriented to person, place, and time. She appears well-developed and well-nourished. No distress.   Ill appearing   HENT:   Head: Normocephalic and atraumatic.   Eyes: Pupils are equal, round, and reactive to light. EOM are normal.   Neck: Normal range of motion.   Cardiovascular: Normal rate, regular rhythm and normal heart sounds.   Pulmonary/Chest: Effort normal and breath sounds normal.   Abdominal: Soft. She exhibits no shifting dullness, no distension, no abdominal bruit, no ascites and no mass. Bowel sounds are decreased. There is no hepatosplenomegaly. There is tenderness in the periumbilical area. There is no rigidity, no rebound, no guarding and no CVA tenderness. No hernia. Hernia confirmed negative in the ventral area.   Obese, mild diffuse   Musculoskeletal: Normal range of motion.   Neurological: She is alert and oriented " to person, place, and time.   Skin: Skin is warm. She is not diaphoretic.   Psychiatric: She has a normal mood and affect. Her behavior is normal. Judgment and thought content normal.   Nursing note and vitals reviewed.    Assessment/Plan      1. Ulcerative pancolitis with rectal bleeding (CMS/HCC)    2. Other ulcerative colitis with other complication (CMS/HCC)    3. Chronic abdominal pain    4. Diarrhea, unspecified type    5. Hemorrhage of anus and rectum    .   Tiffani was seen today for ulcerative colitis, abdominal pain and rectal bleeding.    Diagnoses and all orders for this visit:    Ulcerative pancolitis with rectal bleeding (CMS/HCC)    Other ulcerative colitis with other complication (CMS/HCC)    Chronic abdominal pain    Diarrhea, unspecified type    Hemorrhage of anus and rectum    Other orders  -     Discontinue: Budesonide ER 9 MG tablet sustained-release 24 hour; Take 1 tablet by mouth Daily.  -     predniSONE (DELTASONE) 5 MG tablet; Take 4 tablets by mouth Daily.  -     Discontinue: ENTOCORT EC 3 MG 24 hr capsule; Take 3 capsules by mouth Every Morning.  -     Budesonide (Entocort EC) 3 MG 24 hr capsule; Take 3 capsules by mouth Every Morning.        Orders placed during this encounter include:  No orders of the defined types were placed in this encounter.      Medications prescribed:  New Medications Ordered This Visit   Medications   • predniSONE (DELTASONE) 5 MG tablet     Sig: Take 4 tablets by mouth Daily.     Dispense:  120 tablet     Refill:  1   • Budesonide (Entocort EC) 3 MG 24 hr capsule     Sig: Take 3 capsules by mouth Every Morning.     Dispense:  90 capsule     Refill:  0       Requested Prescriptions     Signed Prescriptions Disp Refills   • predniSONE (DELTASONE) 5 MG tablet 120 tablet 1     Sig: Take 4 tablets by mouth Daily.   • Budesonide (Entocort EC) 3 MG 24 hr capsule 90 capsule 0     Sig: Take 3 capsules by mouth Every Morning.       Review and/or summary of lab tests,  radiology, procedures, medications. Review and summary of old records and obtaining of history. The risks and benefits of my recommendations, as well as other treatment options were discussed with the patient today. Questions were answered.    Follow-up: Return in about 6 weeks (around 7/8/2020), or if symptoms worsen or fail to improve.     * Surgery not found *      This document has been electronically signed by Joao Arnett PA-C on May 29, 2020 14:33      Results for orders placed or performed during the hospital encounter of 05/22/20   Tissue Pathology Exam   Result Value Ref Range    Case Report       Surgical Pathology Report                         Case: [id number]                                  Authorizing Provider:  Joseph Doss MD      Collected:           05/22/2020 03:12 PM          Ordering Location:     [facility]             Received:            05/26/2020 07:19 AM                                 [facility]                                                     Pathologist:           Darci Cardenas MD                                                           Specimens:   1) - Large Intestine, Sigmoid Colon                                                                 2) - Large Intestine, Rectum                                                               Final Diagnosis       See Scanned Report       Results for orders placed or performed in visit on 05/21/20   SARS-COV-2 PCR ([facility] IN-HOUSE PERFORMED), NP SWAB IN TRANSPORT MEDIA - Swab, Nasopharynx   Result Value Ref Range    COVID19 Not Detected Not Detected - Ref. Range   Results for orders placed or performed in visit on 04/15/20   Gastrointestinal Panel, PCR - Stool, Per Rectum   Result Value Ref Range    Campylobacter Not Detected Not Detected    Plesiomonas shigelloides Not Detected Not Detected    Salmonella Not Detected Not Detected    Vibrio Not Detected Not Detected    Vibrio cholerae Not Detected  Not Detected    Yersinia enterocolitica Not Detected Not Detected    Enteroaggregative E. coli (EAEC) Not Detected Not Detected    Enteropathogenic E. coli (EPEC) Not Detected Not Detected    Enterotoxigenic E. coli (ETEC) lt/st Not Detected Not Detected    Shiga-like toxin-producing E. coli (STEC) stx1/stx2 Not Detected Not Detected    E. coli O157 Not Detected Not Detected    Shigella/Enteroinvasive E. coli (EIEC) Not Detected Not Detected    Cryptosporidium Not Detected Not Detected    Cyclospora cayetanensis Not Detected Not Detected    Entamoeba histolytica Not Detected Not Detected    Giardia lamblia Not Detected Not Detected    Adenovirus F40/41 Not Detected Not Detected    Astrovirus Not Detected Not Detected    Norovirus GI/GII Not Detected Not Detected    Rotavirus A Not Detected Not Detected    Sapovirus (I, II, IV or V) Not Detected Not Detected   Urinalysis, Microscopic Only - Urine, Clean Catch   Result Value Ref Range    RBC, UA 0-2 None Seen, 0-2 /HPF    WBC, UA 6-12 (A) None Seen, 0-2 /HPF    Bacteria, UA 4+ (A) None Seen /HPF    Squamous Epithelial Cells, UA 7-12 (A) None Seen, 0-2 /HPF    Hyaline Casts, UA 13-20 None Seen /LPF    Methodology Manual Light Microscopy    CBC Auto Differential   Result Value Ref Range    WBC 7.70 3.40 - 10.80 10*3/mm3    RBC 4.31 3.77 - 5.28 10*6/mm3    Hemoglobin 13.5 12.0 - 15.9 g/dL    Hematocrit 39.6 34.0 - 46.6 %    MCV 91.9 79.0 - 97.0 fL    MCH 31.3 26.6 - 33.0 pg    MCHC 34.1 31.5 - 35.7 g/dL    RDW 12.7 12.3 - 15.4 %    RDW-SD 42.0 37.0 - 54.0 fl    MPV 9.3 6.0 - 12.0 fL    Platelets 253 140 - 450 10*3/mm3    Neutrophil % 65.8 42.7 - 76.0 %    Lymphocyte % 23.1 19.6 - 45.3 %    Monocyte % 6.6 5.0 - 12.0 %    Eosinophil % 3.6 0.3 - 6.2 %    Basophil % 0.5 0.0 - 1.5 %    Immature Grans % 0.4 0.0 - 0.5 %    Neutrophils, Absolute 5.06 1.70 - 7.00 10*3/mm3    Lymphocytes, Absolute 1.78 0.70 - 3.10 10*3/mm3    Monocytes, Absolute 0.51 0.10 - 0.90 10*3/mm3     Eosinophils, Absolute 0.28 0.00 - 0.40 10*3/mm3    Basophils, Absolute 0.04 0.00 - 0.20 10*3/mm3    Immature Grans, Absolute 0.03 0.00 - 0.05 10*3/mm3    nRBC 0.0 0.0 - 0.2 /100 WBC   Urinalysis without microscopic (no culture) - Urine, Clean Catch   Result Value Ref Range    Color, UA Yellow Yellow, Straw    Appearance, UA Cloudy (A) Clear    pH, UA 6.5 5.0 - 8.0    Specific Gravity, UA 1.016 1.005 - 1.030    Glucose, UA Negative Negative    Ketones, UA Negative Negative    Bilirubin, UA Negative Negative    Blood, UA Negative Negative    Protein, UA Negative Negative    Leuk Esterase, UA Small (1+) (A) Negative    Nitrite, UA Positive (A) Negative    Urobilinogen, UA 0.2 E.U./dL 0.2 - 1.0 E.U./dL   Sedimentation Rate   Result Value Ref Range    Sed Rate 4 0 - 30 mm/hr   Urine Culture - Urine, Urine, Clean Catch   Result Value Ref Range    Urine Culture >100,000 CFU/mL Mixed Mónica Isolated    C-reactive Protein   Result Value Ref Range    C-Reactive Protein 1.20 (H) 0.00 - 0.50 mg/dL   Comprehensive Metabolic Panel   Result Value Ref Range    Glucose 94 65 - 99 mg/dL    BUN 11 8 - 23 mg/dL    Creatinine 0.75 0.57 - 1.00 mg/dL    Sodium 140 136 - 145 mmol/L    Potassium 4.5 3.5 - 5.2 mmol/L    Chloride 104 98 - 107 mmol/L    CO2 22.0 22.0 - 29.0 mmol/L    Calcium 9.6 8.6 - 10.5 mg/dL    Total Protein 7.7 6.0 - 8.5 g/dL    Albumin 4.30 3.50 - 5.20 g/dL    ALT (SGPT) 23 1 - 33 U/L    AST (SGOT) 17 1 - 32 U/L    Alkaline Phosphatase 93 39 - 117 U/L    Total Bilirubin 0.4 0.2 - 1.2 mg/dL    eGFR Non African Amer 79 >60 mL/min/1.73    Globulin 3.4 gm/dL    A/G Ratio 1.3 g/dL    BUN/Creatinine Ratio 14.7 7.0 - 25.0    Anion Gap 14.0 5.0 - 15.0 mmol/L   Results for orders placed or performed in visit on 01/28/20   CBC Auto Differential   Result Value Ref Range    WBC 6.86 3.40 - 10.80 10*3/mm3    RBC 4.12 3.77 - 5.28 10*6/mm3    Hemoglobin 12.9 12.0 - 15.9 g/dL    Hematocrit 37.7 34.0 - 46.6 %    MCV 91.5 79.0 - 97.0 fL     MCH 31.3 26.6 - 33.0 pg    MCHC 34.2 31.5 - 35.7 g/dL    RDW 12.3 12.3 - 15.4 %    RDW-SD 40.4 37.0 - 54.0 fl    MPV 9.4 6.0 - 12.0 fL    Platelets 243 140 - 450 10*3/mm3    Neutrophil % 55.5 42.7 - 76.0 %    Lymphocyte % 35.0 19.6 - 45.3 %    Monocyte % 6.1 5.0 - 12.0 %    Eosinophil % 2.2 0.3 - 6.2 %    Basophil % 0.9 0.0 - 1.5 %    Immature Grans % 0.3 0.0 - 0.5 %    Neutrophils, Absolute 3.81 1.70 - 7.00 10*3/mm3    Lymphocytes, Absolute 2.40 0.70 - 3.10 10*3/mm3    Monocytes, Absolute 0.42 0.10 - 0.90 10*3/mm3    Eosinophils, Absolute 0.15 0.00 - 0.40 10*3/mm3    Basophils, Absolute 0.06 0.00 - 0.20 10*3/mm3    Immature Grans, Absolute 0.02 0.00 - 0.05 10*3/mm3    nRBC 0.0 0.0 - 0.2 /100 WBC     *Note: Due to a large number of results and/or encounters for the requested time period, some results have not been displayed. A complete set of results can be found in Results Review.       Some portions of this note have been dictated using voice recognition software and may contain errors and/or omissions.

## 2020-05-27 NOTE — PATIENT INSTRUCTIONS

## 2020-05-28 LAB
LAB AP CASE REPORT: NORMAL
PATH REPORT.FINAL DX SPEC: NORMAL

## 2020-06-10 ENCOUNTER — TELEPHONE (OUTPATIENT)
Dept: GASTROENTEROLOGY | Facility: CLINIC | Age: 60
End: 2020-06-10

## 2020-06-10 NOTE — TELEPHONE ENCOUNTER
----- Message from oJao Arnett PA-C sent at 6/10/2020 12:57 PM CDT -----  Contact: [phone]  She can start to taper the prednisone down by 5mg per week. Cont the rest of the medication. Why is she not taking 3 budesonide?  ----- Message -----  From: Jeanna Arellano MA  Sent: 6/10/2020   8:16 AM CDT  To: Joao Arnett PA-C    Patient called stating that she is much better now and wants to know how much she should cut back on her prednisone? She is taking 4 prednisone daily, 2 budesonide daily and 4 apriso daily.

## 2020-06-10 NOTE — TELEPHONE ENCOUNTER
Patient has been made aware to taper her prednisone down 5 mg per week and to take all 3 budesonides daily. Patient voiced understanding.    Pt w/ hematuria due to suspected UTI. Will get labs, UA, and reassess.

## 2020-07-08 ENCOUNTER — OFFICE VISIT (OUTPATIENT)
Dept: GASTROENTEROLOGY | Facility: CLINIC | Age: 60
End: 2020-07-08

## 2020-07-08 VITALS
HEIGHT: 62 IN | DIASTOLIC BLOOD PRESSURE: 88 MMHG | SYSTOLIC BLOOD PRESSURE: 162 MMHG | HEART RATE: 89 BPM | BODY MASS INDEX: 52.81 KG/M2 | WEIGHT: 287 LBS

## 2020-07-08 DIAGNOSIS — K51.011 ULCERATIVE PANCOLITIS WITH RECTAL BLEEDING (HCC): Primary | ICD-10-CM

## 2020-07-08 DIAGNOSIS — R10.9 CHRONIC ABDOMINAL PAIN: ICD-10-CM

## 2020-07-08 DIAGNOSIS — R19.7 DIARRHEA, UNSPECIFIED TYPE: ICD-10-CM

## 2020-07-08 DIAGNOSIS — G89.29 CHRONIC ABDOMINAL PAIN: ICD-10-CM

## 2020-07-08 PROCEDURE — 99213 OFFICE O/P EST LOW 20 MIN: CPT | Performed by: PHYSICIAN ASSISTANT

## 2020-07-08 RX ORDER — BUDESONIDE 3 MG/1
9 CAPSULE, COATED PELLETS ORAL EVERY MORNING
Qty: 90 CAPSULE | Refills: 2 | Status: SHIPPED | OUTPATIENT
Start: 2020-07-08 | End: 2020-10-12

## 2020-07-08 RX ORDER — MESALAMINE 0.38 G/1
CAPSULE, EXTENDED RELEASE ORAL
Qty: 120 CAPSULE | Refills: 3 | Status: SHIPPED | OUTPATIENT
Start: 2020-07-08 | End: 2021-02-08 | Stop reason: CLARIF

## 2020-07-08 NOTE — PATIENT INSTRUCTIONS

## 2020-07-08 NOTE — PROGRESS NOTES
Chief Complaint   Patient presents with   • Ulcerative Colitis   • Ulcerative Pancolitis   • Diarrhea       ENDO PROCEDURE ORDERED:    Subjective    Tiffani Serra is a 60 y.o. female. she is here today for follow-up.    History of Present Illness    Patient seen on a recheck of ulcerative colitis, diarrhea, abdominal pain. Last seen 05/27/2020. She was started on Uceris as well as prednisone for significant flaring of her colitis. She has undergone flexible sigmoidoscopy for bleeding on 05/22/2020, showed diffuse ulcerations up to the left colon. Her last full colonoscopy was 03/04/2019. She does need to have a full colonoscopy. She has been demetrius to taper herself down to the 2.5 mg daily of prednisone. She is on Entocort 9 mg daily. She is still on her Apriso, iron and Levsin. She states she is doing much better. She would like to try to taper off the prednisone completely because of side effects. She denied nausea, vomiting, no further blood in her stool. Weight is up 3.5 pounds since last visit.     ASSESSMENT/PLAN: Patient with severe ulcerative colitis with flare. She may taper off of the prednisone every other day. I would like for her to stay on the Entocort and Apriso. Refills were given. Blood pressure was elevated today and she does dutch to follow up with her primary care. Will plan followup in 3 months with fecal calprotectin, CBC, CMP prior. Further pending clinical course and results of the above.       The following portions of the patient's history were reviewed and updated as appropriate:   Past Medical History:   Diagnosis Date   • Abdominal pain    • Abscess of labia    • Acute posthemorrhagic anemia 01/08/2015   • Anemia     history of, mild   • Anemia due to blood loss 11/20/2014   • Cancer (CMS/[facility])     cosmo/right leg mole   • Contact dermatitis 01/30/2013    on labia and surrounding regions   • Cystitis     recurrent   • Diarrhea 04/11/2016   • Foot pain     porokeratoma causin metatarsalgia,  right foot   • Generalized abdominal pain 02/23/2015   • Hypercholesterolemia    • Hypertensive disorder    • Infection of skin and subcutaneous tissue 01/30/2013   • Iron deficiency anemia 04/11/2016    improving   • Irritable bowel syndrome    • Knee pain, right    • Left sided ulcerative colitis (CMS/HCC) 10/08/2015   • Malaise and fatigue 11/02/2011   • Obesity    • Pseudomembranous enterocolitis 11/02/2012   • Rectal hemorrhage 07/01/2015   • Scapulalgia 10/24/2014   • Sialoadenitis 07/26/2013   • Ulcerative colitis (CMS/HCC) 04/11/2016    chronic, for 29 years.  flare   • Urinary tract infectious disease 07/14/2012   • Vitamin D deficiency 05/14/2012     Past Surgical History:   Procedure Laterality Date   • BLADDER SURGERY  2001    bladder tacking x 3   • COLONOSCOPY  07/25/2011    Colitis found in rectum & sigmoid colon. Biopsy taken   • COLONOSCOPY  06/15/2016    Erythematous mucosa in the rectum.Biopsied.One polyp in the transverse colon. Biopsied   • COLONOSCOPY  08/15/2008    Colitis of the rectum, the sigmoid and the transverse colon. Multiple biopsies were obtained from the rectum, the sigmoid and the transverse colon. Multiple biopsies were obtained from the cecum, the transverse colon, sigmoid & rectum   • COLONOSCOPY N/A 3/4/2019    Procedure: COLONOSCOPY;  Surgeon: Henok Dixon MD;  Location: [facility] ENDOSCOPY;  Service: Gastroenterology   • EXCISION LESION  10/17/1969    removal of sebaceous cyst of neck   • JOINT REPLACEMENT Left    • KNEE ARTHROSCOPY  02/21/2005    Tears of the meidal and lateral meniscus and osteoarthritis of the knee. Arthroscopic medial and lateral meniscectomies of the right knee with chondroplasty of the medial, lateral femoral condyles and patella   • LYMPH NODE BIOPSY  05/12/2009    [place] lymph node biopsy, superficial and deep, within the right groin involving superficial femoral nodes and also the external iliac nodes. Melanoma of the right leg   • [place] TOTAL KNEE  ARTHROPLASTY Right 10/16/2017    Procedure: TOTAL KNEE ARTHROPLASTY;  Surgeon: Yury Marion MD;  Location: [facility] OR;  Service: Orthopedics   • SIGMOIDOSCOPY N/A 2020    Procedure: FLEXIBLE SIGMOIDOSCOPY WITH BIOPSY--enemas on call to endoscopy--URGENT!!!;  Surgeon: Joseph Doss MD;  Location: [facility] ENDOSCOPY;  Service: Gastroenterology;  Laterality: N/A;   • TOTAL ABDOMINAL HYSTERECTOMY WITH SALPINGO OOPHORECTOMY     • TOTAL KNEE ARTHROPLASTY  2007    severe osteoarthritis of the left knee.     • WRIST GANGLION EXCISION  10/17/1969    left wrist     Family History   Problem Relation Age of Onset   • Coronary artery disease Other    • Hypertension Other      OB History        1    Para   1    Term   1            AB        Living           SAB        TAB        Ectopic        Molar        Multiple        Live Births                  Allergies   Allergen Reactions   • Other      Garlic diarrhea/heartburn   • Keflex [Cephalexin] Rash   • Penicillins Rash     nylon   • Tape Rash     Silk tape/swells     Social History     Socioeconomic History   • Marital status:      Spouse name: Not on file   • Number of children: Not on file   • Years of education: Not on file   • Highest education level: Not on file   Tobacco Use   • Smoking status: Never Smoker   • Smokeless tobacco: Never Used   Substance and Sexual Activity   • Alcohol use: No   • Drug use: No   • Sexual activity: Defer     Current Medications:  Prior to Admission medications    Medication Sig Start Date End Date Taking? Authorizing Provider   acetaminophen (TYLENOL) 500 MG tablet Take 1,000 mg by mouth Every 6 (Six) Hours As Needed for Mild Pain .   Yes Ernesto Delgado MD   Budesonide (Entocort EC) 3 MG 24 hr capsule Take 3 capsules by mouth Every Morning. 20  Yes Joao Arnett PA-C   Ca Phosphate-Cholecalciferol (CALCIUM/VITAMIN D3 GUMMIES PO) Take  by mouth Daily.   Yes ProviderErnesto MD  "  ferrous sulfate 325 (65 FE) MG tablet Take 325 mg by mouth Daily With Breakfast.   Yes Provider, MD Ernesto   hyoscyamine (LEVSIN) 0.125 MG SL tablet Take 1 tablet by mouth Every 6 (Six) Hours As Needed for Cramping. 4/2/20  Yes Joao Arnett PA-C   mesalamine (Apriso) 0.375 g 24 hr capsule Take 4 capsules by mouth Daily. 5/1/20  Yes Joao Arnett PA-C   predniSONE (DELTASONE) 5 MG tablet Take 4 tablets by mouth Daily.  Patient taking differently: Take 2.5 mg by mouth Daily. 5/27/20  Yes Joao Arnett PA-C     Review of Systems  Review of Systems       Objective    /88   Pulse 89   Ht 157.5 cm (62\")   Wt 130 kg (287 lb)   LMP  (LMP Unknown)   BMI 52.49 kg/m²   Physical Exam   Constitutional: She is oriented to person, place, and time. She appears well-developed and well-nourished. No distress.   Ill appearing   HENT:   Head: Normocephalic and atraumatic.   Eyes: Pupils are equal, round, and reactive to light. EOM are normal.   Neck: Normal range of motion.   Cardiovascular: Normal rate, regular rhythm and normal heart sounds.   Pulmonary/Chest: Effort normal and breath sounds normal.   Abdominal: Soft. She exhibits no shifting dullness, no distension, no abdominal bruit, no ascites and no mass. Bowel sounds are decreased. There is no hepatosplenomegaly. There is tenderness in the periumbilical area. There is no rigidity, no rebound, no guarding and no CVA tenderness. No hernia. Hernia confirmed negative in the ventral area.   Obese, mild diffuse   Musculoskeletal: Normal range of motion.   Neurological: She is alert and oriented to person, place, and time.   Skin: Skin is warm. She is not diaphoretic.   Psychiatric: She has a normal mood and affect. Her behavior is normal. Judgment and thought content normal.   Nursing note and vitals reviewed.    Assessment/Plan      1. Ulcerative pancolitis with rectal bleeding (CMS/HCC)    2. Chronic abdominal pain    3. Diarrhea, unspecified type  "   .   Tiffani was seen today for ulcerative colitis, ulcerative pancolitis and diarrhea.    Diagnoses and all orders for this visit:    Ulcerative pancolitis with rectal bleeding (CMS/HCC)  -     Comprehensive Metabolic Panel; Future  -     CBC & Differential; Future  -     Calprotectin, Fecal - Stool, Per Rectum    Chronic abdominal pain  -     Comprehensive Metabolic Panel; Future  -     CBC & Differential; Future  -     Calprotectin, Fecal - Stool, Per Rectum    Diarrhea, unspecified type  -     Comprehensive Metabolic Panel; Future  -     CBC & Differential; Future  -     Calprotectin, Fecal - Stool, Per Rectum    Other orders  -     Budesonide (Entocort EC) 3 MG 24 hr capsule; Take 3 capsules by mouth Every Morning.  -     mesalamine (Apriso) 0.375 g 24 hr capsule; Take 4 capsules by mouth Daily.        Orders placed during this encounter include:  Orders Placed This Encounter   Procedures   • Comprehensive Metabolic Panel     Standing Status:   Future     Standing Expiration Date:   1/4/2021   • Calprotectin, Fecal - Stool, Per Rectum   • CBC & Differential     Standing Status:   Future     Standing Expiration Date:   1/4/2021     Order Specific Question:   Manual Differential     Answer:   No       Medications prescribed:  New Medications Ordered This Visit   Medications   • Budesonide (Entocort EC) 3 MG 24 hr capsule     Sig: Take 3 capsules by mouth Every Morning.     Dispense:  90 capsule     Refill:  2   • mesalamine (Apriso) 0.375 g 24 hr capsule     Sig: Take 4 capsules by mouth Daily.     Dispense:  120 capsule     Refill:  3       Requested Prescriptions     Signed Prescriptions Disp Refills   • Budesonide (Entocort EC) 3 MG 24 hr capsule 90 capsule 2     Sig: Take 3 capsules by mouth Every Morning.   • mesalamine (Apriso) 0.375 g 24 hr capsule 120 capsule 3     Sig: Take 4 capsules by mouth Daily.       Review and/or summary of lab tests, radiology, procedures, medications. Review and summary of old  records and obtaining of history. The risks and benefits of my recommendations, as well as other treatment options were discussed with the patient today. Questions were answered.    Follow-up: Return in about 3 months (around 10/8/2020), or if symptoms worsen or fail to improve, for lab prior.     * Surgery not found *      This document has been electronically signed by Joao Arnett PA-C on July 16, 2020 17:31      Results for orders placed or performed during the hospital encounter of 05/22/20   Tissue Pathology Exam   Result Value Ref Range    Case Report       Surgical Pathology Report                         Case: [id number]                                  Authorizing Provider:  Joseph Doss MD      Collected:           05/22/2020 03:12 PM          Ordering Location:     [facility]             Received:            05/26/2020 07:19 AM                                 [facility]                                                     Pathologist:           Darci Cardenas MD                                                           Specimens:   1) - Large Intestine, Sigmoid Colon                                                                 2) - Large Intestine, Rectum                                                               Final Diagnosis       See Scanned Report       Results for orders placed or performed in visit on 05/21/20   SARS-COV-2 PCR ([facility]-HOU[facility] PERFORMED), NP SWAB IN TRANSPORT MEDIA - Swab, Nasopharynx   Result Value Ref Range    COVID19 Not Detected Not Detected - Ref. Range   Results for orders placed or performed in visit on 04/15/20   Gastrointestinal Panel, PCR - Stool, Per Rectum   Result Value Ref Range    Campylobacter Not Detected Not Detected    Plesiomonas shigelloides Not Detected Not Detected    Salmonella Not Detected Not Detected    Vibrio Not Detected Not Detected    Vibrio cholerae Not Detected Not Detected    Yersinia enterocolitica Not  Detected Not Detected    Enteroaggregative E. coli (EAEC) Not Detected Not Detected    Enteropathogenic E. coli (EPEC) Not Detected Not Detected    Enterotoxigenic E. coli (ETEC) lt/st Not Detected Not Detected    Shiga-like toxin-producing E. coli (STEC) stx1/stx2 Not Detected Not Detected    E. coli O157 Not Detected Not Detected    Shigella/Enteroinvasive E. coli (EIEC) Not Detected Not Detected    Cryptosporidium Not Detected Not Detected    Cyclospora cayetanensis Not Detected Not Detected    Entamoeba histolytica Not Detected Not Detected    Giardia lamblia Not Detected Not Detected    Adenovirus F40/41 Not Detected Not Detected    Astrovirus Not Detected Not Detected    Norovirus GI/GII Not Detected Not Detected    Rotavirus A Not Detected Not Detected    Sapovirus (I, II, IV or V) Not Detected Not Detected   Urinalysis, Microscopic Only - Urine, Clean Catch   Result Value Ref Range    RBC, UA 0-2 None Seen, 0-2 /HPF    WBC, UA 6-12 (A) None Seen, 0-2 /HPF    Bacteria, UA 4+ (A) None Seen /HPF    Squamous Epithelial Cells, UA 7-12 (A) None Seen, 0-2 /HPF    Hyaline Casts, UA 13-20 None Seen /LPF    Methodology Manual Light Microscopy    CBC Auto Differential   Result Value Ref Range    WBC 7.70 3.40 - 10.80 10*3/mm3    RBC 4.31 3.77 - 5.28 10*6/mm3    Hemoglobin 13.5 12.0 - 15.9 g/dL    Hematocrit 39.6 34.0 - 46.6 %    MCV 91.9 79.0 - 97.0 fL    MCH 31.3 26.6 - 33.0 pg    MCHC 34.1 31.5 - 35.7 g/dL    RDW 12.7 12.3 - 15.4 %    RDW-SD 42.0 37.0 - 54.0 fl    MPV 9.3 6.0 - 12.0 fL    Platelets 253 140 - 450 10*3/mm3    Neutrophil % 65.8 42.7 - 76.0 %    Lymphocyte % 23.1 19.6 - 45.3 %    Monocyte % 6.6 5.0 - 12.0 %    Eosinophil % 3.6 0.3 - 6.2 %    Basophil % 0.5 0.0 - 1.5 %    Immature Grans % 0.4 0.0 - 0.5 %    Neutrophils, Absolute 5.06 1.70 - 7.00 10*3/mm3    Lymphocytes, Absolute 1.78 0.70 - 3.10 10*3/mm3    Monocytes, Absolute 0.51 0.10 - 0.90 10*3/mm3    Eosinophils, Absolute 0.28 0.00 - 0.40 10*3/mm3     Basophils, Absolute 0.04 0.00 - 0.20 10*3/mm3    Immature Grans, Absolute 0.03 0.00 - 0.05 10*3/mm3    nRBC 0.0 0.0 - 0.2 /100 WBC   Urinalysis without microscopic (no culture) - Urine, Clean Catch   Result Value Ref Range    Color, UA Yellow Yellow, Straw    Appearance, UA Cloudy (A) Clear    pH, UA 6.5 5.0 - 8.0    Specific Gravity, UA 1.016 1.005 - 1.030    Glucose, UA Negative Negative    Ketones, UA Negative Negative    Bilirubin, UA Negative Negative    Blood, UA Negative Negative    Protein, UA Negative Negative    Leuk Esterase, UA Small (1+) (A) Negative    Nitrite, UA Positive (A) Negative    Urobilinogen, UA 0.2 E.U./dL 0.2 - 1.0 E.U./dL   Sedimentation Rate   Result Value Ref Range    Sed Rate 4 0 - 30 mm/hr   Urine Culture - Urine, Urine, Clean Catch   Result Value Ref Range    Urine Culture >100,000 CFU/mL Mixed Mónica Isolated    C-reactive Protein   Result Value Ref Range    C-Reactive Protein 1.20 (H) 0.00 - 0.50 mg/dL   Comprehensive Metabolic Panel   Result Value Ref Range    Glucose 94 65 - 99 mg/dL    BUN 11 8 - 23 mg/dL    Creatinine 0.75 0.57 - 1.00 mg/dL    Sodium 140 136 - 145 mmol/L    Potassium 4.5 3.5 - 5.2 mmol/L    Chloride 104 98 - 107 mmol/L    CO2 22.0 22.0 - 29.0 mmol/L    Calcium 9.6 8.6 - 10.5 mg/dL    Total Protein 7.7 6.0 - 8.5 g/dL    Albumin 4.30 3.50 - 5.20 g/dL    ALT (SGPT) 23 1 - 33 U/L    AST (SGOT) 17 1 - 32 U/L    Alkaline Phosphatase 93 39 - 117 U/L    Total Bilirubin 0.4 0.2 - 1.2 mg/dL    eGFR Non African Amer 79 >60 mL/min/1.73    Globulin 3.4 gm/dL    A/G Ratio 1.3 g/dL    BUN/Creatinine Ratio 14.7 7.0 - 25.0    Anion Gap 14.0 5.0 - 15.0 mmol/L   Results for orders placed or performed in visit on 01/28/20   CBC Auto Differential   Result Value Ref Range    WBC 6.86 3.40 - 10.80 10*3/mm3    RBC 4.12 3.77 - 5.28 10*6/mm3    Hemoglobin 12.9 12.0 - 15.9 g/dL    Hematocrit 37.7 34.0 - 46.6 %    MCV 91.5 79.0 - 97.0 fL    MCH 31.3 26.6 - 33.0 pg    MCHC 34.2 31.5 - 35.7  g/dL    RDW 12.3 12.3 - 15.4 %    RDW-SD 40.4 37.0 - 54.0 fl    MPV 9.4 6.0 - 12.0 fL    Platelets 243 140 - 450 10*3/mm3    Neutrophil % 55.5 42.7 - 76.0 %    Lymphocyte % 35.0 19.6 - 45.3 %    Monocyte % 6.1 5.0 - 12.0 %    Eosinophil % 2.2 0.3 - 6.2 %    Basophil % 0.9 0.0 - 1.5 %    Immature Grans % 0.3 0.0 - 0.5 %    Neutrophils, Absolute 3.81 1.70 - 7.00 10*3/mm3    Lymphocytes, Absolute 2.40 0.70 - 3.10 10*3/mm3    Monocytes, Absolute 0.42 0.10 - 0.90 10*3/mm3    Eosinophils, Absolute 0.15 0.00 - 0.40 10*3/mm3    Basophils, Absolute 0.06 0.00 - 0.20 10*3/mm3    Immature Grans, Absolute 0.02 0.00 - 0.05 10*3/mm3    nRBC 0.0 0.0 - 0.2 /100 WBC     *Note: Due to a large number of results and/or encounters for the requested time period, some results have not been displayed. A complete set of results can be found in Results Review.       Some portions of this note have been dictated using voice recognition software and may contain errors and/or omissions.

## 2020-10-01 ENCOUNTER — LAB (OUTPATIENT)
Dept: LAB | Facility: HOSPITAL | Age: 60
End: 2020-10-01

## 2020-10-01 DIAGNOSIS — R19.7 DIARRHEA, UNSPECIFIED TYPE: ICD-10-CM

## 2020-10-01 DIAGNOSIS — K51.011 ULCERATIVE PANCOLITIS WITH RECTAL BLEEDING (HCC): ICD-10-CM

## 2020-10-01 DIAGNOSIS — G89.29 CHRONIC ABDOMINAL PAIN: ICD-10-CM

## 2020-10-01 DIAGNOSIS — R10.9 CHRONIC ABDOMINAL PAIN: ICD-10-CM

## 2020-10-01 LAB
ALBUMIN SERPL-MCNC: 4 G/DL (ref 3.5–5.2)
ALBUMIN/GLOB SERPL: 1.5 G/DL
ALP SERPL-CCNC: 113 U/L (ref 39–117)
ALT SERPL W P-5'-P-CCNC: 31 U/L (ref 1–33)
ANION GAP SERPL CALCULATED.3IONS-SCNC: 11 MMOL/L (ref 5–15)
AST SERPL-CCNC: 21 U/L (ref 1–32)
BILIRUB SERPL-MCNC: 0.6 MG/DL (ref 0–1.2)
BUN SERPL-MCNC: 13 MG/DL (ref 8–23)
BUN/CREAT SERPL: 16.7 (ref 7–25)
CALCIUM SPEC-SCNC: 9.6 MG/DL (ref 8.6–10.5)
CHLORIDE SERPL-SCNC: 102 MMOL/L (ref 98–107)
CO2 SERPL-SCNC: 25 MMOL/L (ref 22–29)
CREAT SERPL-MCNC: 0.78 MG/DL (ref 0.57–1)
GFR SERPL CREATININE-BSD FRML MDRD: 75 ML/MIN/1.73
GLOBULIN UR ELPH-MCNC: 2.7 GM/DL
GLUCOSE SERPL-MCNC: 97 MG/DL (ref 65–99)
POTASSIUM SERPL-SCNC: 4.4 MMOL/L (ref 3.5–5.2)
PROT SERPL-MCNC: 6.7 G/DL (ref 6–8.5)
SODIUM SERPL-SCNC: 138 MMOL/L (ref 136–145)

## 2020-10-01 PROCEDURE — 36415 COLL VENOUS BLD VENIPUNCTURE: CPT

## 2020-10-01 PROCEDURE — 80053 COMPREHEN METABOLIC PANEL: CPT

## 2020-10-12 ENCOUNTER — OFFICE VISIT (OUTPATIENT)
Dept: GASTROENTEROLOGY | Facility: CLINIC | Age: 60
End: 2020-10-12

## 2020-10-12 VITALS
HEIGHT: 62 IN | HEART RATE: 75 BPM | BODY MASS INDEX: 52.81 KG/M2 | WEIGHT: 287 LBS | SYSTOLIC BLOOD PRESSURE: 146 MMHG | DIASTOLIC BLOOD PRESSURE: 79 MMHG

## 2020-10-12 DIAGNOSIS — K21.00 GASTROESOPHAGEAL REFLUX DISEASE WITH ESOPHAGITIS WITHOUT HEMORRHAGE: ICD-10-CM

## 2020-10-12 DIAGNOSIS — R13.10 DYSPHAGIA, UNSPECIFIED TYPE: ICD-10-CM

## 2020-10-12 DIAGNOSIS — K51.80 OTHER ULCERATIVE COLITIS WITHOUT COMPLICATION (HCC): Primary | ICD-10-CM

## 2020-10-12 DIAGNOSIS — G89.29 CHRONIC ABDOMINAL PAIN: ICD-10-CM

## 2020-10-12 DIAGNOSIS — R10.9 CHRONIC ABDOMINAL PAIN: ICD-10-CM

## 2020-10-12 DIAGNOSIS — Z91.14 NONCOMPLIANCE WITH MEDICATION REGIMEN: ICD-10-CM

## 2020-10-12 PROCEDURE — 99213 OFFICE O/P EST LOW 20 MIN: CPT | Performed by: PHYSICIAN ASSISTANT

## 2020-10-12 RX ORDER — RABEPRAZOLE SODIUM 20 MG/1
20 TABLET, DELAYED RELEASE ORAL DAILY
Qty: 30 TABLET | Refills: 5 | Status: SHIPPED | OUTPATIENT
Start: 2020-10-12 | End: 2021-04-12 | Stop reason: SDDI

## 2020-10-12 NOTE — PATIENT INSTRUCTIONS
"BMI for Adults  What is BMI?  Body mass index (BMI) is a number that is calculated from a person's weight and height. BMI can help estimate how much of a person's weight is composed of fat. BMI does not measure body fat directly. Rather, it is an alternative to procedures that directly measure body fat, which can be difficult and expensive.  BMI can help identify people who may be at higher risk for certain medical problems.  What are BMI measurements used for?  BMI is used as a screening tool to identify possible weight problems. It helps determine whether a person is obese, overweight, a healthy weight, or underweight.  BMI is useful for:  · Identifying a weight problem that may be related to a medical condition or may increase the risk for medical problems.  · Promoting changes, such as changes in diet and exercise, to help reach a healthy weight. BMI screening can be repeated to see if these changes are working.  How is BMI calculated?  BMI involves measuring your weight in relation to your height. Both height and weight are measured, and the BMI is calculated from those numbers. This can be done either in English ([place] or metric measurements. Note that charts and online BMI calculators are available to help you find your BMI quickly and easily without having to do these calculations yourself.  To calculate your BMI in English ([place] measurements:    1. Measure your weight in pounds (lb).  2. Multiply the number of pounds by 703.  ? For example, for a person who weighs 180 lb, multiply that number by 703, which equals [phone].  3. Measure your height in inches. Then multiply that number by itself to get a measurement called \"inches squared.\"  ? For example, for a person who is 70 inches tall, the \"inches squared\" measurement is 70 inches x 70 inches, which equals 4,900 inches squared.  4. Divide the total from step 2 (number of lb x 703) by the total from step 3 (inches squared): 126,540 ÷ 4,900 = 25.8. This is " "your BMI.  To calculate your BMI in metric measurements:  1. Measure your weight in kilograms (kg).  2. Measure your height in meters (m). Then multiply that number by itself to get a measurement called \"meters squared.\"  ? For example, for a person who is 1.75 m tall, the \"meters squared\" measurement is 1.75 m x 1.75 m, which is equal to 3.1 meters squared.  3. Divide the number of kilograms (your weight) by the meters squared number. In this example: 70 ÷ 3.1 = 22.6. This is your BMI.  What do the results mean?  BMI charts are used to identify whether you are underweight, normal weight, overweight, or obese. The following guidelines will be used:  · Underweight: BMI less than 18.5.  · Normal weight: BMI between 18.5 and 24.9.  · Overweight: BMI between 25 and 29.9.  · Obese: BMI of 30 or above.  Keep these notes in mind:  · Weight includes both fat and muscle, so someone with a muscular build, such as an athlete, may have a BMI that is higher than 24.9. In cases like these, BMI is not an accurate measure of body fat.  · To determine if excess body fat is the cause of a BMI of 25 or higher, further assessments may need to be done by a health care provider.  · BMI is usually interpreted in the same way for men and women.  Where to find more information  For more information about BMI, including tools to quickly calculate your BMI, go to these websites:  · Centers for Disease Control and Prevention: www.cdc.gov  · American Heart Association: www.heart.org  · National Heart, Lung, and Blood [place]: www.nhlbi.nih.gov  Summary  · Body mass index (BMI) is a number that is calculated from a person's weight and height.  · BMI may help estimate how much of a person's weight is composed of fat. BMI can help identify those who may be at higher risk for certain medical problems.  · BMI can be measured using English measurements or metric measurements.  · BMI charts are used to identify whether you are underweight, normal " weight, overweight, or obese.  This information is not intended to replace advice given to you by your health care provider. Make sure you discuss any questions you have with your health care provider.  Document Released: 08/29/2005 Document Revised: 09/09/2020 Document Reviewed: 07/17/2020  [employer]vier Patient Education © 2020 [employer]

## 2020-10-12 NOTE — PROGRESS NOTES
Chief Complaint   Patient presents with   • Ulcerative Pancolitis       ENDO PROCEDURE ORDERED:    Subjective    Tiffani Serra is a 60 y.o. female. she is here today for follow-up.    History of Present Illness    Patient is seen on a recheck of her ulcerative colitis, abdominal pain. Last seen 07/08/2020. Patient did not turn in the fecal calprotectin because of the cost. She did get laboratory on 10/01/2020. Laboratory did not perform the CBC. They did draw a CMP which was normal. Patient states she is “doing great.” She has stopped taking Uceris after 1 month. She reports it was $1700 for that month. She has also decreased her Apriso from 4 daily down to 2 daily on her own, again because of cost and because she feels she is doing so well. The only thing she has noticed is that when she eats rice or spaghetti or other similar foods she is having trouble getting hiccups, belching, etc. Generally liquids and meats do fine. She has had some increased heartburn and does occasionally take her ’s medication for that. Weight is stable. Last colonoscopy was 03/04/2019. She has declined repeat colonoscopy because of the cost.     ASSESSMENT/PLAN:  Patient with chronic ulcerative colitis with recent flare. She has just gotten off of steroids. I do not agree with her reducing her medications without notifying me. Nevertheless, she appears to be improved currently. I would not recommend the lower dose of the Apriso but the patient is adamant that she is doing better and she does not want to stay at the higher recommended dose. Suspect she may be having some esophageal spasms causing her hiccups and dysphagia. Suspect this is related to reflux. I did suggest a trial on Aciphex 20 mg daily. She was encouraged dietary modification, significant weight loss, avoiding gastric irritants. Depending on how she is doing would recommend further followup at her request. We will defer followup for 6 months. She does need to  consider colonoscopy next year. May need upper endoscopy but we will plan further pending clinical course and the results of the above.      The following portions of the patient's history were reviewed and updated as appropriate:   Past Medical History:   Diagnosis Date   • Abdominal pain    • Abscess of labia    • Acute posthemorrhagic anemia 01/08/2015   • Anemia     history of, mild   • Anemia due to blood loss 11/20/2014   • Cancer (CMS/[facility])     cosmo/right leg mole   • Contact dermatitis 01/30/2013    on labia and surrounding regions   • Cystitis     recurrent   • Diarrhea 04/11/2016   • Foot pain     porokeratoma causin metatarsalgia, right foot   • Generalized abdominal pain 02/23/2015   • Hypercholesterolemia    • Hypertensive disorder    • Infection of skin and subcutaneous tissue 01/30/2013   • Iron deficiency anemia 04/11/2016    improving   • Irritable bowel syndrome    • Knee pain, right    • Left sided ulcerative colitis (CMS/HCC) 10/08/2015   • Malaise and fatigue 11/02/2011   • Obesity    • Pseudomembranous enterocolitis 11/02/2012   • Rectal hemorrhage 07/01/2015   • Scapulalgia 10/24/2014   • Sialoadenitis 07/26/2013   • Ulcerative colitis (CMS/HCC) 04/11/2016    chronic, for 29 years.  flare   • Urinary tract infectious disease 07/14/2012   • Vitamin D deficiency 05/14/2012     Past Surgical History:   Procedure Laterality Date   • BLADDER SURGERY  2001    bladder tacking x 3   • COLONOSCOPY  07/25/2011    Colitis found in rectum & sigmoid colon. Biopsy taken   • COLONOSCOPY  06/15/2016    Erythematous mucosa in the rectum.Biopsied.One polyp in the transverse colon. Biopsied   • COLONOSCOPY  08/15/2008    Colitis of the rectum, the sigmoid and the transverse colon. Multiple biopsies were obtained from the rectum, the sigmoid and the transverse colon. Multiple biopsies were obtained from the cecum, the transverse colon, sigmoid & rectum   • COLONOSCOPY N/A 3/4/2019    Procedure: COLONOSCOPY;   Surgeon: Henok Dixon MD;  Location: [facility] ENDOSCOPY;  Service: Gastroenterology   • EXCISION LESION  10/17/1969    removal of sebaceous cyst of neck   • JOINT REPLACEMENT Left    • KNEE ARTHROSCOPY  2005    Tears of the meidal and lateral meniscus and osteoarthritis of the knee. Arthroscopic medial and lateral meniscectomies of the right knee with chondroplasty of the medial, lateral femoral condyles and patella   • LYMPH NODE BIOPSY  2009    Minerva lymph node biopsy, superficial and deep, within the right groin involving superficial femoral nodes and also the external iliac nodes. Melanoma of the right leg   • ID TOTAL KNEE ARTHROPLASTY Right 10/16/2017    Procedure: TOTAL KNEE ARTHROPLASTY;  Surgeon: Yury Marion MD;  Location: [facility] OR;  Service: Orthopedics   • SIGMOIDOSCOPY N/A 2020    Procedure: FLEXIBLE SIGMOIDOSCOPY WITH BIOPSY--enemas on call to endoscopy--URGENT!!!;  Surgeon: Joseph Doss MD;  Location: [facility] ENDOSCOPY;  Service: Gastroenterology;  Laterality: N/A;   • TOTAL ABDOMINAL HYSTERECTOMY WITH SALPINGO OOPHORECTOMY     • TOTAL KNEE ARTHROPLASTY  2007    severe osteoarthritis of the left knee.     • WRIST GANGLION EXCISION  10/17/1969    left wrist     Family History   Problem Relation Age of Onset   • Coronary artery disease Other    • Hypertension Other      OB History        1    Para   1    Term   1            AB        Living           SAB        TAB        Ectopic        Molar        Multiple        Live Births                  Allergies   Allergen Reactions   • Other      Garlic diarrhea/heartburn   • Keflex [Cephalexin] Rash   • Penicillins Rash     nylon   • Tape Rash     Silk tape/swells     Social History     Socioeconomic History   • Marital status:      Spouse name: Not on file   • Number of children: Not on file   • Years of education: Not on file   • Highest education level: Not on file   Tobacco Use   • Smoking  "status: Never Smoker   • Smokeless tobacco: Never Used   Substance and Sexual Activity   • Alcohol use: No   • Drug use: No   • Sexual activity: Defer     Current Medications:  Prior to Admission medications    Medication Sig Start Date End Date Taking? Authorizing Provider   acetaminophen (TYLENOL) 500 MG tablet Take 1,000 mg by mouth Every 6 (Six) Hours As Needed for Mild Pain .   Yes Ernesto Delgado MD   Ca Phosphate-Cholecalciferol (CALCIUM/VITAMIN D3 GUMMIES PO) Take  by mouth Daily.   Yes ProviderErnesto MD   ferrous sulfate 325 (65 FE) MG tablet Take 325 mg by mouth Daily With Breakfast.   Yes Ernesto Delgado MD   hyoscyamine (LEVSIN) 0.125 MG SL tablet Take 1 tablet by mouth Every 6 (Six) Hours As Needed for Cramping. 4/2/20  Yes Joao Arnett PA-C   mesalamine (Apriso) 0.375 g 24 hr capsule Take 4 capsules by mouth Daily. 7/8/20  Yes Joao Arnett PA-C   predniSONE (DELTASONE) 5 MG tablet Take 4 tablets by mouth Daily.  Patient taking differently: Take 2.5 mg by mouth Daily. 5/27/20 10/12/20 Yes Joao Arnett PA-C   RABEprazole (ACIPHEX) 20 MG EC tablet Take 1 tablet by mouth Daily. 10/12/20   Joao Arnett PA-C   Budesonide (Entocort EC) 3 MG 24 hr capsule Take 3 capsules by mouth Every Morning. 7/8/20 10/12/20  Joao Arnett PA-C     Review of Systems  Review of Systems       Objective    /79   Pulse 75   Ht 157.5 cm (62\")   Wt 130 kg (287 lb)   LMP  (LMP Unknown)   BMI 52.49 kg/m²   Physical Exam  Vitals signs and nursing note reviewed.   Constitutional:       General: She is not in acute distress.     Appearance: She is well-developed.   HENT:      Head: Normocephalic and atraumatic.   Eyes:      Pupils: Pupils are equal, round, and reactive to light.   Neck:      Musculoskeletal: Normal range of motion.   Cardiovascular:      Rate and Rhythm: Normal rate and regular rhythm.      Heart sounds: Normal heart sounds.   Pulmonary:      Effort: Pulmonary " effort is normal.      Breath sounds: Normal breath sounds.   Abdominal:      General: Bowel sounds are normal. There is no distension or abdominal bruit.      Palpations: Abdomen is soft. Abdomen is not rigid. There is no shifting dullness or mass.      Tenderness: There is abdominal tenderness. There is no guarding or rebound.      Hernia: No hernia is present. There is no hernia in the ventral area.      Comments: obese   Musculoskeletal: Normal range of motion.   Skin:     General: Skin is warm and dry.   Neurological:      Mental Status: She is alert and oriented to person, place, and time.   Psychiatric:         Behavior: Behavior normal.         Thought Content: Thought content normal.         Judgment: Judgment normal.       Assessment/Plan      1. Other ulcerative colitis without complication (CMS/HCC)    2. Chronic abdominal pain    3. Gastroesophageal reflux disease with esophagitis without hemorrhage    4. Dysphagia, unspecified type    5. Noncompliance with medication regimen    .   Tiffani was seen today for ulcerative pancolitis.    Diagnoses and all orders for this visit:    Other ulcerative colitis without complication (CMS/HCC)    Chronic abdominal pain    Gastroesophageal reflux disease with esophagitis without hemorrhage    Dysphagia, unspecified type    Noncompliance with medication regimen    Other orders  -     RABEprazole (ACIPHEX) 20 MG EC tablet; Take 1 tablet by mouth Daily.        Orders placed during this encounter include:  No orders of the defined types were placed in this encounter.      Medications prescribed:  New Medications Ordered This Visit   Medications   • RABEprazole (ACIPHEX) 20 MG EC tablet     Sig: Take 1 tablet by mouth Daily.     Dispense:  30 tablet     Refill:  5     Discontinued Medications       Reason for Discontinue     predniSONE (DELTASONE) 5 MG tablet    *Therapy completed     Budesonide (Entocort EC) 3 MG 24 hr capsule    *Therapy completed        Requested  Prescriptions     Signed Prescriptions Disp Refills   • RABEprazole (ACIPHEX) 20 MG EC tablet 30 tablet 5     Sig: Take 1 tablet by mouth Daily.       Review and/or summary of lab tests, radiology, procedures, medications. Review and summary of old records and obtaining of history. The risks and benefits of my recommendations, as well as other treatment options were discussed with the patient today. Questions were answered.    Follow-up: Return in about 6 months (around 4/12/2021), or if symptoms worsen or fail to improve.     * Surgery not found *      This document has been electronically signed by Joao Arnett PA-C on October 12, 2020 18:07 CDT      Results for orders placed or performed in visit on 10/01/20   Comprehensive Metabolic Panel    Specimen: Blood   Result Value Ref Range    Glucose 97 65 - 99 mg/dL    BUN 13 8 - 23 mg/dL    Creatinine 0.78 0.57 - 1.00 mg/dL    Sodium 138 136 - 145 mmol/L    Potassium 4.4 3.5 - 5.2 mmol/L    Chloride 102 98 - 107 mmol/L    CO2 25.0 22.0 - 29.0 mmol/L    Calcium 9.6 8.6 - 10.5 mg/dL    Total Protein 6.7 6.0 - 8.5 g/dL    Albumin 4.00 3.50 - 5.20 g/dL    ALT (SGPT) 31 1 - 33 U/L    AST (SGOT) 21 1 - 32 U/L    Alkaline Phosphatase 113 39 - 117 U/L    Total Bilirubin 0.6 0.0 - 1.2 mg/dL    eGFR Non African Amer 75 >60 mL/min/1.73    Globulin 2.7 gm/dL    A/G Ratio 1.5 g/dL    BUN/Creatinine Ratio 16.7 7.0 - 25.0    Anion Gap 11.0 5.0 - 15.0 mmol/L   Results for orders placed or performed during the hospital encounter of 05/22/20   Tissue Pathology Exam    Specimen: A: Large Intestine, Sigmoid Colon; Tissue    B: Large Intestine, Rectum; Tissue   Result Value Ref Range    Case Report       Surgical Pathology Report                         Case: [id number]                                  Authorizing Provider:  Joseph Doss MD      Collected:           05/22/2020 03:12 PM          Ordering Location:     [facility]             Received:            05/26/2020  07:19 AM                                 [facility]                                                     Pathologist:           Darci Cardenas MD                                                           Specimens:   1) - Large Intestine, Sigmoid Colon                                                                 2) - Large Intestine, Rectum                                                               Final Diagnosis       See Scanned Report       Results for orders placed or performed in visit on 05/21/20   SARS-COV-2 PCR ([facility]-HOU[facility] PERFORMED), NP SWAB IN TRANSPORT MEDIA - Swab, Nasopharynx    Specimen: Nasopharynx; Swab   Result Value Ref Range    COVID19 Not Detected Not Detected - Ref. Range   Results for orders placed or performed in visit on 04/15/20   Gastrointestinal Panel, PCR - Stool, Per Rectum    Specimen: Per Rectum; Stool   Result Value Ref Range    Campylobacter Not Detected Not Detected    Plesiomonas shigelloides Not Detected Not Detected    Salmonella Not Detected Not Detected    Vibrio Not Detected Not Detected    Vibrio cholerae Not Detected Not Detected    Yersinia enterocolitica Not Detected Not Detected    Enteroaggregative E. coli (EAEC) Not Detected Not Detected    Enteropathogenic E. coli (EPEC) Not Detected Not Detected    Enterotoxigenic E. coli (ETEC) lt/st Not Detected Not Detected    Shiga-like toxin-producing E. coli (STEC) stx1/stx2 Not Detected Not Detected    E. coli O157 Not Detected Not Detected    Shigella/Enteroinvasive E. coli (EIEC) Not Detected Not Detected    Cryptosporidium Not Detected Not Detected    Cyclospora cayetanensis Not Detected Not Detected    Entamoeba histolytica Not Detected Not Detected    Giardia lamblia Not Detected Not Detected    Adenovirus F40/41 Not Detected Not Detected    Astrovirus Not Detected Not Detected    Norovirus GI/GII Not Detected Not Detected    Rotavirus A Not Detected Not Detected    Sapovirus (I, II, IV or  V) Not Detected Not Detected   Urinalysis, Microscopic Only - Urine, Clean Catch    Specimen: Urine, Clean Catch   Result Value Ref Range    RBC, UA 0-2 None Seen, 0-2 /HPF    WBC, UA 6-12 (A) None Seen, 0-2 /HPF    Bacteria, UA 4+ (A) None Seen /HPF    Squamous Epithelial Cells, UA 7-12 (A) None Seen, 0-2 /HPF    Hyaline Casts, UA 13-20 None Seen /LPF    Methodology Manual Light Microscopy    CBC Auto Differential    Specimen: Blood   Result Value Ref Range    WBC 7.70 3.40 - 10.80 10*3/mm3    RBC 4.31 3.77 - 5.28 10*6/mm3    Hemoglobin 13.5 12.0 - 15.9 g/dL    Hematocrit 39.6 34.0 - 46.6 %    MCV 91.9 79.0 - 97.0 fL    MCH 31.3 26.6 - 33.0 pg    MCHC 34.1 31.5 - 35.7 g/dL    RDW 12.7 12.3 - 15.4 %    RDW-SD 42.0 37.0 - 54.0 fl    MPV 9.3 6.0 - 12.0 fL    Platelets 253 140 - 450 10*3/mm3    Neutrophil % 65.8 42.7 - 76.0 %    Lymphocyte % 23.1 19.6 - 45.3 %    Monocyte % 6.6 5.0 - 12.0 %    Eosinophil % 3.6 0.3 - 6.2 %    Basophil % 0.5 0.0 - 1.5 %    Immature Grans % 0.4 0.0 - 0.5 %    Neutrophils, Absolute 5.06 1.70 - 7.00 10*3/mm3    Lymphocytes, Absolute 1.78 0.70 - 3.10 10*3/mm3    Monocytes, Absolute 0.51 0.10 - 0.90 10*3/mm3    Eosinophils, Absolute 0.28 0.00 - 0.40 10*3/mm3    Basophils, Absolute 0.04 0.00 - 0.20 10*3/mm3    Immature Grans, Absolute 0.03 0.00 - 0.05 10*3/mm3    nRBC 0.0 0.0 - 0.2 /100 WBC   Urinalysis without microscopic (no culture) - Urine, Clean Catch    Specimen: Urine, Clean Catch   Result Value Ref Range    Color, UA Yellow Yellow, Straw    Appearance, UA Cloudy (A) Clear    pH, UA 6.5 5.0 - 8.0    Specific Gravity, UA 1.016 1.005 - 1.030    Glucose, UA Negative Negative    Ketones, UA Negative Negative    Bilirubin, UA Negative Negative    Blood, UA Negative Negative    Protein, UA Negative Negative    Leuk Esterase, UA Small (1+) (A) Negative    Nitrite, UA Positive (A) Negative    Urobilinogen, UA 0.2 E.U./dL 0.2 - 1.0 E.U./dL   Sedimentation Rate    Specimen: Blood   Result Value  Ref Range    Sed Rate 4 0 - 30 mm/hr   Urine Culture - Urine, Urine, Clean Catch    Specimen: Urine, Clean Catch   Result Value Ref Range    Urine Culture >100,000 CFU/mL Mixed Mónica Isolated    C-reactive Protein    Specimen: Blood   Result Value Ref Range    C-Reactive Protein 1.20 (H) 0.00 - 0.50 mg/dL   Comprehensive Metabolic Panel    Specimen: Blood   Result Value Ref Range    Glucose 94 65 - 99 mg/dL    BUN 11 8 - 23 mg/dL    Creatinine 0.75 0.57 - 1.00 mg/dL    Sodium 140 136 - 145 mmol/L    Potassium 4.5 3.5 - 5.2 mmol/L    Chloride 104 98 - 107 mmol/L    CO2 22.0 22.0 - 29.0 mmol/L    Calcium 9.6 8.6 - 10.5 mg/dL    Total Protein 7.7 6.0 - 8.5 g/dL    Albumin 4.30 3.50 - 5.20 g/dL    ALT (SGPT) 23 1 - 33 U/L    AST (SGOT) 17 1 - 32 U/L    Alkaline Phosphatase 93 39 - 117 U/L    Total Bilirubin 0.4 0.2 - 1.2 mg/dL    eGFR Non African Amer 79 >60 mL/min/1.73    Globulin 3.4 gm/dL    A/G Ratio 1.3 g/dL    BUN/Creatinine Ratio 14.7 7.0 - 25.0    Anion Gap 14.0 5.0 - 15.0 mmol/L     *Note: Due to a large number of results and/or encounters for the requested time period, some results have not been displayed. A complete set of results can be found in Results Review.       Some portions of this note have been dictated using voice recognition software and may contain errors and/or omissions.

## 2021-02-04 ENCOUNTER — DOCUMENTATION (OUTPATIENT)
Dept: GASTROENTEROLOGY | Facility: CLINIC | Age: 61
End: 2021-02-04

## 2021-02-04 NOTE — PROGRESS NOTES
Patient called stating that her copay for Mesalamine 0.375 was going to be 312$. She has been advised to call her insurance company to see what would be a cheaper alternative for her to take to replace the Mesalamine. Patient voiced understanding and will call our office back once she has that information.

## 2021-02-08 ENCOUNTER — TELEPHONE (OUTPATIENT)
Dept: GASTROENTEROLOGY | Facility: CLINIC | Age: 61
End: 2021-02-08

## 2021-02-08 RX ORDER — BALSALAZIDE DISODIUM 750 MG/1
2250 CAPSULE ORAL 2 TIMES DAILY
Qty: 180 CAPSULE | Refills: 2 | Status: SHIPPED | OUTPATIENT
Start: 2021-02-08 | End: 2022-03-07 | Stop reason: SDUPTHER

## 2021-02-08 NOTE — TELEPHONE ENCOUNTER
----- Message from Joao Arnett PA-C sent at 2/8/2021  4:15 PM CST -----  sent  ----- Message -----  From: Jeanna Arellaon MA  Sent: 2/8/2021  11:12 AM CST  To: Joao Arnett PA-C    Patients insurance company is now charging a 312$  co pay for her mesalamine. She called her insurance company and the co pay for Balsalazide 750 mg would only be 29.20. she wanted to know if that medication would be ok to switch too? If you were okay with switching please send in a new rx for that to [facility].

## 2021-02-08 NOTE — TELEPHONE ENCOUNTER
Patient has been contacted and made aware that a new rx to replace her mesalamine has been sent to the pharmacy. Patient voiced understanding.

## 2021-03-29 ENCOUNTER — IMMUNIZATION (OUTPATIENT)
Dept: VACCINE CLINIC | Facility: HOSPITAL | Age: 61
End: 2021-03-29

## 2021-03-29 PROCEDURE — 0001A: CPT | Performed by: THORACIC SURGERY (CARDIOTHORACIC VASCULAR SURGERY)

## 2021-03-29 PROCEDURE — 91300 HC SARSCOV02 VAC 30MCG/0.3ML IM: CPT | Performed by: THORACIC SURGERY (CARDIOTHORACIC VASCULAR SURGERY)

## 2021-04-05 ENCOUNTER — TELEPHONE (OUTPATIENT)
Dept: GASTROENTEROLOGY | Facility: CLINIC | Age: 61
End: 2021-04-05

## 2021-04-05 NOTE — TELEPHONE ENCOUNTER
04/05/2021--- Patient called and has switched insurance and can no longer afford the Balsalazide and was wanting to know if she could go back on the Apriso. I spoke with Dr. Doss about this due to Joao Arnett being on vacation and Dr. Doss said that she didn't see a problem with it. So I put in a refill for Apriso .375g to be taken 4 caspules by mouth once daily. I did call and speak with the patient about this as well.

## 2021-04-06 ENCOUNTER — LAB (OUTPATIENT)
Dept: LAB | Facility: HOSPITAL | Age: 61
End: 2021-04-06

## 2021-04-06 RX ORDER — MESALAMINE 0.38 G/1
CAPSULE, EXTENDED RELEASE ORAL
Qty: 120 CAPSULE | Refills: 0 | Status: SHIPPED | OUTPATIENT
Start: 2021-04-06 | End: 2021-04-12

## 2021-04-12 ENCOUNTER — OFFICE VISIT (OUTPATIENT)
Dept: GASTROENTEROLOGY | Facility: CLINIC | Age: 61
End: 2021-04-12

## 2021-04-12 ENCOUNTER — LAB (OUTPATIENT)
Dept: LAB | Facility: HOSPITAL | Age: 61
End: 2021-04-12

## 2021-04-12 VITALS
HEIGHT: 62 IN | HEART RATE: 82 BPM | SYSTOLIC BLOOD PRESSURE: 118 MMHG | DIASTOLIC BLOOD PRESSURE: 64 MMHG | WEIGHT: 287 LBS | BODY MASS INDEX: 52.81 KG/M2

## 2021-04-12 DIAGNOSIS — K21.00 GASTROESOPHAGEAL REFLUX DISEASE WITH ESOPHAGITIS WITHOUT HEMORRHAGE: ICD-10-CM

## 2021-04-12 DIAGNOSIS — G89.29 CHRONIC ABDOMINAL PAIN: Primary | ICD-10-CM

## 2021-04-12 DIAGNOSIS — R10.9 CHRONIC ABDOMINAL PAIN: Primary | ICD-10-CM

## 2021-04-12 DIAGNOSIS — K51.80 OTHER ULCERATIVE COLITIS WITHOUT COMPLICATION (HCC): ICD-10-CM

## 2021-04-12 LAB
ALBUMIN SERPL-MCNC: 4.2 G/DL (ref 3.5–5.2)
ALBUMIN/GLOB SERPL: 1.7 G/DL
ALP SERPL-CCNC: 108 U/L (ref 39–117)
ALT SERPL W P-5'-P-CCNC: 17 U/L (ref 1–33)
ANION GAP SERPL CALCULATED.3IONS-SCNC: 9.9 MMOL/L (ref 5–15)
AST SERPL-CCNC: 16 U/L (ref 1–32)
BASOPHILS # BLD AUTO: 0.05 10*3/MM3 (ref 0–0.2)
BASOPHILS NFR BLD AUTO: 0.7 % (ref 0–1.5)
BILIRUB SERPL-MCNC: 0.4 MG/DL (ref 0–1.2)
BUN SERPL-MCNC: 15 MG/DL (ref 8–23)
BUN/CREAT SERPL: 21.4 (ref 7–25)
CALCIUM SPEC-SCNC: 9.3 MG/DL (ref 8.6–10.5)
CHLORIDE SERPL-SCNC: 101 MMOL/L (ref 98–107)
CO2 SERPL-SCNC: 27.1 MMOL/L (ref 22–29)
CREAT SERPL-MCNC: 0.7 MG/DL (ref 0.57–1)
DEPRECATED RDW RBC AUTO: 42.3 FL (ref 37–54)
EOSINOPHIL # BLD AUTO: 0.07 10*3/MM3 (ref 0–0.4)
EOSINOPHIL NFR BLD AUTO: 0.9 % (ref 0.3–6.2)
ERYTHROCYTE [DISTWIDTH] IN BLOOD BY AUTOMATED COUNT: 12.3 % (ref 12.3–15.4)
GFR SERPL CREATININE-BSD FRML MDRD: 85 ML/MIN/1.73
GLOBULIN UR ELPH-MCNC: 2.5 GM/DL
GLUCOSE SERPL-MCNC: 74 MG/DL (ref 65–99)
HCT VFR BLD AUTO: 42.7 % (ref 34–46.6)
HGB BLD-MCNC: 13.9 G/DL (ref 12–15.9)
IMM GRANULOCYTES # BLD AUTO: 0.02 10*3/MM3 (ref 0–0.05)
IMM GRANULOCYTES NFR BLD AUTO: 0.3 % (ref 0–0.5)
LYMPHOCYTES # BLD AUTO: 1.64 10*3/MM3 (ref 0.7–3.1)
LYMPHOCYTES NFR BLD AUTO: 21.3 % (ref 19.6–45.3)
MCH RBC QN AUTO: 30.7 PG (ref 26.6–33)
MCHC RBC AUTO-ENTMCNC: 32.6 G/DL (ref 31.5–35.7)
MCV RBC AUTO: 94.3 FL (ref 79–97)
MONOCYTES # BLD AUTO: 0.57 10*3/MM3 (ref 0.1–0.9)
MONOCYTES NFR BLD AUTO: 7.4 % (ref 5–12)
NEUTROPHILS NFR BLD AUTO: 5.34 10*3/MM3 (ref 1.7–7)
NEUTROPHILS NFR BLD AUTO: 69.4 % (ref 42.7–76)
NRBC BLD AUTO-RTO: 0 /100 WBC (ref 0–0.2)
PLATELET # BLD AUTO: 238 10*3/MM3 (ref 140–450)
PMV BLD AUTO: 8.7 FL (ref 6–12)
POTASSIUM SERPL-SCNC: 3.6 MMOL/L (ref 3.5–5.2)
PROT SERPL-MCNC: 6.7 G/DL (ref 6–8.5)
RBC # BLD AUTO: 4.53 10*6/MM3 (ref 3.77–5.28)
SODIUM SERPL-SCNC: 138 MMOL/L (ref 136–145)
WBC # BLD AUTO: 7.69 10*3/MM3 (ref 3.4–10.8)

## 2021-04-12 PROCEDURE — 80053 COMPREHEN METABOLIC PANEL: CPT | Performed by: PHYSICIAN ASSISTANT

## 2021-04-12 PROCEDURE — 99213 OFFICE O/P EST LOW 20 MIN: CPT | Performed by: PHYSICIAN ASSISTANT

## 2021-04-12 PROCEDURE — 85025 COMPLETE CBC W/AUTO DIFF WBC: CPT | Performed by: PHYSICIAN ASSISTANT

## 2021-04-12 PROCEDURE — 36415 COLL VENOUS BLD VENIPUNCTURE: CPT | Performed by: PHYSICIAN ASSISTANT

## 2021-04-12 RX ORDER — MESALAMINE 0.38 G/1
1.5 CAPSULE, EXTENDED RELEASE ORAL DAILY
Qty: 120 CAPSULE | Refills: 5 | Status: SHIPPED | OUTPATIENT
Start: 2021-04-12 | End: 2021-05-24

## 2021-04-12 NOTE — PROGRESS NOTES
Chief Complaint   Patient presents with   • Ulcerative Colitis       ENDO PROCEDURE ORDERED:    Subjective    Tiffani Serra is a 61 y.o. female. she is here today for follow-up.    History of Present Illness    Patient is seen on a recheck of her ulcerative colitis. Last seen on 10/12/2020. Patient states her insurance increased the copay on her medication. She requested change to balsalazide. She states she has trouble taking more than 1 a day. If she takes 3 a day she gets severe constipation and has to use a lot of stool softeners. Her weight has been stable. She complains of 4 out of 10 abdominal pain. She denied nausea, vomiting or dysphagia. Last colonoscopy was 03/04/2019.     ASSESSMENT/PLAN:  Patient with increasing difficulty with her bowels. Discussed possible colonoscopy. I recommended CBC, CMP today. We will try switching her medication to Apriso and see if this has a lower copay. She states she has taken it before and was given samples in the past. We will plan followup in 6 weeks; further pending clinical course and the results of the above.      The following portions of the patient's history were reviewed and updated as appropriate:   Past Medical History:   Diagnosis Date   • Abdominal pain    • Abscess of labia    • Acute posthemorrhagic anemia 01/08/2015   • Anemia     history of, mild   • Anemia due to blood loss 11/20/2014   • Cancer (CMS/[facility])     cosmo/right leg mole   • Contact dermatitis 01/30/2013    on labia and surrounding regions   • Cystitis     recurrent   • Diarrhea 04/11/2016   • Foot pain     porokeratoma causin metatarsalgia, right foot   • Generalized abdominal pain 02/23/2015   • Hypercholesterolemia    • Hypertensive disorder    • Infection of skin and subcutaneous tissue 01/30/2013   • Iron deficiency anemia 04/11/2016    improving   • Irritable bowel syndrome    • Knee pain, right    • Left sided ulcerative colitis (CMS/HCC) 10/08/2015   • Malaise and fatigue 11/02/2011      • Obesity    • Pseudomembranous enterocolitis 11/02/2012   • Rectal hemorrhage 07/01/2015   • Scapulalgia 10/24/2014   • Sialoadenitis 07/26/2013   • Ulcerative colitis (CMS/HCC) 04/11/2016    chronic, for 29 years.  flare   • Urinary tract infectious disease 07/14/2012   • Vitamin D deficiency 05/14/2012     Past Surgical History:   Procedure Laterality Date   • BLADDER SURGERY  2001    bladder tacking x 3   • COLONOSCOPY  07/25/2011    Colitis found in rectum & sigmoid colon. Biopsy taken   • COLONOSCOPY  06/15/2016    Erythematous mucosa in the rectum.Biopsied.One polyp in the transverse colon. Biopsied   • COLONOSCOPY  08/15/2008    Colitis of the rectum, the sigmoid and the transverse colon. Multiple biopsies were obtained from the rectum, the sigmoid and the transverse colon. Multiple biopsies were obtained from the cecum, the transverse colon, sigmoid & rectum   • COLONOSCOPY N/A 3/4/2019    Procedure: COLONOSCOPY;  Surgeon: Henok Dixon MD;  Location: [facility] ENDOSCOPY;  Service: Gastroenterology   • EXCISION LESION  10/17/1969    removal of sebaceous cyst of neck   • JOINT REPLACEMENT Left    • KNEE ARTHROSCOPY  02/21/2005    Tears of the meidal and lateral meniscus and osteoarthritis of the knee. Arthroscopic medial and lateral meniscectomies of the right knee with chondroplasty of the medial, lateral femoral condyles and patella   • LYMPH NODE BIOPSY  05/12/2009    [place] lymph node biopsy, superficial and deep, within the right groin involving superficial femoral nodes and also the external iliac nodes. Melanoma of the right leg   • IA TOTAL KNEE ARTHROPLASTY Right 10/16/2017    Procedure: TOTAL KNEE ARTHROPLASTY;  Surgeon: Yury Marion MD;  Location: [facility] OR;  Service: Orthopedics   • SIGMOIDOSCOPY N/A 5/22/2020    Procedure: FLEXIBLE SIGMOIDOSCOPY WITH BIOPSY--enemas on call to endoscopy--URGENT!!!;  Surgeon: Joseph Doss MD;  Location: [facility] ENDOSCOPY;  Service: Gastroenterology;   Laterality: N/A;   • TOTAL ABDOMINAL HYSTERECTOMY WITH SALPINGO OOPHORECTOMY     • TOTAL KNEE ARTHROPLASTY  2007    severe osteoarthritis of the left knee.     • WRIST GANGLION EXCISION  10/17/1969    left wrist     Family History   Problem Relation Age of Onset   • Coronary artery disease Other    • Hypertension Other      OB History        1    Para   1    Term   1            AB        Living           SAB        TAB        Ectopic        Molar        Multiple        Live Births                  Allergies   Allergen Reactions   • Other      Garlic diarrhea/heartburn   • Keflex [Cephalexin] Rash   • Penicillins Rash     nylon   • Tape Rash     Silk tape/swells     Social History     Socioeconomic History   • Marital status:      Spouse name: Not on file   • Number of children: Not on file   • Years of education: Not on file   • Highest education level: Not on file   Tobacco Use   • Smoking status: Never Smoker   • Smokeless tobacco: Never Used   Vaping Use   • Vaping Use: Never used   Substance and Sexual Activity   • Alcohol use: No   • Drug use: No   • Sexual activity: Defer     Current Medications:  Prior to Admission medications    Medication Sig Start Date End Date Taking? Authorizing Provider   acetaminophen (TYLENOL) 500 MG tablet Take 1,000 mg by mouth Every 6 (Six) Hours As Needed for Mild Pain .   Yes Ernesto Delgado MD   balsalazide (Colazal) 750 MG capsule Take 3 capsules by mouth 2 (two) times a day.  Patient taking differently: Take 750 mg by mouth Daily. 21  Yes Joao Arnett PA-C   Ca Phosphate-Cholecalciferol (CALCIUM/VITAMIN D3 GUMMIES PO) Take  by mouth Daily.   Yes Ernesto Delgado MD   ferrous sulfate 325 (65 FE) MG tablet Take 325 mg by mouth Daily With Breakfast.   Yes Ernesto Delgado MD   hyoscyamine (LEVSIN) 0.125 MG SL tablet Take 1 tablet by mouth Every 6 (Six) Hours As Needed for Cramping. 20  Joao Arnett PA-C    "  mesalamine (Apriso) 0.375 g 24 hr capsule Take 4 capsules by mouth once daily 4/6/21 4/12/21  Joseph Doss MD   RABEprazole (ACIPHEX) 20 MG EC tablet Take 1 tablet by mouth Daily. 10/12/20 4/12/21  Joao Arnett PA-C     Review of Systems  Review of Systems       Objective    /64   Pulse 82   Ht 157.5 cm (62\")   Wt 130 kg (287 lb)   LMP  (LMP Unknown)   BMI 52.49 kg/m²   Physical Exam  Vitals and nursing note reviewed.   Constitutional:       General: She is not in acute distress.     Appearance: She is well-developed.   HENT:      Head: Normocephalic and atraumatic.   Eyes:      Pupils: Pupils are equal, round, and reactive to light.   Cardiovascular:      Rate and Rhythm: Normal rate and regular rhythm.      Heart sounds: Normal heart sounds.   Pulmonary:      Effort: Pulmonary effort is normal.      Breath sounds: Normal breath sounds.   Abdominal:      General: Bowel sounds are normal. There is no distension or abdominal bruit.      Palpations: Abdomen is soft. Abdomen is not rigid. There is no shifting dullness or mass.      Tenderness: There is abdominal tenderness. There is no guarding or rebound.      Hernia: No hernia is present. There is no hernia in the ventral area.      Comments: Obese, mild   Musculoskeletal:         General: Normal range of motion.      Cervical back: Normal range of motion.   Skin:     General: Skin is warm and dry.   Neurological:      Mental Status: She is alert and oriented to person, place, and time.   Psychiatric:         Behavior: Behavior normal.         Thought Content: Thought content normal.         Judgment: Judgment normal.       Assessment/Plan      1. Chronic abdominal pain    2. Other ulcerative colitis without complication (CMS/HCC)    3. Gastroesophageal reflux disease with esophagitis without hemorrhage    .   Diagnoses and all orders for this visit:    1. Chronic abdominal pain (Primary)  -     CBC & Differential  -     Comprehensive " Metabolic Panel    2. Other ulcerative colitis without complication (CMS/HCC)  -     CBC & Differential  -     Comprehensive Metabolic Panel    3. Gastroesophageal reflux disease with esophagitis without hemorrhage  -     CBC & Differential  -     Comprehensive Metabolic Panel    Other orders  -     mesalamine (Apriso) 0.375 g 24 hr capsule; Take 4 capsules by mouth Daily.  Dispense: 120 capsule; Refill: 5        Orders placed during this encounter include:  Orders Placed This Encounter   Procedures   • Comprehensive Metabolic Panel     Order Specific Question:   Release to patient     Answer:   Immediate   • CBC Auto Differential   • CBC & Differential     Order Specific Question:   Manual Differential     Answer:   No       Medications prescribed:  New Medications Ordered This Visit   Medications   • mesalamine (Apriso) 0.375 g 24 hr capsule     Sig: Take 4 capsules by mouth Daily.     Dispense:  120 capsule     Refill:  5     Discontinued Medications       Reason for Discontinue     hyoscyamine (LEVSIN) 0.125 MG SL tablet    *Therapy completed     RABEprazole (ACIPHEX) 20 MG EC tablet    Non-compliance     mesalamine (Apriso) 0.375 g 24 hr capsule    Cost of medication        Requested Prescriptions     Signed Prescriptions Disp Refills   • mesalamine (Apriso) 0.375 g 24 hr capsule 120 capsule 5     Sig: Take 4 capsules by mouth Daily.       Review and/or summary of lab tests, radiology, procedures, medications. Review and summary of old records and obtaining of history. The risks and benefits of my recommendations, as well as other treatment options were discussed with the patient today. Questions were answered.    Follow-up: Return in about 6 weeks (around 5/24/2021), or if symptoms worsen or fail to improve, for lab today.     * Surgery not found *      This document has been electronically signed by Joao Arnett PA-C on April 23, 2021 18:25 CDT      Results for orders placed or performed in visit on  04/12/21   CBC Auto Differential    Specimen: Blood   Result Value Ref Range    WBC 7.69 3.40 - 10.80 10*3/mm3    RBC 4.53 3.77 - 5.28 10*6/mm3    Hemoglobin 13.9 12.0 - 15.9 g/dL    Hematocrit 42.7 34.0 - 46.6 %    MCV 94.3 79.0 - 97.0 fL    MCH 30.7 26.6 - 33.0 pg    MCHC 32.6 31.5 - 35.7 g/dL    RDW 12.3 12.3 - 15.4 %    RDW-SD 42.3 37.0 - 54.0 fl    MPV 8.7 6.0 - 12.0 fL    Platelets 238 140 - 450 10*3/mm3    Neutrophil % 69.4 42.7 - 76.0 %    Lymphocyte % 21.3 19.6 - 45.3 %    Monocyte % 7.4 5.0 - 12.0 %    Eosinophil % 0.9 0.3 - 6.2 %    Basophil % 0.7 0.0 - 1.5 %    Immature Grans % 0.3 0.0 - 0.5 %    Neutrophils, Absolute 5.34 1.70 - 7.00 10*3/mm3    Lymphocytes, Absolute 1.64 0.70 - 3.10 10*3/mm3    Monocytes, Absolute 0.57 0.10 - 0.90 10*3/mm3    Eosinophils, Absolute 0.07 0.00 - 0.40 10*3/mm3    Basophils, Absolute 0.05 0.00 - 0.20 10*3/mm3    Immature Grans, Absolute 0.02 0.00 - 0.05 10*3/mm3    nRBC 0.0 0.0 - 0.2 /100 WBC   Comprehensive Metabolic Panel    Specimen: Blood   Result Value Ref Range    Glucose 74 65 - 99 mg/dL    BUN 15 8 - 23 mg/dL    Creatinine 0.70 0.57 - 1.00 mg/dL    Sodium 138 136 - 145 mmol/L    Potassium 3.6 3.5 - 5.2 mmol/L    Chloride 101 98 - 107 mmol/L    CO2 27.1 22.0 - 29.0 mmol/L    Calcium 9.3 8.6 - 10.5 mg/dL    Total Protein 6.7 6.0 - 8.5 g/dL    Albumin 4.20 3.50 - 5.20 g/dL    ALT (SGPT) 17 1 - 33 U/L    AST (SGOT) 16 1 - 32 U/L    Alkaline Phosphatase 108 39 - 117 U/L    Total Bilirubin 0.4 0.0 - 1.2 mg/dL    eGFR Non African Amer 85 >60 mL/min/1.73    Globulin 2.5 gm/dL    A/G Ratio 1.7 g/dL    BUN/Creatinine Ratio 21.4 7.0 - 25.0    Anion Gap 9.9 5.0 - 15.0 mmol/L   Results for orders placed or performed in visit on 10/01/20   Comprehensive Metabolic Panel    Specimen: Blood   Result Value Ref Range    Glucose 97 65 - 99 mg/dL    BUN 13 8 - 23 mg/dL    Creatinine 0.78 0.57 - 1.00 mg/dL    Sodium 138 136 - 145 mmol/L    Potassium 4.4 3.5 - 5.2 mmol/L    Chloride 102  98 - 107 mmol/L    CO2 25.0 22.0 - 29.0 mmol/L    Calcium 9.6 8.6 - 10.5 mg/dL    Total Protein 6.7 6.0 - 8.5 g/dL    Albumin 4.00 3.50 - 5.20 g/dL    ALT (SGPT) 31 1 - 33 U/L    AST (SGOT) 21 1 - 32 U/L    Alkaline Phosphatase 113 39 - 117 U/L    Total Bilirubin 0.6 0.0 - 1.2 mg/dL    eGFR Non African Amer 75 >60 mL/min/1.73    Globulin 2.7 gm/dL    A/G Ratio 1.5 g/dL    BUN/Creatinine Ratio 16.7 7.0 - 25.0    Anion Gap 11.0 5.0 - 15.0 mmol/L   Results for orders placed or performed during the hospital encounter of 05/22/20   Tissue Pathology Exam    Specimen: A: Large Intestine, Sigmoid Colon; Tissue    B: Large Intestine, Rectum; Tissue   Result Value Ref Range    Case Report       Surgical Pathology Report                         Case: [id number]                                  Authorizing Provider:  Joseph Doss MD      Collected:           05/22/2020 03:12 PM          Ordering Location:     [facility]             Received:            05/26/2020 07:19 AM                                 [facility]                                                     Pathologist:           Darci Cardenas MD                                                           Specimens:   1) - Large Intestine, Sigmoid Colon                                                                 2) - Large Intestine, Rectum                                                               Final Diagnosis       See Scanned Report       Results for orders placed or performed in visit on 05/21/20   SARS-COV-2 PCR ([facility] PERFORMED), NP SWAB IN TRANSPORT MEDIA - Swab, Nasopharynx    Specimen: Nasopharynx; Swab   Result Value Ref Range    COVID19 Not Detected Not Detected - Ref. Range   Results for orders placed or performed in visit on 04/15/20   Gastrointestinal Panel, PCR - Stool, Per Rectum    Specimen: Per Rectum; Stool   Result Value Ref Range    Campylobacter Not Detected Not Detected    Plesiomonas shigelloides  Not Detected Not Detected    Salmonella Not Detected Not Detected    Vibrio Not Detected Not Detected    Vibrio cholerae Not Detected Not Detected    Yersinia enterocolitica Not Detected Not Detected    Enteroaggregative E. coli (EAEC) Not Detected Not Detected    Enteropathogenic E. coli (EPEC) Not Detected Not Detected    Enterotoxigenic E. coli (ETEC) lt/st Not Detected Not Detected    Shiga-like toxin-producing E. coli (STEC) stx1/stx2 Not Detected Not Detected    E. coli O157 Not Detected Not Detected    Shigella/Enteroinvasive E. coli (EIEC) Not Detected Not Detected    Cryptosporidium Not Detected Not Detected    Cyclospora cayetanensis Not Detected Not Detected    Entamoeba histolytica Not Detected Not Detected    Giardia lamblia Not Detected Not Detected    Adenovirus F40/41 Not Detected Not Detected    Astrovirus Not Detected Not Detected    Norovirus GI/GII Not Detected Not Detected    Rotavirus A Not Detected Not Detected    Sapovirus (I, II, IV or V) Not Detected Not Detected   Urinalysis, Microscopic Only - Urine, Clean Catch    Specimen: Urine, Clean Catch   Result Value Ref Range    RBC, UA 0-2 None Seen, 0-2 /HPF    WBC, UA 6-12 (A) None Seen, 0-2 /HPF    Bacteria, UA 4+ (A) None Seen /HPF    Squamous Epithelial Cells, UA 7-12 (A) None Seen, 0-2 /HPF    Hyaline Casts, UA 13-20 None Seen /LPF    Methodology Manual Light Microscopy    CBC Auto Differential    Specimen: Blood   Result Value Ref Range    WBC 7.70 3.40 - 10.80 10*3/mm3    RBC 4.31 3.77 - 5.28 10*6/mm3    Hemoglobin 13.5 12.0 - 15.9 g/dL    Hematocrit 39.6 34.0 - 46.6 %    MCV 91.9 79.0 - 97.0 fL    MCH 31.3 26.6 - 33.0 pg    MCHC 34.1 31.5 - 35.7 g/dL    RDW 12.7 12.3 - 15.4 %    RDW-SD 42.0 37.0 - 54.0 fl    MPV 9.3 6.0 - 12.0 fL    Platelets 253 140 - 450 10*3/mm3    Neutrophil % 65.8 42.7 - 76.0 %    Lymphocyte % 23.1 19.6 - 45.3 %    Monocyte % 6.6 5.0 - 12.0 %    Eosinophil % 3.6 0.3 - 6.2 %    Basophil % 0.5 0.0 - 1.5 %    Immature  Grans % 0.4 0.0 - 0.5 %    Neutrophils, Absolute 5.06 1.70 - 7.00 10*3/mm3    Lymphocytes, Absolute 1.78 0.70 - 3.10 10*3/mm3    Monocytes, Absolute 0.51 0.10 - 0.90 10*3/mm3    Eosinophils, Absolute 0.28 0.00 - 0.40 10*3/mm3    Basophils, Absolute 0.04 0.00 - 0.20 10*3/mm3    Immature Grans, Absolute 0.03 0.00 - 0.05 10*3/mm3    nRBC 0.0 0.0 - 0.2 /100 WBC     *Note: Due to a large number of results and/or encounters for the requested time period, some results have not been displayed. A complete set of results can be found in Results Review.       Some portions of this note have been dictated using voice recognition software and may contain errors and/or omissions.

## 2021-04-12 NOTE — PATIENT INSTRUCTIONS
MyPlate from USDA    MyPlate is an outline of a general healthy diet based on the 2010 Dietary Guidelines for Americans, from the U.S. Department of Agriculture (USDA). It sets guidelines for how much food you should eat from each food group based on your age, sex, and level of physical activity.  What are tips for following MyPlate?  To follow MyPlate recommendations:  · Eat a wide variety of fruits and vegetables, grains, and protein foods.  · Serve smaller portions and eat less food throughout the day.  · Limit portion sizes to avoid overeating.  · Enjoy your food.  · Get at least 150 minutes of exercise every week. This is about 30 minutes each day, 5 or more days per week.  It can be difficult to have every meal look like MyPlate. Think about MyPlate as eating guidelines for an entire day, rather than each individual meal.  Fruits and vegetables  · Make half of your plate fruits and vegetables.  · Eat many different colors of fruits and vegetables each day.  · For a 2,000 calorie daily food plan, eat:  ? 2½ cups of vegetables every day.  ? 2 cups of fruit every day.  · 1 cup is equal to:  ? 1 cup raw or cooked vegetables.  ? 1 cup raw fruit.  ? 1 medium-sized orange, apple, or banana.  ? 1 cup 100% fruit or vegetable juice.  ? 2 cups raw leafy greens, such as lettuce, spinach, or kale.  ? ½ cup dried fruit.  Grains  · One fourth of your plate should be grains.  · Make at least half of the grains you eat each day whole grains.  · For a 2,000 calorie daily food plan, eat 6 oz of grains every day.  · 1 oz is equal to:  ? 1 slice bread.  ? 1 cup cereal.  ? ½ cup cooked rice, cereal, or pasta.  Protein  · One fourth of your plate should be protein.  · Eat a wide variety of protein foods, including meat, poultry, fish, eggs, beans, nuts, and tofu.  · For a 2,000 calorie daily food plan, eat 5½ oz of protein every day.  · 1 oz is equal to:  ? 1 oz meat, poultry, or fish.  ? ¼ cup cooked beans.  ? 1 egg.  ? ½ oz nuts  or seeds.  ? 1 Tbsp peanut butter.  Dairy  · Drink fat-free or low-fat (1%) milk.  · Eat or drink dairy as a side to meals.  · For a 2,000 calorie daily food plan, eat or drink 3 cups of dairy every day.  · 1 cup is equal to:  ? 1 cup milk, yogurt, cottage cheese, or soy milk (soy beverage).  ? 2 oz processed cheese.  ? 1½ oz natural cheese.  Fats, oils, salt, and sugars  · Only small amounts of oils are recommended.  · Avoid foods that are high in calories and low in nutritional value (empty calories), like foods high in fat or added sugars.  · Choose foods that are low in salt (sodium). Choose foods that have less than 140 milligrams (mg) of sodium per serving.  · Drink water instead of sugary drinks. Drink enough water each day to keep your urine pale yellow.  Where to find support  · Work with your health care provider or a nutrition specialist (dietitian) to develop a customized eating plan that is right for you.  · Download an uli (mobile application) to help you track your daily food intake.  Where to find more information  · Go to ChooseMyPlate.gov for more information.  Summary  · MyPlate is a general guideline for healthy eating from the USDA. It is based on the 2010 Dietary Guidelines for Americans.  · In general, fruits and vegetables should take up ½ of your plate, grains should take up ¼ of your plate, and protein should take up ¼ of your plate.  This information is not intended to replace advice given to you by your health care provider. Make sure you discuss any questions you have with your health care provider.  Document Revised: 05/21/2020 Document Reviewed: 03/19/2018  Elsevier Patient Education © 2021 Elsevier Inc.  BMI for Adults  What is BMI?  Body mass index (BMI) is a number that is calculated from a person's weight and height. BMI can help estimate how much of a person's weight is composed of fat. BMI does not measure body fat directly. Rather, it is an alternative to procedures that  "directly measure body fat, which can be difficult and expensive.  BMI can help identify people who may be at higher risk for certain medical problems.  What are BMI measurements used for?  BMI is used as a screening tool to identify possible weight problems. It helps determine whether a person is obese, overweight, a healthy weight, or underweight.  BMI is useful for:  · Identifying a weight problem that may be related to a medical condition or may increase the risk for medical problems.  · Promoting changes, such as changes in diet and exercise, to help reach a healthy weight. BMI screening can be repeated to see if these changes are working.  How is BMI calculated?  BMI involves measuring your weight in relation to your height. Both height and weight are measured, and the BMI is calculated from those numbers. This can be done either in English (U[place] or metric measurements. Note that charts and online BMI calculators are available to help you find your BMI quickly and easily without having to do these calculations yourself.  To calculate your BMI in English (U.S[place] measurements:    1. Measure your weight in pounds (lb).  2. Multiply the number of pounds by 703.  ? For example, for a person who weighs 180 lb, multiply that number by 703, which equals 126,540.  3. Measure your height in inches. Then multiply that number by itself to get a measurement called \"inches squared.\"  ? For example, for a person who is 70 inches tall, the \"inches squared\" measurement is 70 inches x 70 inches, which equals 4,900 inches squared.  4. Divide the total from step 2 (number of lb x 703) by the total from step 3 (inches squared): 126,540 ÷ 4,900 = 25.8. This is your BMI.  To calculate your BMI in metric measurements:  1. Measure your weight in kilograms (kg).  2. Measure your height in meters (m). Then multiply that number by itself to get a measurement called \"meters squared.\"  ? For example, for a person who is 1.75 m tall, the " "\"meters squared\" measurement is 1.75 m x 1.75 m, which is equal to 3.1 meters squared.  3. Divide the number of kilograms (your weight) by the meters squared number. In this example: 70 ÷ 3.1 = 22.6. This is your BMI.  What do the results mean?  BMI charts are used to identify whether you are underweight, normal weight, overweight, or obese. The following guidelines will be used:  · Underweight: BMI less than 18.5.  · Normal weight: BMI between 18.5 and 24.9.  · Overweight: BMI between 25 and 29.9.  · Obese: BMI of 30 or above.  Keep these notes in mind:  · Weight includes both fat and muscle, so someone with a muscular build, such as an athlete, may have a BMI that is higher than 24.9. In cases like these, BMI is not an accurate measure of body fat.  · To determine if excess body fat is the cause of a BMI of 25 or higher, further assessments may need to be done by a health care provider.  · BMI is usually interpreted in the same way for men and women.  Where to find more information  For more information about BMI, including tools to quickly calculate your BMI, go to these websites:  · Centers for Disease Control and Prevention: www.cdc.gov  · American Heart Association: www.heart.org  · National Heart, Lung, and Blood [place]: www.nhlbi.nih.gov  Summary  · Body mass index (BMI) is a number that is calculated from a person's weight and height.  · BMI may help estimate how much of a person's weight is composed of fat. BMI can help identify those who may be at higher risk for certain medical problems.  · BMI can be measured using English measurements or metric measurements.  · BMI charts are used to identify whether you are underweight, normal weight, overweight, or obese.  This information is not intended to replace advice given to you by your health care provider. Make sure you discuss any questions you have with your health care provider.  Document Revised: 09/09/2020 Document Reviewed: 07/17/2020  [employer] " Patient Education © 2021 Elsevier Inc.

## 2021-04-19 ENCOUNTER — APPOINTMENT (OUTPATIENT)
Dept: VACCINE CLINIC | Facility: HOSPITAL | Age: 61
End: 2021-04-19

## 2021-04-23 ENCOUNTER — IMMUNIZATION (OUTPATIENT)
Dept: VACCINE CLINIC | Facility: HOSPITAL | Age: 61
End: 2021-04-23

## 2021-04-23 ENCOUNTER — TELEPHONE (OUTPATIENT)
Dept: GASTROENTEROLOGY | Facility: CLINIC | Age: 61
End: 2021-04-23

## 2021-04-23 PROCEDURE — 0002A: CPT | Performed by: THORACIC SURGERY (CARDIOTHORACIC VASCULAR SURGERY)

## 2021-04-23 PROCEDURE — 91300 HC SARSCOV02 VAC 30MCG/0.3ML IM: CPT | Performed by: THORACIC SURGERY (CARDIOTHORACIC VASCULAR SURGERY)

## 2021-04-23 NOTE — TELEPHONE ENCOUNTER
Patient has been contacted and made aware of the following per Joao Arnett MS, DARCIE That is unlikely to be a therapeutic dose of the Balsalazide. She needs to see her PCP, chiropractor or go to ER for back pain.

## 2021-04-23 NOTE — TELEPHONE ENCOUNTER
----- Message from Joao Arnett PA-C sent at 4/19/2021  5:16 PM CDT -----  That is unlikely to be a therapeutic dose. She needs to see her PCP, chiropractor or go to ER.  ----- Message -----  From: Jeanna Arellano MA  Sent: 4/19/2021   4:08 PM CDT  To: Joao Arnett PA-C    Patient is unable to afford the copay for mesalamine. She request to stay on the Balsalazide. She states that she can only take 1 of those tablets and if she takes anymore than that she cant have a bowel movement. She states at least taking one she can have a bm however her back hurts but she may need to go to a chiropractor?

## 2021-04-26 ENCOUNTER — OFFICE VISIT (OUTPATIENT)
Dept: PODIATRY | Facility: CLINIC | Age: 61
End: 2021-04-26

## 2021-04-26 VITALS — HEIGHT: 62 IN | BODY MASS INDEX: 52.81 KG/M2 | OXYGEN SATURATION: 99 % | WEIGHT: 287 LBS | HEART RATE: 97 BPM

## 2021-04-26 DIAGNOSIS — M79.672 PAIN IN LEFT FOOT: Primary | ICD-10-CM

## 2021-04-26 DIAGNOSIS — L60.0 INGROWN TOENAIL: ICD-10-CM

## 2021-04-26 DIAGNOSIS — M21.42 PES PLANUS OF BOTH FEET: ICD-10-CM

## 2021-04-26 DIAGNOSIS — M79.675 PAIN OF TOE OF LEFT FOOT: ICD-10-CM

## 2021-04-26 DIAGNOSIS — M21.41 PES PLANUS OF BOTH FEET: ICD-10-CM

## 2021-04-26 PROCEDURE — 99203 OFFICE O/P NEW LOW 30 MIN: CPT | Performed by: PODIATRIST

## 2021-04-26 PROCEDURE — 11750 EXCISION NAIL&NAIL MATRIX: CPT | Performed by: PODIATRIST

## 2021-04-26 NOTE — PROGRESS NOTES
Tiffani Serra  1960  61 y.o. female     Patient presents to clinic today with complaint of left foot pain.    04/26/2021  Chief Complaint   Patient presents with   • Left Foot - Pain           History of Present Illness    Tiffani Serra is a 61 y.o. female who presents for evaluation of left foot pain.  Pain primarily located along the inner edge of her left great toe.  She denies any known trauma or injuries.  There does seem to be some sensitivity around the nail.  She denies any previous treatments for this.  States pain has been extending around the ball of her foot.     Past Medical History:   Diagnosis Date   • Abdominal pain    • Abscess of labia    • Acute posthemorrhagic anemia 01/08/2015   • Anemia     history of, mild   • Anemia due to blood loss 11/20/2014   • Cancer (CMS/[facility])     cosmo/right leg mole   • Contact dermatitis 01/30/2013    on labia and surrounding regions   • Cystitis     recurrent   • Diarrhea 04/11/2016   • Foot pain     porokeratoma causin metatarsalgia, right foot   • Generalized abdominal pain 02/23/2015   • Hypercholesterolemia    • Hypertensive disorder    • Infection of skin and subcutaneous tissue 01/30/2013   • Iron deficiency anemia 04/11/2016    improving   • Irritable bowel syndrome    • Knee pain, right    • Left sided ulcerative colitis (CMS/HCC) 10/08/2015   • Malaise and fatigue 11/02/2011   • Obesity    • Pseudomembranous enterocolitis 11/02/2012   • Rectal hemorrhage 07/01/2015   • Scapulalgia 10/24/2014   • Sialoadenitis 07/26/2013   • Ulcerative colitis (CMS/HCC) 04/11/2016    chronic, for 29 years.  flare   • Urinary tract infectious disease 07/14/2012   • Vitamin D deficiency 05/14/2012         Past Surgical History:   Procedure Laterality Date   • BLADDER SURGERY  2001    bladder tacking x 3   • COLONOSCOPY  07/25/2011    Colitis found in rectum & sigmoid colon. Biopsy taken   • COLONOSCOPY  06/15/2016    Erythematous mucosa in the rectum.Biopsied.One  polyp in the transverse colon. Biopsied   • COLONOSCOPY  08/15/2008    Colitis of the rectum, the sigmoid and the transverse colon. Multiple biopsies were obtained from the rectum, the sigmoid and the transverse colon. Multiple biopsies were obtained from the cecum, the transverse colon, sigmoid & rectum   • COLONOSCOPY N/A 3/4/2019    Procedure: COLONOSCOPY;  Surgeon: Henok Dixon MD;  Location: [facility] ENDOSCOPY;  Service: Gastroenterology   • EXCISION LESION  10/17/1969    removal of sebaceous cyst of neck   • JOINT REPLACEMENT Left    • KNEE ARTHROSCOPY  02/21/2005    Tears of the meidal and lateral meniscus and osteoarthritis of the knee. Arthroscopic medial and lateral meniscectomies of the right knee with chondroplasty of the medial, lateral femoral condyles and patella   • LYMPH NODE BIOPSY  05/12/2009    [place] lymph node biopsy, superficial and deep, within the right groin involving superficial femoral nodes and also the external iliac nodes. Melanoma of the right leg   • [place] TOTAL KNEE ARTHROPLASTY Right 10/16/2017    Procedure: TOTAL KNEE ARTHROPLASTY;  Surgeon: Yury Marion MD;  Location: [facility] OR;  Service: Orthopedics   • SIGMOIDOSCOPY N/A 5/22/2020    Procedure: FLEXIBLE SIGMOIDOSCOPY WITH BIOPSY--enemas on call to endoscopy--URGENT!!!;  Surgeon: Joseph Doss MD;  Location: [facility] ENDOSCOPY;  Service: Gastroenterology;  Laterality: N/A;   • TOTAL ABDOMINAL HYSTERECTOMY WITH SALPINGO OOPHORECTOMY  2001   • TOTAL KNEE ARTHROPLASTY  08/08/2007    severe osteoarthritis of the left knee.     • WRIST GANGLION EXCISION  10/17/1969    left wrist         Family History   Problem Relation Age of Onset   • Coronary artery disease Other    • Hypertension Other    • Cancer Father    • Heart disease Father    • Cancer Brother    • Ulcerative colitis Maternal Grandmother          Social History     Socioeconomic History   • Marital status:      Spouse name: Not on file   • Number of children:  "Not on file   • Years of education: Not on file   • Highest education level: Not on file   Tobacco Use   • Smoking status: Never Smoker   • Smokeless tobacco: Never Used   Vaping Use   • Vaping Use: Never used   Substance and Sexual Activity   • Alcohol use: No   • Drug use: No   • Sexual activity: Defer         Current Outpatient Medications   Medication Sig Dispense Refill   • balsalazide (Colazal) 750 MG capsule Take 3 capsules by mouth 2 (two) times a day. (Patient taking differently: Take 750 mg by mouth Daily.) 180 capsule 2   • Ca Phosphate-Cholecalciferol (CALCIUM/VITAMIN D3 GUMMIES PO) Take  by mouth Daily.     • ferrous sulfate 325 (65 FE) MG tablet Take 325 mg by mouth Daily With Breakfast.     • acetaminophen (TYLENOL) 500 MG tablet Take 1,000 mg by mouth Every 6 (Six) Hours As Needed for Mild Pain .     • mesalamine (Apriso) 0.375 g 24 hr capsule Take 4 capsules by mouth Daily. 120 capsule 5     No current facility-administered medications for this visit.         OBJECTIVE    Pulse 97   Ht 157.5 cm (62\")   Wt 130 kg (287 lb)   LMP  (LMP Unknown)   SpO2 99%   BMI 52.49 kg/m²       Review of Systems   Constitutional: Negative.    HENT: Negative.    Eyes: Negative.    Respiratory: Negative.    Cardiovascular: Negative.    Gastrointestinal: Negative.    Endocrine: Negative.    Genitourinary: Negative.    Musculoskeletal:        Foot pain   Skin: Negative.    Allergic/Immunologic: Negative.    Neurological: Negative.    Hematological: Negative.    Psychiatric/Behavioral: Negative.          Physical Exam   Constitutional: she appears well-developed and well-nourished.   CV: No chest pain. Normal RR  Resp: Non labored respirations  Psychiatric: she has a normal mood and affect. her behavior is normal.      Lower Extremity Exam:  Vascular: DP/PT pulses palpable 2+.   No hallux edema  Toes warm  Neuro: Protective sensation intact, b/l.  DTRs intact  Integument: No open wounds.  Ingrown medial nail border, " right hallux. Minimal erythema, edema. +tenderness to palpation.  Web spaces c/d/i  No skin lesions  Musculoskeletal: LE muscle strength 5/5.   Gait normal  Ankle ROM full without pain or crepitus  STJ ROM full without pain or crepitus  Flexible pes planus noted bilateral  Mild hallux abductovalgus      Nail Removal    Date/Time: 4/26/2021 12:40 PM  Performed by: Lonnie Arnett DPM  Authorized by: Lonnie Arnett DPM   Location: right foot  Location details: right big toe  Anesthesia: digital block    Anesthesia:  Local Anesthetic: lidocaine 2% without epinephrine  Anesthetic total: 3 mL  Preparation: skin prepped with Betadine  Amount removed: partial  Side: medial  Nail matrix removed: partial  Dressing: 4x4 and antibiotic ointment  Patient tolerance: patient tolerated the procedure well with no immediate complications  Comments: Matrixectomy with 10% NaOH. Neutralized with acetic acid.                  ASSESSMENT AND PLAN    Diagnoses and all orders for this visit:    1. Pain in left foot (Primary)  -     XR Foot 3+ View Left    2. Ingrown toenail    3. Pain of toe of left foot    4. Pes planus of both feet      -Comprehensive foot and ankle exam performed  -Diagnosis, prevention, and treatment of ingrown toe nails discussed with patient, including risks and potential benefits of nail avulsion both temporary and permanent versus simple debridement.  -Pt elected for partial permanent avulsion of right hallux  -Aftercare instructions for soapy akins soaks BID  -Dispensed information regarding more supportive shoe gear, power step over-the-counter insoles.  -Recheck 2 weeks            This document has been electronically signed by Lonnie Arnett DPM on April 26, 2021 12:39 CDT       Much of this encounter note is an electronic transcription/translation of spoken language to printed text.   Lonnie Arnett DPM  4/26/2021  12:39 CDT

## 2021-05-10 ENCOUNTER — OFFICE VISIT (OUTPATIENT)
Dept: PODIATRY | Facility: CLINIC | Age: 61
End: 2021-05-10

## 2021-05-10 VITALS — WEIGHT: 287 LBS | HEIGHT: 62 IN | OXYGEN SATURATION: 98 % | BODY MASS INDEX: 52.81 KG/M2 | HEART RATE: 93 BPM

## 2021-05-10 DIAGNOSIS — S91.209D NAIL AVULSION, TOE, SUBSEQUENT ENCOUNTER: Primary | ICD-10-CM

## 2021-05-10 DIAGNOSIS — L60.0 INGROWN TOENAIL: ICD-10-CM

## 2021-05-10 DIAGNOSIS — M20.42 HAMMER TOE OF LEFT FOOT: ICD-10-CM

## 2021-05-10 PROCEDURE — 99213 OFFICE O/P EST LOW 20 MIN: CPT | Performed by: PODIATRIST

## 2021-05-10 NOTE — PROGRESS NOTES
Tiffani Serra  1960  61 y.o. female     Patient presents to clinic today for a follow up on the left hallux.    05/10/2021    Chief Complaint   Patient presents with   • Left Foot - Follow-up           History of Present Illness    Tiffani Serra is a 61 y.o. female who presents for follow-up of left hallux partial permanent nail avulsion.  She is doing well overall with mild residual tenderness.  Does note continued worsening pain to her left fifth digit.    Past Medical History:   Diagnosis Date   • Abdominal pain    • Abscess of labia    • Acute posthemorrhagic anemia 01/08/2015   • Anemia     history of, mild   • Anemia due to blood loss 11/20/2014   • Cancer (CMS/[facility])     cosmo/right leg mole   • Contact dermatitis 01/30/2013    on labia and surrounding regions   • Cystitis     recurrent   • Diarrhea 04/11/2016   • Foot pain     porokeratoma causin metatarsalgia, right foot   • Generalized abdominal pain 02/23/2015   • Hypercholesterolemia    • Hypertensive disorder    • Infection of skin and subcutaneous tissue 01/30/2013   • Iron deficiency anemia 04/11/2016    improving   • Irritable bowel syndrome    • Knee pain, right    • Left sided ulcerative colitis (CMS/HCC) 10/08/2015   • Malaise and fatigue 11/02/2011   • Obesity    • Pseudomembranous enterocolitis 11/02/2012   • Rectal hemorrhage 07/01/2015   • Scapulalgia 10/24/2014   • Sialoadenitis 07/26/2013   • Ulcerative colitis (CMS/HCC) 04/11/2016    chronic, for 29 years.  flare   • Urinary tract infectious disease 07/14/2012   • Vitamin D deficiency 05/14/2012         Past Surgical History:   Procedure Laterality Date   • BLADDER SURGERY  2001    bladder tacking x 3   • COLONOSCOPY  07/25/2011    Colitis found in rectum & sigmoid colon. Biopsy taken   • COLONOSCOPY  06/15/2016    Erythematous mucosa in the rectum.Biopsied.One polyp in the transverse colon. Biopsied   • COLONOSCOPY  08/15/2008    Colitis of the rectum, the sigmoid and the  transverse colon. Multiple biopsies were obtained from the rectum, the sigmoid and the transverse colon. Multiple biopsies were obtained from the cecum, the transverse colon, sigmoid & rectum   • COLONOSCOPY N/A 3/4/2019    Procedure: COLONOSCOPY;  Surgeon: Henok Dixon MD;  Location: [facility] ENDOSCOPY;  Service: Gastroenterology   • EXCISION LESION  10/17/1969    removal of sebaceous cyst of neck   • JOINT REPLACEMENT Left    • KNEE ARTHROSCOPY  02/21/2005    Tears of the meidal and lateral meniscus and osteoarthritis of the knee. Arthroscopic medial and lateral meniscectomies of the right knee with chondroplasty of the medial, lateral femoral condyles and patella   • LYMPH NODE BIOPSY  05/12/2009    [place] lymph node biopsy, superficial and deep, within the right groin involving superficial femoral nodes and also the external iliac nodes. Melanoma of the right leg   • [place] TOTAL KNEE ARTHROPLASTY Right 10/16/2017    Procedure: TOTAL KNEE ARTHROPLASTY;  Surgeon: Yury Marion MD;  Location: [facility] OR;  Service: Orthopedics   • SIGMOIDOSCOPY N/A 5/22/2020    Procedure: FLEXIBLE SIGMOIDOSCOPY WITH BIOPSY--enemas on call to endoscopy--URGENT!!!;  Surgeon: Joseph Doss MD;  Location: [facility] ENDOSCOPY;  Service: Gastroenterology;  Laterality: N/A;   • TOTAL ABDOMINAL HYSTERECTOMY WITH SALPINGO OOPHORECTOMY  2001   • TOTAL KNEE ARTHROPLASTY  08/08/2007    severe osteoarthritis of the left knee.     • WRIST GANGLION EXCISION  10/17/1969    left wrist         Family History   Problem Relation Age of Onset   • Coronary artery disease Other    • Hypertension Other    • Cancer Father    • Heart disease Father    • Cancer Brother    • Ulcerative colitis Maternal Grandmother          Social History     Socioeconomic History   • Marital status:      Spouse name: Not on file   • Number of children: Not on file   • Years of education: Not on file   • Highest education level: Not on file   Tobacco Use   • Smoking  "status: Never Smoker   • Smokeless tobacco: Never Used   Vaping Use   • Vaping Use: Never used   Substance and Sexual Activity   • Alcohol use: No   • Drug use: No   • Sexual activity: Defer         Current Outpatient Medications   Medication Sig Dispense Refill   • acetaminophen (TYLENOL) 500 MG tablet Take 1,000 mg by mouth Every 6 (Six) Hours As Needed for Mild Pain .     • balsalazide (Colazal) 750 MG capsule Take 3 capsules by mouth 2 (two) times a day. (Patient taking differently: Take 750 mg by mouth Daily.) 180 capsule 2   • Ca Phosphate-Cholecalciferol (CALCIUM/VITAMIN D3 GUMMIES PO) Take  by mouth Daily.     • ferrous sulfate 325 (65 FE) MG tablet Take 325 mg by mouth Daily With Breakfast.     • mesalamine (Apriso) 0.375 g 24 hr capsule Take 4 capsules by mouth Daily. 120 capsule 5     No current facility-administered medications for this visit.         OBJECTIVE    Pulse 93   Ht 157.5 cm (62\")   Wt 130 kg (287 lb)   LMP  (LMP Unknown)   SpO2 98%   BMI 52.49 kg/m²       Review of Systems   Constitutional: Negative.    HENT: Negative.    Eyes: Negative.    Respiratory: Negative.    Cardiovascular: Negative.    Gastrointestinal: Negative.    Endocrine: Negative.    Genitourinary: Negative.    Musculoskeletal:        Foot pain   Skin: Negative.    Allergic/Immunologic: Negative.    Neurological: Negative.    Hematological: Negative.    Psychiatric/Behavioral: Negative.          Physical Exam   Constitutional: she appears well-developed and well-nourished.   CV: No chest pain. Normal RR  Resp: Non labored respirations  Psychiatric: she has a normal mood and affect. her behavior is normal.      Lower Extremity Exam:  Vascular: DP/PT pulses palpable 2+.   No hallux edema  Toes warm  Neuro: Protective sensation intact, b/l.  DTRs intact  Integument: No open wounds.  Medial nail border of left hallux with mild partial-thickness breakdown.  Minimal surrounding erythema.  No active drainage.  No sign of " infection.  Web spaces c/d/i  No skin lesions  Musculoskeletal: LE muscle strength 5/5.   Gait normal  Ankle ROM full without pain or crepitus  STJ ROM full without pain or crepitus  Flexible pes planus noted bilateral  Mild hallux abductovalgus  Flexible adductovarus hammertoe contracture of left fifth digit.  Positive tenderness palpation dorsally.      Procedures          ASSESSMENT AND PLAN    Diagnoses and all orders for this visit:    1. Nail avulsion, toe, subsequent encounter (Primary)    2. Ingrown toenail    3. Hammer toe of left foot      -Progressing well following nail procedure.  May wean soaks and bandaging over the next week.  -Discussed potential flexor tenotomy to left fifth digit.  Patient is interested in this option.  Will obtain prior authorization reappoint later in the month.          This document has been electronically signed by Lonnie Arnett DPM on May 10, 2021 15:36 CDT       Much of this encounter note is an electronic transcription/translation of spoken language to printed text.   Lonnie Arnett DPM  5/10/2021  15:36 CDT

## 2021-05-20 ENCOUNTER — LAB (OUTPATIENT)
Dept: LAB | Facility: HOSPITAL | Age: 61
End: 2021-05-20

## 2021-05-20 PROCEDURE — 83993 ASSAY FOR CALPROTECTIN FECAL: CPT | Performed by: PHYSICIAN ASSISTANT

## 2021-05-24 ENCOUNTER — OFFICE VISIT (OUTPATIENT)
Dept: GASTROENTEROLOGY | Facility: CLINIC | Age: 61
End: 2021-05-24

## 2021-05-24 VITALS
HEIGHT: 62 IN | HEART RATE: 90 BPM | DIASTOLIC BLOOD PRESSURE: 73 MMHG | BODY MASS INDEX: 52.81 KG/M2 | WEIGHT: 287 LBS | SYSTOLIC BLOOD PRESSURE: 118 MMHG

## 2021-05-24 DIAGNOSIS — R10.9 CHRONIC ABDOMINAL PAIN: ICD-10-CM

## 2021-05-24 DIAGNOSIS — K21.00 GASTROESOPHAGEAL REFLUX DISEASE WITH ESOPHAGITIS WITHOUT HEMORRHAGE: ICD-10-CM

## 2021-05-24 DIAGNOSIS — G89.29 CHRONIC ABDOMINAL PAIN: ICD-10-CM

## 2021-05-24 DIAGNOSIS — K51.80 OTHER ULCERATIVE COLITIS WITHOUT COMPLICATION (HCC): Primary | ICD-10-CM

## 2021-05-24 PROCEDURE — 99213 OFFICE O/P EST LOW 20 MIN: CPT | Performed by: PHYSICIAN ASSISTANT

## 2021-05-25 ENCOUNTER — OFFICE VISIT (OUTPATIENT)
Dept: PODIATRY | Facility: CLINIC | Age: 61
End: 2021-05-25

## 2021-05-25 VITALS
HEART RATE: 79 BPM | HEIGHT: 62 IN | BODY MASS INDEX: 52.81 KG/M2 | WEIGHT: 287 LBS | OXYGEN SATURATION: 98 % | SYSTOLIC BLOOD PRESSURE: 156 MMHG | DIASTOLIC BLOOD PRESSURE: 91 MMHG

## 2021-05-25 DIAGNOSIS — M79.672 PAIN IN LEFT FOOT: ICD-10-CM

## 2021-05-25 DIAGNOSIS — M20.42 HAMMER TOE OF LEFT FOOT: Primary | ICD-10-CM

## 2021-05-25 LAB — CALPROTECTIN STL-MCNT: 52 UG/G (ref 0–120)

## 2021-05-25 PROCEDURE — 28010 INCISION OF TOE TENDON: CPT | Performed by: PODIATRIST

## 2021-05-25 RX ORDER — HYDROCORTISONE 25 MG/G
CREAM TOPICAL
COMMUNITY
Start: 2021-05-24 | End: 2022-01-31 | Stop reason: SDUPTHER

## 2021-05-25 NOTE — PROGRESS NOTES
Tiffani Serra  1960  61 y.o. female     Patient presents to clinic today for a left 5th digit flexor tenotomy     05/25/2021      Chief Complaint   Patient presents with   • Left Foot - Flexor tenotomy           History of Present Illness    Tiffani Serra is a 61 y.o. female who presents for left fifth toe flexor tenotomy.    Past Medical History:   Diagnosis Date   • Abdominal pain    • Abscess of labia    • Acute posthemorrhagic anemia 01/08/2015   • Anemia     history of, mild   • Anemia due to blood loss 11/20/2014   • Cancer (CMS/[facility])     cosmo/right leg mole   • Contact dermatitis 01/30/2013    on labia and surrounding regions   • Cystitis     recurrent   • Diarrhea 04/11/2016   • Foot pain     porokeratoma causin metatarsalgia, right foot   • Generalized abdominal pain 02/23/2015   • Hypercholesterolemia    • Hypertensive disorder    • Infection of skin and subcutaneous tissue 01/30/2013   • Iron deficiency anemia 04/11/2016    improving   • Irritable bowel syndrome    • Knee pain, right    • Left sided ulcerative colitis (CMS/HCC) 10/08/2015   • Malaise and fatigue 11/02/2011   • Obesity    • Pseudomembranous enterocolitis 11/02/2012   • Rectal hemorrhage 07/01/2015   • Scapulalgia 10/24/2014   • Sialoadenitis 07/26/2013   • Ulcerative colitis (CMS/HCC) 04/11/2016    chronic, for 29 years.  flare   • Urinary tract infectious disease 07/14/2012   • Vitamin D deficiency 05/14/2012         Past Surgical History:   Procedure Laterality Date   • BLADDER SURGERY  2001    bladder tacking x 3   • COLONOSCOPY  07/25/2011    Colitis found in rectum & sigmoid colon. Biopsy taken   • COLONOSCOPY  06/15/2016    Erythematous mucosa in the rectum.Biopsied.One polyp in the transverse colon. Biopsied   • COLONOSCOPY  08/15/2008    Colitis of the rectum, the sigmoid and the transverse colon. Multiple biopsies were obtained from the rectum, the sigmoid and the transverse colon. Multiple biopsies were obtained  from the cecum, the transverse colon, sigmoid & rectum   • COLONOSCOPY N/A 3/4/2019    Procedure: COLONOSCOPY;  Surgeon: Henok Dixon MD;  Location: [facility] ENDOSCOPY;  Service: Gastroenterology   • EXCISION LESION  10/17/1969    removal of sebaceous cyst of neck   • JOINT REPLACEMENT Left    • KNEE ARTHROSCOPY  02/21/2005    Tears of the meidal and lateral meniscus and osteoarthritis of the knee. Arthroscopic medial and lateral meniscectomies of the right knee with chondroplasty of the medial, lateral femoral condyles and patella   • LYMPH NODE BIOPSY  05/12/2009    [place] lymph node biopsy, superficial and deep, within the right groin involving superficial femoral nodes and also the external iliac nodes. Melanoma of the right leg   • [place] TOTAL KNEE ARTHROPLASTY Right 10/16/2017    Procedure: TOTAL KNEE ARTHROPLASTY;  Surgeon: Yury Marion MD;  Location: [facility] OR;  Service: Orthopedics   • SIGMOIDOSCOPY N/A 5/22/2020    Procedure: FLEXIBLE SIGMOIDOSCOPY WITH BIOPSY--enemas on call to endoscopy--URGENT!!!;  Surgeon: Joseph Doss MD;  Location: [facility] ENDOSCOPY;  Service: Gastroenterology;  Laterality: N/A;   • TOTAL ABDOMINAL HYSTERECTOMY WITH SALPINGO OOPHORECTOMY  2001   • TOTAL KNEE ARTHROPLASTY  08/08/2007    severe osteoarthritis of the left knee.     • WRIST GANGLION EXCISION  10/17/1969    left wrist         Family History   Problem Relation Age of Onset   • Coronary artery disease Other    • Hypertension Other    • Cancer Father    • Heart disease Father    • Cancer Brother    • Ulcerative colitis Maternal Grandmother          Social History     Socioeconomic History   • Marital status:      Spouse name: Not on file   • Number of children: Not on file   • Years of education: Not on file   • Highest education level: Not on file   Tobacco Use   • Smoking status: Never Smoker   • Smokeless tobacco: Never Used   Vaping Use   • Vaping Use: Never used   Substance and Sexual Activity   • Alcohol  "use: No   • Drug use: No   • Sexual activity: Defer         Current Outpatient Medications   Medication Sig Dispense Refill   • acetaminophen (TYLENOL) 500 MG tablet Take 1,000 mg by mouth Every 6 (Six) Hours As Needed for Mild Pain .     • balsalazide (Colazal) 750 MG capsule Take 3 capsules by mouth 2 (two) times a day. (Patient taking differently: Take 750 mg by mouth Daily.) 180 capsule 2   • Ca Phosphate-Cholecalciferol (CALCIUM/VITAMIN D3 GUMMIES PO) Take  by mouth Daily.     • ferrous sulfate 325 (65 FE) MG tablet Take 325 mg by mouth Daily With Breakfast.     • nystatin-triamcinolone (MYCOLOG II) 530628-0.1 UNIT/GM-% cream APPLY TOPICALLY 2 TIMES DAILY SPARINGLY TO AFFECTED AREA.     • Procto-Med HC 2.5 % rectal cream PLACE RECTALLY 3 TIMES DAILY FOR 30 DAYS       No current facility-administered medications for this visit.         OBJECTIVE    /91   Pulse 79   Ht 157.5 cm (62\")   Wt 130 kg (287 lb)   LMP  (LMP Unknown)   SpO2 98%   BMI 52.49 kg/m²       Review of Systems   Constitutional: Negative.    HENT: Negative.    Eyes: Negative.    Respiratory: Negative.    Cardiovascular: Negative.    Gastrointestinal: Negative.    Endocrine: Negative.    Genitourinary: Negative.    Musculoskeletal:        Foot pain   Skin: Negative.    Allergic/Immunologic: Negative.    Neurological: Negative.    Hematological: Negative.    Psychiatric/Behavioral: Negative.          Physical Exam   Constitutional: she appears well-developed and well-nourished.   CV: No chest pain. Normal RR  Resp: Non labored respirations  Psychiatric: she has a normal mood and affect. her behavior is normal.      Lower Extremity Exam:  Vascular: DP/PT pulses palpable 2+.   No hallux edema  Toes warm  Neuro: Protective sensation intact, b/l.  DTRs intact  Integument: No open wounds.  Medial nail border of left hallux with mild partial-thickness breakdown.  Minimal surrounding erythema.  No active drainage.  No sign of infection.  Web " spaces c/d/i  No skin lesions  Musculoskeletal: LE muscle strength 5/5.   Gait normal  Ankle ROM full without pain or crepitus  STJ ROM full without pain or crepitus  Flexible pes planus noted bilateral  Mild hallux abductovalgus  Flexible adductovarus hammertoe contracture of left fifth digit.  Positive tenderness palpation dorsally.      Procedures    Left 5th flexor tenotomy:  Risks and benefits discussed. Written consent obtained.  Skin prepped with betadine  Digital block performed with 3 cc 2% lidocaine plain  Percutaneous flexor tenotomy and PIPJ capsulotomy performed.  Incision re aproximated with 4-0 nylon  Steristrip splint applied  Pt tolerated well.        ASSESSMENT AND PLAN    Diagnoses and all orders for this visit:    1. Hammer toe of left foot (Primary)    2. Pain in left foot      -Flexor tenotomy as above.  Patient tolerated procedure well.  -Advised local incision care  -Recheck 2 weeks for suture removal          This document has been electronically signed by Lonnie Arnett DPM on May 25, 2021 12:29 CDT       Much of this encounter note is an electronic transcription/translation of spoken language to printed text.   Lonnie Arnett DPM  5/25/2021  12:29 CDT

## 2021-06-10 ENCOUNTER — OFFICE VISIT (OUTPATIENT)
Dept: PODIATRY | Facility: CLINIC | Age: 61
End: 2021-06-10

## 2021-06-10 VITALS
OXYGEN SATURATION: 96 % | HEIGHT: 62 IN | HEART RATE: 86 BPM | WEIGHT: 287 LBS | DIASTOLIC BLOOD PRESSURE: 90 MMHG | BODY MASS INDEX: 52.81 KG/M2 | SYSTOLIC BLOOD PRESSURE: 160 MMHG

## 2021-06-10 DIAGNOSIS — M20.42 HAMMER TOE OF LEFT FOOT: Primary | ICD-10-CM

## 2021-06-10 PROCEDURE — 99024 POSTOP FOLLOW-UP VISIT: CPT | Performed by: PODIATRIST

## 2021-06-10 RX ORDER — CLINDAMYCIN HYDROCHLORIDE 150 MG/1
CAPSULE ORAL
COMMUNITY
Start: 2021-05-27 | End: 2021-08-02

## 2021-06-10 NOTE — PROGRESS NOTES
Tiffani Serra  1960  61 y.o. female     Patient presents to clinic today for a recheck on fifth toe flexor tenotomy.    06/10/2021        Chief Complaint   Patient presents with   • Left Foot - Follow-up           History of Present Illness    Tiffani Serra is a 61 y.o. female who presents for follow-up of left fifth toe flexor tenotomy.    Past Medical History:   Diagnosis Date   • Abdominal pain    • Abscess of labia    • Acute posthemorrhagic anemia 01/08/2015   • Anemia     history of, mild   • Anemia due to blood loss 11/20/2014   • Cancer (CMS/[facility])     cosmo/right leg mole   • Contact dermatitis 01/30/2013    on labia and surrounding regions   • Cystitis     recurrent   • Diarrhea 04/11/2016   • Foot pain     porokeratoma causin metatarsalgia, right foot   • Generalized abdominal pain 02/23/2015   • Hypercholesterolemia    • Hypertensive disorder    • Infection of skin and subcutaneous tissue 01/30/2013   • Iron deficiency anemia 04/11/2016    improving   • Irritable bowel syndrome    • Knee pain, right    • Left sided ulcerative colitis (CMS/HCC) 10/08/2015   • Malaise and fatigue 11/02/2011   • Obesity    • Pseudomembranous enterocolitis 11/02/2012   • Rectal hemorrhage 07/01/2015   • Scapulalgia 10/24/2014   • Sialoadenitis 07/26/2013   • Ulcerative colitis (CMS/HCC) 04/11/2016    chronic, for 29 years.  flare   • Urinary tract infectious disease 07/14/2012   • Vitamin D deficiency 05/14/2012         Past Surgical History:   Procedure Laterality Date   • BLADDER SURGERY  2001    bladder tacking x 3   • COLONOSCOPY  07/25/2011    Colitis found in rectum & sigmoid colon. Biopsy taken   • COLONOSCOPY  06/15/2016    Erythematous mucosa in the rectum.Biopsied.One polyp in the transverse colon. Biopsied   • COLONOSCOPY  08/15/2008    Colitis of the rectum, the sigmoid and the transverse colon. Multiple biopsies were obtained from the rectum, the sigmoid and the transverse colon. Multiple biopsies  were obtained from the cecum, the transverse colon, sigmoid & rectum   • COLONOSCOPY N/A 3/4/2019    Procedure: COLONOSCOPY;  Surgeon: Henok Dixon MD;  Location: [facility] ENDOSCOPY;  Service: Gastroenterology   • EXCISION LESION  10/17/1969    removal of sebaceous cyst of neck   • JOINT REPLACEMENT Left    • KNEE ARTHROSCOPY  02/21/2005    Tears of the meidal and lateral meniscus and osteoarthritis of the knee. Arthroscopic medial and lateral meniscectomies of the right knee with chondroplasty of the medial, lateral femoral condyles and patella   • LYMPH NODE BIOPSY  05/12/2009    [place] lymph node biopsy, superficial and deep, within the right groin involving superficial femoral nodes and also the external iliac nodes. Melanoma of the right leg   • [place] TOTAL KNEE ARTHROPLASTY Right 10/16/2017    Procedure: TOTAL KNEE ARTHROPLASTY;  Surgeon: Yury Marion MD;  Location: [facility] OR;  Service: Orthopedics   • SIGMOIDOSCOPY N/A 5/22/2020    Procedure: FLEXIBLE SIGMOIDOSCOPY WITH BIOPSY--enemas on call to endoscopy--URGENT!!!;  Surgeon: Joseph Doss MD;  Location: [facility] ENDOSCOPY;  Service: Gastroenterology;  Laterality: N/A;   • TOTAL ABDOMINAL HYSTERECTOMY WITH SALPINGO OOPHORECTOMY  2001   • TOTAL KNEE ARTHROPLASTY  08/08/2007    severe osteoarthritis of the left knee.     • WRIST GANGLION EXCISION  10/17/1969    left wrist         Family History   Problem Relation Age of Onset   • Coronary artery disease Other    • Hypertension Other    • Cancer Father    • Heart disease Father    • Cancer Brother    • Ulcerative colitis Maternal Grandmother          Social History     Socioeconomic History   • Marital status:      Spouse name: Not on file   • Number of children: Not on file   • Years of education: Not on file   • Highest education level: Not on file   Tobacco Use   • Smoking status: Never Smoker   • Smokeless tobacco: Never Used   Vaping Use   • Vaping Use: Never used   Substance and Sexual  "Activity   • Alcohol use: No   • Drug use: No   • Sexual activity: Defer         Current Outpatient Medications   Medication Sig Dispense Refill   • acetaminophen (TYLENOL) 500 MG tablet Take 1,000 mg by mouth Every 6 (Six) Hours As Needed for Mild Pain .     • balsalazide (Colazal) 750 MG capsule Take 3 capsules by mouth 2 (two) times a day. (Patient taking differently: Take 750 mg by mouth Daily.) 180 capsule 2   • Ca Phosphate-Cholecalciferol (CALCIUM/VITAMIN D3 GUMMIES PO) Take  by mouth Daily.     • clindamycin (CLEOCIN) 150 MG capsule TAKE ONE CAPSULE BY MOUTH THREE TIMES A DAY UNTIL GONE     • ferrous sulfate 325 (65 FE) MG tablet Take 325 mg by mouth Daily With Breakfast.     • nystatin-triamcinolone (MYCOLOG II) 785038-3.1 UNIT/GM-% cream APPLY TOPICALLY 2 TIMES DAILY SPARINGLY TO AFFECTED AREA.     • Procto-Med HC 2.5 % rectal cream PLACE RECTALLY 3 TIMES DAILY FOR 30 DAYS       No current facility-administered medications for this visit.         OBJECTIVE    /90   Pulse 86   Ht 157.5 cm (62\")   Wt 130 kg (287 lb)   LMP  (LMP Unknown)   SpO2 96%   BMI 52.49 kg/m²       Review of Systems   Constitutional: Negative.    HENT: Negative.    Eyes: Negative.    Respiratory: Negative.    Cardiovascular: Negative.    Gastrointestinal: Negative.    Endocrine: Negative.    Genitourinary: Negative.    Musculoskeletal:        Foot pain   Skin: Negative.    Allergic/Immunologic: Negative.    Neurological: Negative.    Hematological: Negative.    Psychiatric/Behavioral: Negative.          Physical Exam   Constitutional: she appears well-developed and well-nourished.   CV: No chest pain. Normal RR  Resp: Non labored respirations  Psychiatric: she has a normal mood and affect. her behavior is normal.      Lower Extremity Exam:  Vascular: DP/PT pulses palpable 2+.   No hallux edema  Toes warm  Neuro: Protective sensation intact, b/l.  DTRs intact  Integument: No open wounds.  Plantar left fifth toe incision " well-healed.  Single suture in place.  No signs of infection.  Web spaces c/d/i  No skin lesions  Musculoskeletal: LE muscle strength 5/5.   Gait normal  Ankle ROM full without pain or crepitus  STJ ROM full without pain or crepitus  Flexible pes planus noted bilateral  Mild hallux abductovalgus  Improved flexion contracture of left fifth digit      Procedures      ASSESSMENT AND PLAN    Diagnoses and all orders for this visit:    1. Hammer toe of left foot (Primary)      -Suture removed  -Tenotomy site well-healed  -Activity as tolerated.  Recheck as needed          This document has been electronically signed by Lonnie Arnett DPM on Emily 10, 2021 13:07 CDT       Much of this encounter note is an electronic transcription/translation of spoken language to printed text.   Lonnie Arnett DPM  6/10/2021  13:07 CDT

## 2021-08-02 PROCEDURE — 87635 SARS-COV-2 COVID-19 AMP PRB: CPT | Performed by: NURSE PRACTITIONER

## 2021-09-23 ENCOUNTER — LAB (OUTPATIENT)
Dept: LAB | Facility: HOSPITAL | Age: 61
End: 2021-09-23

## 2021-09-23 DIAGNOSIS — K51.80 OTHER ULCERATIVE COLITIS WITHOUT COMPLICATION (HCC): ICD-10-CM

## 2021-09-23 DIAGNOSIS — R10.9 CHRONIC ABDOMINAL PAIN: ICD-10-CM

## 2021-09-23 DIAGNOSIS — G89.29 CHRONIC ABDOMINAL PAIN: ICD-10-CM

## 2021-09-23 DIAGNOSIS — K21.00 GASTROESOPHAGEAL REFLUX DISEASE WITH ESOPHAGITIS WITHOUT HEMORRHAGE: ICD-10-CM

## 2021-09-23 PROCEDURE — 36415 COLL VENOUS BLD VENIPUNCTURE: CPT

## 2021-09-23 PROCEDURE — 85025 COMPLETE CBC W/AUTO DIFF WBC: CPT

## 2021-09-23 PROCEDURE — 80053 COMPREHEN METABOLIC PANEL: CPT

## 2021-09-24 LAB
ALBUMIN SERPL-MCNC: 4.2 G/DL (ref 3.5–5.2)
ALBUMIN/GLOB SERPL: 1.7 G/DL
ALP SERPL-CCNC: 102 U/L (ref 39–117)
ALT SERPL W P-5'-P-CCNC: 17 U/L (ref 1–33)
ANION GAP SERPL CALCULATED.3IONS-SCNC: 10.9 MMOL/L (ref 5–15)
AST SERPL-CCNC: 17 U/L (ref 1–32)
BASOPHILS # BLD AUTO: 0.04 10*3/MM3 (ref 0–0.2)
BASOPHILS NFR BLD AUTO: 0.6 % (ref 0–1.5)
BILIRUB SERPL-MCNC: 0.4 MG/DL (ref 0–1.2)
BUN SERPL-MCNC: 12 MG/DL (ref 8–23)
BUN/CREAT SERPL: 19.7 (ref 7–25)
CALCIUM SPEC-SCNC: 9.4 MG/DL (ref 8.6–10.5)
CHLORIDE SERPL-SCNC: 103 MMOL/L (ref 98–107)
CO2 SERPL-SCNC: 26.1 MMOL/L (ref 22–29)
CREAT SERPL-MCNC: 0.61 MG/DL (ref 0.57–1)
DEPRECATED RDW RBC AUTO: 40.7 FL (ref 37–54)
EOSINOPHIL # BLD AUTO: 0.1 10*3/MM3 (ref 0–0.4)
EOSINOPHIL NFR BLD AUTO: 1.6 % (ref 0.3–6.2)
ERYTHROCYTE [DISTWIDTH] IN BLOOD BY AUTOMATED COUNT: 12 % (ref 12.3–15.4)
GFR SERPL CREATININE-BSD FRML MDRD: 99 ML/MIN/1.73
GLOBULIN UR ELPH-MCNC: 2.5 GM/DL
GLUCOSE SERPL-MCNC: 90 MG/DL (ref 65–99)
HCT VFR BLD AUTO: 38.4 % (ref 34–46.6)
HGB BLD-MCNC: 12.8 G/DL (ref 12–15.9)
IMM GRANULOCYTES # BLD AUTO: 0.04 10*3/MM3 (ref 0–0.05)
IMM GRANULOCYTES NFR BLD AUTO: 0.6 % (ref 0–0.5)
LYMPHOCYTES # BLD AUTO: 1.67 10*3/MM3 (ref 0.7–3.1)
LYMPHOCYTES NFR BLD AUTO: 26.6 % (ref 19.6–45.3)
MCH RBC QN AUTO: 31.1 PG (ref 26.6–33)
MCHC RBC AUTO-ENTMCNC: 33.3 G/DL (ref 31.5–35.7)
MCV RBC AUTO: 93.4 FL (ref 79–97)
MONOCYTES # BLD AUTO: 0.38 10*3/MM3 (ref 0.1–0.9)
MONOCYTES NFR BLD AUTO: 6.1 % (ref 5–12)
NEUTROPHILS NFR BLD AUTO: 4.04 10*3/MM3 (ref 1.7–7)
NEUTROPHILS NFR BLD AUTO: 64.5 % (ref 42.7–76)
NRBC BLD AUTO-RTO: 0 /100 WBC (ref 0–0.2)
PLATELET # BLD AUTO: 242 10*3/MM3 (ref 140–450)
PMV BLD AUTO: 9.3 FL (ref 6–12)
POTASSIUM SERPL-SCNC: 4.3 MMOL/L (ref 3.5–5.2)
PROT SERPL-MCNC: 6.7 G/DL (ref 6–8.5)
RBC # BLD AUTO: 4.11 10*6/MM3 (ref 3.77–5.28)
SODIUM SERPL-SCNC: 140 MMOL/L (ref 136–145)
WBC # BLD AUTO: 6.27 10*3/MM3 (ref 3.4–10.8)

## 2021-09-27 ENCOUNTER — OFFICE VISIT (OUTPATIENT)
Dept: GASTROENTEROLOGY | Facility: CLINIC | Age: 61
End: 2021-09-27

## 2021-09-27 VITALS
WEIGHT: 281.2 LBS | DIASTOLIC BLOOD PRESSURE: 74 MMHG | BODY MASS INDEX: 48.01 KG/M2 | HEIGHT: 64 IN | HEART RATE: 71 BPM | SYSTOLIC BLOOD PRESSURE: 151 MMHG

## 2021-09-27 DIAGNOSIS — K51.80 OTHER ULCERATIVE COLITIS WITHOUT COMPLICATION (HCC): Primary | ICD-10-CM

## 2021-09-27 DIAGNOSIS — R10.9 CHRONIC ABDOMINAL PAIN: ICD-10-CM

## 2021-09-27 DIAGNOSIS — G89.29 CHRONIC ABDOMINAL PAIN: ICD-10-CM

## 2021-09-27 DIAGNOSIS — K21.00 GASTROESOPHAGEAL REFLUX DISEASE WITH ESOPHAGITIS WITHOUT HEMORRHAGE: ICD-10-CM

## 2021-09-27 PROCEDURE — 99213 OFFICE O/P EST LOW 20 MIN: CPT | Performed by: PHYSICIAN ASSISTANT

## 2022-01-31 ENCOUNTER — OFFICE VISIT (OUTPATIENT)
Dept: GASTROENTEROLOGY | Facility: CLINIC | Age: 62
End: 2022-01-31

## 2022-01-31 VITALS
BODY MASS INDEX: 48.42 KG/M2 | HEIGHT: 64 IN | WEIGHT: 283.6 LBS | SYSTOLIC BLOOD PRESSURE: 128 MMHG | DIASTOLIC BLOOD PRESSURE: 69 MMHG | HEART RATE: 92 BPM

## 2022-01-31 DIAGNOSIS — R10.9 CHRONIC ABDOMINAL PAIN: ICD-10-CM

## 2022-01-31 DIAGNOSIS — G89.29 CHRONIC ABDOMINAL PAIN: ICD-10-CM

## 2022-01-31 DIAGNOSIS — K51.80 OTHER ULCERATIVE COLITIS WITHOUT COMPLICATION: Primary | ICD-10-CM

## 2022-01-31 DIAGNOSIS — K21.00 GASTROESOPHAGEAL REFLUX DISEASE WITH ESOPHAGITIS WITHOUT HEMORRHAGE: ICD-10-CM

## 2022-01-31 PROCEDURE — 99213 OFFICE O/P EST LOW 20 MIN: CPT | Performed by: PHYSICIAN ASSISTANT

## 2022-01-31 RX ORDER — SODIUM, POTASSIUM,MAG SULFATES 17.5-3.13G
SOLUTION, RECONSTITUTED, ORAL ORAL
Qty: 354 ML | Refills: 0 | Status: SHIPPED | OUTPATIENT
Start: 2022-01-31 | End: 2022-02-28 | Stop reason: HOSPADM

## 2022-01-31 RX ORDER — HYDROCORTISONE 25 MG/G
CREAM TOPICAL 2 TIMES DAILY PRN
Qty: 28 G | Refills: 3 | Status: SHIPPED | OUTPATIENT
Start: 2022-01-31

## 2022-01-31 RX ORDER — DEXTROSE AND SODIUM CHLORIDE 5; .45 G/100ML; G/100ML
30 INJECTION, SOLUTION INTRAVENOUS CONTINUOUS PRN
Status: CANCELLED | OUTPATIENT
Start: 2022-02-28

## 2022-01-31 NOTE — PATIENT INSTRUCTIONS
MyPlate from USDA    MyPlate is an outline of a general healthy diet based on the 2010 Dietary Guidelines for Americans, from the U.S. Department of Agriculture (USDA). It sets guidelines for how much food you should eat from each food group based on your age, sex, and level of physical activity.  What are tips for following MyPlate?  To follow MyPlate recommendations:  · Eat a wide variety of fruits and vegetables, grains, and protein foods.  · Serve smaller portions and eat less food throughout the day.  · Limit portion sizes to avoid overeating.  · Enjoy your food.  · Get at least 150 minutes of exercise every week. This is about 30 minutes each day, 5 or more days per week.  It can be difficult to have every meal look like MyPlate. Think about MyPlate as eating guidelines for an entire day, rather than each individual meal.  Fruits and vegetables  · Make half of your plate fruits and vegetables.  · Eat many different colors of fruits and vegetables each day.  · For a 2,000 calorie daily food plan, eat:  ? 2½ cups of vegetables every day.  ? 2 cups of fruit every day.  · 1 cup is equal to:  ? 1 cup raw or cooked vegetables.  ? 1 cup raw fruit.  ? 1 medium-sized orange, apple, or banana.  ? 1 cup 100% fruit or vegetable juice.  ? 2 cups raw leafy greens, such as lettuce, spinach, or kale.  ? ½ cup dried fruit.  Grains  · One fourth of your plate should be grains.  · Make at least half of the grains you eat each day whole grains.  · For a 2,000 calorie daily food plan, eat 6 oz of grains every day.  · 1 oz is equal to:  ? 1 slice bread.  ? 1 cup cereal.  ? ½ cup cooked rice, cereal, or pasta.  Protein  · One fourth of your plate should be protein.  · Eat a wide variety of protein foods, including meat, poultry, fish, eggs, beans, nuts, and tofu.  · For a 2,000 calorie daily food plan, eat 5½ oz of protein every day.  · 1 oz is equal to:  ? 1 oz meat, poultry, or fish.  ? ¼ cup cooked beans.  ? 1 egg.  ? ½ oz nuts  or seeds.  ? 1 Tbsp peanut butter.  Dairy  · Drink fat-free or low-fat (1%) milk.  · Eat or drink dairy as a side to meals.  · For a 2,000 calorie daily food plan, eat or drink 3 cups of dairy every day.  · 1 cup is equal to:  ? 1 cup milk, yogurt, cottage cheese, or soy milk (soy beverage).  ? 2 oz processed cheese.  ? 1½ oz natural cheese.  Fats, oils, salt, and sugars  · Only small amounts of oils are recommended.  · Avoid foods that are high in calories and low in nutritional value (empty calories), like foods high in fat or added sugars.  · Choose foods that are low in salt (sodium). Choose foods that have less than 140 milligrams (mg) of sodium per serving.  · Drink water instead of sugary drinks. Drink enough water each day to keep your urine pale yellow.  Where to find support  · Work with your health care provider or a nutrition specialist (dietitian) to develop a customized eating plan that is right for you.  · Download an uli (mobile application) to help you track your daily food intake.  Where to find more information  · Go to ChooseMyPlate.gov for more information.  Summary  · MyPlate is a general guideline for healthy eating from the USDA. It is based on the 2010 Dietary Guidelines for Americans.  · In general, fruits and vegetables should take up ½ of your plate, grains should take up ¼ of your plate, and protein should take up ¼ of your plate.  This information is not intended to replace advice given to you by your health care provider. Make sure you discuss any questions you have with your health care provider.  Document Revised: 05/21/2020 Document Reviewed: 03/19/2018  Elsevier Patient Education © 2021 [employer].  BMI for Adults  What is BMI?  Body mass index (BMI) is a number that is calculated from a person's weight and height. BMI can help estimate how much of a person's weight is composed of fat. BMI does not measure body fat directly. Rather, it is an alternative to procedures that  "directly measure body fat, which can be difficult and expensive.  BMI can help identify people who may be at higher risk for certain medical problems.  What are BMI measurements used for?  BMI is used as a screening tool to identify possible weight problems. It helps determine whether a person is obese, overweight, a healthy weight, or underweight.  BMI is useful for:  · Identifying a weight problem that may be related to a medical condition or may increase the risk for medical problems.  · Promoting changes, such as changes in diet and exercise, to help reach a healthy weight. BMI screening can be repeated to see if these changes are working.  How is BMI calculated?  BMI involves measuring your weight in relation to your height. Both height and weight are measured, and the BMI is calculated from those numbers. This can be done either in English (U[place] or metric measurements. Note that charts and online BMI calculators are available to help you find your BMI quickly and easily without having to do these calculations yourself.  To calculate your BMI in English (U.S[place] measurements:    1. Measure your weight in pounds (lb).  2. Multiply the number of pounds by 703.  ? For example, for a person who weighs 180 lb, multiply that number by 703, which equals 126,540.  3. Measure your height in inches. Then multiply that number by itself to get a measurement called \"inches squared.\"  ? For example, for a person who is 70 inches tall, the \"inches squared\" measurement is 70 inches x 70 inches, which equals 4,900 inches squared.  4. Divide the total from step 2 (number of lb x 703) by the total from step 3 (inches squared): 126,540 ÷ 4,900 = 25.8. This is your BMI.    To calculate your BMI in metric measurements:  1. Measure your weight in kilograms (kg).  2. Measure your height in meters (m). Then multiply that number by itself to get a measurement called \"meters squared.\"  ? For example, for a person who is 1.75 m tall, the " "\"meters squared\" measurement is 1.75 m x 1.75 m, which is equal to 3.1 meters squared.  3. Divide the number of kilograms (your weight) by the meters squared number. In this example: 70 ÷ 3.1 = 22.6. This is your BMI.  What do the results mean?  BMI charts are used to identify whether you are underweight, normal weight, overweight, or obese. The following guidelines will be used:  · Underweight: BMI less than 18.5.  · Normal weight: BMI between 18.5 and 24.9.  · Overweight: BMI between 25 and 29.9.  · Obese: BMI of 30 or above.  Keep these notes in mind:  · Weight includes both fat and muscle, so someone with a muscular build, such as an athlete, may have a BMI that is higher than 24.9. In cases like these, BMI is not an accurate measure of body fat.  · To determine if excess body fat is the cause of a BMI of 25 or higher, further assessments may need to be done by a health care provider.  · BMI is usually interpreted in the same way for men and women.  Where to find more information  For more information about BMI, including tools to quickly calculate your BMI, go to these websites:  · Centers for Disease Control and Prevention: www.cdc.gov  · American Heart Association: www.heart.org  · National Heart, Lung, and Blood [place]: www.nhlbi.nih.gov  Summary  · Body mass index (BMI) is a number that is calculated from a person's weight and height.  · BMI may help estimate how much of a person's weight is composed of fat. BMI can help identify those who may be at higher risk for certain medical problems.  · BMI can be measured using English measurements or metric measurements.  · BMI charts are used to identify whether you are underweight, normal weight, overweight, or obese.  This information is not intended to replace advice given to you by your health care provider. Make sure you discuss any questions you have with your health care provider.  Document Revised: 09/09/2020 Document Reviewed: 07/17/2020  [employer] " Patient Education © 2021 [employer] Inc.

## 2022-01-31 NOTE — PROGRESS NOTES
Chief Complaint   Patient presents with   • Heartburn   • Abdominal Pain   • Ulcerative Colitis       ENDO PROCEDURE ORDERED:    Subjective    Tiffani Serra is a 61 y.o. female. she is here today for follow-up.    History of Present Illness    The patient is seen on recheck of her ulcerative colitis and GERD. Last seen on 09/27/2021. She states she has been doing well. No significant abdominal pain. No heartburn, nausea, vomiting, or dysphagia. She is on Colazal for ulcerative colitis. Weight is up 2.3 pounds since the last visit. She had last proctosigmoidoscopy on 05/22/2020. It showed colitis up to the transverse colon. She had multiple polyps removed by last colonoscopy on 03/04/2019.     ASSESSMENT AND PLAN: Patient due for surveillance colonoscopy for ulcerative colitis. We will schedule that in the next few months. Her daughter recently had surgery and she would like to wait until she recovers to have that done. We will refill her medications as needed. I will plan follow up after the above. We will need laboratories, if not done prior.       The following portions of the patient's history were reviewed and updated as appropriate:   Past Medical History:   Diagnosis Date   • Abdominal pain    • Abscess of labia    • Acute posthemorrhagic anemia 01/08/2015   • Anemia     history of, mild   • Anemia due to blood loss 11/20/2014   • Cancer (HCC)     cosmo/right leg mole   • Contact dermatitis 01/30/2013    on labia and surrounding regions   • Cystitis     recurrent   • Diarrhea 04/11/2016   • Foot pain     porokeratoma causin metatarsalgia, right foot   • Generalized abdominal pain 02/23/2015   • Hypercholesterolemia    • Hypertensive disorder    • Infection of skin and subcutaneous tissue 01/30/2013   • Iron deficiency anemia 04/11/2016    improving   • Irritable bowel syndrome    • Knee pain, right    • Left sided ulcerative colitis (HCC) 10/08/2015   • Malaise and fatigue 11/02/2011   • Obesity    •  Pseudomembranous enterocolitis 11/02/2012   • Rectal hemorrhage 07/01/2015   • Scapulalgia 10/24/2014   • Sialoadenitis 07/26/2013   • Ulcerative colitis (HCC) 04/11/2016    chronic, for 29 years.  flare   • Urinary tract infectious disease 07/14/2012   • Vitamin D deficiency 05/14/2012     Past Surgical History:   Procedure Laterality Date   • BLADDER SURGERY  2001    bladder tacking x 3   • COLONOSCOPY  07/25/2011    Colitis found in rectum & sigmoid colon. Biopsy taken   • COLONOSCOPY  06/15/2016    Erythematous mucosa in the rectum.Biopsied.One polyp in the transverse colon. Biopsied   • COLONOSCOPY  08/15/2008    Colitis of the rectum, the sigmoid and the transverse colon. Multiple biopsies were obtained from the rectum, the sigmoid and the transverse colon. Multiple biopsies were obtained from the cecum, the transverse colon, sigmoid & rectum   • COLONOSCOPY N/A 3/4/2019    Procedure: COLONOSCOPY;  Surgeon: Henok Dixon MD;  Location: [facility] ENDOSCOPY;  Service: Gastroenterology   • EXCISION LESION  10/17/1969    removal of sebaceous cyst of neck   • JOINT REPLACEMENT Left    • KNEE ARTHROSCOPY  02/21/2005    Tears of the meidal and lateral meniscus and osteoarthritis of the knee. Arthroscopic medial and lateral meniscectomies of the right knee with chondroplasty of the medial, lateral femoral condyles and patella   • LYMPH NODE BIOPSY  05/12/2009    [place] lymph node biopsy, superficial and deep, within the right groin involving superficial femoral nodes and also the external iliac nodes. Melanoma of the right leg   • [place] TOTAL KNEE ARTHROPLASTY Right 10/16/2017    Procedure: TOTAL KNEE ARTHROPLASTY;  Surgeon: Yury Marion MD;  Location: [facility] OR;  Service: Orthopedics   • SIGMOIDOSCOPY N/A 5/22/2020    Procedure: FLEXIBLE SIGMOIDOSCOPY WITH BIOPSY--enemas on call to endoscopy--URGENT!!!;  Surgeon: Joseph Doss MD;  Location: [facility] ENDOSCOPY;  Service: Gastroenterology;  Laterality: N/A;   •  TOTAL ABDOMINAL HYSTERECTOMY WITH SALPINGO OOPHORECTOMY     • TOTAL KNEE ARTHROPLASTY  2007    severe osteoarthritis of the left knee.     • WRIST GANGLION EXCISION  10/17/1969    left wrist     Family History   Problem Relation Age of Onset   • Coronary artery disease Other    • Hypertension Other    • Cancer Father    • Heart disease Father    • Cancer Brother    • Ulcerative colitis Maternal Grandmother      OB History        1    Para   1    Term   1            AB        Living           SAB        IAB        Ectopic        Molar        Multiple        Live Births                  Allergies   Allergen Reactions   • Other      Garlic diarrhea/heartburn   • Keflex [Cephalexin] Rash   • Penicillins Rash     nylon   • Tape Rash     Nylon And Silk Tape - Swelling     Social History     Socioeconomic History   • Marital status:    Tobacco Use   • Smoking status: Never Smoker   • Smokeless tobacco: Never Used   Vaping Use   • Vaping Use: Never used   Substance and Sexual Activity   • Alcohol use: No   • Drug use: No   • Sexual activity: Defer     Current Medications:  Prior to Admission medications    Medication Sig Start Date End Date Taking? Authorizing Provider   acetaminophen (TYLENOL) 500 MG tablet Take 1,000 mg by mouth Every 6 (Six) Hours As Needed for Mild Pain .   Yes Ernesto Delgado MD   balsalazide (Colazal) 750 MG capsule Take 3 capsules by mouth 2 (two) times a day.  Patient taking differently: Take 750 mg by mouth Daily. 21  Yes Joao Arnett PA-C   Ca Phosphate-Cholecalciferol (CALCIUM/VITAMIN D3 GUMMIES PO) Take  by mouth Daily.   Yes Ernesto Delgado MD   ferrous sulfate 325 (65 FE) MG tablet Take 325 mg by mouth Daily With Breakfast.   Yes Ernesto Delgado MD   nystatin-triamcinolone (MYCOLOG II) 762668-8.1 UNIT/GM-% cream APPLY TOPICALLY 2 TIMES DAILY SPARINGLY TO AFFECTED AREA. 21  Yes Ernesto Delgado MD   Procto-Med HC 2.5 % rectal  "cream PLACE RECTALLY 3 TIMES DAILY FOR 30 DAYS 5/24/21  Yes Provider, Ernesto, MD     Review of Systems  Review of Systems       Objective    /69   Pulse 92   Ht 162.6 cm (64\")   Wt 129 kg (283 lb 9.6 oz)   LMP  (LMP Unknown)   BMI 48.68 kg/m²   Physical Exam  Vitals and nursing note reviewed.   Constitutional:       General: She is not in acute distress.     Appearance: She is well-developed. She is obese.   HENT:      Head: Normocephalic and atraumatic.   Eyes:      Pupils: Pupils are equal, round, and reactive to light.   Cardiovascular:      Rate and Rhythm: Normal rate and regular rhythm.      Heart sounds: Normal heart sounds.   Pulmonary:      Effort: Pulmonary effort is normal.      Breath sounds: Normal breath sounds.   Abdominal:      General: Bowel sounds are normal. There is no distension or abdominal bruit.      Palpations: Abdomen is soft. Abdomen is not rigid. There is no shifting dullness or mass.      Tenderness: There is no abdominal tenderness. There is no guarding or rebound.      Hernia: No hernia is present. There is no hernia in the ventral area.   Musculoskeletal:         General: Normal range of motion.      Cervical back: Normal range of motion.   Skin:     General: Skin is warm and dry.   Neurological:      Mental Status: She is alert and oriented to person, place, and time.   Psychiatric:         Behavior: Behavior normal.         Thought Content: Thought content normal.         Judgment: Judgment normal.       Assessment/Plan      1. Other ulcerative colitis without complication (HCC)    2. Chronic abdominal pain    3. Gastroesophageal reflux disease with esophagitis without hemorrhage    .   Diagnoses and all orders for this visit:    1. Other ulcerative colitis without complication (HCC) (Primary)  -     Case Request; Standing  -     COVID PRE-OP / PRE-PROCEDURE SCREENING ORDER (NO ISOLATION) - Swab, Nasopharynx; Future  -     Case Request    2. Chronic abdominal " pain    3. Gastroesophageal reflux disease with esophagitis without hemorrhage    Other orders  -     Follow Anesthesia Guidelines / Standing Orders; Future  -     Obtain Informed Consent; Future  -     sodium-potassium-magnesium sulfates (Suprep Bowel Prep Kit) 17.5-3.13-1.6 GM/177ML solution oral solution; As directed per instruction sheet for colonoscopy  Dispense: 354 mL; Refill: 0  -     Procto-Med HC 2.5 % rectal cream; Insert  into the rectum 2 (Two) Times a Day As Needed for Hemorrhoids.  Dispense: 28 g; Refill: 3        Orders placed during this encounter include:  Orders Placed This Encounter   Procedures   • COVID PRE-OP / PRE-PROCEDURE SCREENING ORDER (NO ISOLATION) - Swab, Nasopharynx     Standing Status:   Future     Standing Expiration Date:   1/31/2023     Order Specific Question:   Release to patient     Answer:   Immediate     Order Specific Question:   Please select your location:     Answer:   [facility]     Order Specific Question:   COVID Screening Order:     Answer:   In-House: PRE-OP: BD MAX, 8-10 HR TAT [[id number]]     Order Specific Question:   Previously tested for COVID-19?     Answer:   Yes     Order Specific Question:   Employed in healthcare setting?     Answer:   No     Order Specific Question:   Symptomatic for COVID-19 as defined by CDC?     Answer:   No     Order Specific Question:   Hospitalized for COVID-19?     Answer:   No     Order Specific Question:   Admitted to ICU for COVID-19?     Answer:   No     Order Specific Question:   Resident in a congregate (group) care setting?     Answer:   No     Order Specific Question:   Pregnant?     Answer:   No   • Follow Anesthesia Guidelines / Standing Orders     Standing Status:   Future   • Obtain Informed Consent     Standing Status:   Future     Order Specific Question:   Informed Consent Given For     Answer:   COLONOSCOPY       Medications prescribed:  New Medications Ordered This Visit   Medications   •  sodium-potassium-magnesium sulfates (Suprep Bowel Prep Kit) 17.5-3.13-1.6 GM/177ML solution oral solution     Sig: As directed per instruction sheet for colonoscopy     Dispense:  354 mL     Refill:  0   • Procto-Med HC 2.5 % rectal cream     Sig: Insert  into the rectum 2 (Two) Times a Day As Needed for Hemorrhoids.     Dispense:  28 g     Refill:  3       Requested Prescriptions     Signed Prescriptions Disp Refills   • sodium-potassium-magnesium sulfates (Suprep Bowel Prep Kit) 17.5-3.13-1.6 GM/177ML solution oral solution 354 mL 0     Sig: As directed per instruction sheet for colonoscopy   • Procto-Med HC 2.5 % rectal cream 28 g 3     Sig: Insert  into the rectum 2 (Two) Times a Day As Needed for Hemorrhoids.       Review and/or summary of lab tests, radiology, procedures, medications. Review and summary of old records and obtaining of history. The risks and benefits of my recommendations, as well as other treatment options were discussed with the patient today. Questions were answered.    Follow-up: Return in about 4 months (around 5/31/2022), or if symptoms worsen or fail to improve, for colon prior.     COLONOSCOPY--bx---A Monday (N/A)      This document has been electronically signed by Joao Arnett PA-C on January 31, 2022 12:36 CST      Results for orders placed or performed in visit on 09/23/21   CBC Auto Differential    Specimen: Blood   Result Value Ref Range    WBC 6.27 3.40 - 10.80 10*3/mm3    RBC 4.11 3.77 - 5.28 10*6/mm3    Hemoglobin 12.8 12.0 - 15.9 g/dL    Hematocrit 38.4 34.0 - 46.6 %    MCV 93.4 79.0 - 97.0 fL    MCH 31.1 26.6 - 33.0 pg    MCHC 33.3 31.5 - 35.7 g/dL    RDW 12.0 (L) 12.3 - 15.4 %    RDW-SD 40.7 37.0 - 54.0 fl    MPV 9.3 6.0 - 12.0 fL    Platelets 242 140 - 450 10*3/mm3    Neutrophil % 64.5 42.7 - 76.0 %    Lymphocyte % 26.6 19.6 - 45.3 %    Monocyte % 6.1 5.0 - 12.0 %    Eosinophil % 1.6 0.3 - 6.2 %    Basophil % 0.6 0.0 - 1.5 %    Immature Grans % 0.6 (H) 0.0 - 0.5 %     Neutrophils, Absolute 4.04 1.70 - 7.00 10*3/mm3    Lymphocytes, Absolute 1.67 0.70 - 3.10 10*3/mm3    Monocytes, Absolute 0.38 0.10 - 0.90 10*3/mm3    Eosinophils, Absolute 0.10 0.00 - 0.40 10*3/mm3    Basophils, Absolute 0.04 0.00 - 0.20 10*3/mm3    Immature Grans, Absolute 0.04 0.00 - 0.05 10*3/mm3    nRBC 0.0 0.0 - 0.2 /100 WBC   Comprehensive Metabolic Panel    Specimen: Blood   Result Value Ref Range    Glucose 90 65 - 99 mg/dL    BUN 12 8 - 23 mg/dL    Creatinine 0.61 0.57 - 1.00 mg/dL    Sodium 140 136 - 145 mmol/L    Potassium 4.3 3.5 - 5.2 mmol/L    Chloride 103 98 - 107 mmol/L    CO2 26.1 22.0 - 29.0 mmol/L    Calcium 9.4 8.6 - 10.5 mg/dL    Total Protein 6.7 6.0 - 8.5 g/dL    Albumin 4.20 3.50 - 5.20 g/dL    ALT (SGPT) 17 1 - 33 U/L    AST (SGOT) 17 1 - 32 U/L    Alkaline Phosphatase 102 39 - 117 U/L    Total Bilirubin 0.4 0.0 - 1.2 mg/dL    eGFR Non African Amer 99 >60 mL/min/1.73    Globulin 2.5 gm/dL    A/G Ratio 1.7 g/dL    BUN/Creatinine Ratio 19.7 7.0 - 25.0    Anion Gap 10.9 5.0 - 15.0 mmol/L   Results for orders placed or performed during the hospital encounter of 08/02/21   COVID-19,  [facility] IN-HOUSE, NP SWAB IN TRANSPORT MEDIA 8-10 HR TAT - Swab, Anterior nasal    Specimen: Anterior nasal; Swab   Result Value Ref Range    COVID19 Not Detected Not Detected - Ref. Range   Results for orders placed or performed in visit on 04/12/21   CBC Auto Differential    Specimen: Blood   Result Value Ref Range    WBC 7.69 3.40 - 10.80 10*3/mm3    RBC 4.53 3.77 - 5.28 10*6/mm3    Hemoglobin 13.9 12.0 - 15.9 g/dL    Hematocrit 42.7 34.0 - 46.6 %    MCV 94.3 79.0 - 97.0 fL    MCH 30.7 26.6 - 33.0 pg    MCHC 32.6 31.5 - 35.7 g/dL    RDW 12.3 12.3 - 15.4 %    RDW-SD 42.3 37.0 - 54.0 fl    MPV 8.7 6.0 - 12.0 fL    Platelets 238 140 - 450 10*3/mm3    Neutrophil % 69.4 42.7 - 76.0 %    Lymphocyte % 21.3 19.6 - 45.3 %    Monocyte % 7.4 5.0 - 12.0 %    Eosinophil % 0.9 0.3 - 6.2 %    Basophil % 0.7 0.0 - 1.5 %     Immature Grans % 0.3 0.0 - 0.5 %    Neutrophils, Absolute 5.34 1.70 - 7.00 10*3/mm3    Lymphocytes, Absolute 1.64 0.70 - 3.10 10*3/mm3    Monocytes, Absolute 0.57 0.10 - 0.90 10*3/mm3    Eosinophils, Absolute 0.07 0.00 - 0.40 10*3/mm3    Basophils, Absolute 0.05 0.00 - 0.20 10*3/mm3    Immature Grans, Absolute 0.02 0.00 - 0.05 10*3/mm3    nRBC 0.0 0.0 - 0.2 /100 WBC   Comprehensive Metabolic Panel    Specimen: Blood   Result Value Ref Range    Glucose 74 65 - 99 mg/dL    BUN 15 8 - 23 mg/dL    Creatinine 0.70 0.57 - 1.00 mg/dL    Sodium 138 136 - 145 mmol/L    Potassium 3.6 3.5 - 5.2 mmol/L    Chloride 101 98 - 107 mmol/L    CO2 27.1 22.0 - 29.0 mmol/L    Calcium 9.3 8.6 - 10.5 mg/dL    Total Protein 6.7 6.0 - 8.5 g/dL    Albumin 4.20 3.50 - 5.20 g/dL    ALT (SGPT) 17 1 - 33 U/L    AST (SGOT) 16 1 - 32 U/L    Alkaline Phosphatase 108 39 - 117 U/L    Total Bilirubin 0.4 0.0 - 1.2 mg/dL    eGFR Non African Amer 85 >60 mL/min/1.73    Globulin 2.5 gm/dL    A/G Ratio 1.7 g/dL    BUN/Creatinine Ratio 21.4 7.0 - 25.0    Anion Gap 9.9 5.0 - 15.0 mmol/L   Results for orders placed or performed in visit on 10/01/20   Comprehensive Metabolic Panel    Specimen: Blood   Result Value Ref Range    Glucose 97 65 - 99 mg/dL    BUN 13 8 - 23 mg/dL    Creatinine 0.78 0.57 - 1.00 mg/dL    Sodium 138 136 - 145 mmol/L    Potassium 4.4 3.5 - 5.2 mmol/L    Chloride 102 98 - 107 mmol/L    CO2 25.0 22.0 - 29.0 mmol/L    Calcium 9.6 8.6 - 10.5 mg/dL    Total Protein 6.7 6.0 - 8.5 g/dL    Albumin 4.00 3.50 - 5.20 g/dL    ALT (SGPT) 31 1 - 33 U/L    AST (SGOT) 21 1 - 32 U/L    Alkaline Phosphatase 113 39 - 117 U/L    Total Bilirubin 0.6 0.0 - 1.2 mg/dL    eGFR Non African Amer 75 >60 mL/min/1.73    Globulin 2.7 gm/dL    A/G Ratio 1.5 g/dL    BUN/Creatinine Ratio 16.7 7.0 - 25.0    Anion Gap 11.0 5.0 - 15.0 mmol/L     *Note: Due to a large number of results and/or encounters for the requested time period, some results have not been displayed. A  complete set of results can be found in Results Review.

## 2022-02-24 RX ORDER — FAMOTIDINE 20 MG/1
20 TABLET, FILM COATED ORAL DAILY
COMMUNITY
End: 2022-05-09

## 2022-02-28 ENCOUNTER — HOSPITAL ENCOUNTER (OUTPATIENT)
Facility: HOSPITAL | Age: 62
Setting detail: HOSPITAL OUTPATIENT SURGERY
Discharge: HOME | End: 2022-02-28
Attending: INTERNAL MEDICINE | Admitting: INTERNAL MEDICINE

## 2022-02-28 ENCOUNTER — ANESTHESIA EVENT (OUTPATIENT)
Dept: GASTROENTEROLOGY | Facility: HOSPITAL | Age: 62
End: 2022-02-28

## 2022-02-28 ENCOUNTER — ANESTHESIA (OUTPATIENT)
Dept: GASTROENTEROLOGY | Facility: HOSPITAL | Age: 62
End: 2022-02-28

## 2022-02-28 VITALS
BODY MASS INDEX: 46.9 KG/M2 | HEIGHT: 64 IN | OXYGEN SATURATION: 96 % | TEMPERATURE: 97.3 F | HEART RATE: 76 BPM | DIASTOLIC BLOOD PRESSURE: 59 MMHG | SYSTOLIC BLOOD PRESSURE: 131 MMHG | RESPIRATION RATE: 18 BRPM | WEIGHT: 274.69 LBS

## 2022-02-28 DIAGNOSIS — K51.80 OTHER ULCERATIVE COLITIS WITHOUT COMPLICATION: ICD-10-CM

## 2022-02-28 PROCEDURE — 25010000002 PROPOFOL 10 MG/ML EMULSION: Performed by: NURSE ANESTHETIST, CERTIFIED REGISTERED

## 2022-02-28 PROCEDURE — 45380 COLONOSCOPY AND BIOPSY: CPT | Performed by: INTERNAL MEDICINE

## 2022-02-28 RX ORDER — LIDOCAINE HYDROCHLORIDE 20 MG/ML
INJECTION, SOLUTION INTRAVENOUS AS NEEDED
Status: DISCONTINUED | OUTPATIENT
Start: 2022-02-28 | End: 2022-02-28 | Stop reason: SURG

## 2022-02-28 RX ORDER — DEXTROSE AND SODIUM CHLORIDE 5; .45 G/100ML; G/100ML
30 INJECTION, SOLUTION INTRAVENOUS CONTINUOUS PRN
Status: DISCONTINUED | OUTPATIENT
Start: 2022-02-28 | End: 2022-02-28 | Stop reason: HOSPADM

## 2022-02-28 RX ORDER — PROPOFOL 10 MG/ML
VIAL (ML) INTRAVENOUS AS NEEDED
Status: DISCONTINUED | OUTPATIENT
Start: 2022-02-28 | End: 2022-02-28 | Stop reason: SURG

## 2022-02-28 RX ADMIN — PROPOFOL 50 MG: 10 INJECTION, EMULSION INTRAVENOUS at 12:23

## 2022-02-28 RX ADMIN — LIDOCAINE HYDROCHLORIDE 60 MG: 20 INJECTION, SOLUTION INTRAVENOUS at 12:17

## 2022-02-28 RX ADMIN — PROPOFOL 30 MG: 10 INJECTION, EMULSION INTRAVENOUS at 12:25

## 2022-02-28 RX ADMIN — PROPOFOL 40 MG: 10 INJECTION, EMULSION INTRAVENOUS at 12:19

## 2022-02-28 RX ADMIN — PROPOFOL 30 MG: 10 INJECTION, EMULSION INTRAVENOUS at 12:28

## 2022-02-28 RX ADMIN — PROPOFOL 80 MG: 10 INJECTION, EMULSION INTRAVENOUS at 12:17

## 2022-02-28 RX ADMIN — DEXTROSE AND SODIUM CHLORIDE 30 ML/HR: 5; 450 INJECTION, SOLUTION INTRAVENOUS at 11:12

## 2022-02-28 RX ADMIN — PROPOFOL 50 MG: 10 INJECTION, EMULSION INTRAVENOUS at 12:21

## 2022-02-28 NOTE — ANESTHESIA POSTPROCEDURE EVALUATION
Patient: Tiffani Serra    Procedure Summary     Date: 02/28/22 Room / Location: [facility] ENDOSCOPY 1 / [facility] ENDOSCOPY    Anesthesia Start: 1215 Anesthesia Stop: 1231    Procedure: COLONOSCOPY--bx---A Monday (N/A ) Diagnosis:       Other ulcerative colitis without complication (HCC)      (Other ulcerative colitis without complication (HCC) [K51.80])    Surgeons: Henok Dixon MD Provider: Kala Londono CRNA    Anesthesia Type: MAC ASA Status: 3          Anesthesia Type: MAC    Vitals  No vitals data found for the desired time range.          Post Anesthesia Care and Evaluation    Patient location during evaluation: PACU  Patient participation: complete - patient participated  Level of consciousness: awake and alert  Pain management: adequate  Airway patency: patent  Anesthetic complications: No anesthetic complications  PONV Status: none  Cardiovascular status: acceptable  Respiratory status: acceptable  Hydration status: acceptable    Comments: ---------------------------               02/28/22                      1106         ---------------------------   BP:        (!) 182/112      Pulse:         79           Resp:          18           Temp:   97.7 °F (36.5 °C)   SpO2:          98%         ---------------------------

## 2022-03-02 LAB
LAB AP CASE REPORT: NORMAL
PATH REPORT.FINAL DX SPEC: NORMAL

## 2022-03-07 ENCOUNTER — OFFICE VISIT (OUTPATIENT)
Dept: GASTROENTEROLOGY | Facility: CLINIC | Age: 62
End: 2022-03-07

## 2022-03-07 VITALS
DIASTOLIC BLOOD PRESSURE: 67 MMHG | SYSTOLIC BLOOD PRESSURE: 128 MMHG | HEART RATE: 84 BPM | WEIGHT: 280.6 LBS | BODY MASS INDEX: 47.91 KG/M2 | HEIGHT: 64 IN

## 2022-03-07 DIAGNOSIS — K51.011 ULCERATIVE PANCOLITIS WITH RECTAL BLEEDING: Primary | ICD-10-CM

## 2022-03-07 DIAGNOSIS — R19.7 DIARRHEA, UNSPECIFIED TYPE: ICD-10-CM

## 2022-03-07 DIAGNOSIS — K21.00 GASTROESOPHAGEAL REFLUX DISEASE WITH ESOPHAGITIS WITHOUT HEMORRHAGE: ICD-10-CM

## 2022-03-07 DIAGNOSIS — K62.5 HEMORRHAGE OF ANUS AND RECTUM: ICD-10-CM

## 2022-03-07 PROCEDURE — 99214 OFFICE O/P EST MOD 30 MIN: CPT | Performed by: PHYSICIAN ASSISTANT

## 2022-03-07 RX ORDER — BALSALAZIDE DISODIUM 750 MG/1
2250 CAPSULE ORAL 2 TIMES DAILY
Qty: 180 CAPSULE | Refills: 0 | Status: SHIPPED | OUTPATIENT
Start: 2022-03-07 | End: 2022-08-11

## 2022-03-07 NOTE — PROGRESS NOTES
Chief Complaint   Patient presents with   • Ulcerative Colitis   • Heartburn   • Abdominal Pain       ENDO PROCEDURE ORDERED:    Subjective    Tiffani Serra is a 61 y.o. female. she is here today for follow-up.    History of Present Illness    Patient seen on a recheck of her ulcerative colitis, GERD, abdominal pain. Last seen 01/31/2022. Patient generally takes 2 of her Colazal daily. She was only taking 1 but recently she has had more bleeding and discomfort. She started a Probiotic. She thinks her nerves are bothering her. She has been having issues with a nephew that has to go to court. If she takes 3 a day she gets severe constipation but she has been able to tolerate the 2 a day currently. She is on Pepcid for chronic heartburn. Weight is down 3 pounds since last visit. Prior proctosigmoidoscopy 05/22/2020 showed a colitis up to the transverse colon. She had multiple polyps removed in 2019. No recent laboratory or radiographic studies.     Patient underwent colonoscopy on 02/28/2022, showed localized erythematous mucosa of the sigmoid colon, adequate prep. In the photograph she appears to have pseudopolyps. Rectal biopsy showed diffuse active colitis with erosion or ulceration, negative for dysplasia consistent with some clinical history of BABAR. Biopsy from the sigmoid, descending, transverse, ascending, and cecum were negative. Recommend a repeat colonoscopy at 1 year.     A/P: Patient with diffuse active colitis of the sigmoid, rectum, pseudopolyps in the photographs. She has had some increased bleeding. Will try to tolerate as much of the Colazal as she can. Unfortunately insurance will not cover more appropriate medications. Discussed the risks, benefits, complications to further evaluation and treatment. She would like to try the Colazal. I have asked her to try to go up to 3 a day if she can tolerate. Continue Probiotics, may have to give her something for the constipation. Will plan follow-up in a  few months, sooner if needed, further pending clinical course and the results of the above.        The following portions of the patient's history were reviewed and updated as appropriate:   Past Medical History:   Diagnosis Date   • Abdominal pain    • Abscess of labia    • Acute posthemorrhagic anemia 01/08/2015   • Anemia     history of, mild   • Anemia due to blood loss 11/20/2014   • Cancer (HCC)     cosmo/right leg mole   • Contact dermatitis 01/30/2013    on labia and surrounding regions   • Cystitis     recurrent   • Diarrhea 04/11/2016   • Foot pain     porokeratoma causin metatarsalgia, right foot   • Generalized abdominal pain 02/23/2015   • Hypercholesterolemia    • Hypertensive disorder    • Infection of skin and subcutaneous tissue 01/30/2013   • Iron deficiency anemia 04/11/2016    improving   • Irritable bowel syndrome    • Knee pain, right    • Left sided ulcerative colitis (HCC) 10/08/2015   • Malaise and fatigue 11/02/2011   • Obesity    • Pseudomembranous enterocolitis 11/02/2012   • Rectal hemorrhage 07/01/2015   • Scapulalgia 10/24/2014   • Sialoadenitis 07/26/2013   • Ulcerative colitis (HCC) 04/11/2016    chronic, for 29 years.  flare   • Urinary tract infectious disease 07/14/2012   • Vitamin D deficiency 05/14/2012     Past Surgical History:   Procedure Laterality Date   • BLADDER SURGERY  2001    bladder tacking x 3   • COLONOSCOPY  07/25/2011    Colitis found in rectum & sigmoid colon. Biopsy taken   • COLONOSCOPY  06/15/2016    Erythematous mucosa in the rectum.Biopsied.One polyp in the transverse colon. Biopsied   • COLONOSCOPY  08/15/2008    Colitis of the rectum, the sigmoid and the transverse colon. Multiple biopsies were obtained from the rectum, the sigmoid and the transverse colon. Multiple biopsies were obtained from the cecum, the transverse colon, sigmoid & rectum   • COLONOSCOPY N/A 3/4/2019    Procedure: COLONOSCOPY;  Surgeon: Henok Dixon MD;  Location: [facility] ENDOSCOPY;   Service: Gastroenterology   • COLONOSCOPY N/A 2022    Procedure: COLONOSCOPY--bx---A Monday;  Surgeon: Henok Dixon MD;  Location: [facility] ENDOSCOPY;  Service: Gastroenterology;  Laterality: N/A;   • EXCISION LESION  10/17/1969    removal of sebaceous cyst of neck   • JOINT REPLACEMENT Bilateral      and    • KNEE ARTHROSCOPY  2005    Tears of the meidal and lateral meniscus and osteoarthritis of the knee. Arthroscopic medial and lateral meniscectomies of the right knee with chondroplasty of the medial, lateral femoral condyles and patella   • LYMPH NODE BIOPSY  2009    [place] lymph node biopsy, superficial and deep, within the right groin involving superficial femoral nodes and also the external iliac nodes. Melanoma of the right leg   • AL TOTAL KNEE ARTHROPLASTY Right 10/16/2017    Procedure: TOTAL KNEE ARTHROPLASTY;  Surgeon: Yury Marion MD;  Location: [facility] OR;  Service: Orthopedics   • SIGMOIDOSCOPY N/A 2020    Procedure: FLEXIBLE SIGMOIDOSCOPY WITH BIOPSY--enemas on call to endoscopy--URGENT!!!;  Surgeon: Joseph Doss MD;  Location: [facility] ENDOSCOPY;  Service: Gastroenterology;  Laterality: N/A;   • TOTAL ABDOMINAL HYSTERECTOMY WITH SALPINGO OOPHORECTOMY     • TOTAL KNEE ARTHROPLASTY  2007    severe osteoarthritis of the left knee.     • WRIST GANGLION EXCISION  10/17/1969    left wrist     Family History   Problem Relation Age of Onset   • Coronary artery disease Other    • Hypertension Other    • Cancer Father    • Heart disease Father    • Cancer Brother    • Ulcerative colitis Maternal Grandmother      OB History        1    Para   1    Term   1            AB        Living           SAB        IAB        Ectopic        Molar        Multiple        Live Births                  Allergies   Allergen Reactions   • Other      Garlic diarrhea/heartburn   • Keflex [Cephalexin] Rash   • Penicillins Rash     nylon   • Tape Rash     Nylon And Silk  "Tape - Swelling     Social History     Socioeconomic History   • Marital status:    Tobacco Use   • Smoking status: Never Smoker   • Smokeless tobacco: Never Used   Vaping Use   • Vaping Use: Never used   Substance and Sexual Activity   • Alcohol use: No   • Drug use: No   • Sexual activity: Yes     Partners: Male     Comment:      Current Medications:  Prior to Admission medications    Medication Sig Start Date End Date Taking? Authorizing Provider   acetaminophen (TYLENOL) 500 MG tablet Take 1,000 mg by mouth Every 6 (Six) Hours As Needed for Mild Pain .   Yes Ernesto Delgado MD   balsalazide (Colazal) 750 MG capsule Take 3 capsules by mouth 2 (Two) Times a Day. 3/7/22  Yes Joao Arnett PA-C   Ca Phosphate-Cholecalciferol (CALCIUM/VITAMIN D3 GUMMIES PO) Take  by mouth Daily.   Yes Ernesto Delgado MD   famotidine (PEPCID) 20 MG tablet Take 20 mg by mouth Daily.   Yes Ernesto Delgado MD   ferrous sulfate 325 (65 FE) MG tablet Take 325 mg by mouth Daily With Breakfast.   Yes Ernesto Delgado MD   nystatin-triamcinolone (MYCOLOG II) 670471-0.1 UNIT/GM-% cream APPLY TOPICALLY 2 TIMES DAILY SPARINGLY TO AFFECTED AREA. 5/24/21  Yes Ernesto Delgado MD   Probiotic Product ([employer] ADV PROBIOTIC GUMMIES PO) Take 1 dose by mouth Daily.   Yes Ernesto Dlegado MD   Procto-Med HC 2.5 % rectal cream Insert  into the rectum 2 (Two) Times a Day As Needed for Hemorrhoids. 1/31/22  Yes Joao Arnett PA-C   balsalazide (Colazal) 750 MG capsule Take 3 capsules by mouth 2 (two) times a day.  Patient taking differently: Take 750 mg by mouth 2 (two) times a day. 2/8/21 3/7/22 Yes Joao Arnett PA-C     Review of Systems  Review of Systems       Objective    /67   Pulse 84   Ht 162.6 cm (64\")   Wt 127 kg (280 lb 9.6 oz)   LMP  (LMP Unknown)   BMI 48.16 kg/m²   Physical Exam  Vitals and nursing note reviewed.   Constitutional:       General: She is not in acute distress.   "   Appearance: She is well-developed. She is obese. She is ill-appearing.   HENT:      Head: Normocephalic and atraumatic.   Eyes:      Pupils: Pupils are equal, round, and reactive to light.   Cardiovascular:      Rate and Rhythm: Normal rate and regular rhythm.      Heart sounds: Normal heart sounds.   Pulmonary:      Effort: Pulmonary effort is normal.      Breath sounds: Normal breath sounds.   Abdominal:      General: Bowel sounds are normal. There is no distension or abdominal bruit.      Palpations: Abdomen is soft. Abdomen is not rigid. There is no shifting dullness or mass.      Tenderness: There is abdominal tenderness. There is no guarding or rebound.      Hernia: No hernia is present. There is no hernia in the ventral area.   Musculoskeletal:         General: Normal range of motion.      Cervical back: Normal range of motion.   Skin:     General: Skin is warm and dry.   Neurological:      Mental Status: She is alert and oriented to person, place, and time.   Psychiatric:         Behavior: Behavior normal.         Thought Content: Thought content normal.         Judgment: Judgment normal.       Assessment/Plan      1. Ulcerative pancolitis with rectal bleeding (HCC)    2. Gastroesophageal reflux disease with esophagitis without hemorrhage    3. Diarrhea, unspecified type    4. Hemorrhage of anus and rectum    .   Diagnoses and all orders for this visit:    1. Ulcerative pancolitis with rectal bleeding (HCC) (Primary)    2. Gastroesophageal reflux disease with esophagitis without hemorrhage    3. Diarrhea, unspecified type    4. Hemorrhage of anus and rectum    Other orders  -     balsalazide (Colazal) 750 MG capsule; Take 3 capsules by mouth 2 (Two) Times a Day.  Dispense: 180 capsule; Refill: 0        Orders placed during this encounter include:  No orders of the defined types were placed in this encounter.      Medications prescribed:  New Medications Ordered This Visit   Medications   • balsalazide  (Colazal) 750 MG capsule     Sig: Take 3 capsules by mouth 2 (Two) Times a Day.     Dispense:  180 capsule     Refill:  0       Requested Prescriptions     Signed Prescriptions Disp Refills   • balsalazide (Colazal) 750 MG capsule 180 capsule 0     Sig: Take 3 capsules by mouth 2 (Two) Times a Day.       Review and/or summary of lab tests, radiology, procedures, medications. Review and summary of old records and obtaining of history. The risks and benefits of my recommendations, as well as other treatment options were discussed with the patient today. Questions were answered.    Follow-up: Return in about 2 months (around 5/7/2022), or if symptoms worsen or fail to improve.     * Surgery not found *      This document has been electronically signed by Joao Arnett PA-C on March 16, 2022 16:48 CDT      Results for orders placed or performed during the hospital encounter of 02/28/22   Tissue Pathology Exam    Specimen: A: Large Intestine, Cecum; Tissue    B: Large Intestine, Right / Ascending Colon; Tissue    C: Large Intestine, Transverse Colon; Tissue    D: Large Intestine, Left / Descending Colon; Tissue    E: Large Intestine, Sigmoid Colon; Tissue    F: Large Intestine, Rectum; Tissue   Result Value Ref Range    Case Report       Surgical Pathology Report                         Case: [id number]                                  Authorizing Provider:  Henok Dixon MD        Collected:           02/28/2022 12:31 PM          Ordering Location:     [facility]   Received:            02/28/2022 01:57 PM                                 [facility]                                                     Pathologist:           Darci Cardenas MD                                                           Specimens:   1) - Large Intestine, Cecum, JA                                                                     2) - Large Intestine, Right / Ascending Colon, JA                                                    3) - Large Intestine, Transverse Colon, JA                                                          4) - Large Intestine, Left / Descending Colon, JA                                                   5) - Large Intestine, Sigmoid Colon, JA                                                              6) - Large Intestine, Rectum, JA                                                           Final Diagnosis       See Scanned Report       Results for orders placed or performed in visit on 09/23/21   CBC Auto Differential    Specimen: Blood   Result Value Ref Range    WBC 6.27 3.40 - 10.80 10*3/mm3    RBC 4.11 3.77 - 5.28 10*6/mm3    Hemoglobin 12.8 12.0 - 15.9 g/dL    Hematocrit 38.4 34.0 - 46.6 %    MCV 93.4 79.0 - 97.0 fL    MCH 31.1 26.6 - 33.0 pg    MCHC 33.3 31.5 - 35.7 g/dL    RDW 12.0 (L) 12.3 - 15.4 %    RDW-SD 40.7 37.0 - 54.0 fl    MPV 9.3 6.0 - 12.0 fL    Platelets 242 140 - 450 10*3/mm3    Neutrophil % 64.5 42.7 - 76.0 %    Lymphocyte % 26.6 19.6 - 45.3 %    Monocyte % 6.1 5.0 - 12.0 %    Eosinophil % 1.6 0.3 - 6.2 %    Basophil % 0.6 0.0 - 1.5 %    Immature Grans % 0.6 (H) 0.0 - 0.5 %    Neutrophils, Absolute 4.04 1.70 - 7.00 10*3/mm3    Lymphocytes, Absolute 1.67 0.70 - 3.10 10*3/mm3    Monocytes, Absolute 0.38 0.10 - 0.90 10*3/mm3    Eosinophils, Absolute 0.10 0.00 - 0.40 10*3/mm3    Basophils, Absolute 0.04 0.00 - 0.20 10*3/mm3    Immature Grans, Absolute 0.04 0.00 - 0.05 10*3/mm3    nRBC 0.0 0.0 - 0.2 /100 WBC   Comprehensive Metabolic Panel    Specimen: Blood   Result Value Ref Range    Glucose 90 65 - 99 mg/dL    BUN 12 8 - 23 mg/dL    Creatinine 0.61 0.57 - 1.00 mg/dL    Sodium 140 136 - 145 mmol/L    Potassium 4.3 3.5 - 5.2 mmol/L    Chloride 103 98 - 107 mmol/L    CO2 26.1 22.0 - 29.0 mmol/L    Calcium 9.4 8.6 - 10.5 mg/dL    Total Protein 6.7 6.0 - 8.5 g/dL    Albumin 4.20 3.50 - 5.20 g/dL    ALT (SGPT) 17 1 - 33 U/L    AST (SGOT) 17 1 - 32 U/L    Alkaline Phosphatase 102 39 -  117 U/L    Total Bilirubin 0.4 0.0 - 1.2 mg/dL    eGFR Non African Amer 99 >60 mL/min/1.73    Globulin 2.5 gm/dL    A/G Ratio 1.7 g/dL    BUN/Creatinine Ratio 19.7 7.0 - 25.0    Anion Gap 10.9 5.0 - 15.0 mmol/L   Results for orders placed or performed during the hospital encounter of 08/02/21   COVID-19, [facility] IN-HOUSE, NP SWAB IN TRANSPORT MEDIA 8-10 HR TAT - Swab, Anterior nasal    Specimen: Anterior nasal; Swab   Result Value Ref Range    COVID19 Not Detected Not Detected - Ref. Range   Results for orders placed or performed in visit on 04/12/21   CBC Auto Differential    Specimen: Blood   Result Value Ref Range    WBC 7.69 3.40 - 10.80 10*3/mm3    RBC 4.53 3.77 - 5.28 10*6/mm3    Hemoglobin 13.9 12.0 - 15.9 g/dL    Hematocrit 42.7 34.0 - 46.6 %    MCV 94.3 79.0 - 97.0 fL    MCH 30.7 26.6 - 33.0 pg    MCHC 32.6 31.5 - 35.7 g/dL    RDW 12.3 12.3 - 15.4 %    RDW-SD 42.3 37.0 - 54.0 fl    MPV 8.7 6.0 - 12.0 fL    Platelets 238 140 - 450 10*3/mm3    Neutrophil % 69.4 42.7 - 76.0 %    Lymphocyte % 21.3 19.6 - 45.3 %    Monocyte % 7.4 5.0 - 12.0 %    Eosinophil % 0.9 0.3 - 6.2 %    Basophil % 0.7 0.0 - 1.5 %    Immature Grans % 0.3 0.0 - 0.5 %    Neutrophils, Absolute 5.34 1.70 - 7.00 10*3/mm3    Lymphocytes, Absolute 1.64 0.70 - 3.10 10*3/mm3    Monocytes, Absolute 0.57 0.10 - 0.90 10*3/mm3    Eosinophils, Absolute 0.07 0.00 - 0.40 10*3/mm3    Basophils, Absolute 0.05 0.00 - 0.20 10*3/mm3    Immature Grans, Absolute 0.02 0.00 - 0.05 10*3/mm3    nRBC 0.0 0.0 - 0.2 /100 WBC   Comprehensive Metabolic Panel    Specimen: Blood   Result Value Ref Range    Glucose 74 65 - 99 mg/dL    BUN 15 8 - 23 mg/dL    Creatinine 0.70 0.57 - 1.00 mg/dL    Sodium 138 136 - 145 mmol/L    Potassium 3.6 3.5 - 5.2 mmol/L    Chloride 101 98 - 107 mmol/L    CO2 27.1 22.0 - 29.0 mmol/L    Calcium 9.3 8.6 - 10.5 mg/dL    Total Protein 6.7 6.0 - 8.5 g/dL    Albumin 4.20 3.50 - 5.20 g/dL    ALT (SGPT) 17 1 - 33 U/L    AST (SGOT) 16 1 - 32 U/L     Alkaline Phosphatase 108 39 - 117 U/L    Total Bilirubin 0.4 0.0 - 1.2 mg/dL    eGFR Non African Amer 85 >60 mL/min/1.73    Globulin 2.5 gm/dL    A/G Ratio 1.7 g/dL    BUN/Creatinine Ratio 21.4 7.0 - 25.0    Anion Gap 9.9 5.0 - 15.0 mmol/L   Results for orders placed or performed in visit on 10/01/20   Comprehensive Metabolic Panel    Specimen: Blood   Result Value Ref Range    Glucose 97 65 - 99 mg/dL    BUN 13 8 - 23 mg/dL    Creatinine 0.78 0.57 - 1.00 mg/dL    Sodium 138 136 - 145 mmol/L    Potassium 4.4 3.5 - 5.2 mmol/L    Chloride 102 98 - 107 mmol/L    CO2 25.0 22.0 - 29.0 mmol/L    Calcium 9.6 8.6 - 10.5 mg/dL    Total Protein 6.7 6.0 - 8.5 g/dL    Albumin 4.00 3.50 - 5.20 g/dL    ALT (SGPT) 31 1 - 33 U/L    AST (SGOT) 21 1 - 32 U/L    Alkaline Phosphatase 113 39 - 117 U/L    Total Bilirubin 0.6 0.0 - 1.2 mg/dL    eGFR Non African Amer 75 >60 mL/min/1.73    Globulin 2.7 gm/dL    A/G Ratio 1.5 g/dL    BUN/Creatinine Ratio 16.7 7.0 - 25.0    Anion Gap 11.0 5.0 - 15.0 mmol/L     *Note: Due to a large number of results and/or encounters for the requested time period, some results have not been displayed. A complete set of results can be found in Results Review.

## 2022-03-07 NOTE — EXTERNAL PATIENT INSTRUCTIONS
"Patient Education   Table of Contents       BMI for Adults       MyPlate from [employer]     To view videos and all your education online visit,   [email or web address]   or scan this QR code with your smartphone.                  BMI for Adults     What is BMI?   Body mass index (BMI) is a number that is calculated from a person's weight and height. BMI can help estimate how much of a person's weight is composed of fat. BMI does not measure body fat directly. Rather, it is an alternative to procedures that directly measure body fat, which can be difficult and expensive.   BMI can help identify people who may be at higher risk for certain medical problems.   What are BMI measurements used for?   BMI is used as a screening tool to identify possible weight problems. It helps determine whether a person is obese, overweight, a healthy weight, or underweight.    BMI is useful for:       Identifying a weight problem that may be related to a medical condition or may increase the risk for medical problems.       Promoting changes, such as changes in diet and exercise, to help reach a healthy weight. BMI screening can be repeated to see if these changes are working.     How is BMI calculated?   BMI involves measuring your weight in relation to your height. Both height and weight are measured, and the BMI is calculated from those numbers. This can be done either in English ([place] or metric measurements. Note that charts and online BMI calculators are available to help you find your BMI quickly and easily without having to do these calculations yourself.   To calculate your BMI in English ([place] measurements:          Measure your weight in pounds (lb).      Multiply the number of pounds by 703.       For example, for a person who weighs 180 lb, multiply that number by 703, which equals 126,540.      Measure your height in inches. Then multiply that number by itself to get a measurement called \"inches squared.\"       For " "example, for a person who is 70 inches tall, the \"inches squared\" measurement is 70 inches x 70 inches, which equals 4,900 inches squared.       Divide the total from step 2 (number of lb x 703) by the total from step 3 (inches squared): 126,540 ? 4,900 = 25.8. This is your BMI.   To calculate your BMI in metric measurements:       Measure your weight in kilograms (kg).      Measure your height in meters (m). Then multiply that number by itself to get a measurement called \"meters squared.\"       For example, for a person who is 1.75 m tall, the \"meters squared\" measurement is 1.75 m x 1.75 m, which is equal to 3.1 meters squared.       Divide the number of kilograms (your weight) by the meters squared number. In this example: 70 ? 3.1 = 22.6. This is your BMI.     What do the results mean?    BMI charts are used to identify whether you are underweight, normal weight, overweight, or obese. The following guidelines will be used:       Underweight: BMI less than 18.5.       Normal weight: BMI between 18.5 and 24.9.       Overweight: BMI between 25 and 29.9.       Obese: BMI of 30 or above.      Keep these notes in mind:       Weight includes both fat and muscle, so someone with a muscular build, such as an athlete, may have a BMI that is higher than 24.9. In cases like these, BMI is not an accurate measure of body fat.       To determine if excess body fat is the cause of a BMI of 25 or higher, further assessments may need to be done by a health care provider.       BMI is usually interpreted in the same way for men and women.     Where to find more information    For more information about BMI, including tools to quickly calculate your BMI, go to these websites:       Centers for Disease Control and Prevention: www.cdc.gov         American Heart Association: www.heart.org         National Heart, Lung, and Blood [employer]: www.nhlbi.nih.gov       Summary         Body mass index (BMI) is a number that is calculated from " a person's weight and height.       BMI may help estimate how much of a person's weight is composed of fat. BMI can help identify those who may be at higher risk for certain medical problems.       BMI can be measured using English measurements or metric measurements.       BMI charts are used to identify whether you are underweight, normal weight, overweight, or obese.     This information is not intended to replace advice given to you by your health care provider. Make sure you discuss any questions you have with your health care provider.     Document Released: 08/29/2005Document Revised: 09/09/2020Document Reviewed: 07/17/2020     [employer] Patient Education ? 2021 [employer]         MyPlate from [employer]        MyPlate is an outline of a general healthy diet based on the 2010 Dietary Guidelines for Americans, from the [place] Department of Agriculture (USDA). It sets guidelines for how much food you should eat from each food group based on your age, sex, and level of physical activity.     What are tips for following MyPlate?    To follow MyPlate recommendations:       Eat a wide variety of fruits and vegetables, grains, and protein foods.       Serve smaller portions and eat less food throughout the day.       Limit portion sizes to avoid overeating.       Enjoy your food.       Get at least 150 minutes of exercise every week. This is about 30 minutes each day, 5 or more days per week.     It can be difficult to have every meal look like MyPlate. Think about MyPlate as eating guidelines for an entire day, rather than each individual meal.   Fruits and vegetables         Make half of your plate fruits and vegetables.       Eat many different colors of fruits and vegetables each day.      For a 2,000 calorie daily food plan, eat:       2? cups of vegetables every day.       2 cups of fruit every day.      1 cup is equal to:       1 cup raw or cooked vegetables.       1 cup raw fruit.       1 medium-sized orange,  apple, or banana.       1 cup 100% fruit or vegetable juice.       2 cups raw leafy greens, such as lettuce, spinach, or kale.       ? cup dried fruit.     Grains         One fourth of your plate should be grains.       Make at least half of the grains you eat each day whole grains.       For a 2,000 calorie daily food plan, eat 6 oz of grains every day.      1 oz is equal to:       1 slice bread.       1 cup cereal.       ? cup cooked rice, cereal, or pasta.     Protein         One fourth of your plate should be protein.       Eat a wide variety of protein foods, including meat, poultry, fish, eggs, beans, nuts, and tofu.       For a 2,000 calorie daily food plan, eat 5? oz of protein every day.      1 oz is equal to:       1 oz meat, poultry, or fish.       ? cup cooked beans.       1 egg.       ? oz nuts or seeds.       1 Tbsp peanut butter.     Dairy         Drink fat-free or low-fat (1%) milk.       Eat or drink dairy as a side to meals.       For a 2,000 calorie daily food plan, eat or drink 3 cups of dairy every day.      1 cup is equal to:       1 cup milk, yogurt, cottage cheese, or soy milk (soy beverage).       2 oz processed cheese.       1? oz natural cheese.     Fats, oils, salt, and sugars         Only small amounts of oils are recommended.       Avoid foods that are high in calories and low in nutritional value (empty calories), like foods high in fat or added sugars.       Choose foods that are low in salt (sodium). Choose foods that have less than 140 milligrams (mg) of sodium per serving.       Drink water instead of sugary drinks. Drink enough water each day to keep your urine pale yellow.     Where to find support         Work with your health care provider or a nutrition specialist (dietitian) to develop a customized eating plan that is right for you.       Download an uli (mobile application) to help you track your daily food intake.     Where to find more information         Go to  ChooseMyPlate.gov    for more information.     Summary         MyPlate is a general guideline for healthy eating from the USDA. It is based on the 2010 Dietary Guidelines for Americans.       In general, fruits and vegetables should take up ? of your plate, grains should take up ? of your plate, and protein should take up ? of your plate.     This information is not intended to replace advice given to you by your health care provider. Make sure you discuss any questions you have with your health care provider.     Document Released: 01/06/2009Document Revised: 05/21/2020Document Reviewed: 03/19/2018     [employer] Patient Education ? 2021 [employer]

## 2022-05-09 ENCOUNTER — OFFICE VISIT (OUTPATIENT)
Dept: GASTROENTEROLOGY | Facility: CLINIC | Age: 62
End: 2022-05-09

## 2022-05-09 VITALS
SYSTOLIC BLOOD PRESSURE: 131 MMHG | WEIGHT: 286.6 LBS | DIASTOLIC BLOOD PRESSURE: 64 MMHG | HEIGHT: 67 IN | BODY MASS INDEX: 44.98 KG/M2 | HEART RATE: 88 BPM

## 2022-05-09 DIAGNOSIS — R10.9 CHRONIC ABDOMINAL PAIN: ICD-10-CM

## 2022-05-09 DIAGNOSIS — K51.011 ULCERATIVE PANCOLITIS WITH RECTAL BLEEDING: Primary | ICD-10-CM

## 2022-05-09 DIAGNOSIS — G89.29 CHRONIC ABDOMINAL PAIN: ICD-10-CM

## 2022-05-09 DIAGNOSIS — K21.00 GASTROESOPHAGEAL REFLUX DISEASE WITH ESOPHAGITIS WITHOUT HEMORRHAGE: ICD-10-CM

## 2022-05-09 PROCEDURE — 99213 OFFICE O/P EST LOW 20 MIN: CPT | Performed by: PHYSICIAN ASSISTANT

## 2022-05-09 RX ORDER — NAPROXEN SODIUM 220 MG
440 TABLET ORAL 2 TIMES DAILY PRN
COMMUNITY

## 2022-08-11 RX ORDER — BALSALAZIDE DISODIUM 750 MG/1
2250 CAPSULE ORAL 2 TIMES DAILY
Qty: 180 CAPSULE | Refills: 0 | Status: SHIPPED | OUTPATIENT
Start: 2022-08-11

## 2023-04-10 RX ORDER — HYDROCORTISONE 25 MG/G
CREAM TOPICAL
Qty: 30 G | Refills: 0 | Status: SHIPPED | OUTPATIENT
Start: 2023-04-10

## 2023-06-16 RX ORDER — BALSALAZIDE DISODIUM 750 MG/1
2250 CAPSULE ORAL 2 TIMES DAILY
Qty: 180 CAPSULE | Refills: 0 | Status: SHIPPED | OUTPATIENT
Start: 2023-06-16

## (undated) DEVICE — GLV SURG SENSICARE ALOE LF PF SZ7.5 GRN

## (undated) DEVICE — SYR LL TP 10ML STRL

## (undated) DEVICE — NDL HYPO SFTY GLD 22G 1 1/2IN

## (undated) DEVICE — BNDG ELAS CO-FLEX SLF ADHR 6IN 5YD LF STRL

## (undated) DEVICE — NDL HYPO PRECISIONGLIDE/REG 18G 1IN PNK

## (undated) DEVICE — SINGLE-USE BIOPSY FORCEPS: Brand: RADIAL JAW 4

## (undated) DEVICE — PK KN TOTL LF 60

## (undated) DEVICE — STRYKER PERFORMANCE SERIES SAGITTAL BLADE: Brand: STRYKER PERFORMANCE SERIES

## (undated) DEVICE — TRAY,CATHETER,URETHRAL,RED-RUBBER,15FR: Brand: MEDLINE

## (undated) DEVICE — GLV SURG TRIUMPH LT PF LTX 7 STRL

## (undated) DEVICE — SPNG LAP 18X18IN LF STRL PK/5

## (undated) DEVICE — SUT VIC 2 CP 27IN UD VCP195H

## (undated) DEVICE — ELECTRD BLD EXT EDGE/INSUL 6IN

## (undated) DEVICE — GLV SURG SENSICARE GREEN W/ALOE PF LF 7 STRL

## (undated) DEVICE — 3M™ STERI-DRAPE™ ISOLATION BAG, 10 PER CARTON / 4 CARTONS PER CASE, 1003: Brand: 3M™ STERI-DRAPE™

## (undated) DEVICE — GLV SURG SENSICARE GREEN W/ALOE PF LF 8 STRL

## (undated) DEVICE — APPL CHLORAPREP W/TINT 26ML ORNG

## (undated) DEVICE — SOL IRR NACL 0.9PCT BT 1000ML

## (undated) DEVICE — ATTUNE PINNING SYSTEM
Type: IMPLANTABLE DEVICE | Site: KNEE | Status: NON-FUNCTIONAL
Brand: ATTUNE
Removed: 2017-10-16

## (undated) DEVICE — DISPOSABLE TOURNIQUET CUFF SINGLE BLADDER, DUAL PORT AND QUICK CONNECT CONNECTOR: Brand: COLOR CUFF

## (undated) DEVICE — DRSNG TELFA PAD NONADH STR 1S 3X8IN

## (undated) DEVICE — STPLR SKIN VISISTAT WD 35CT

## (undated) DEVICE — DRSNG WND BORDR/ADHS NONADHR/GZ LF 4X14IN STRL

## (undated) DEVICE — GLV SURG SENSICARE GREEN W/ALOE PF LF 8.5 STRL

## (undated) DEVICE — SPNG DRN AMD EXCILON 6PLY 4X4IN PK/2

## (undated) DEVICE — UNDRPD BREATH 23X36 BG/10

## (undated) DEVICE — RECIPROCATING BLADE, DOUBLE SIDED, OFFSET  (70.0 X 1.0 X 12.5MM)

## (undated) DEVICE — NDL SPINE 22G 31/2IN BLK

## (undated) DEVICE — CANN SMPL SOFTECH BIFLO ETCO2 A/M 7FT

## (undated) DEVICE — GLV SURG TRIUMPH LT PF LTX 6 STRL

## (undated) DEVICE — GLV SURG TRIUMPH NATURAL W/ALOE PF LTX 8 STRL

## (undated) DEVICE — GLV SURG SENSICARE MICRO PF LF 7.5 STRL

## (undated) DEVICE — KT DRN EVAC WND PVC PCH WTROC RND 10F400

## (undated) DEVICE — INTENDED FOR TISSUE SEPARATION, AND OTHER PROCEDURES THAT REQUIRE A SHARP SURGICAL BLADE TO PUNCTURE OR CUT.: Brand: BARD-PARKER ® CARBON RIB-BACK BLADES

## (undated) DEVICE — GLV SURG TRIUMPH NATURAL W/ALOE PF LTX 7 STRL

## (undated) DEVICE — GLV SURG SENSICARE GREEN W/ALOE PF LF 6.5 STRL

## (undated) DEVICE — TB CULTURETT2 LIQ STUARTS RED

## (undated) DEVICE — GLV SURG TRIUMPH ORTHO W/ALOE PF LTX 8 STRL

## (undated) DEVICE — GOWN,AURORA,NOREINF,RAGLAN,XL,STERILE: Brand: MEDLINE

## (undated) DEVICE — HANDPIECE SET WITH COAXIAL HIGH FLOW TIP AND SUCTION TUBE: Brand: INTERPULSE

## (undated) DEVICE — HOOD, PEEL-AWAY: Brand: FLYTE

## (undated) DEVICE — DRSNG WND GZ CURAD OIL EMULSION 3X16IN LF STRL

## (undated) DEVICE — SHEET,DRAPE,53X77,STERILE: Brand: MEDLINE

## (undated) DEVICE — ANTIBACTERIAL UNDYED BRAIDED (POLYGLACTIN 910), SYNTHETIC ABSORBABLE SUTURE: Brand: COATED VICRYL

## (undated) DEVICE — GAUZE,SPONGE,FLUFF,6"X6.75",STRL,5/TRAY: Brand: MEDLINE

## (undated) DEVICE — 3 BONE CEMENT MIXER: Brand: MIXEVAC

## (undated) DEVICE — 3M™ IOBAN™ 2 ANTIMICROBIAL INCISE DRAPE 6651EZ: Brand: IOBAN™ 2

## (undated) DEVICE — SOL IRR NACL 0.9PCT 3000ML

## (undated) DEVICE — PEEL-AWAY TOGA, X-LARGE: Brand: FLYTE

## (undated) DEVICE — 3M™ STERI-STRIP™ REINFORCED ADHESIVE SKIN CLOSURES, R1547, 1/2 IN X 4 IN (12 MM X 100 MM), 6 STRIPS/ENVELOPE: Brand: 3M™ STERI-STRIP™

## (undated) DEVICE — GOWN,PREVENTION PLUS,XLNG/XXLARGE,STRL: Brand: MEDLINE

## (undated) DEVICE — GLV SURG TRIUMPH ORTHO W/ALOE PF LTX 7.5 STRL